# Patient Record
Sex: FEMALE | Race: WHITE | NOT HISPANIC OR LATINO | Employment: UNEMPLOYED | URBAN - NONMETROPOLITAN AREA
[De-identification: names, ages, dates, MRNs, and addresses within clinical notes are randomized per-mention and may not be internally consistent; named-entity substitution may affect disease eponyms.]

---

## 2017-01-17 RX ORDER — BUDESONIDE AND FORMOTEROL FUMARATE DIHYDRATE 160; 4.5 UG/1; UG/1
AEROSOL RESPIRATORY (INHALATION)
Qty: 1 INHALER | Refills: 5 | Status: SHIPPED | OUTPATIENT
Start: 2017-01-17 | End: 2018-02-02 | Stop reason: SDUPTHER

## 2017-01-17 RX ORDER — FEXOFENADINE HYDROCHLORIDE 60 MG/1
TABLET, FILM COATED ORAL
Qty: 60 TABLET | Refills: 5 | Status: SHIPPED | OUTPATIENT
Start: 2017-01-17 | End: 2017-07-27 | Stop reason: SDUPTHER

## 2017-01-17 RX ORDER — FLUTICASONE PROPIONATE 50 MCG
SPRAY, SUSPENSION (ML) NASAL
Qty: 16 G | Refills: 5 | Status: SHIPPED | OUTPATIENT
Start: 2017-01-17 | End: 2017-08-12 | Stop reason: SDUPTHER

## 2017-01-30 DIAGNOSIS — K86.1 CHRONIC PANCREATITIS, UNSPECIFIED PANCREATITIS TYPE (HCC): ICD-10-CM

## 2017-01-31 RX ORDER — PROMETHAZINE HYDROCHLORIDE 25 MG/1
TABLET ORAL
Qty: 90 TABLET | Refills: 0 | Status: SHIPPED | OUTPATIENT
Start: 2017-01-31 | End: 2017-03-30 | Stop reason: SDUPTHER

## 2017-02-07 ENCOUNTER — OFFICE VISIT (OUTPATIENT)
Dept: FAMILY MEDICINE CLINIC | Facility: CLINIC | Age: 57
End: 2017-02-07

## 2017-02-07 VITALS
SYSTOLIC BLOOD PRESSURE: 112 MMHG | BODY MASS INDEX: 27.31 KG/M2 | TEMPERATURE: 99.1 F | OXYGEN SATURATION: 98 % | DIASTOLIC BLOOD PRESSURE: 72 MMHG | HEART RATE: 80 BPM | WEIGHT: 160 LBS | HEIGHT: 64 IN

## 2017-02-07 DIAGNOSIS — R53.81 MALAISE AND FATIGUE: ICD-10-CM

## 2017-02-07 DIAGNOSIS — R53.83 MALAISE AND FATIGUE: ICD-10-CM

## 2017-02-07 DIAGNOSIS — R05.8 COUGH PRODUCTIVE OF PURULENT SPUTUM: Primary | ICD-10-CM

## 2017-02-07 DIAGNOSIS — R50.81 FEVER IN OTHER DISEASES: ICD-10-CM

## 2017-02-07 DIAGNOSIS — Z20.828 EXPOSURE TO THE FLU: ICD-10-CM

## 2017-02-07 DIAGNOSIS — G47.00 PERSISTENT INSOMNIA: ICD-10-CM

## 2017-02-07 DIAGNOSIS — J44.1 COPD EXACERBATION (HCC): ICD-10-CM

## 2017-02-07 LAB
EXPIRATION DATE: NORMAL
FLUAV AG NPH QL: NORMAL
FLUBV AG NPH QL: NORMAL
INTERNAL CONTROL: NORMAL
Lab: NORMAL

## 2017-02-07 PROCEDURE — 96372 THER/PROPH/DIAG INJ SC/IM: CPT | Performed by: FAMILY MEDICINE

## 2017-02-07 PROCEDURE — 87804 INFLUENZA ASSAY W/OPTIC: CPT | Performed by: FAMILY MEDICINE

## 2017-02-07 PROCEDURE — 99214 OFFICE O/P EST MOD 30 MIN: CPT | Performed by: FAMILY MEDICINE

## 2017-02-07 RX ORDER — AZITHROMYCIN 250 MG/1
TABLET, FILM COATED ORAL
Qty: 6 TABLET | Refills: 0 | Status: SHIPPED | OUTPATIENT
Start: 2017-02-07 | End: 2017-02-14

## 2017-02-07 RX ORDER — CEFTRIAXONE 1 G/1
1 INJECTION, POWDER, FOR SOLUTION INTRAMUSCULAR; INTRAVENOUS ONCE
Status: COMPLETED | OUTPATIENT
Start: 2017-02-07 | End: 2017-02-07

## 2017-02-07 RX ORDER — ERGOCALCIFEROL 1.25 MG/1
CAPSULE ORAL
Qty: 4 CAPSULE | Refills: 0 | Status: SHIPPED | OUTPATIENT
Start: 2017-02-07 | End: 2017-08-17 | Stop reason: SDUPTHER

## 2017-02-07 RX ORDER — METHYLPREDNISOLONE 4 MG/1
TABLET ORAL
Qty: 21 TABLET | Refills: 0 | Status: SHIPPED | OUTPATIENT
Start: 2017-02-07 | End: 2017-05-15

## 2017-02-07 RX ORDER — OSELTAMIVIR PHOSPHATE 75 MG/1
75 CAPSULE ORAL 2 TIMES DAILY
Qty: 10 CAPSULE | Refills: 0 | Status: SHIPPED | OUTPATIENT
Start: 2017-02-07 | End: 2017-02-14

## 2017-02-07 RX ORDER — ZOLPIDEM TARTRATE 10 MG/1
10 TABLET ORAL NIGHTLY PRN
Qty: 30 TABLET | Refills: 2 | Status: SHIPPED | OUTPATIENT
Start: 2017-02-07 | End: 2017-05-02 | Stop reason: SDUPTHER

## 2017-02-07 RX ADMIN — CEFTRIAXONE 1 G: 1 INJECTION, POWDER, FOR SOLUTION INTRAMUSCULAR; INTRAVENOUS at 12:28

## 2017-02-07 NOTE — PROGRESS NOTES
Subjective   Katelynn Sung is a 56 y.o. female.     Cough   This is a new problem. The current episode started in the past 7 days (approx 5 days). The problem has been rapidly worsening. The problem occurs every few minutes. The cough is productive of purulent sputum. Associated symptoms include chest pain (with cough), chills, ear congestion, a fever, headaches, myalgias, nasal congestion, postnasal drip, rhinorrhea, a sore throat, shortness of breath, sweats and wheezing. Pertinent negatives include no ear pain, eye redness, hemoptysis, rash or weight loss. The symptoms are aggravated by cold air, exercise and lying down. Risk factors for lung disease include smoking/tobacco exposure. She has tried rest and OTC cough suppressant for the symptoms. The treatment provided no relief. Her past medical history is significant for COPD and pneumonia.   Exposure to several family members with flu over past week.    During coughing spell, accidentally dropped ambien bottle into commode. Requesting refill. Occurred few days ago, barely any sleep since that time.    The following portions of the patient's history were reviewed and updated as appropriate: allergies, current medications, past family history, past medical history, past social history, past surgical history and problem list.    Review of Systems   Constitutional: Positive for appetite change, chills, fatigue and fever. Negative for weight loss.   HENT: Positive for congestion, postnasal drip, rhinorrhea and sore throat. Negative for ear pain, mouth sores, nosebleeds and trouble swallowing.    Eyes: Negative for pain, discharge and redness.   Respiratory: Positive for cough, shortness of breath and wheezing. Negative for hemoptysis.    Cardiovascular: Positive for chest pain (with cough). Negative for palpitations and leg swelling.   Gastrointestinal: Positive for diarrhea and nausea. Negative for abdominal distention and blood in stool.   Genitourinary:  Negative for dysuria and hematuria.   Musculoskeletal: Positive for arthralgias and myalgias.   Skin: Negative for rash.   Neurological: Positive for dizziness, weakness and headaches. Negative for syncope.   Hematological: Negative for adenopathy. Does not bruise/bleed easily.   Psychiatric/Behavioral: Positive for sleep disturbance. Negative for confusion. The patient is nervous/anxious.      Objective    Vitals:    02/07/17 1054   BP: 112/72   Pulse: 80   Temp: 99.1 °F (37.3 °C)   SpO2: 98%     Body mass index is 27.46 kg/(m^2).  Last 2 weights    02/07/17  1054   Weight: 160 lb (72.6 kg)     Physical Exam   Constitutional: She is oriented to person, place, and time. She appears well-developed and well-nourished. She is cooperative. She appears ill. No distress.   HENT:   Head: Normocephalic and atraumatic.   Right Ear: Tympanic membrane, external ear and ear canal normal.   Left Ear: Tympanic membrane, external ear and ear canal normal.   Nose: Mucosal edema present. No rhinorrhea.   Mouth/Throat: Uvula is midline and mucous membranes are normal. Mucous membranes are not dry. Posterior oropharyngeal erythema present. No oropharyngeal exudate.   Eyes: Conjunctivae, EOM and lids are normal.   Neck: Phonation normal. Neck supple. Normal carotid pulses present. No thyroid mass and no thyromegaly present.   Cardiovascular: Normal rate, regular rhythm, normal heart sounds and intact distal pulses.    Pulmonary/Chest: Effort normal. No respiratory distress (deep breathing triggers cough productive of purulent sputum). She has decreased breath sounds. She has wheezes (scattered). She has no rhonchi. She has no rales.   Lymphadenopathy:     She has no cervical adenopathy.   Neurological: She is alert and oriented to person, place, and time. She has normal strength. No cranial nerve deficit. Gait normal.   Skin: Skin is warm and dry. No rash noted.   Psychiatric: She has a normal mood and affect. Her behavior is normal.  Cognition and memory are normal.   Nursing note and vitals reviewed.    Recent Results (from the past 24 hour(s))   POC Influenza A / B    Collection Time: 02/07/17 12:29 PM   Result Value Ref Range    Rapid Influenza A Ag neg     Rapid Influenza B Ag neg     Internal Control Passed Passed    Lot Number [id number]     Expiration Date 6/18      Assessment/Plan   Katelynn was seen today for fever, generalized body aches, cough, diarrhea and chills.    Diagnoses and all orders for this visit:    Cough productive of purulent sputum  -     oseltamivir (TAMIFLU) 75 MG capsule; Take 1 capsule by mouth 2 (Two) Times a Day.  -     MethylPREDNISolone (MEDROL, STAN,) 4 MG tablet; Take as directed on package instructions.  -     azithromycin (ZITHROMAX Z-STAN) 250 MG tablet; Take 2 tablets the first day, then 1 tablet daily for 4 days.  -     cefTRIAXone (ROCEPHIN) injection 1 g; Inject 1 g into the shoulder, thigh, or buttocks 1 (One) Time.    Persistent insomnia  -     zolpidem (AMBIEN) 10 MG tablet; Take 1 tablet by mouth At Night As Needed for sleep.    Fever in other diseases  -     oseltamivir (TAMIFLU) 75 MG capsule; Take 1 capsule by mouth 2 (Two) Times a Day.  -     azithromycin (ZITHROMAX Z-STAN) 250 MG tablet; Take 2 tablets the first day, then 1 tablet daily for 4 days.  -     POC Influenza A / B    Malaise and fatigue  -     oseltamivir (TAMIFLU) 75 MG capsule; Take 1 capsule by mouth 2 (Two) Times a Day.  -     MethylPREDNISolone (MEDROL, STAN,) 4 MG tablet; Take as directed on package instructions.  -     azithromycin (ZITHROMAX Z-STAN) 250 MG tablet; Take 2 tablets the first day, then 1 tablet daily for 4 days.  -     POC Influenza A / B    Exposure to the flu  -     oseltamivir (TAMIFLU) 75 MG capsule; Take 1 capsule by mouth 2 (Two) Times a Day.  -     POC Influenza A / B    COPD exacerbation  -     MethylPREDNISolone (MEDROL, STAN,) 4 MG tablet; Take as directed on package instructions.  -     azithromycin (ZITHROMAX  Z-STAN) 250 MG tablet; Take 2 tablets the first day, then 1 tablet daily for 4 days.  -     cefTRIAXone (ROCEPHIN) injection 1 g; Inject 1 g into the shoulder, thigh, or buttocks 1 (One) Time.    Other orders  -     nystatin (MYCOSTATIN) 679311 UNIT/ML suspension; Take 5 mL by mouth 4 (Four) Times a Day. Switch and swallow    Rapid flu neg but high risk and + exposure, clinically c/w flu. Cover with Tamiflu.  Concern for developing bacterial PNE.  Abx as above. Comorbid COPD- medrol dosepak.  Continue albuterol MDI and ICS.  Symptomatic tx reviewed.  F/U for recheck in 5 days, sooner as needed.  Patient was encouraged to keep me informed of any acute changes, lack of improvement, or any new concerning symptoms.  Pt is aware of reasons to seek emergent care.  Patient voiced understanding of all instructions and denied further questions.

## 2017-02-09 DIAGNOSIS — K59.00 CONSTIPATION, UNSPECIFIED CONSTIPATION TYPE: ICD-10-CM

## 2017-02-09 RX ORDER — DOCUSATE SODIUM 100 MG/1
CAPSULE ORAL
Qty: 60 CAPSULE | Refills: 5 | Status: SHIPPED | OUTPATIENT
Start: 2017-02-09 | End: 2017-08-23 | Stop reason: SDUPTHER

## 2017-02-14 ENCOUNTER — OFFICE VISIT (OUTPATIENT)
Dept: FAMILY MEDICINE CLINIC | Facility: CLINIC | Age: 57
End: 2017-02-14

## 2017-02-14 VITALS
TEMPERATURE: 99.1 F | OXYGEN SATURATION: 97 % | SYSTOLIC BLOOD PRESSURE: 110 MMHG | HEIGHT: 64 IN | BODY MASS INDEX: 27.31 KG/M2 | HEART RATE: 62 BPM | WEIGHT: 160 LBS | DIASTOLIC BLOOD PRESSURE: 72 MMHG

## 2017-02-14 DIAGNOSIS — R05.9 COUGH: Primary | ICD-10-CM

## 2017-02-14 DIAGNOSIS — J98.01 BRONCHOSPASM: ICD-10-CM

## 2017-02-14 DIAGNOSIS — R07.89 ATYPICAL CHEST PAIN: ICD-10-CM

## 2017-02-14 DIAGNOSIS — B37.0 THRUSH: ICD-10-CM

## 2017-02-14 DIAGNOSIS — J44.9 CHRONIC OBSTRUCTIVE PULMONARY DISEASE, UNSPECIFIED COPD TYPE (HCC): ICD-10-CM

## 2017-02-14 PROCEDURE — 94640 AIRWAY INHALATION TREATMENT: CPT | Performed by: FAMILY MEDICINE

## 2017-02-14 PROCEDURE — 99214 OFFICE O/P EST MOD 30 MIN: CPT | Performed by: FAMILY MEDICINE

## 2017-02-14 RX ORDER — DEXTROMETHORPHAN HYDROBROMIDE AND PROMETHAZINE HYDROCHLORIDE 15; 6.25 MG/5ML; MG/5ML
5 SYRUP ORAL EVERY 6 HOURS PRN
Qty: 118 ML | Refills: 0 | Status: SHIPPED | OUTPATIENT
Start: 2017-02-14 | End: 2017-05-15

## 2017-02-14 RX ORDER — FLUCONAZOLE 150 MG/1
150 TABLET ORAL ONCE
Qty: 1 TABLET | Refills: 0 | Status: SHIPPED | OUTPATIENT
Start: 2017-02-14 | End: 2017-02-14

## 2017-02-14 RX ORDER — ALBUTEROL SULFATE 2.5 MG/3ML
2.5 SOLUTION RESPIRATORY (INHALATION) EVERY 4 HOURS PRN
Qty: 270 ML | Refills: 11 | Status: SHIPPED | OUTPATIENT
Start: 2017-02-14 | End: 2018-09-14

## 2017-02-14 RX ORDER — ALBUTEROL SULFATE 2.5 MG/3ML
2.5 SOLUTION RESPIRATORY (INHALATION) EVERY 6 HOURS PRN
Status: DISCONTINUED | OUTPATIENT
Start: 2017-02-14 | End: 2018-11-10

## 2017-02-14 RX ADMIN — ALBUTEROL SULFATE 2.5 MG: 2.5 SOLUTION RESPIRATORY (INHALATION) at 18:19

## 2017-02-14 NOTE — PROGRESS NOTES
"Subjective   Katelynn Sung is a 56 y.o. female.     History of Present Illness   Cough   She is here for f/u of cough. Seen initially one week ago for this. The current episode started 2 weeks ago. At last visit she c/o symptoms rapidly worsening. The problem occured every few minutes. The cough was productive of purulent sputum. Associated symptoms include chest pain (with cough), chills, ear congestion, a fever, headaches, myalgias, nasal congestion, postnasal drip, rhinorrhea, a sore throat, shortness of breath, sweats and wheezing. Pertinent negatives included no ear pain, eye redness, hemoptysis, rash or weight loss. The symptoms were aggravated by cold air, exercise and lying down. Risk factors for lung disease included smoking/tobacco exposure. She had tried rest and OTC cough suppressant for the symptoms. The treatment provided no relief. Her past medical history is significant for COPD and pneumonia. She had reported exposure to several family members with flu over past week. At last visit her flu screen was negative but due to clinical appearance and exposure she was tx'd with tamiflu as well as covered for bacterial infection. She returns today stating that she is much better but continues to have congested cough with chest tightness. Yesterday she began running \"low grade fever\" again.  She is a smoker and has underlying h/o COPD. She does not have nebulizer at home.    She was not able to tolerate the medrol dosepak due to increased anxiety.    The following portions of the patient's history were reviewed and updated as appropriate: allergies, current medications, past family history, past medical history, past social history, past surgical history and problem list.    Review of Systems   Constitutional: Positive for appetite change, chills, fatigue and fever.   HENT: Positive for congestion, postnasal drip, rhinorrhea and sore throat. Negative for ear pain, mouth sores, nosebleeds and trouble " swallowing.    Eyes: Negative for pain, discharge and redness.   Respiratory: Positive for cough, shortness of breath and wheezing.    Cardiovascular: Positive for chest pain (with cough). Negative for palpitations and leg swelling.   Gastrointestinal: Positive for nausea. Negative for abdominal distention, blood in stool, diarrhea and vomiting.   Genitourinary: Negative for dysuria and hematuria.   Musculoskeletal: Positive for arthralgias and myalgias.   Skin: Negative for rash.   Neurological: Positive for weakness. Negative for dizziness, syncope and headaches.   Hematological: Negative for adenopathy. Does not bruise/bleed easily.   Psychiatric/Behavioral: Positive for sleep disturbance. Negative for confusion. The patient is nervous/anxious.      Objective    Vitals:    02/14/17 1124   BP: 110/72   Pulse: 62   Temp: 99.1 °F (37.3 °C)   SpO2: 97%     Body mass index is 27.46 kg/(m^2).  Last 2 weights    02/14/17  1124   Weight: 160 lb (72.6 kg)     Physical Exam   Constitutional: She is oriented to person, place, and time. She appears well-developed and well-nourished. She is cooperative. She appears ill (improved from previous). No distress.   HENT:   Head: Normocephalic and atraumatic.   Right Ear: Tympanic membrane, external ear and ear canal normal.   Left Ear: Tympanic membrane, external ear and ear canal normal.   Nose: Mucosal edema present. No rhinorrhea.   Mouth/Throat: Uvula is midline and mucous membranes are normal. Mucous membranes are not dry. No oral lesions. Posterior oropharyngeal erythema (assoc'd with glossitis, thrush) present.   Eyes: Conjunctivae and lids are normal.   Neck: Phonation normal. Neck supple. Normal carotid pulses present. No thyroid mass and no thyromegaly present.   Cardiovascular: Normal rate, regular rhythm, normal heart sounds and intact distal pulses.    Pulmonary/Chest: Effort normal. No respiratory distress (deep breathing triggers cough). She has decreased breath  sounds. She has wheezes (scattered). She has no rhonchi. She has no rales.       Vascular Status -  Her exam exhibits no right foot edema. Her exam exhibits no left foot edema.  Lymphadenopathy:     She has no cervical adenopathy.   Neurological: She is alert and oriented to person, place, and time. She has normal strength. No cranial nerve deficit. Gait normal.   Skin: Skin is warm and dry. No rash noted.   Psychiatric: She has a normal mood and affect. Her behavior is normal. Cognition and memory are normal.   Nursing note and vitals reviewed.    Assessment/Plan   Katelynn was seen today for cough and fever.    Diagnoses and all orders for this visit:    Cough  -     XR Chest PA & Lateral; Future  -     Home Nebulizer  -     albuterol (PROVENTIL) (2.5 MG/3ML) 0.083% nebulizer solution; Take 2.5 mg by nebulization Every 4 (Four) Hours As Needed for wheezing.    Atypical chest pain  -     XR Chest PA & Lateral; Future    Thrush    Bronchospasm  -     XR Chest PA & Lateral; Future  -     Home Nebulizer  -     albuterol (PROVENTIL) (2.5 MG/3ML) 0.083% nebulizer solution; Take 2.5 mg by nebulization Every 4 (Four) Hours As Needed for wheezing.    Chronic obstructive pulmonary disease, unspecified COPD type  -     Home Nebulizer  -     albuterol (PROVENTIL) (2.5 MG/3ML) 0.083% nebulizer solution; Take 2.5 mg by nebulization Every 4 (Four) Hours As Needed for wheezing.    Other orders  -     fluconazole (DIFLUCAN) 150 MG tablet; Take 1 tablet by mouth 1 (One) Time for 1 dose.  -     promethazine-dextromethorphan (PROMETHAZINE-DM) 6.25-15 MG/5ML syrup; Take 5 mL by mouth Every 6 (Six) Hours As Needed for cough.    Recent acute respiratory illness, initially improving, now worsening again.  CXR to r/o secondary PNE.  Neb x 1 in office today improved SOA, cough.   I will contact patient regarding test results and provide instructions regarding any necessary changes in plan of care.  Patient was encouraged to keep me informed  of any acute changes, lack of improvement, or any new concerning symptoms.  Pt is aware of reasons to seek emergent care.  Patient voiced understanding of all instructions and denied further questions.

## 2017-02-17 DIAGNOSIS — G89.4 CHRONIC PAIN SYNDROME: ICD-10-CM

## 2017-02-20 RX ORDER — GABAPENTIN 800 MG/1
TABLET ORAL
Qty: 90 TABLET | Refills: 1 | Status: SHIPPED | OUTPATIENT
Start: 2017-02-20 | End: 2017-05-15

## 2017-02-28 DIAGNOSIS — M43.02 CERVICAL SPONDYLOLYSIS: ICD-10-CM

## 2017-02-28 DIAGNOSIS — G89.29 CHRONIC NECK PAIN: ICD-10-CM

## 2017-02-28 DIAGNOSIS — M54.2 CHRONIC NECK PAIN: ICD-10-CM

## 2017-02-28 DIAGNOSIS — M50.90 CERVICAL DISC DISEASE: ICD-10-CM

## 2017-03-01 NOTE — TELEPHONE ENCOUNTER
----- Message from Zahraa Phillips MA sent at 3/1/2017  2:20 PM EST -----  I wanted to let you know that I canceled out pt x ray order. She said that she is feeling better and is not going to have it done now

## 2017-03-30 DIAGNOSIS — M50.90 CERVICAL DISC DISEASE: ICD-10-CM

## 2017-03-30 DIAGNOSIS — G89.29 CHRONIC NECK PAIN: ICD-10-CM

## 2017-03-30 DIAGNOSIS — M43.02 CERVICAL SPONDYLOLYSIS: ICD-10-CM

## 2017-03-30 DIAGNOSIS — K86.1 CHRONIC PANCREATITIS, UNSPECIFIED PANCREATITIS TYPE (HCC): ICD-10-CM

## 2017-03-30 DIAGNOSIS — M54.2 CHRONIC NECK PAIN: ICD-10-CM

## 2017-03-31 RX ORDER — PROMETHAZINE HYDROCHLORIDE 25 MG/1
TABLET ORAL
Qty: 90 TABLET | Refills: 0 | Status: SHIPPED | OUTPATIENT
Start: 2017-03-31 | End: 2017-05-30 | Stop reason: SDUPTHER

## 2017-04-06 NOTE — TELEPHONE ENCOUNTER
Dose patient still need to be on the large dose of Vit D. Pharmacy sent a request for vit D 1 a week.

## 2017-04-07 RX ORDER — ERGOCALCIFEROL 1.25 MG/1
CAPSULE ORAL
Qty: 4 CAPSULE | OUTPATIENT
Start: 2017-04-07

## 2017-04-10 RX ORDER — ERGOCALCIFEROL 1.25 MG/1
CAPSULE ORAL
Qty: 4 CAPSULE | OUTPATIENT
Start: 2017-04-10

## 2017-04-10 RX ORDER — PANTOPRAZOLE SODIUM 40 MG/1
TABLET, DELAYED RELEASE ORAL
Qty: 60 TABLET | Refills: 5 | Status: SHIPPED | OUTPATIENT
Start: 2017-04-10 | End: 2017-10-11 | Stop reason: SDUPTHER

## 2017-05-02 DIAGNOSIS — G47.00 PERSISTENT INSOMNIA: ICD-10-CM

## 2017-05-03 RX ORDER — ZOLPIDEM TARTRATE 10 MG/1
TABLET ORAL
Qty: 30 TABLET | Refills: 5 | Status: SHIPPED | OUTPATIENT
Start: 2017-05-03 | End: 2017-08-22 | Stop reason: SDUPTHER

## 2017-05-05 ENCOUNTER — TELEPHONE (OUTPATIENT)
Dept: FAMILY MEDICINE CLINIC | Facility: CLINIC | Age: 57
End: 2017-05-05

## 2017-05-08 RX ORDER — LISINOPRIL AND HYDROCHLOROTHIAZIDE 12.5; 1 MG/1; MG/1
TABLET ORAL
Qty: 30 TABLET | Refills: 5 | Status: SHIPPED | OUTPATIENT
Start: 2017-05-08 | End: 2017-11-14 | Stop reason: SDUPTHER

## 2017-05-15 ENCOUNTER — OFFICE VISIT (OUTPATIENT)
Dept: FAMILY MEDICINE CLINIC | Facility: CLINIC | Age: 57
End: 2017-05-15

## 2017-05-15 VITALS
HEIGHT: 64 IN | HEART RATE: 76 BPM | WEIGHT: 159 LBS | BODY MASS INDEX: 27.14 KG/M2 | OXYGEN SATURATION: 98 % | DIASTOLIC BLOOD PRESSURE: 78 MMHG | SYSTOLIC BLOOD PRESSURE: 122 MMHG

## 2017-05-15 DIAGNOSIS — M50.90 CERVICAL DISC DISEASE: ICD-10-CM

## 2017-05-15 DIAGNOSIS — M54.2 CHRONIC NECK PAIN: ICD-10-CM

## 2017-05-15 DIAGNOSIS — Z78.0 POSTMENOPAUSAL: ICD-10-CM

## 2017-05-15 DIAGNOSIS — J44.9 CHRONIC OBSTRUCTIVE PULMONARY DISEASE, UNSPECIFIED COPD TYPE (HCC): ICD-10-CM

## 2017-05-15 DIAGNOSIS — I10 ESSENTIAL HYPERTENSION: Primary | ICD-10-CM

## 2017-05-15 DIAGNOSIS — E55.9 VITAMIN D DEFICIENCY: ICD-10-CM

## 2017-05-15 DIAGNOSIS — M43.02 CERVICAL SPONDYLOLYSIS: ICD-10-CM

## 2017-05-15 DIAGNOSIS — M62.81 MUSCLE WEAKNESS: ICD-10-CM

## 2017-05-15 DIAGNOSIS — Z12.39 BREAST CANCER SCREENING: ICD-10-CM

## 2017-05-15 DIAGNOSIS — R25.1 TREMOR OF BOTH HANDS: ICD-10-CM

## 2017-05-15 DIAGNOSIS — M40.294 OTHER KYPHOSIS OF THORACIC REGION: ICD-10-CM

## 2017-05-15 DIAGNOSIS — G89.29 CHRONIC NECK PAIN: ICD-10-CM

## 2017-05-15 DIAGNOSIS — M79.7 FIBROMYALGIA: ICD-10-CM

## 2017-05-15 LAB
ALBUMIN SERPL-MCNC: 4.9 G/DL (ref 3.5–5)
ALBUMIN/GLOB SERPL: 1.3 G/DL (ref 1–2)
ALP SERPL-CCNC: 70 U/L (ref 38–126)
ALT SERPL-CCNC: 23 U/L (ref 13–69)
AST SERPL-CCNC: 31 U/L (ref 15–46)
BILIRUB SERPL-MCNC: 0.5 MG/DL (ref 0.2–1.3)
BUN SERPL-MCNC: 11 MG/DL (ref 7–20)
BUN/CREAT SERPL: 13.8 (ref 7.1–23.5)
CALCIUM SERPL-MCNC: 10.1 MG/DL (ref 8.4–10.2)
CHLORIDE SERPL-SCNC: 102 MMOL/L (ref 98–107)
CO2 SERPL-SCNC: 24 MMOL/L (ref 26–30)
CREAT SERPL-MCNC: 0.8 MG/DL (ref 0.6–1.3)
ERYTHROCYTE [DISTWIDTH] IN BLOOD BY AUTOMATED COUNT: 13.6 % (ref 11.5–14.5)
ERYTHROCYTE [SEDIMENTATION RATE] IN BLOOD BY WESTERGREN METHOD: 6 MM/HR (ref 0–20)
GLOBULIN SER CALC-MCNC: 3.7 GM/DL
GLUCOSE SERPL-MCNC: 102 MG/DL (ref 74–98)
HCT VFR BLD AUTO: 39.3 % (ref 37–47)
HGB BLD-MCNC: 13.2 G/DL (ref 12–16)
MCH RBC QN AUTO: 29.1 PG (ref 27–31)
MCHC RBC AUTO-ENTMCNC: 33.6 G/DL (ref 30–37)
MCV RBC AUTO: 86.6 FL (ref 81–99)
PLATELET # BLD AUTO: 276 10*3/MM3 (ref 130–400)
POTASSIUM SERPL-SCNC: 4.2 MMOL/L (ref 3.5–5.1)
PROT SERPL-MCNC: 8.6 G/DL (ref 6.3–8.2)
RBC # BLD AUTO: 4.54 10*6/MM3 (ref 4.2–5.4)
SODIUM SERPL-SCNC: 141 MMOL/L (ref 137–145)
TSH SERPL DL<=0.005 MIU/L-ACNC: 1.89 MIU/ML (ref 0.47–4.68)
VIT B12 SERPL-MCNC: 503 PG/ML (ref 239–931)
WBC # BLD AUTO: 6.35 10*3/MM3 (ref 4.8–10.8)

## 2017-05-15 PROCEDURE — 99214 OFFICE O/P EST MOD 30 MIN: CPT | Performed by: FAMILY MEDICINE

## 2017-05-15 RX ORDER — BUPRENORPHINE HYDROCHLORIDE AND NALOXONE HYDROCHLORIDE DIHYDRATE 8; 2 MG/1; MG/1
TABLET SUBLINGUAL
COMMUNITY
Start: 2017-05-03 | End: 2018-09-05

## 2017-05-15 RX ORDER — PREGABALIN 200 MG/1
200 CAPSULE ORAL 2 TIMES DAILY
Qty: 60 CAPSULE | Refills: 2 | Status: SHIPPED | OUTPATIENT
Start: 2017-05-15 | End: 2017-08-15 | Stop reason: SDUPTHER

## 2017-05-16 ENCOUNTER — TELEPHONE (OUTPATIENT)
Dept: FAMILY MEDICINE CLINIC | Facility: CLINIC | Age: 57
End: 2017-05-16

## 2017-05-19 ENCOUNTER — TELEPHONE (OUTPATIENT)
Dept: FAMILY MEDICINE CLINIC | Facility: CLINIC | Age: 57
End: 2017-05-19

## 2017-05-19 DIAGNOSIS — G89.4 CHRONIC PAIN SYNDROME: ICD-10-CM

## 2017-05-19 RX ORDER — GABAPENTIN 800 MG/1
TABLET ORAL
Qty: 90 TABLET | Refills: 0 | Status: SHIPPED | OUTPATIENT
Start: 2017-05-19 | End: 2017-07-11 | Stop reason: SDUPTHER

## 2017-05-30 DIAGNOSIS — K86.1 CHRONIC PANCREATITIS, UNSPECIFIED PANCREATITIS TYPE (HCC): ICD-10-CM

## 2017-05-30 RX ORDER — PROMETHAZINE HYDROCHLORIDE 25 MG/1
TABLET ORAL
Qty: 90 TABLET | Refills: 0 | Status: SHIPPED | OUTPATIENT
Start: 2017-05-30 | End: 2017-07-25 | Stop reason: SDUPTHER

## 2017-06-16 NOTE — TELEPHONE ENCOUNTER
Patient called requesting a letter to take to her suboxone clinic apt. next Wednesday.  It needs to state why patient is taking or needs to be on gabapentin.  She states she takes it for the nerve pain in her neck and back.

## 2017-07-11 DIAGNOSIS — G89.4 CHRONIC PAIN SYNDROME: ICD-10-CM

## 2017-07-14 RX ORDER — GABAPENTIN 800 MG/1
TABLET ORAL
Qty: 90 TABLET | Refills: 0 | OUTPATIENT
Start: 2017-07-14 | End: 2017-08-15 | Stop reason: SDUPTHER

## 2017-07-25 DIAGNOSIS — K86.1 CHRONIC PANCREATITIS, UNSPECIFIED PANCREATITIS TYPE (HCC): ICD-10-CM

## 2017-07-25 RX ORDER — PROMETHAZINE HYDROCHLORIDE 25 MG/1
TABLET ORAL
Qty: 90 TABLET | Refills: 0 | Status: SHIPPED | OUTPATIENT
Start: 2017-07-25 | End: 2017-09-20 | Stop reason: SDUPTHER

## 2017-07-27 RX ORDER — FEXOFENADINE HYDROCHLORIDE 60 MG/1
TABLET, FILM COATED ORAL
Qty: 60 TABLET | Refills: 5 | Status: SHIPPED | OUTPATIENT
Start: 2017-07-27 | End: 2018-01-05 | Stop reason: SDUPTHER

## 2017-08-14 RX ORDER — FLUTICASONE PROPIONATE 50 MCG
SPRAY, SUSPENSION (ML) NASAL
Qty: 16 G | Refills: 5 | Status: SHIPPED | OUTPATIENT
Start: 2017-08-14 | End: 2018-02-02

## 2017-08-15 ENCOUNTER — OFFICE VISIT (OUTPATIENT)
Dept: FAMILY MEDICINE CLINIC | Facility: CLINIC | Age: 57
End: 2017-08-15

## 2017-08-15 VITALS
OXYGEN SATURATION: 98 % | SYSTOLIC BLOOD PRESSURE: 136 MMHG | BODY MASS INDEX: 26.98 KG/M2 | HEART RATE: 81 BPM | DIASTOLIC BLOOD PRESSURE: 80 MMHG | WEIGHT: 158 LBS | HEIGHT: 64 IN

## 2017-08-15 DIAGNOSIS — E55.9 VITAMIN D DEFICIENCY: ICD-10-CM

## 2017-08-15 DIAGNOSIS — M79.7 FIBROMYALGIA: ICD-10-CM

## 2017-08-15 DIAGNOSIS — M50.90 CERVICAL DISC DISEASE: ICD-10-CM

## 2017-08-15 DIAGNOSIS — G89.4 CHRONIC PAIN SYNDROME: ICD-10-CM

## 2017-08-15 DIAGNOSIS — I10 ESSENTIAL HYPERTENSION: ICD-10-CM

## 2017-08-15 DIAGNOSIS — Z11.59 NEED FOR HEPATITIS C SCREENING TEST: ICD-10-CM

## 2017-08-15 DIAGNOSIS — M25.50 POLYARTHRALGIA: ICD-10-CM

## 2017-08-15 DIAGNOSIS — G89.29 CHRONIC NECK PAIN: Primary | ICD-10-CM

## 2017-08-15 DIAGNOSIS — M54.2 CHRONIC NECK PAIN: Primary | ICD-10-CM

## 2017-08-15 DIAGNOSIS — M43.02 CERVICAL SPONDYLOLYSIS: ICD-10-CM

## 2017-08-15 DIAGNOSIS — L60.8 NAIL DEFORMITY: ICD-10-CM

## 2017-08-15 DIAGNOSIS — J44.9 CHRONIC OBSTRUCTIVE PULMONARY DISEASE, UNSPECIFIED COPD TYPE (HCC): ICD-10-CM

## 2017-08-15 PROCEDURE — 99214 OFFICE O/P EST MOD 30 MIN: CPT | Performed by: FAMILY MEDICINE

## 2017-08-15 PROCEDURE — 96372 THER/PROPH/DIAG INJ SC/IM: CPT | Performed by: FAMILY MEDICINE

## 2017-08-15 RX ORDER — METHYLPREDNISOLONE ACETATE 80 MG/ML
120 INJECTION, SUSPENSION INTRA-ARTICULAR; INTRALESIONAL; INTRAMUSCULAR; SOFT TISSUE ONCE
Status: COMPLETED | OUTPATIENT
Start: 2017-08-15 | End: 2017-08-15

## 2017-08-15 RX ORDER — PREGABALIN 200 MG/1
200 CAPSULE ORAL 2 TIMES DAILY
Qty: 60 CAPSULE | Refills: 2 | Status: SHIPPED | OUTPATIENT
Start: 2017-08-15 | End: 2017-08-15 | Stop reason: SDUPTHER

## 2017-08-15 RX ORDER — PREGABALIN 300 MG/1
CAPSULE ORAL
Qty: 60 CAPSULE | Refills: 2 | Status: SHIPPED | OUTPATIENT
Start: 2017-08-15 | End: 2017-10-28 | Stop reason: SDUPTHER

## 2017-08-15 RX ORDER — GABAPENTIN 800 MG/1
800 TABLET ORAL 3 TIMES DAILY
Qty: 90 TABLET | Refills: 0 | Status: SHIPPED | OUTPATIENT
Start: 2017-08-15 | End: 2017-08-15

## 2017-08-15 RX ADMIN — METHYLPREDNISOLONE ACETATE 120 MG: 80 INJECTION, SUSPENSION INTRA-ARTICULAR; INTRALESIONAL; INTRAMUSCULAR; SOFT TISSUE at 12:48

## 2017-08-15 NOTE — PROGRESS NOTES
"Subjective   Katelynn Sung is a 57 y.o. female.     History of Present Illness  Mrs. Sung presents today for f/u on several issues:  1) She has h/o chronic pancreatitis assoc'd with chronic nausea. Is followed by Dr. Ching. Denies fever, recent change in bowel habits. Weight has been stable. Uses anti-emetics frequently. Assoc'd dx of GERD and Humphreys's esophagus. Taking PPI as directed. Has chronic constipation. Uses MOM once per week at least which helps.  2) H/O HTN for several years. Taking meds as rx'd. Denies side effects. Fair compliance with low salt diet. Intermittently exercise (walking). Denies side effects from meds.  3) Having chronic neck pain due to assoc'd C-DDD/DJD. Has failed PT in the past. Aching/burning pain radiates into bilateral shoulder and upper back. Feels her upper back \"hunching over\" makes her head hurt worse. Reports mid/upper thoracic aching pain as well.  Continues to have diffuse muscle pain. Worst in right arm/hand. Previous dx of fibromyalgia as well as diffuse DJD/DDD. Currently on Lyrica which she feels works better than gabapentin but would like to increase dosage. She denies known side effects. It came to my attention that she never dc'd gabapentin and has been refilling and taking both. She is willing to d/c gabapentin but is worried about possible increase in pain.   4) COPD- stable, no recent increased cough, no sputum production, no fever or weight loss. Taking meds as rx'd. Not smoking.  5) She c/o increased allergy symptoms this late summer including scratchy throat, rhinorrhea, postnasal drip, etc. SHe has been taking claritin intermittently with some help. Has done well with short course of steroids in the past for allergic exacerbations.  6) SHe has h/o chronic insomnia. Doing well with ambien for sleep initiation. SHe denies side effects.    Has not yet had mammogram as previously ordered.    The following portions of the patient's history were reviewed and " updated as appropriate: allergies, current medications, past family history, past medical history, past social history, past surgical history and problem list.    Review of Systems   Constitutional: Positive for fatigue. Negative for activity change, appetite change, chills, fever and unexpected weight change.   HENT: Negative for congestion, nosebleeds, rhinorrhea, sore throat and trouble swallowing.    Eyes: Negative.    Respiratory: Negative.    Cardiovascular: Negative.    Gastrointestinal: Positive for constipation and nausea. Negative for abdominal pain, blood in stool, diarrhea and vomiting.   Endocrine: Negative.    Genitourinary: Negative.    Musculoskeletal: Positive for arthralgias, back pain, myalgias, neck pain and neck stiffness.   Skin: Negative.    Neurological: Positive for tremors, weakness and headaches. Negative for dizziness, syncope, facial asymmetry and speech difficulty.   Hematological: Negative.    Psychiatric/Behavioral: Positive for sleep disturbance. Negative for confusion and dysphoric mood. The patient is nervous/anxious.        Objective    Vitals:    08/15/17 1055   BP: 136/80   Pulse: 81   SpO2: 98%     Body mass index is 27.12 kg/(m^2).  Last 2 weights    08/15/17  1055   Weight: 158 lb (71.7 kg)       Physical Exam   Constitutional: She is oriented to person, place, and time. She appears well-developed and well-nourished. She is cooperative. She does not appear ill. No distress.   HENT:   Head: Normocephalic and atraumatic.   Right Ear: Tympanic membrane, external ear and ear canal normal.   Left Ear: Tympanic membrane, external ear and ear canal normal.   Nose: Mucosal edema and rhinorrhea (clear) present.   Mouth/Throat: Mucous membranes are normal. Mucous membranes are not dry. No oral lesions. No posterior oropharyngeal erythema.   Eyes: Conjunctivae, EOM and lids are normal.   Neck: Phonation normal. Neck supple. Normal carotid pulses present. No thyroid mass and no  thyromegaly present.   Cardiovascular: Normal rate, regular rhythm, normal heart sounds and intact distal pulses.    Pulmonary/Chest: Effort normal. She has decreased breath sounds. She has no wheezes. She has no rhonchi. She has no rales.   Musculoskeletal:        Cervical back: She exhibits decreased range of motion, tenderness (diffuse soft tissue of neck, upper back), bony tenderness (C3-C7) and deformity (kyphotic posture).   Diffuse soft tissue TTP       Vascular Status -  Her exam exhibits no right foot edema. Her exam exhibits no left foot edema.  Lymphadenopathy:     She has no cervical adenopathy.        Right: No supraclavicular adenopathy present.        Left: No supraclavicular adenopathy present.   Neurological: She is alert and oriented to person, place, and time. She has normal strength. She displays tremor (mild, bilateral hands). No cranial nerve deficit or sensory deficit. Gait normal.   Skin: Skin is warm and dry. No ecchymosis and no rash noted.   Nail thickening and linear white streaks   Psychiatric: She has a normal mood and affect. Her speech is normal and behavior is normal. Thought content normal. Cognition and memory are normal.   Nursing note and vitals reviewed.      Assessment/Plan   Katelynn was seen today for med refill.    Diagnoses and all orders for this visit:    Chronic neck pain  -     Discontinue: pregabalin (LYRICA) 200 MG capsule; Take 1 capsule by mouth 2 (Two) Times a Day.  -     pregabalin (LYRICA) 300 MG capsule; 1 po bid  -     methylPREDNISolone acetate (DEPO-medrol) injection 120 mg; Inject 1.5 mL into the shoulder, thigh, or buttocks 1 (One) Time.    Cervical disc disease  -     Discontinue: pregabalin (LYRICA) 200 MG capsule; Take 1 capsule by mouth 2 (Two) Times a Day.  -     pregabalin (LYRICA) 300 MG capsule; 1 po bid    Cervical spondylolysis  -     Discontinue: pregabalin (LYRICA) 200 MG capsule; Take 1 capsule by mouth 2 (Two) Times a Day.  -     pregabalin  (LYRICA) 300 MG capsule; 1 po bid    Chronic pain syndrome  -     Discontinue: gabapentin (NEURONTIN) 800 MG tablet; Take 1 tablet by mouth 3 (Three) Times a Day.    Essential hypertension    Chronic obstructive pulmonary disease, unspecified COPD type  -     methylPREDNISolone acetate (DEPO-medrol) injection 120 mg; Inject 1.5 mL into the shoulder, thigh, or buttocks 1 (One) Time.    Vitamin D deficiency    Fibromyalgia    Polyarthralgia  -     Sedimentation Rate  -     C-reactive Protein  -     Rheumatoid Factor, Quant  -     MERT With / DsDNA, RNP, Sjogrens A / B, Garcia    Nail deformity  -     Sedimentation Rate  -     C-reactive Protein  -     Rheumatoid Factor, Quant  -     MERT With / DsDNA, RNP, Sjogrens A / B, Garcia    Need for hepatitis C screening test  -     Hepatitis C Antibody    Other orders  -     Hepatitis C Antibody    Allergy treatment with claritin and flonase as well as IM DepoMedrol as above.  Lab eval for autoimmune/inflammatory causes of joint pain.  Chronic pain due to arthritis/FMSx/C-DDD. She will continue Lyrica at max dose and d/c gabapentin.  COPD stable. HTN controlled.  Continue vit D replacement.  I will contact patient regarding test results and provide instructions regarding any necessary changes in plan of care.  Routine f/u in 3 months, sooner as needed/instructed.  Patient was encouraged to keep me informed of any acute changes, lack of improvement, or any new concerning symptoms.  Pt is aware of reasons to seek emergent care.  Patient voiced understanding of all instructions and denied further questions.  As part of patient's treatment plan I am prescribing a controlled substance.  The patient has been made aware of the appropriate use of such medications, including potential risk of somnolence, limited ability to drive and/or work safely, and potential for dependence and/or overdose.  It has also been made clear that these medications are for use by this patient only, without  concomitant use of alcohol or other substances, unless prescribed.  DANTE report reviewed and scanned into chart.  Last DANTE date 8/15/17.

## 2017-08-16 LAB
ANA SER QL: NEGATIVE
CRP SERPL-MCNC: <0.5 MG/DL (ref 0–1)
ERYTHROCYTE [SEDIMENTATION RATE] IN BLOOD BY WESTERGREN METHOD: 19 MM/HR (ref 0–20)
HCV AB S/CO SERPL IA: <0.1 S/CO RATIO (ref 0–0.9)
RHEUMATOID FACT SERPL-ACNC: <10 IU/ML (ref 0–13.9)

## 2017-08-22 DIAGNOSIS — G47.00 PERSISTENT INSOMNIA: ICD-10-CM

## 2017-08-23 DIAGNOSIS — K59.00 CONSTIPATION, UNSPECIFIED CONSTIPATION TYPE: ICD-10-CM

## 2017-08-23 RX ORDER — DOCUSATE SODIUM 100 MG/1
CAPSULE ORAL
Qty: 60 CAPSULE | Refills: 5 | Status: SHIPPED | OUTPATIENT
Start: 2017-08-23 | End: 2017-11-15

## 2017-08-25 RX ORDER — ERGOCALCIFEROL 1.25 MG/1
CAPSULE ORAL
Qty: 4 CAPSULE | Refills: 0 | Status: SHIPPED | OUTPATIENT
Start: 2017-08-25 | End: 2017-10-10 | Stop reason: SDUPTHER

## 2017-08-25 RX ORDER — ZOLPIDEM TARTRATE 10 MG/1
TABLET ORAL
Qty: 30 TABLET | Refills: 5 | OUTPATIENT
Start: 2017-08-25 | End: 2018-02-05 | Stop reason: SDUPTHER

## 2017-09-20 DIAGNOSIS — K86.1 CHRONIC PANCREATITIS, UNSPECIFIED PANCREATITIS TYPE (HCC): ICD-10-CM

## 2017-09-22 RX ORDER — PROMETHAZINE HYDROCHLORIDE 25 MG/1
TABLET ORAL
Qty: 90 TABLET | Refills: 0 | Status: SHIPPED | OUTPATIENT
Start: 2017-09-22 | End: 2017-10-21 | Stop reason: SDUPTHER

## 2017-10-10 RX ORDER — ERGOCALCIFEROL 1.25 MG/1
CAPSULE ORAL
Qty: 4 CAPSULE | Refills: 0 | Status: SHIPPED | OUTPATIENT
Start: 2017-10-10 | End: 2017-11-08 | Stop reason: SDUPTHER

## 2017-10-11 RX ORDER — PANTOPRAZOLE SODIUM 40 MG/1
TABLET, DELAYED RELEASE ORAL
Qty: 60 TABLET | Refills: 5 | Status: SHIPPED | OUTPATIENT
Start: 2017-10-11 | End: 2019-01-22 | Stop reason: SDUPTHER

## 2017-10-21 DIAGNOSIS — K86.1 CHRONIC PANCREATITIS, UNSPECIFIED PANCREATITIS TYPE (HCC): ICD-10-CM

## 2017-10-23 RX ORDER — PROMETHAZINE HYDROCHLORIDE 25 MG/1
TABLET ORAL
Qty: 90 TABLET | Refills: 0 | Status: SHIPPED | OUTPATIENT
Start: 2017-10-23 | End: 2017-11-17 | Stop reason: SDUPTHER

## 2017-10-28 DIAGNOSIS — M50.90 CERVICAL DISC DISEASE: ICD-10-CM

## 2017-10-28 DIAGNOSIS — M54.2 CHRONIC NECK PAIN: ICD-10-CM

## 2017-10-28 DIAGNOSIS — G89.29 CHRONIC NECK PAIN: ICD-10-CM

## 2017-10-28 DIAGNOSIS — M43.02 CERVICAL SPONDYLOLYSIS: ICD-10-CM

## 2017-11-08 RX ORDER — ERGOCALCIFEROL 1.25 MG/1
CAPSULE ORAL
Qty: 4 CAPSULE | Refills: 0 | Status: SHIPPED | OUTPATIENT
Start: 2017-11-08 | End: 2017-12-11 | Stop reason: SDUPTHER

## 2017-11-14 RX ORDER — LISINOPRIL AND HYDROCHLOROTHIAZIDE 12.5; 1 MG/1; MG/1
TABLET ORAL
Qty: 30 TABLET | Refills: 0 | Status: SHIPPED | OUTPATIENT
Start: 2017-11-14 | End: 2017-12-11 | Stop reason: SDUPTHER

## 2017-11-15 ENCOUNTER — OFFICE VISIT (OUTPATIENT)
Dept: FAMILY MEDICINE CLINIC | Facility: CLINIC | Age: 57
End: 2017-11-15

## 2017-11-15 VITALS
WEIGHT: 159 LBS | DIASTOLIC BLOOD PRESSURE: 78 MMHG | HEART RATE: 90 BPM | TEMPERATURE: 98.6 F | SYSTOLIC BLOOD PRESSURE: 104 MMHG | HEIGHT: 64 IN | OXYGEN SATURATION: 97 % | BODY MASS INDEX: 27.14 KG/M2

## 2017-11-15 DIAGNOSIS — K59.00 CONSTIPATION, UNSPECIFIED CONSTIPATION TYPE: ICD-10-CM

## 2017-11-15 DIAGNOSIS — I10 ESSENTIAL HYPERTENSION: ICD-10-CM

## 2017-11-15 DIAGNOSIS — R73.01 IFG (IMPAIRED FASTING GLUCOSE): ICD-10-CM

## 2017-11-15 DIAGNOSIS — J44.9 CHRONIC OBSTRUCTIVE PULMONARY DISEASE, UNSPECIFIED COPD TYPE (HCC): Primary | ICD-10-CM

## 2017-11-15 DIAGNOSIS — Z23 NEED FOR INFLUENZA VACCINATION: ICD-10-CM

## 2017-11-15 DIAGNOSIS — M79.7 FIBROMYALGIA: ICD-10-CM

## 2017-11-15 DIAGNOSIS — B37.0 THRUSH: ICD-10-CM

## 2017-11-15 LAB
ALBUMIN SERPL-MCNC: 4.8 G/DL (ref 3.5–5)
ALBUMIN/GLOB SERPL: 1.5 G/DL (ref 1–2)
ALP SERPL-CCNC: 67 U/L (ref 38–126)
ALT SERPL-CCNC: 21 U/L (ref 13–69)
AST SERPL-CCNC: 27 U/L (ref 15–46)
BILIRUB SERPL-MCNC: 0.4 MG/DL (ref 0.2–1.3)
BUN SERPL-MCNC: 11 MG/DL (ref 7–20)
BUN/CREAT SERPL: 11 (ref 7.1–23.5)
CALCIUM SERPL-MCNC: 10 MG/DL (ref 8.4–10.2)
CHLORIDE SERPL-SCNC: 99 MMOL/L (ref 98–107)
CO2 SERPL-SCNC: 26 MMOL/L (ref 26–30)
CREAT SERPL-MCNC: 1 MG/DL (ref 0.6–1.3)
GFR SERPLBLD CREATININE-BSD FMLA CKD-EPI: 57 ML/MIN/1.73
GFR SERPLBLD CREATININE-BSD FMLA CKD-EPI: 70 ML/MIN/1.73
GLOBULIN SER CALC-MCNC: 3.2 GM/DL
GLUCOSE SERPL-MCNC: 93 MG/DL (ref 74–98)
HBA1C MFR BLD: 5.5 %
POTASSIUM SERPL-SCNC: 4.7 MMOL/L (ref 3.5–5.1)
PROT SERPL-MCNC: 8 G/DL (ref 6.3–8.2)
SODIUM SERPL-SCNC: 142 MMOL/L (ref 137–145)

## 2017-11-15 PROCEDURE — 90686 IIV4 VACC NO PRSV 0.5 ML IM: CPT | Performed by: FAMILY MEDICINE

## 2017-11-15 PROCEDURE — 99214 OFFICE O/P EST MOD 30 MIN: CPT | Performed by: FAMILY MEDICINE

## 2017-11-15 PROCEDURE — 90471 IMMUNIZATION ADMIN: CPT | Performed by: FAMILY MEDICINE

## 2017-11-15 NOTE — PROGRESS NOTES
Subjective   Katelynn Sung is a 57 y.o. female.     History of Present Illness  Ms. Sung presents today for routine f/u on several chronic issues.  1) SHe has chronic pain due to FMSx as well as DDD. She is currently on Lyrica and feels symptoms are stable. Denies side effect from current medication. No recent injury or fall.  2) She has chronic constipation assoc'd with IBSx. She has been using routine of fiber, miralax, ,stool softener without much improvement recently. Assoc'd with cramping abd pain and bloating. No blood in stool. No weight loss.  3) She has HTN. Currently on Zestoretic with good compliance. BP has been well controlled. No CP, SOA, palpitations, edema.  4) She has COPD. Is no longer smoking cigarettes but is using vapor. Using Symbicort and proventil as needed. No recent increase in cough, wheeze, CHU. No recent illness/fever.  5) Has h/o IFG; is due for recheck A1c. Has struggled with recurrent thrush as well.    The following portions of the patient's history were reviewed and updated as appropriate: allergies, current medications, past family history, past medical history, past social history, past surgical history and problem list.    Review of Systems   Constitutional: Positive for fatigue. Negative for activity change, appetite change, chills, fever and unexpected weight change.   HENT: Negative for congestion, nosebleeds, rhinorrhea, sore throat and trouble swallowing.    Eyes: Negative.    Respiratory: Negative.    Cardiovascular: Negative.    Gastrointestinal: Positive for constipation and nausea. Negative for abdominal pain, blood in stool, diarrhea and vomiting.   Endocrine: Negative.    Genitourinary: Negative.    Musculoskeletal: Positive for arthralgias, back pain, myalgias, neck pain and neck stiffness.   Skin: Negative.    Neurological: Positive for tremors, weakness and headaches. Negative for dizziness, syncope, facial asymmetry and speech difficulty.   Hematological:  Negative.    Psychiatric/Behavioral: Positive for sleep disturbance. Negative for confusion and dysphoric mood. The patient is nervous/anxious.        Objective    Vitals:    11/15/17 1116   BP: 104/78   Pulse: 90   Temp: 98.6 °F (37 °C)   SpO2: 97%     Body mass index is 27.29 kg/(m^2).  Last 2 weights    11/15/17  1116   Weight: 159 lb (72.1 kg)       Physical Exam   Constitutional: She is oriented to person, place, and time. She appears well-developed and well-nourished. She is cooperative. She does not appear ill. No distress.   HENT:   Head: Normocephalic and atraumatic.   Right Ear: Tympanic membrane, external ear and ear canal normal.   Left Ear: Tympanic membrane, external ear and ear canal normal.   Nose: Nose normal.   Mouth/Throat: Mucous membranes are dry (somewhat). No oral lesions. No posterior oropharyngeal erythema.   Mild glossitis noted   Eyes: Conjunctivae and lids are normal.   Neck: Phonation normal. Neck supple. Normal carotid pulses present. No thyroid mass and no thyromegaly present.   Cardiovascular: Normal rate, regular rhythm, normal heart sounds and intact distal pulses.    Pulmonary/Chest: Effort normal. She has decreased breath sounds. She has no wheezes. She has no rhonchi. She has no rales.   Musculoskeletal:        Cervical back: She exhibits decreased range of motion, tenderness (diffuse soft tissue of neck, upper back), bony tenderness (C3-C7) and deformity (kyphotic posture).        Thoracic back: She exhibits deformity (kyphotic posture). She exhibits no bony tenderness.   Diffuse soft tissue TTP       Vascular Status -  Her exam exhibits no right foot edema. Her exam exhibits no left foot edema.  Lymphadenopathy:     She has no cervical adenopathy.        Right: No supraclavicular adenopathy present.        Left: No supraclavicular adenopathy present.   Neurological: She is alert and oriented to person, place, and time. She has normal strength. She displays tremor (mild,  bilateral hands). Gait normal.   Skin: Skin is warm and dry. No ecchymosis and no rash noted.   Psychiatric: She has a normal mood and affect. Her speech is normal and behavior is normal. Thought content normal. Cognition and memory are normal.   Nursing note and vitals reviewed.      Assessment/Plan   Katelynn was seen today for follow-up and hypertension.    Diagnoses and all orders for this visit:    Chronic obstructive pulmonary disease, unspecified COPD type  -     Comprehensive Metabolic Panel    Need for influenza vaccination  -     Flu Vaccine Quad PF 3YR+    Essential hypertension  -     Comprehensive Metabolic Panel    Constipation, unspecified constipation type  -     Comprehensive Metabolic Panel    Fibromyalgia    Thrush    IFG (impaired fasting glucose)  -     Hemoglobin A1c    COPD stable. Continue current meds. She is encouraged to wean off nicotine replacement.  HTN controlled.  Chronic constipation with IBSx. I have reviewed risks/benefits and potential side effects of various treatment options. Pt voices understanding and wishes to proceed with trial of Linzess and probiotics along with her usual colace, miralax, fiber, etc.  Chronic pain is stable. Continue Lyrica.  Use nystatin as needed for thrush.  I will contact patient regarding test results and provide instructions regarding any necessary changes in plan of care.  Patient was encouraged to keep me informed of any acute changes, lack of improvement, or any new concerning symptoms.  Pt is aware of reasons to seek emergent care.  Patient voiced understanding of all instructions and denied further questions.

## 2017-11-16 ENCOUNTER — TELEPHONE (OUTPATIENT)
Dept: FAMILY MEDICINE CLINIC | Facility: CLINIC | Age: 57
End: 2017-11-16

## 2017-11-16 DIAGNOSIS — K59.09 CHRONIC CONSTIPATION: Primary | ICD-10-CM

## 2017-11-16 RX ORDER — LACTOBACILLUS RHAMNOSUS GG 10B CELL
1 CAPSULE ORAL 2 TIMES DAILY
Qty: 60 CAPSULE | Refills: 5 | Status: SHIPPED | OUTPATIENT
Start: 2017-11-16 | End: 2018-02-15

## 2017-11-17 ENCOUNTER — TELEPHONE (OUTPATIENT)
Dept: FAMILY MEDICINE CLINIC | Facility: CLINIC | Age: 57
End: 2017-11-17

## 2017-11-17 DIAGNOSIS — K86.1 CHRONIC PANCREATITIS, UNSPECIFIED PANCREATITIS TYPE (HCC): ICD-10-CM

## 2017-11-17 RX ORDER — PROMETHAZINE HYDROCHLORIDE 25 MG/1
25 TABLET ORAL EVERY 8 HOURS PRN
Qty: 90 TABLET | Refills: 0 | OUTPATIENT
Start: 2017-11-17 | End: 2017-12-14 | Stop reason: SDUPTHER

## 2017-11-20 ENCOUNTER — OFFICE VISIT (OUTPATIENT)
Dept: FAMILY MEDICINE CLINIC | Facility: CLINIC | Age: 57
End: 2017-11-20

## 2017-11-20 VITALS
DIASTOLIC BLOOD PRESSURE: 74 MMHG | SYSTOLIC BLOOD PRESSURE: 108 MMHG | HEIGHT: 64 IN | HEART RATE: 84 BPM | BODY MASS INDEX: 27.49 KG/M2 | WEIGHT: 161 LBS | OXYGEN SATURATION: 97 %

## 2017-11-20 DIAGNOSIS — B37.0 ORAL CANDIDIASIS: ICD-10-CM

## 2017-11-20 DIAGNOSIS — R09.82 PND (POST-NASAL DRIP): ICD-10-CM

## 2017-11-20 DIAGNOSIS — J01.10 ACUTE NON-RECURRENT FRONTAL SINUSITIS: ICD-10-CM

## 2017-11-20 DIAGNOSIS — J01.00 ACUTE NON-RECURRENT MAXILLARY SINUSITIS: ICD-10-CM

## 2017-11-20 DIAGNOSIS — J02.9 ACUTE PHARYNGITIS, UNSPECIFIED ETIOLOGY: ICD-10-CM

## 2017-11-20 LAB
EXPIRATION DATE: NORMAL
INTERNAL CONTROL: NORMAL
Lab: NORMAL
S PYO AG THROAT QL: NEGATIVE

## 2017-11-20 PROCEDURE — 96372 THER/PROPH/DIAG INJ SC/IM: CPT | Performed by: NURSE PRACTITIONER

## 2017-11-20 PROCEDURE — 99213 OFFICE O/P EST LOW 20 MIN: CPT | Performed by: NURSE PRACTITIONER

## 2017-11-20 PROCEDURE — 87880 STREP A ASSAY W/OPTIC: CPT | Performed by: NURSE PRACTITIONER

## 2017-11-20 RX ORDER — DOCUSATE SODIUM 100 MG/1
CAPSULE, LIQUID FILLED ORAL
COMMUNITY
Start: 2017-11-18 | End: 2019-09-23 | Stop reason: SDDI

## 2017-11-20 RX ORDER — FLUCONAZOLE 150 MG/1
150 TABLET ORAL ONCE
Qty: 1 TABLET | Refills: 1 | Status: SHIPPED | OUTPATIENT
Start: 2017-11-20 | End: 2017-11-20

## 2017-11-20 RX ORDER — METHYLPREDNISOLONE ACETATE 80 MG/ML
120 INJECTION, SUSPENSION INTRA-ARTICULAR; INTRALESIONAL; INTRAMUSCULAR; SOFT TISSUE ONCE
Status: COMPLETED | OUTPATIENT
Start: 2017-11-20 | End: 2017-11-20

## 2017-11-20 RX ORDER — LEVOFLOXACIN 500 MG/1
500 TABLET, FILM COATED ORAL DAILY
Qty: 7 TABLET | Refills: 0 | Status: SHIPPED | OUTPATIENT
Start: 2017-11-20 | End: 2017-11-27

## 2017-11-20 RX ADMIN — METHYLPREDNISOLONE ACETATE 120 MG: 80 INJECTION, SUSPENSION INTRA-ARTICULAR; INTRALESIONAL; INTRAMUSCULAR; SOFT TISSUE at 19:03

## 2017-11-22 ENCOUNTER — TELEPHONE (OUTPATIENT)
Dept: FAMILY MEDICINE CLINIC | Facility: CLINIC | Age: 57
End: 2017-11-22

## 2017-11-22 NOTE — TELEPHONE ENCOUNTER
----- Message from Zahraa Phillips MA sent at 11/20/2017  7:36 PM EST -----  Regarding: FW: PA STATUS  Contact: [phone]      ----- Message -----     From: Madeleine Mendoza     Sent: 11/17/2017   3:52 PM       To: [facility]  Subject: PA STATUS                                        Pt called requesting status of PA on her [employer] rx.

## 2017-11-29 ENCOUNTER — TELEPHONE (OUTPATIENT)
Dept: FAMILY MEDICINE CLINIC | Facility: CLINIC | Age: 57
End: 2017-11-29

## 2017-12-01 RX ORDER — POLYETHYLENE GLYCOL 3350 17 G/17G
17 POWDER, FOR SOLUTION ORAL 2 TIMES DAILY PRN
Qty: 14 EACH | Refills: 0 | Status: SHIPPED | OUTPATIENT
Start: 2017-12-01 | End: 2018-02-15

## 2017-12-01 NOTE — TELEPHONE ENCOUNTER
They want her to have failed other meds first. I know she has tried miralax previously and it did not work, but she will have to fill rx for miralax, try for 7 days, then fail. Then they may approve Linzess.

## 2017-12-11 RX ORDER — ERGOCALCIFEROL 1.25 MG/1
CAPSULE ORAL
Qty: 4 CAPSULE | Refills: 1 | Status: SHIPPED | OUTPATIENT
Start: 2017-12-11 | End: 2018-01-26 | Stop reason: SDUPTHER

## 2017-12-11 RX ORDER — LISINOPRIL AND HYDROCHLOROTHIAZIDE 12.5; 1 MG/1; MG/1
TABLET ORAL
Qty: 30 TABLET | Refills: 5 | Status: SHIPPED | OUTPATIENT
Start: 2017-12-11 | End: 2018-04-24 | Stop reason: SDUPTHER

## 2017-12-14 DIAGNOSIS — K86.1 CHRONIC PANCREATITIS, UNSPECIFIED PANCREATITIS TYPE (HCC): ICD-10-CM

## 2017-12-14 RX ORDER — PROMETHAZINE HYDROCHLORIDE 25 MG/1
TABLET ORAL
Qty: 90 TABLET | Refills: 0 | Status: SHIPPED | OUTPATIENT
Start: 2017-12-14 | End: 2018-01-17 | Stop reason: SDUPTHER

## 2018-01-02 DIAGNOSIS — G89.29 CHRONIC NECK PAIN: ICD-10-CM

## 2018-01-02 DIAGNOSIS — M43.02 CERVICAL SPONDYLOLYSIS: ICD-10-CM

## 2018-01-02 DIAGNOSIS — M54.2 CHRONIC NECK PAIN: ICD-10-CM

## 2018-01-02 DIAGNOSIS — M50.90 CERVICAL DISC DISEASE: ICD-10-CM

## 2018-01-05 RX ORDER — FEXOFENADINE HYDROCHLORIDE 60 MG/1
TABLET, FILM COATED ORAL
Qty: 60 TABLET | Refills: 5 | Status: SHIPPED | OUTPATIENT
Start: 2018-01-05 | End: 2018-06-15 | Stop reason: SDUPTHER

## 2018-01-17 DIAGNOSIS — K86.1 CHRONIC PANCREATITIS, UNSPECIFIED PANCREATITIS TYPE (HCC): ICD-10-CM

## 2018-01-17 RX ORDER — PROMETHAZINE HYDROCHLORIDE 25 MG/1
TABLET ORAL
Qty: 90 TABLET | Refills: 1 | Status: SHIPPED | OUTPATIENT
Start: 2018-01-17 | End: 2018-03-09 | Stop reason: SDUPTHER

## 2018-01-26 ENCOUNTER — OFFICE VISIT (OUTPATIENT)
Dept: FAMILY MEDICINE CLINIC | Facility: CLINIC | Age: 58
End: 2018-01-26

## 2018-01-26 VITALS
OXYGEN SATURATION: 97 % | HEIGHT: 64 IN | BODY MASS INDEX: 27.31 KG/M2 | HEART RATE: 72 BPM | TEMPERATURE: 98.4 F | DIASTOLIC BLOOD PRESSURE: 60 MMHG | WEIGHT: 160 LBS | SYSTOLIC BLOOD PRESSURE: 122 MMHG

## 2018-01-26 DIAGNOSIS — R05.9 COUGH: ICD-10-CM

## 2018-01-26 DIAGNOSIS — E55.9 VITAMIN D DEFICIENCY: ICD-10-CM

## 2018-01-26 DIAGNOSIS — N95.1 POSTMENOPAUSAL DISORDER: ICD-10-CM

## 2018-01-26 DIAGNOSIS — R52 BODY ACHES: ICD-10-CM

## 2018-01-26 DIAGNOSIS — J20.8 ACUTE BRONCHITIS DUE TO OTHER SPECIFIED ORGANISMS: Primary | ICD-10-CM

## 2018-01-26 DIAGNOSIS — R53.82 CHRONIC FATIGUE: ICD-10-CM

## 2018-01-26 PROCEDURE — 87804 INFLUENZA ASSAY W/OPTIC: CPT | Performed by: FAMILY MEDICINE

## 2018-01-26 PROCEDURE — 96372 THER/PROPH/DIAG INJ SC/IM: CPT | Performed by: FAMILY MEDICINE

## 2018-01-26 PROCEDURE — 99214 OFFICE O/P EST MOD 30 MIN: CPT | Performed by: FAMILY MEDICINE

## 2018-01-26 RX ORDER — PREDNISONE 10 MG/1
TABLET ORAL
Qty: 15 TABLET | Refills: 0 | Status: SHIPPED | OUTPATIENT
Start: 2018-01-26 | End: 2018-02-02

## 2018-01-26 RX ORDER — ALBUTEROL SULFATE 90 UG/1
2 AEROSOL, METERED RESPIRATORY (INHALATION) EVERY 4 HOURS PRN
Qty: 18 G | Refills: 1 | Status: SHIPPED | OUTPATIENT
Start: 2018-01-26 | End: 2018-02-16 | Stop reason: SDUPTHER

## 2018-01-26 RX ORDER — FLUCONAZOLE 150 MG/1
TABLET ORAL
COMMUNITY
Start: 2017-12-16 | End: 2018-02-02

## 2018-01-26 RX ORDER — DOXYCYCLINE 100 MG/1
100 CAPSULE ORAL EVERY 12 HOURS SCHEDULED
Qty: 20 CAPSULE | Refills: 0 | Status: SHIPPED | OUTPATIENT
Start: 2018-01-26 | End: 2018-02-02

## 2018-01-26 RX ORDER — METHYLPREDNISOLONE ACETATE 80 MG/ML
120 INJECTION, SUSPENSION INTRA-ARTICULAR; INTRALESIONAL; INTRAMUSCULAR; SOFT TISSUE ONCE
Status: COMPLETED | OUTPATIENT
Start: 2018-01-26 | End: 2018-01-26

## 2018-01-26 RX ORDER — ERGOCALCIFEROL 1.25 MG/1
CAPSULE ORAL
Qty: 4 CAPSULE | Refills: 0 | Status: SHIPPED | OUTPATIENT
Start: 2018-01-26 | End: 2018-03-22 | Stop reason: SDUPTHER

## 2018-01-26 RX ORDER — POLYETHYLENE GLYCOL 3350 17 G/17G
POWDER, FOR SOLUTION ORAL
COMMUNITY
Start: 2017-12-01 | End: 2018-02-15

## 2018-01-26 RX ORDER — GUAIFENESIN 600 MG/1
TABLET, EXTENDED RELEASE ORAL
Qty: 30 TABLET | Refills: 0 | Status: SHIPPED | OUTPATIENT
Start: 2018-01-26 | End: 2018-02-15

## 2018-01-26 RX ADMIN — METHYLPREDNISOLONE ACETATE 120 MG: 80 INJECTION, SUSPENSION INTRA-ARTICULAR; INTRALESIONAL; INTRAMUSCULAR; SOFT TISSUE at 17:54

## 2018-01-26 RX ADMIN — METHYLPREDNISOLONE ACETATE 120 MG: 80 INJECTION, SUSPENSION INTRA-ARTICULAR; INTRALESIONAL; INTRAMUSCULAR; SOFT TISSUE at 12:54

## 2018-01-26 NOTE — PROGRESS NOTES
Subjective   Katelynn Sung is a 57 y.o. female.     Cough   This is a new problem. The current episode started in the past 7 days. The problem has been rapidly worsening. The problem occurs every few minutes. The cough is productive of purulent sputum. Associated symptoms include chest pain (with cough), ear congestion, headaches, myalgias, nasal congestion, postnasal drip, rhinorrhea, shortness of breath and wheezing. Pertinent negatives include no chills, ear pain, eye redness, fever, hemoptysis, rash or sore throat. The symptoms are aggravated by lying down, exercise and cold air. She has tried OTC cough suppressant for the symptoms. The treatment provided no relief. Her past medical history is significant for bronchitis, COPD and pneumonia.     She also c/o severe fatigue, hot flashes. Feels it is due to postmenopausal status. Not currently on HRT. Is not a cigarette smoker; uses nicotine replacement. Sister with h/o breast cancer.    The following portions of the patient's history were reviewed and updated as appropriate: allergies, current medications, past family history, past medical history, past social history, past surgical history and problem list.    Review of Systems   Constitutional: Positive for fatigue. Negative for chills, fever and unexpected weight change.   HENT: Positive for congestion, postnasal drip and rhinorrhea. Negative for ear pain, mouth sores, nosebleeds, sore throat and trouble swallowing.    Eyes: Negative for pain, discharge and redness.   Respiratory: Positive for cough, chest tightness, shortness of breath and wheezing. Negative for hemoptysis.    Cardiovascular: Positive for chest pain (with cough). Negative for leg swelling.   Gastrointestinal: Negative for diarrhea, nausea and vomiting.   Genitourinary: Negative for dysuria and hematuria.   Musculoskeletal: Positive for arthralgias and myalgias.   Skin: Negative for rash.   Neurological: Positive for headaches.    Hematological: Negative for adenopathy. Does not bruise/bleed easily.   Psychiatric/Behavioral: Positive for sleep disturbance.       Objective    Vitals:    01/26/18 1145   BP: 122/60   Pulse: 72   Temp: 98.4 °F (36.9 °C)   SpO2: 97%     Body mass index is 27.46 kg/(m^2).  Last 2 weights    01/26/18  1145   Weight: 72.6 kg (160 lb)       Physical Exam   Constitutional: She is oriented to person, place, and time. She appears well-developed and well-nourished. She is cooperative. She appears ill. No distress.   HENT:   Head: Normocephalic and atraumatic.   Right Ear: External ear and ear canal normal. Tympanic membrane is erythematous and retracted.   Left Ear: External ear and ear canal normal. Tympanic membrane is erythematous and retracted.   Nose: Mucosal edema and rhinorrhea (mucoid) present. Right sinus exhibits maxillary sinus tenderness. Left sinus exhibits maxillary sinus tenderness.   Mouth/Throat: Oropharynx is clear and moist. Mucous membranes are not dry. No oral lesions.   Eyes: Conjunctivae and lids are normal.   Cardiovascular: Normal rate, regular rhythm, normal heart sounds and intact distal pulses.    Pulmonary/Chest: Effort normal. No respiratory distress (deep breathis trigger wet cough). She has decreased breath sounds. She has wheezes in the right upper field and the right middle field. She has no rhonchi. She has no rales.       Vascular Status -  Her exam exhibits no right foot edema. Her exam exhibits no left foot edema.  Lymphadenopathy:     She has no cervical adenopathy.   Neurological: She is alert and oriented to person, place, and time. Gait normal.   Skin: Skin is warm and dry. No rash noted.   Psychiatric: She has a normal mood and affect. Her behavior is normal. Cognition and memory are normal.   Nursing note and vitals reviewed.      Recent Results (from the past 24 hour(s))   POC Influenza A / B    Collection Time: 01/26/18  2:06 PM   Result Value Ref Range    Rapid Influenza A  Ag neg     Rapid Influenza B Ag neg     Internal Control Passed Passed    Lot Number [id number]     Expiration Date 9/19      Assessment/Plan   Katelynn was seen today for earache, sinus problem and cough.    Diagnoses and all orders for this visit:    Acute bronchitis due to other specified organisms  -     doxycycline (MONODOX) 100 MG capsule; Take 1 capsule by mouth Every 12 (Twelve) Hours.  -     methylPREDNISolone acetate (DEPO-medrol) injection 120 mg; Inject 1.5 mL into the shoulder, thigh, or buttocks 1 (One) Time.    Chronic fatigue  -     Estradiol  -     Progesterone  -     Testosterone, Free, Total  -     POC Influenza A / B    Postmenopausal disorder  -     Estradiol  -     Progesterone  -     Testosterone, Free, Total    Vitamin D deficiency  -     Vitamin D 25 Hydroxy    Body aches  -     POC Influenza A / B    Cough  -     POC Influenza A / B    Other orders  -     albuterol (PROVENTIL HFA;VENTOLIN HFA) 108 (90 Base) MCG/ACT inhaler; Inhale 2 puffs Every 4 (Four) Hours As Needed for Wheezing or Shortness of Air.  -     guaiFENesin (MUCINEX) 600 MG 12 hr tablet; 1-2 po bid as needed for chest congestion; take with plenty of water  -     predniSONE (DELTASONE) 10 MG tablet; 5 po day 1, then decrease by 1 tablet each day until gone (5,4,3,2,1)    Symptomatic tx reviewed.  I will contact patient regarding test results and provide instructions regarding any necessary changes in plan of care.  Routine f/u as schedule next month, f/u sooner as needed/instructed.  Patient was encouraged to keep me informed of any acute changes, lack of improvement, or any new concerning symptoms.  Pt advised to eat a heart healthy diet and get regular aerobic exercise once well.  Pt is aware of reasons to seek emergent care.  Patient voiced understanding of all instructions and denied further questions.

## 2018-01-29 LAB
25(OH)D3+25(OH)D2 SERPL-MCNC: 40.6 NG/ML
ESTRADIOL SERPL-MCNC: <5 PG/ML
PROGEST SERPL-MCNC: <0.1 NG/ML
TESTOST FREE SERPL-MCNC: 0.4 PG/ML (ref 0–4.2)
TESTOST SERPL-MCNC: <3 NG/DL (ref 3–41)

## 2018-01-31 ENCOUNTER — TELEPHONE (OUTPATIENT)
Dept: FAMILY MEDICINE CLINIC | Facility: CLINIC | Age: 58
End: 2018-01-31

## 2018-02-02 ENCOUNTER — OFFICE VISIT (OUTPATIENT)
Dept: FAMILY MEDICINE CLINIC | Facility: CLINIC | Age: 58
End: 2018-02-02

## 2018-02-02 VITALS
BODY MASS INDEX: 27.49 KG/M2 | DIASTOLIC BLOOD PRESSURE: 74 MMHG | OXYGEN SATURATION: 98 % | HEIGHT: 64 IN | WEIGHT: 161 LBS | SYSTOLIC BLOOD PRESSURE: 118 MMHG | TEMPERATURE: 98.9 F | HEART RATE: 77 BPM

## 2018-02-02 DIAGNOSIS — J31.0 OTHER CHRONIC RHINITIS: ICD-10-CM

## 2018-02-02 DIAGNOSIS — N95.1 POSTMENOPAUSAL DISORDER: ICD-10-CM

## 2018-02-02 DIAGNOSIS — J42 CHRONIC BRONCHITIS, UNSPECIFIED CHRONIC BRONCHITIS TYPE (HCC): Primary | ICD-10-CM

## 2018-02-02 DIAGNOSIS — Z79.890 POSTMENOPAUSAL HORMONE THERAPY: ICD-10-CM

## 2018-02-02 PROCEDURE — 99214 OFFICE O/P EST MOD 30 MIN: CPT | Performed by: FAMILY MEDICINE

## 2018-02-02 RX ORDER — FLUCONAZOLE 150 MG/1
TABLET ORAL
Qty: 2 TABLET | Refills: 0 | Status: SHIPPED | OUTPATIENT
Start: 2018-02-02 | End: 2018-02-15

## 2018-02-02 RX ORDER — ESTRADIOL VALERATE 10 MG/ML
10 INJECTION INTRAMUSCULAR
Qty: 1 ML | Refills: 2 | Status: SHIPPED | OUTPATIENT
Start: 2018-02-02 | End: 2018-04-24

## 2018-02-02 RX ORDER — BUDESONIDE AND FORMOTEROL FUMARATE DIHYDRATE 160; 4.5 UG/1; UG/1
2 AEROSOL RESPIRATORY (INHALATION)
Qty: 1 INHALER | Refills: 5 | Status: SHIPPED | OUTPATIENT
Start: 2018-02-02 | End: 2018-02-02

## 2018-02-02 RX ORDER — MOMETASONE FUROATE 50 UG/1
2 SPRAY, METERED NASAL DAILY
Qty: 17 G | Refills: 5 | Status: SHIPPED | OUTPATIENT
Start: 2018-02-02 | End: 2018-09-05 | Stop reason: SDUPTHER

## 2018-02-02 NOTE — PROGRESS NOTES
"Subjective   Katelynn Richy Sung is a 57 y.o. female.     History of Present Illness  Mrs. Sung presents today with c/o persistent cough. She has underlying COPD/chronic bronchitis. Has been tx'd for bronchitis exacerbation within past month. Does note feel she \"can get over it\". She is using albuterol MDI as needed. She apparently has not been using her Symbicort. She is not smoking but does use nicotine replacement vapor. She denies CP, hemoptysis or fever. Sputum is clear/white.    She would also like to review possible tx options for postmenopausal DO. She has severe hot flashes, depressed mood, irritability, vaginal dryness. She has been on Depo Estrogen previously which helped significantly. She is UTD on mammogram. Has + family h/o breast CA in sister.    He requests an alternative to her flonase, as it is not longer working and she is having increased rhinitis. No facial pain.    The following portions of the patient's history were reviewed and updated as appropriate: allergies, current medications, past family history, past medical history, past social history, past surgical history and problem list.    Review of Systems   Constitutional: Positive for fatigue. Negative for chills, fever and unexpected weight change.   HENT: Positive for congestion, postnasal drip, rhinorrhea and sinus pressure. Negative for ear pain, mouth sores, nosebleeds, sore throat and trouble swallowing.    Eyes: Negative for pain, discharge and redness.   Respiratory: Positive for cough, chest tightness, shortness of breath and wheezing.    Cardiovascular: Negative for chest pain and leg swelling.   Gastrointestinal: Negative for diarrhea, nausea and vomiting.   Endocrine: Positive for heat intolerance.   Genitourinary: Negative for dysuria and hematuria.   Musculoskeletal: Positive for arthralgias and myalgias.   Skin: Negative for rash.   Neurological: Positive for weakness and headaches.   Hematological: Negative for adenopathy. " Does not bruise/bleed easily.   Psychiatric/Behavioral: Positive for decreased concentration, dysphoric mood and sleep disturbance. Negative for confusion and suicidal ideas. The patient is nervous/anxious.        Objective    Vitals:    02/02/18 0919   BP: 118/74   Pulse: 77   Temp: 98.9 °F (37.2 °C)   SpO2: 98%     Body mass index is 27.64 kg/(m^2).  Last 2 weights    02/02/18 0919   Weight: 73 kg (161 lb)       Physical Exam   Constitutional: She is oriented to person, place, and time. She appears well-developed and well-nourished. She is cooperative. She does not appear ill. No distress.   HENT:   Head: Normocephalic and atraumatic.   Right Ear: Tympanic membrane, external ear and ear canal normal.   Left Ear: Tympanic membrane, external ear and ear canal normal.   Nose: Mucosal edema present. No rhinorrhea.   Mouth/Throat: Oropharynx is clear and moist. Mucous membranes are not dry. No oral lesions.   Eyes: Conjunctivae and lids are normal.   Neck: Phonation normal. Neck supple. No thyroid mass and no thyromegaly present.   Cardiovascular: Normal rate, regular rhythm, normal heart sounds and intact distal pulses.  Exam reveals no gallop.    No murmur heard.  Pulmonary/Chest: Effort normal. No respiratory distress (deep breaths trigger dry cough). She has decreased breath sounds. She has no wheezes. She has no rhonchi. She has no rales.       Vascular Status -  Her exam exhibits no right foot edema. Her exam exhibits no left foot edema.  Lymphadenopathy:     She has no cervical adenopathy.        Right: No supraclavicular adenopathy present.        Left: No supraclavicular adenopathy present.   Neurological: She is alert and oriented to person, place, and time. Gait normal.   Skin: Skin is warm and dry. No rash noted.   Psychiatric: Her behavior is normal. Her affect is blunt. Cognition and memory are normal.   Nursing note and vitals reviewed.      Assessment/Plan   Katelynn was seen today for cough.    Diagnoses  "and all orders for this visit:    Chronic bronchitis, unspecified chronic bronchitis type    Postmenopausal disorder    Postmenopausal hormone therapy    Other chronic rhinitis    Other orders  -     budesonide-formoterol (SYMBICORT) 160-4.5 MCG/ACT inhaler; Inhale 2 puffs 2 (Two) Times a Day.  -     fluconazole (DIFLUCAN) 150 MG tablet; 1 now and repeat in 1 week if needed  -     Estradiol Valerate 10 MG/ML oil; Inject 10 mg into the shoulder, thigh, or buttocks Every 30 (Thirty) Days.  -     mometasone (NASONEX) 50 MCG/ACT nasal spray; 2 sprays into each nostril Daily.    Uncontrolled COPD symptoms. I have reviewed risks/benefits and potential side effects of various treatment options. Pt voices understanding and wishes to proceed with symbicort daily with albuterol as needed. Refer to pulmonology if needed.    Severe postmenopausal DO symptoms with decreased quality of life according to pt. I have reviewed risks/benefits and potential side effects of various treatment options. Pt voices understanding and wishes to proceed with trial of Depo estrogen. She is aware that this increase her r/o breast CA in particular considering her family hx. She has intact uterus and therefore is also at risk for endometrial hyperplasia/neoplasm. Informed consent reviewed with her, signed and scanned to chart. She wishes to try for 2-3 months to see \"if she can get set right\". Is willing to consider alternative tx if this is minimally helpful.    F/U in 2 weeks, sooner as needed.  Patient was encouraged to keep me informed of any acute changes, lack of improvement, or any new concerning symptoms.  Pt is aware of reasons to seek emergent care.  Patient voiced understanding of all instructions and denied further questions.             "

## 2018-02-05 DIAGNOSIS — G47.00 PERSISTENT INSOMNIA: ICD-10-CM

## 2018-02-06 RX ORDER — ZOLPIDEM TARTRATE 10 MG/1
TABLET ORAL
Qty: 30 TABLET | Refills: 2 | OUTPATIENT
Start: 2018-02-06 | End: 2018-05-01 | Stop reason: SDUPTHER

## 2018-02-07 RX ORDER — METHYLPREDNISOLONE ACETATE 80 MG/ML
120 INJECTION, SUSPENSION INTRA-ARTICULAR; INTRALESIONAL; INTRAMUSCULAR; SOFT TISSUE ONCE
Status: COMPLETED | OUTPATIENT
Start: 2018-01-26 | End: 2018-01-26

## 2018-02-15 ENCOUNTER — OFFICE VISIT (OUTPATIENT)
Dept: FAMILY MEDICINE CLINIC | Facility: CLINIC | Age: 58
End: 2018-02-15

## 2018-02-15 VITALS
DIASTOLIC BLOOD PRESSURE: 80 MMHG | TEMPERATURE: 98.1 F | WEIGHT: 162 LBS | BODY MASS INDEX: 30.58 KG/M2 | HEIGHT: 61 IN | SYSTOLIC BLOOD PRESSURE: 120 MMHG

## 2018-02-15 DIAGNOSIS — E66.09 CLASS 1 OBESITY DUE TO EXCESS CALORIES WITH SERIOUS COMORBIDITY AND BODY MASS INDEX (BMI) OF 30.0 TO 30.9 IN ADULT: ICD-10-CM

## 2018-02-15 DIAGNOSIS — J42 CHRONIC BRONCHITIS, UNSPECIFIED CHRONIC BRONCHITIS TYPE (HCC): Primary | ICD-10-CM

## 2018-02-15 DIAGNOSIS — Z79.890 POSTMENOPAUSAL HORMONE THERAPY: ICD-10-CM

## 2018-02-15 DIAGNOSIS — E55.9 VITAMIN D DEFICIENCY: ICD-10-CM

## 2018-02-15 PROCEDURE — 99214 OFFICE O/P EST MOD 30 MIN: CPT | Performed by: FAMILY MEDICINE

## 2018-02-15 RX ORDER — FLUTICASONE PROPIONATE 50 MCG
SPRAY, SUSPENSION (ML) NASAL
COMMUNITY
Start: 2018-02-14 | End: 2018-09-05 | Stop reason: SDUPTHER

## 2018-02-16 ENCOUNTER — TELEPHONE (OUTPATIENT)
Dept: FAMILY MEDICINE CLINIC | Facility: CLINIC | Age: 58
End: 2018-02-16

## 2018-02-16 PROBLEM — E66.09 CLASS 1 OBESITY DUE TO EXCESS CALORIES WITH SERIOUS COMORBIDITY AND BODY MASS INDEX (BMI) OF 30.0 TO 30.9 IN ADULT: Status: ACTIVE | Noted: 2018-02-16

## 2018-02-16 PROBLEM — E66.811 CLASS 1 OBESITY DUE TO EXCESS CALORIES WITH SERIOUS COMORBIDITY AND BODY MASS INDEX (BMI) OF 30.0 TO 30.9 IN ADULT: Status: ACTIVE | Noted: 2018-02-16

## 2018-02-16 NOTE — PROGRESS NOTES
"Subjective   Katelynn Sung is a 57 y.o. female.     History of Present Illness  Mr. Sung presents today for f/u.  1) She has COPD/chronic bronchitis. She was advised at last visit to begin daily maintenance tx (Breo) due to persistent symptoms. She was to use ventolin as needed. She unfortunately has not followed these instructions. She has used ventolin rarely. Is still using Breo \"off and on\". Has cough and wheeze but these are overall improved. Some CHU. No CP, hemoptysis.   2) She wishes to start depo estradiol. Had lengthy review of risks/benefits of this medication for her and her specific risk factors. She wished to move forward with tx. This medication has not yet been approved by her insurance.  3) She has mild obesity. Struggling with energy, exercising, eating healthfully. Feels she would \"do better once on hormones.  4) is taking Vit D as instructed for def. Denies side effects; still having some joint pain    The following portions of the patient's history were reviewed and updated as appropriate: allergies, current medications, past family history, past medical history, past social history, past surgical history and problem list.    Review of Systems   Constitutional: Positive for fatigue. Negative for chills, fever and unexpected weight change.   HENT: Positive for congestion and postnasal drip. Negative for ear pain, mouth sores, nosebleeds, rhinorrhea, sore throat and trouble swallowing.    Eyes: Negative for pain, discharge and redness.   Respiratory: Positive for cough (improvd), shortness of breath (CHU, stable) and wheezing (improved). Negative for chest tightness.    Cardiovascular: Negative for chest pain, palpitations and leg swelling.   Gastrointestinal: Negative for blood in stool, diarrhea, nausea and vomiting.   Endocrine: Positive for heat intolerance.   Genitourinary: Negative for dysuria and hematuria.   Musculoskeletal: Positive for arthralgias and myalgias.   Skin: Negative " for rash.   Neurological: Positive for headaches. Negative for weakness.   Hematological: Negative for adenopathy. Does not bruise/bleed easily.   Psychiatric/Behavioral: Positive for decreased concentration, dysphoric mood and sleep disturbance. Negative for confusion and suicidal ideas. The patient is nervous/anxious.        Objective    Vitals:    02/15/18 1119   BP: 120/80   Temp: 98.1 °F (36.7 °C)     Body mass index is 30.61 kg/(m^2).  Last 2 weights    02/15/18  1119   Weight: 73.5 kg (162 lb)       Physical Exam   Constitutional: She is oriented to person, place, and time. She appears well-developed and well-nourished. She is cooperative. She does not appear ill. No distress.   obese   HENT:   Head: Normocephalic and atraumatic.   Right Ear: Tympanic membrane, external ear and ear canal normal.   Left Ear: Tympanic membrane, external ear and ear canal normal.   Nose: Mucosal edema present. No rhinorrhea.   Mouth/Throat: Oropharynx is clear and moist. Mucous membranes are not dry. No oral lesions.   Eyes: Conjunctivae and lids are normal.   Neck: Phonation normal. Neck supple. Normal carotid pulses present. Carotid bruit is not present. No thyroid mass and no thyromegaly present.   Cardiovascular: Normal rate, regular rhythm, normal heart sounds and intact distal pulses.  Exam reveals no gallop.    No murmur heard.  Pulmonary/Chest: Effort normal. No respiratory distress. She has decreased breath sounds. She has no wheezes. She has no rhonchi. She has no rales.   Musculoskeletal:        Thoracic back: She exhibits deformity (kyphosis). She exhibits no bony tenderness.       Vascular Status -  Her exam exhibits no right foot edema. Her exam exhibits no left foot edema.  Lymphadenopathy:     She has no cervical adenopathy.        Right: No supraclavicular adenopathy present.        Left: No supraclavicular adenopathy present.   Neurological: She is alert and oriented to person, place, and time. Gait normal.    Skin: Skin is warm and dry. No rash noted.   Psychiatric: Her behavior is normal. Thought content normal. Her affect is blunt. Cognition and memory are normal.   Nursing note and vitals reviewed.    Recent Results (from the past 1008 hour(s))   Estradiol    Collection Time: 01/26/18  1:01 PM   Result Value Ref Range    Estradiol <5.0 pg/mL   Progesterone    Collection Time: 01/26/18  1:01 PM   Result Value Ref Range    Progesterone <0.1 ng/mL   Testosterone, Free, Total    Collection Time: 01/26/18  1:01 PM   Result Value Ref Range    Testosterone, Total <3 (L) 3 - 41 ng/dL    Testosterone, Free 0.4 0.0 - 4.2 pg/mL   Vitamin D 25 Hydroxy    Collection Time: 01/26/18  1:01 PM   Result Value Ref Range    25 Hydroxy, Vitamin D 40.6 ng/mL   POC Influenza A / B    Collection Time: 01/26/18  2:06 PM   Result Value Ref Range    Rapid Influenza A Ag neg     Rapid Influenza B Ag neg     Internal Control Passed Passed    Lot Number [id number]     Expiration Date 9/19      Assessment/Plan   Katelynn was seen today for follow-up.    Diagnoses and all orders for this visit:    Chronic bronchitis, unspecified chronic bronchitis type    Postmenopausal hormone therapy    Class 1 obesity due to excess calories with serious comorbidity and body mass index (BMI) of 30.0 to 30.9 in adult    Vitamin D deficiency    COPD with persistent symptoms. She is reminded to take Breo and ventolin only as needed. She is working on reducing overall nicotine use. Not currently smoking cigs but using replacement vapor.    Will attempt to PA HRT for her.    Nutrition and activity goals reviewed including: mainly water to drink, limit white flour/processed sugar, high protein, high fiber carbs, good breakfast, working toward 150 mins cardio per week, weight training 2x/week    Continue vit D replacement.    F/U 4-6 weeks after starting HRT for recheck.  Patient was encouraged to keep me informed of any acute changes, lack of improvement, or any new  concerning symptoms.  She is encouraged to update mammogram, pap, etc.  Pt is aware of reasons to seek emergent care.  Patient voiced understanding of all instructions and denied further questions.

## 2018-02-19 RX ORDER — ALBUTEROL SULFATE 90 UG/1
AEROSOL, METERED RESPIRATORY (INHALATION)
Qty: 1 INHALER | Refills: 5 | Status: SHIPPED | OUTPATIENT
Start: 2018-02-19 | End: 2018-07-28 | Stop reason: SDUPTHER

## 2018-03-07 NOTE — TELEPHONE ENCOUNTER
I called pt and notified and she said that she does not want to schedule these right now that she would call back. She had a death in her family

## 2018-03-09 DIAGNOSIS — K86.1 CHRONIC PANCREATITIS, UNSPECIFIED PANCREATITIS TYPE (HCC): ICD-10-CM

## 2018-03-12 RX ORDER — PROMETHAZINE HYDROCHLORIDE 25 MG/1
TABLET ORAL
Qty: 90 TABLET | Refills: 0 | Status: SHIPPED | OUTPATIENT
Start: 2018-03-12 | End: 2018-04-04 | Stop reason: SDUPTHER

## 2018-03-23 RX ORDER — ERGOCALCIFEROL 1.25 MG/1
CAPSULE ORAL
Qty: 4 CAPSULE | Refills: 2 | Status: SHIPPED | OUTPATIENT
Start: 2018-03-23 | End: 2018-07-13 | Stop reason: SDUPTHER

## 2018-03-26 DIAGNOSIS — M54.2 CHRONIC NECK PAIN: ICD-10-CM

## 2018-03-26 DIAGNOSIS — M50.90 CERVICAL DISC DISEASE: ICD-10-CM

## 2018-03-26 DIAGNOSIS — G89.29 CHRONIC NECK PAIN: ICD-10-CM

## 2018-03-26 DIAGNOSIS — M43.02 CERVICAL SPONDYLOLYSIS: ICD-10-CM

## 2018-03-27 RX ORDER — PREGABALIN 300 MG/1
CAPSULE ORAL
Qty: 60 CAPSULE | Refills: 2 | OUTPATIENT
Start: 2018-03-27 | End: 2018-05-15 | Stop reason: SDUPTHER

## 2018-04-04 DIAGNOSIS — K86.1 CHRONIC PANCREATITIS, UNSPECIFIED PANCREATITIS TYPE (HCC): ICD-10-CM

## 2018-04-09 RX ORDER — PROMETHAZINE HYDROCHLORIDE 25 MG/1
TABLET ORAL
Qty: 90 TABLET | Refills: 0 | Status: SHIPPED | OUTPATIENT
Start: 2018-04-09 | End: 2018-05-15 | Stop reason: SDUPTHER

## 2018-04-20 RX ORDER — FLUCONAZOLE 150 MG/1
150 TABLET ORAL ONCE
Qty: 1 TABLET | Refills: 0 | Status: SHIPPED | OUTPATIENT
Start: 2018-04-20 | End: 2018-07-31 | Stop reason: SDUPTHER

## 2018-04-24 ENCOUNTER — OFFICE VISIT (OUTPATIENT)
Dept: FAMILY MEDICINE CLINIC | Facility: CLINIC | Age: 58
End: 2018-04-24

## 2018-04-24 VITALS
DIASTOLIC BLOOD PRESSURE: 72 MMHG | WEIGHT: 165 LBS | SYSTOLIC BLOOD PRESSURE: 110 MMHG | HEART RATE: 76 BPM | BODY MASS INDEX: 31.15 KG/M2 | HEIGHT: 61 IN | OXYGEN SATURATION: 98 %

## 2018-04-24 DIAGNOSIS — K22.719 BARRETT'S ESOPHAGUS WITH DYSPLASIA: ICD-10-CM

## 2018-04-24 DIAGNOSIS — R00.2 PALPITATIONS: Primary | ICD-10-CM

## 2018-04-24 DIAGNOSIS — Z12.31 ENCOUNTER FOR SCREENING MAMMOGRAM FOR BREAST CANCER: ICD-10-CM

## 2018-04-24 DIAGNOSIS — Z79.890 POSTMENOPAUSAL HORMONE THERAPY: ICD-10-CM

## 2018-04-24 DIAGNOSIS — I10 ESSENTIAL HYPERTENSION: ICD-10-CM

## 2018-04-24 DIAGNOSIS — R13.14 PHARYNGOESOPHAGEAL DYSPHAGIA: ICD-10-CM

## 2018-04-24 DIAGNOSIS — K59.09 CHRONIC CONSTIPATION: ICD-10-CM

## 2018-04-24 DIAGNOSIS — R68.81 EARLY SATIETY: ICD-10-CM

## 2018-04-24 DIAGNOSIS — N95.1 POSTMENOPAUSAL DISORDER: ICD-10-CM

## 2018-04-24 LAB
ALBUMIN SERPL-MCNC: 4.7 G/DL (ref 3.5–5)
ALBUMIN/GLOB SERPL: 1.3 G/DL (ref 1–2)
ALP SERPL-CCNC: 78 U/L (ref 38–126)
ALT SERPL-CCNC: 15 U/L (ref 13–69)
AST SERPL-CCNC: 28 U/L (ref 15–46)
BILIRUB SERPL-MCNC: 0.4 MG/DL (ref 0.2–1.3)
BUN SERPL-MCNC: 12 MG/DL (ref 7–20)
BUN/CREAT SERPL: 15 (ref 7.1–23.5)
CALCIUM SERPL-MCNC: 10 MG/DL (ref 8.4–10.2)
CHLORIDE SERPL-SCNC: 99 MMOL/L (ref 98–107)
CO2 SERPL-SCNC: 27 MMOL/L (ref 26–30)
CREAT SERPL-MCNC: 0.8 MG/DL (ref 0.6–1.3)
ERYTHROCYTE [DISTWIDTH] IN BLOOD BY AUTOMATED COUNT: 13 % (ref 11.5–14.5)
GFR SERPLBLD CREATININE-BSD FMLA CKD-EPI: 74 ML/MIN/1.73
GFR SERPLBLD CREATININE-BSD FMLA CKD-EPI: 90 ML/MIN/1.73
GLOBULIN SER CALC-MCNC: 3.6 GM/DL
GLUCOSE SERPL-MCNC: 88 MG/DL (ref 74–98)
HCT VFR BLD AUTO: 43.9 % (ref 37–47)
HGB BLD-MCNC: 14.5 G/DL (ref 12–16)
IRON SERPL-MCNC: 95 MCG/DL (ref 37–181)
MCH RBC QN AUTO: 29.6 PG (ref 27–31)
MCHC RBC AUTO-ENTMCNC: 33 G/DL (ref 30–37)
MCV RBC AUTO: 89.6 FL (ref 81–99)
PLATELET # BLD AUTO: 286 10*3/MM3 (ref 130–400)
POTASSIUM SERPL-SCNC: 4.6 MMOL/L (ref 3.5–5.1)
PROT SERPL-MCNC: 8.3 G/DL (ref 6.3–8.2)
RBC # BLD AUTO: 4.9 10*6/MM3 (ref 4.2–5.4)
SODIUM SERPL-SCNC: 141 MMOL/L (ref 137–145)
TSH SERPL DL<=0.005 MIU/L-ACNC: 2.69 MIU/ML (ref 0.47–4.68)
WBC # BLD AUTO: 7.13 10*3/MM3 (ref 4.8–10.8)

## 2018-04-24 PROCEDURE — 99214 OFFICE O/P EST MOD 30 MIN: CPT | Performed by: FAMILY MEDICINE

## 2018-04-24 PROCEDURE — 93000 ELECTROCARDIOGRAM COMPLETE: CPT | Performed by: FAMILY MEDICINE

## 2018-04-24 RX ORDER — ESTRADIOL 0.04 MG/D
1 FILM, EXTENDED RELEASE TRANSDERMAL 2 TIMES WEEKLY
Qty: 8 PATCH | Refills: 2 | Status: SHIPPED | OUTPATIENT
Start: 2018-04-26 | End: 2018-09-14

## 2018-04-24 NOTE — PROGRESS NOTES
Subjective   Katelynn Sung is a 58 y.o. female.     Mrs. Sung presents today with complaint of palpitations.      Palpitations    This is a new problem. The current episode started more than 1 month ago (approx 2 months). The problem occurs intermittently (3 distinct episodes). The problem has been gradually worsening. On average, each episode lasts 15 minutes. The symptoms are aggravated by caffeine. Associated symptoms include anxiety, coughing (chronic, improved), dizziness, an irregular heartbeat, malaise/fatigue and shortness of breath. Pertinent negatives include no chest pain, diaphoresis, fever, nausea, near-syncope, syncope, vomiting or weakness. She has tried breathing exercises for the symptoms. The treatment provided no relief. Risk factors include stress, sedentary lifestyle, post menopause and obesity. Her past medical history is significant for anxiety.   Of note, episodes have awoken her from sleep.    She also complains of persisting chronic constipation.  She has been evaluated for this in the past per Dr. Damon.  Last visit apparently 3 months ago.  She complains of early satiety, bloating.  Mild dysphagia.  No blood in stool or weight loss.    She is post menopausal disorder with symptoms including dysphoric mood, sleep impairment, vaginal dryness, hot flashes.  We attempted to place her back on estrogen but correlation was not covered.  She now requests estrogen patch.    The following portions of the patient's history were reviewed and updated as appropriate: allergies, current medications, past family history, past medical history, past social history, past surgical history and problem list.    Review of Systems   Constitutional: Positive for fatigue and malaise/fatigue. Negative for chills, diaphoresis, fever and unexpected weight change.   HENT: Positive for congestion, postnasal drip and trouble swallowing. Negative for ear pain, mouth sores, nosebleeds, rhinorrhea and sore throat.     Eyes: Negative for pain, discharge and redness.   Respiratory: Positive for cough (chronic, improved), shortness of breath and wheezing (improved). Negative for chest tightness.    Cardiovascular: Positive for palpitations. Negative for chest pain, leg swelling, syncope and near-syncope.   Gastrointestinal: Positive for constipation. Negative for blood in stool, diarrhea, nausea and vomiting.        See HPI   Endocrine: Positive for heat intolerance.   Genitourinary: Negative for dysuria and hematuria.   Musculoskeletal: Positive for arthralgias and myalgias.   Skin: Negative for rash.   Neurological: Positive for dizziness and headaches. Negative for weakness.   Hematological: Negative for adenopathy. Does not bruise/bleed easily.   Psychiatric/Behavioral: Positive for decreased concentration, dysphoric mood and sleep disturbance. Negative for confusion and suicidal ideas. The patient is nervous/anxious.        Objective    Vitals:    04/24/18 0917   BP: 110/72   Pulse: 76   SpO2: 98%     Body mass index is 31.18 kg/m².  1    04/24/18 0917   Weight: 74.8 kg (165 lb)         Physical Exam   Constitutional: She is oriented to person, place, and time. She appears well-developed and well-nourished. She is cooperative. She does not appear ill. No distress.   Obese   HENT:   Head: Normocephalic and atraumatic.   Right Ear: Tympanic membrane, external ear and ear canal normal.   Left Ear: Tympanic membrane, external ear and ear canal normal.   Nose: Mucosal edema present. No rhinorrhea.   Mouth/Throat: Oropharynx is clear and moist. Mucous membranes are not dry. No oral lesions.   Eyes: Conjunctivae and lids are normal.   Neck: Phonation normal. Neck supple. Normal carotid pulses present. Carotid bruit is not present. No thyroid mass and no thyromegaly present.   Cardiovascular: Normal rate, regular rhythm, normal heart sounds and intact distal pulses.  Exam reveals no gallop.    No murmur heard.  No peripheral edema.    Pulmonary/Chest: Effort normal. No respiratory distress. She has decreased breath sounds. She has no wheezes. She has no rhonchi. She has no rales.   Musculoskeletal:        Thoracic back: She exhibits deformity (kyphosis). She exhibits no bony tenderness.   Lymphadenopathy:     She has no cervical adenopathy.        Right: No supraclavicular adenopathy present.        Left: No supraclavicular adenopathy present.   Neurological: She is alert and oriented to person, place, and time. She displays no tremor. Gait normal.   Skin: Skin is warm and dry. No bruising and no rash noted.   Psychiatric: Her speech is normal and behavior is normal. Thought content normal. Her affect is blunt. Cognition and memory are normal.   Nursing note and vitals reviewed.      ECG 12 Lead  Date/Time: 4/24/2018 9:52 AM  Performed by: ERROL GUERRIER  Authorized by: ERROL GUERRIER   Comparison: not compared with previous ECG   Previous ECG: no previous ECG available  Rhythm: sinus rhythm  Ectopy comments: none  Rate: normal  BPM: 67  Conduction: non-specific intraventricular conduction delay  ST Segments: ST segments normal  T Waves: T waves normal  QRS axis: normal  Other findings: prolonged QTc interval  Other findings comments: low voltage in precordial leads  Clinical impression: non-specific ECG  Clinical impression comment: pt with significant kyphosis  Comments: RI int: 154 ms  QRS dur: 90 ms  QTc: 418 ms          Assessment/Plan   Katelynn was seen today for anxiety, depression, copd, hypertension, fibromyalgia, palpitations and constipation.    Diagnoses and all orders for this visit:    Palpitations  -     CBC (No Diff)  -     Comprehensive Metabolic Panel  -     Iron  -     TSH Rfx On Abnormal To Free T4  -     ECG 12 Lead    Early satiety  -     Ambulatory Referral to Gastroenterology    Pharyngoesophageal dysphagia  -     Ambulatory Referral to Gastroenterology    Essential hypertension  -     CBC (No Diff)  -     Comprehensive  Metabolic Panel  -     ECG 12 Lead    Humphreys's esophagus with dysplasia  -     Ambulatory Referral to Gastroenterology    Chronic constipation  -     Comprehensive Metabolic Panel  -     Ambulatory Referral to Gastroenterology    Postmenopausal disorder  -     estradiol (VIVELLE-DOT) 0.0375 MG/24HR; Place 1 patch on the skin 2 (Two) Times a Week.    Postmenopausal hormone therapy  -     estradiol (VIVELLE-DOT) 0.0375 MG/24HR; Place 1 patch on the skin 2 (Two) Times a Week.    Encounter for screening mammogram for breast cancer  -     Mammo Screening Digital Tomosynthesis Bilateral With CAD; Future    Palpitations with what sounds like paroxysmal tachycardia with distinct episodes which are symptomatic.  Lab eval as above to rule out secondary causes.  If normal refer to cardiology for further evaluation.    Constipation persistent dysphagia and history of Humphreys's esophagus.  Refer back to Dr. Strauss for further evaluation.    Post menopausal disorder with significant sediment hematology.  She wishes to begin estrogen transdermal.  Risk/benefits and potential side effects of HRT are once again reviewed with Mr. Sung.  She voices understanding of increased risk of breast cancer, heart attack, stroke, DVT and wishes to proceed. Pt advised to eat a heart healthy diet and get regular aerobic exercise. She is reminded to have mammogram. Order entered for her.    Keep routine follow-up as scheduled next month, polyps interesting/instructed.  I will contact patient regarding test results and provide instructions regarding any necessary changes in plan of care.  Patient was encouraged to keep me informed of any acute changes, lack of improvement, or any new concerning symptoms.  Pt is aware of reasons to seek emergent care.  Patient voiced understanding of all instructions and denied further questions.

## 2018-04-25 DIAGNOSIS — I47.9 PAROXYSMAL TACHYCARDIA (HCC): Primary | ICD-10-CM

## 2018-04-25 RX ORDER — LISINOPRIL AND HYDROCHLOROTHIAZIDE 12.5; 1 MG/1; MG/1
TABLET ORAL
Qty: 30 TABLET | Refills: 5 | Status: SHIPPED | OUTPATIENT
Start: 2018-04-25 | End: 2018-09-05 | Stop reason: SDUPTHER

## 2018-05-01 DIAGNOSIS — G47.00 PERSISTENT INSOMNIA: ICD-10-CM

## 2018-05-03 RX ORDER — ZOLPIDEM TARTRATE 10 MG/1
10 TABLET ORAL NIGHTLY PRN
Qty: 30 TABLET | Refills: 2 | Status: SHIPPED | OUTPATIENT
Start: 2018-05-03 | End: 2018-07-23 | Stop reason: SDUPTHER

## 2018-05-15 ENCOUNTER — OFFICE VISIT (OUTPATIENT)
Dept: FAMILY MEDICINE CLINIC | Facility: CLINIC | Age: 58
End: 2018-05-15

## 2018-05-15 VITALS
SYSTOLIC BLOOD PRESSURE: 112 MMHG | WEIGHT: 163 LBS | DIASTOLIC BLOOD PRESSURE: 76 MMHG | OXYGEN SATURATION: 98 % | HEART RATE: 76 BPM | HEIGHT: 61 IN | BODY MASS INDEX: 30.78 KG/M2

## 2018-05-15 DIAGNOSIS — E66.09 CLASS 1 OBESITY DUE TO EXCESS CALORIES WITHOUT SERIOUS COMORBIDITY WITH BODY MASS INDEX (BMI) OF 30.0 TO 30.9 IN ADULT: ICD-10-CM

## 2018-05-15 DIAGNOSIS — M43.02 CERVICAL SPONDYLOLYSIS: ICD-10-CM

## 2018-05-15 DIAGNOSIS — K86.1 CHRONIC PANCREATITIS, UNSPECIFIED PANCREATITIS TYPE (HCC): ICD-10-CM

## 2018-05-15 DIAGNOSIS — M50.90 CERVICAL DISC DISEASE: ICD-10-CM

## 2018-05-15 DIAGNOSIS — M79.7 FIBROMYALGIA: ICD-10-CM

## 2018-05-15 DIAGNOSIS — M54.2 CHRONIC NECK PAIN: ICD-10-CM

## 2018-05-15 DIAGNOSIS — G89.29 CHRONIC NECK PAIN: ICD-10-CM

## 2018-05-15 DIAGNOSIS — F17.200: Primary | ICD-10-CM

## 2018-05-15 PROCEDURE — 99214 OFFICE O/P EST MOD 30 MIN: CPT | Performed by: FAMILY MEDICINE

## 2018-05-15 RX ORDER — PROMETHAZINE HYDROCHLORIDE 25 MG/1
25 TABLET ORAL 3 TIMES DAILY
Qty: 90 TABLET | Refills: 0 | Status: SHIPPED | OUTPATIENT
Start: 2018-05-15 | End: 2018-06-12 | Stop reason: SDUPTHER

## 2018-05-15 RX ORDER — PREGABALIN 300 MG/1
CAPSULE ORAL
Qty: 60 CAPSULE | Refills: 2 | Status: SHIPPED | OUTPATIENT
Start: 2018-05-15 | End: 2018-09-05 | Stop reason: SDUPTHER

## 2018-05-15 NOTE — PROGRESS NOTES
"Subjective   Katelynn Sung is a 58 y.o. female.     History of Present Illness  Ms. Medrano presents today for f/u on several chronic problems.  1) She wishes to try to \"get off nicotine\". She quit smoking but has been using high nicotine content vapor cig. Would like to try nicotine gum. Stable cough, CHU.  2) She has chronic pain due to FMSx and C-DDD/DJD. Currently on LYrica and doing well. Denies side effects. No weight gain.  3) She has chronic pancreatitis but denies current abd pain. Does have intermittent nasuea for which she takes phenergan. Requests refill.  4) She has been trying to lose weight via improved diet. She has dc'd pop. Is down 7 lbs.    The following portions of the patient's history were reviewed and updated as appropriate: allergies, current medications, past family history, past medical history, past social history, past surgical history and problem list.    Review of Systems   Constitutional: Positive for fatigue. Negative for chills, diaphoresis, fever and unexpected weight change.   HENT: Positive for postnasal drip. Negative for congestion, ear pain, mouth sores, nosebleeds, rhinorrhea and sore throat.    Eyes: Negative for pain, discharge and redness.   Respiratory: Positive for cough (chronic, dry, intermittent) and shortness of breath (mild with exertion). Negative for chest tightness and wheezing.    Cardiovascular: Negative for chest pain, palpitations and leg swelling.   Gastrointestinal: Positive for constipation and nausea. Negative for blood in stool, diarrhea and vomiting.   Endocrine: Positive for heat intolerance.   Genitourinary: Negative for dysuria, hematuria and vaginal bleeding.   Musculoskeletal: Positive for arthralgias and myalgias.   Skin: Negative for rash.   Neurological: Positive for headaches. Negative for weakness.   Hematological: Negative for adenopathy. Does not bruise/bleed easily.   Psychiatric/Behavioral: Positive for sleep disturbance. Negative for " confusion, dysphoric mood and suicidal ideas. The patient is nervous/anxious.        Objective    Vitals:    05/15/18 1140   BP: 112/76   Pulse: 76   SpO2: 98%     Body mass index is 30.8 kg/m².  1    05/15/18  1140   Weight: 73.9 kg (163 lb)       Physical Exam   Constitutional: She is oriented to person, place, and time. She appears well-developed and well-nourished. She is cooperative. She does not appear ill. No distress.   HENT:   Head: Normocephalic and atraumatic.   Mouth/Throat: Mucous membranes are normal. Mucous membranes are not dry.   Eyes: Conjunctivae and lids are normal.   Cardiovascular: Normal rate, regular rhythm, normal heart sounds and intact distal pulses.  Exam reveals no gallop.    No murmur heard.  No peripheral edema.   Pulmonary/Chest: Effort normal and breath sounds normal. She has no wheezes. She has no rhonchi. She has no rales.   Musculoskeletal:        Thoracic back: She exhibits deformity (increased kyphosis). She exhibits no bony tenderness.   Neurological: She is alert and oriented to person, place, and time. She displays no tremor. Gait normal.   Skin: Skin is warm and dry. No bruising and no rash noted.   Psychiatric: She has a normal mood and affect. Her behavior is normal. Cognition and memory are normal.   Nursing note and vitals reviewed.    Lab Results   Component Value Date    WBC 7.13 04/24/2018    HGB 14.5 04/24/2018    HCT 43.9 04/24/2018    MCV 89.6 04/24/2018     04/24/2018     Lab Results   Component Value Date    GLUCOSE 99 08/04/2016    BUN 12 04/24/2018    CREATININE 0.80 04/24/2018    EGFRIFNONA 74 04/24/2018    EGFRIFAFRI 90 04/24/2018    BCR 15.0 04/24/2018    K 4.6 04/24/2018    CO2 27.0 04/24/2018    CALCIUM 10.0 04/24/2018    PROTENTOTREF 8.3 (H) 04/24/2018    ALBUMIN 4.70 04/24/2018    LABIL2 1.3 04/24/2018    AST 28 04/24/2018    ALT 15 04/24/2018     Lab Results   Component Value Date    TSH 2.69 04/24/2018     Lab Results   Component Value Date     HGBA1C 5.50 11/15/2017       Assessment/Plan   Katelynn was seen today for fibromyalgia, nicotine dependence and hypertension.    Diagnoses and all orders for this visit:    High degree of dependence on nicotine    Chronic pancreatitis, unspecified pancreatitis type  -     promethazine (PHENERGAN) 25 MG tablet; Take 1 tablet by mouth 3 (Three) Times a Day.    Cervical disc disease  -     pregabalin (LYRICA) 300 MG capsule; 1 po bid, only 1 rx/ 30 days    Cervical spondylolysis  -     pregabalin (LYRICA) 300 MG capsule; 1 po bid, only 1 rx/ 30 days    Chronic neck pain  -     pregabalin (LYRICA) 300 MG capsule; 1 po bid, only 1 rx/ 30 days    Fibromyalgia    Class 1 obesity due to excess calories without serious comorbidity with body mass index (BMI) of 30.0 to 30.9 in adult    Other orders  -     nicotine polacrilex ([employer] NICOTINE POLACRILEX) 4 MG gum; Chew 1 each As Needed for Smoking Cessation.    Chronic pain stable. Doing well on Lyrica. Refill provided.  Chronic pancreatitis with no recent flares of pain or change in bowel habits.  Nicotine dependence with desire for cessation. I have reviewed risks/benefits and potential side effects of various treatment options. Pt voices understanding and wishes to proceed with trial of nicotine gum.  Pt advised to eat a heart healthy diet and get regular aerobic exercise.  Routine f/u in 3 months, sooner as needed.  Patient was encouraged to keep me informed of any acute changes, lack of improvement, or any new concerning symptoms.  Pt is aware of reasons to seek emergent care.  Patient voiced understanding of all instructions and denied further questions.

## 2018-06-12 DIAGNOSIS — K86.1 CHRONIC PANCREATITIS, UNSPECIFIED PANCREATITIS TYPE (HCC): ICD-10-CM

## 2018-06-12 RX ORDER — PROMETHAZINE HYDROCHLORIDE 25 MG/1
TABLET ORAL
Qty: 90 TABLET | Refills: 1 | Status: SHIPPED | OUTPATIENT
Start: 2018-06-12 | End: 2018-08-07 | Stop reason: SDUPTHER

## 2018-06-15 RX ORDER — FEXOFENADINE HYDROCHLORIDE 60 MG/1
TABLET, FILM COATED ORAL
Qty: 60 TABLET | Refills: 5 | Status: SHIPPED | OUTPATIENT
Start: 2018-06-15 | End: 2019-04-03 | Stop reason: SDUPTHER

## 2018-07-16 RX ORDER — ERGOCALCIFEROL 1.25 MG/1
CAPSULE ORAL
Qty: 4 CAPSULE | Refills: 0 | Status: SHIPPED | OUTPATIENT
Start: 2018-07-16 | End: 2019-09-23

## 2018-07-23 ENCOUNTER — TELEPHONE (OUTPATIENT)
Dept: FAMILY MEDICINE CLINIC | Facility: CLINIC | Age: 58
End: 2018-07-23

## 2018-07-23 DIAGNOSIS — G47.00 PERSISTENT INSOMNIA: ICD-10-CM

## 2018-07-24 RX ORDER — ZOLPIDEM TARTRATE 10 MG/1
10 TABLET ORAL NIGHTLY PRN
Qty: 30 TABLET | Refills: 2 | Status: SHIPPED | OUTPATIENT
Start: 2018-07-24 | End: 2018-09-05 | Stop reason: SDUPTHER

## 2018-07-30 RX ORDER — ALBUTEROL SULFATE 90 UG/1
AEROSOL, METERED RESPIRATORY (INHALATION)
Qty: 18 G | Refills: 5 | Status: SHIPPED | OUTPATIENT
Start: 2018-07-30 | End: 2019-04-03 | Stop reason: SDUPTHER

## 2018-07-31 RX ORDER — FLUCONAZOLE 150 MG/1
150 TABLET ORAL ONCE
Qty: 1 TABLET | Refills: 1 | Status: SHIPPED | OUTPATIENT
Start: 2018-07-31 | End: 2019-01-26 | Stop reason: SDUPTHER

## 2018-08-04 DIAGNOSIS — K86.1 CHRONIC PANCREATITIS, UNSPECIFIED PANCREATITIS TYPE (HCC): ICD-10-CM

## 2018-08-07 DIAGNOSIS — K86.1 CHRONIC PANCREATITIS, UNSPECIFIED PANCREATITIS TYPE (HCC): ICD-10-CM

## 2018-08-07 RX ORDER — PROMETHAZINE HYDROCHLORIDE 25 MG/1
25 TABLET ORAL EVERY 8 HOURS PRN
Qty: 90 TABLET | Refills: 2 | Status: SHIPPED | OUTPATIENT
Start: 2018-08-07 | End: 2018-12-12

## 2018-08-10 RX ORDER — FLUTICASONE PROPIONATE 50 MCG
SPRAY, SUSPENSION (ML) NASAL
Qty: 16 G | Refills: 5 | Status: SHIPPED | OUTPATIENT
Start: 2018-08-10 | End: 2018-09-05 | Stop reason: SDUPTHER

## 2018-08-10 RX ORDER — PROMETHAZINE HYDROCHLORIDE 25 MG/1
TABLET ORAL
Qty: 90 TABLET | Refills: 0 | Status: SHIPPED | OUTPATIENT
Start: 2018-08-10 | End: 2018-09-05 | Stop reason: SDUPTHER

## 2018-09-05 ENCOUNTER — OFFICE VISIT (OUTPATIENT)
Dept: FAMILY MEDICINE CLINIC | Facility: CLINIC | Age: 58
End: 2018-09-05

## 2018-09-05 VITALS
HEART RATE: 76 BPM | DIASTOLIC BLOOD PRESSURE: 80 MMHG | WEIGHT: 170 LBS | BODY MASS INDEX: 32.1 KG/M2 | OXYGEN SATURATION: 98 % | HEIGHT: 61 IN | SYSTOLIC BLOOD PRESSURE: 122 MMHG

## 2018-09-05 DIAGNOSIS — M43.02 CERVICAL SPONDYLOLYSIS: ICD-10-CM

## 2018-09-05 DIAGNOSIS — F17.298 OTHER TOBACCO PRODUCT NICOTINE DEPENDENCE WITH OTHER NICOTINE-INDUCED DISORDER: ICD-10-CM

## 2018-09-05 DIAGNOSIS — G89.29 CHRONIC NECK PAIN: ICD-10-CM

## 2018-09-05 DIAGNOSIS — M54.2 CHRONIC NECK PAIN: ICD-10-CM

## 2018-09-05 DIAGNOSIS — M50.90 CERVICAL DISC DISEASE: ICD-10-CM

## 2018-09-05 DIAGNOSIS — G47.00 PERSISTENT INSOMNIA: ICD-10-CM

## 2018-09-05 DIAGNOSIS — J42 CHRONIC BRONCHITIS, UNSPECIFIED CHRONIC BRONCHITIS TYPE (HCC): ICD-10-CM

## 2018-09-05 DIAGNOSIS — M79.7 FIBROMYALGIA: Primary | ICD-10-CM

## 2018-09-05 DIAGNOSIS — I10 ESSENTIAL HYPERTENSION: ICD-10-CM

## 2018-09-05 PROCEDURE — 99214 OFFICE O/P EST MOD 30 MIN: CPT | Performed by: FAMILY MEDICINE

## 2018-09-05 RX ORDER — FLUTICASONE PROPIONATE 50 MCG
2 SPRAY, SUSPENSION (ML) NASAL DAILY
Qty: 16 G | Refills: 5 | Status: SHIPPED | OUTPATIENT
Start: 2018-09-05 | End: 2019-04-03 | Stop reason: SDUPTHER

## 2018-09-05 RX ORDER — ZOLPIDEM TARTRATE 10 MG/1
10 TABLET ORAL NIGHTLY PRN
Qty: 30 TABLET | Refills: 2 | Status: SHIPPED | OUTPATIENT
Start: 2018-09-05 | End: 2018-12-10 | Stop reason: SDUPTHER

## 2018-09-05 RX ORDER — LISINOPRIL AND HYDROCHLOROTHIAZIDE 12.5; 1 MG/1; MG/1
1 TABLET ORAL DAILY
Qty: 30 TABLET | Refills: 5 | Status: SHIPPED | OUTPATIENT
Start: 2018-09-05 | End: 2019-04-03 | Stop reason: SDUPTHER

## 2018-09-05 RX ORDER — NICOTINE 21 MG/24HR
1 PATCH, TRANSDERMAL 24 HOURS TRANSDERMAL EVERY 24 HOURS
Qty: 28 EACH | Refills: 2 | Status: SHIPPED | OUTPATIENT
Start: 2018-09-05 | End: 2018-11-10

## 2018-09-05 RX ORDER — PREGABALIN 300 MG/1
CAPSULE ORAL
Qty: 60 CAPSULE | Refills: 2 | Status: SHIPPED | OUTPATIENT
Start: 2018-09-05 | End: 2018-12-12 | Stop reason: SDUPTHER

## 2018-09-05 NOTE — PROGRESS NOTES
"Subjective   Katelynn Sung is a 58 y.o. female.     History of Present Illness  Ms. Medrano presents today for f/u on several chronic problems.  1) She wishes to try to \"get off nicotine\". She quit smoking but has been using high nicotine content vapor cig \"almost constantly\". She did not do well with nicotine gum but would like to try the patch. She has mild COPD. No CP or hemoptysis. Stable cough, CHU.  2) She has chronic pain due to FMSx and C-DDD/DJD. Currently on LYrica and doing well. Denies side effects. Some weight gain.  4) since her last visit she has had eval per Dr. Flores. Will be undergoing sleep study this month. Had ECHO- reviewed on chart.  Showed EF 60%, moderate hypokinesis of the lateral wall left ventricle, akinetic left ventricular apex, moderate dilatation of the right ventricle, small to moderate pericardial effusion. She is unsure about schedule stress testing or heart cath.   5) She is proud to report she has weaned off of Suboxone.  Has been without medication 9 days.  6) she has chronic insomnia which has responded well to Ambien for sleep initiation.  She is pleased with her sleep quality.  Denies side effects from medication.  7) she has essential hypertension for which he is on Zestoretic.  Taking as prescribed.  She denies side effects.  Blood pressure has been near goal.    The following portions of the patient's history were reviewed and updated as appropriate: allergies, current medications, past family history, past medical history, past social history, past surgical history and problem list.    Review of Systems   Constitutional: Positive for fatigue and unexpected weight change. Negative for chills, diaphoresis and fever.   HENT: Positive for postnasal drip. Negative for congestion, ear pain, mouth sores, nosebleeds, rhinorrhea and sore throat.    Eyes: Negative for pain, discharge and redness.   Respiratory: Positive for cough (chronic, dry, intermittent) and shortness of " breath (mild with exertion). Negative for chest tightness and wheezing.    Cardiovascular: Negative for chest pain, palpitations and leg swelling.   Gastrointestinal: Positive for constipation and nausea. Negative for blood in stool, diarrhea and vomiting.   Endocrine: Positive for heat intolerance.   Genitourinary: Negative for dysuria, hematuria and vaginal bleeding.   Musculoskeletal: Positive for arthralgias and myalgias.   Skin: Negative for rash.   Neurological: Positive for headaches. Negative for weakness.   Hematological: Negative for adenopathy. Does not bruise/bleed easily.   Psychiatric/Behavioral: Positive for sleep disturbance. Negative for confusion, dysphoric mood and suicidal ideas. The patient is nervous/anxious.        Objective    Vitals:    09/05/18 1533   BP: 122/80   Pulse: 76   SpO2: 98%     Body mass index is 32.12 kg/m².  1    09/05/18  1533   Weight: 77.1 kg (170 lb)       Physical Exam   Constitutional: She is oriented to person, place, and time. She appears well-developed and well-nourished. She is cooperative. She does not appear ill. No distress.   obese   HENT:   Head: Normocephalic and atraumatic.   Mouth/Throat: Oropharynx is clear and moist and mucous membranes are normal. Mucous membranes are not dry. No oral lesions.   Eyes: Conjunctivae and lids are normal.   Neck: Phonation normal. Neck supple. Normal carotid pulses present.   Cardiovascular: Normal rate, regular rhythm, normal heart sounds and intact distal pulses.  Exam reveals no gallop.    No murmur heard.  Pulmonary/Chest: Effort normal and breath sounds normal. She has no wheezes. She has no rhonchi. She has no rales.   Musculoskeletal:        Thoracic back: She exhibits deformity (increased kyphosis). She exhibits no bony tenderness.     Vascular Status -  Her right foot exhibits no edema. Her left foot exhibits no edema.  Lymphadenopathy:     She has no cervical adenopathy.   Neurological: She is alert and oriented to  person, place, and time. She displays no tremor. Gait normal.   Skin: Skin is warm and dry. No bruising and no rash noted.   Psychiatric: She has a normal mood and affect. Her behavior is normal. Cognition and memory are normal.   Good eye contact. Answers questions appropriately. Good personal hygiene and grooming.   Nursing note and vitals reviewed.    Lab Results   Component Value Date    WBC 7.13 04/24/2018    HGB 14.5 04/24/2018    HCT 43.9 04/24/2018    MCV 89.6 04/24/2018     04/24/2018     Lab Results   Component Value Date    GLUCOSE 99 08/04/2016    BUN 12 04/24/2018    CREATININE 0.80 04/24/2018    EGFRIFNONA 74 04/24/2018    EGFRIFAFRI 90 04/24/2018    BCR 15.0 04/24/2018    K 4.6 04/24/2018    CO2 27.0 04/24/2018    CALCIUM 10.0 04/24/2018    PROTENTOTREF 8.3 (H) 04/24/2018    ALBUMIN 4.70 04/24/2018    LABIL2 1.3 04/24/2018    AST 28 04/24/2018    ALT 15 04/24/2018     Lab Results   Component Value Date    LIPASE 46 08/04/2016     Lab Results   Component Value Date    TSH 2.69 04/24/2018     Lab Results   Component Value Date    HGBA1C 5.50 11/15/2017       Assessment/Plan   Katelynn was seen today for anxiety, depression, hypertension, fibromyalgia, copd and med refill.    Diagnoses and all orders for this visit:    Fibromyalgia    Other tobacco product nicotine dependence with other nicotine-induced disorder    Cervical disc disease  -     pregabalin (LYRICA) 300 MG capsule; 1 po bid, only 1 rx/ 30 days    Cervical spondylolysis  -     pregabalin (LYRICA) 300 MG capsule; 1 po bid, only 1 rx/ 30 days    Chronic neck pain  -     pregabalin (LYRICA) 300 MG capsule; 1 po bid, only 1 rx/ 30 days    Persistent insomnia  -     zolpidem (AMBIEN) 10 MG tablet; Take 1 tablet by mouth At Night As Needed for Sleep.    Chronic bronchitis, unspecified chronic bronchitis type (CMS/HCC)    Essential hypertension    Other orders  -     lisinopril-hydrochlorothiazide (PRINZIDE,ZESTORETIC) 10-12.5 MG per tablet;  Take 1 tablet by mouth Daily.  -     fluticasone (FLONASE) 50 MCG/ACT nasal spray; 2 sprays into the nostril(s) as directed by provider Daily.  -     nicotine (NICODERM CQ) 21 MG/24HR patch; Place 1 patch on the skin as directed by provider Daily.    Stable chronic pain syndrome.  Continue Lyrica.  No aberrant behavior regarding this medication.    Chronic insomnia doing well with Ambien.  No side effects reported.    COPD in association with high E cig/vapor use.  Trial of nicotine patch as above.  Behavioral modification methods reviewed as well.    Hypertension near goal.  She is encouraged to follow heart healthy diet, exercise daily.  In addition she has had a recent abnormal echo.  She is strongly encouraged to follow with Dr. Flores and recommended testing as scheduled.  Appears clinically stable at this time.    Routine follow-up in 3 months, sooner as needed.  Patient was encouraged to keep me informed of any acute changes, lack of improvement, or any new concerning symptoms.  Pt is aware of reasons to seek emergent care.  Patient voiced understanding of all instructions and denied further questions.  As part of patient's treatment plan I am prescribing a controlled substance.  The patient has been made aware of the appropriate use of such medications, including potential risk of somnolence, limited ability to drive and/or work safely, and potential for dependence and/or overdose.  It has also been made clear that these medications are for use by this patient only, without concomitant use of alcohol or other substances, unless prescribed.  DANTE report requested.        Please note that portions of this note may have been completed with a voice recognition program. Efforts were made to edit the dictations, but occasionally words are mistranscribed.

## 2018-09-10 ENCOUNTER — TELEPHONE (OUTPATIENT)
Dept: FAMILY MEDICINE CLINIC | Facility: CLINIC | Age: 58
End: 2018-09-10

## 2018-09-10 NOTE — TELEPHONE ENCOUNTER
Pt called requesting rx for a restless leg that Dr Servin wrote pt in past. She was unsure of the name of med.

## 2018-09-10 NOTE — TELEPHONE ENCOUNTER
I do not see anything in her medication hx specifically for restless leg syndrome.    Please review symptoms with her.

## 2018-09-11 RX ORDER — PRAMIPEXOLE DIHYDROCHLORIDE 0.12 MG/1
TABLET ORAL
Qty: 120 TABLET | Refills: 0 | Status: SHIPPED | OUTPATIENT
Start: 2018-09-11 | End: 2018-11-10

## 2018-09-11 NOTE — TELEPHONE ENCOUNTER
Spoke with patient states her legs are all over the place at night feels like she in constantly needing to move them. States this is keeping her up at night.

## 2018-09-11 NOTE — TELEPHONE ENCOUNTER
Please let her know I have sent in a prescription of Mirapex for her to try.  She should follow instructions carefully, read all information provided to her by the pharmacy, and let me know if she has any concerns.

## 2018-09-14 ENCOUNTER — OFFICE VISIT (OUTPATIENT)
Dept: RETAIL CLINIC | Facility: CLINIC | Age: 58
End: 2018-09-14

## 2018-09-14 VITALS
HEART RATE: 82 BPM | RESPIRATION RATE: 18 BRPM | SYSTOLIC BLOOD PRESSURE: 128 MMHG | OXYGEN SATURATION: 98 % | WEIGHT: 171 LBS | DIASTOLIC BLOOD PRESSURE: 72 MMHG | BODY MASS INDEX: 32.31 KG/M2 | TEMPERATURE: 98.5 F

## 2018-09-14 DIAGNOSIS — J30.2 ACUTE SEASONAL ALLERGIC RHINITIS, UNSPECIFIED TRIGGER: Primary | ICD-10-CM

## 2018-09-14 PROCEDURE — 96372 THER/PROPH/DIAG INJ SC/IM: CPT | Performed by: NURSE PRACTITIONER

## 2018-09-14 PROCEDURE — 99213 OFFICE O/P EST LOW 20 MIN: CPT | Performed by: NURSE PRACTITIONER

## 2018-09-14 RX ORDER — METHYLPREDNISOLONE ACETATE 80 MG/ML
80 INJECTION, SUSPENSION INTRA-ARTICULAR; INTRALESIONAL; INTRAMUSCULAR; SOFT TISSUE ONCE
Status: COMPLETED | OUTPATIENT
Start: 2018-09-14 | End: 2018-09-14

## 2018-09-14 RX ADMIN — METHYLPREDNISOLONE ACETATE 80 MG: 80 INJECTION, SUSPENSION INTRA-ARTICULAR; INTRALESIONAL; INTRAMUSCULAR; SOFT TISSUE at 12:45

## 2018-09-14 NOTE — PROGRESS NOTES
Cough      Subjective   Katelynn Sung is a 58 y.o. female.     URI    This is a new problem. The current episode started yesterday. The problem has been gradually worsening. There has been no fever. Associated symptoms include congestion, coughing, rhinorrhea and wheezing (intermittent ). Pertinent negatives include no abdominal pain, chest pain, diarrhea, ear pain (itchy ), headaches, nausea, rash, sinus pain, sore throat or vomiting. Treatments tried: Used Flonase x 2 doses.  Allegra x 2 doses, Mucinex x 1 dose. The treatment provided mild relief.    Vaccines UTD.  Requesting steroid shot today.  See ROS.      Patient Active Problem List   Diagnosis   • Abdominal pain   • Chronic pancreatitis (CMS/HCC)   • Persistent insomnia   • Chronic neck pain   • Dysphagia   • Heartburn   • Hypertension   • Nausea   • Polyarthropathy   • Vitamin D deficiency   • Cervical spondylolysis   • Cervical disc disease   • Fibromyalgia   • Humphreys's esophagus   • GERD (gastroesophageal reflux disease)   • COPD (chronic obstructive pulmonary disease) (CMS/HCC)   • Osteoporosis   • Chronic constipation   • Degenerative arthritis of lumbar spine   • Lumbar degenerative disc disease   • Anxiety   • Arthritis   • Depression   • Sleep apnea   • Restless leg syndrome   • Chronic rhinitis   • Postmenopausal disorder   • Postmenopausal hormone therapy   • Class 1 obesity due to excess calories with serious comorbidity and body mass index (BMI) of 30.0 to 30.9 in adult   • Nicotine dependence       Allergies   Allergen Reactions   • Nsaids GI Intolerance        Current Outpatient Prescriptions on File Prior to Visit   Medication Sig Dispense Refill   • BREO ELLIPTA 200-25 MCG/INH aerosol powder  INHALE 1 PUFF BY MOUTH DAILY. 1 each 5   • fexofenadine (ALLEGRA) 60 MG tablet TAKE 1 TO 2 TABLETS BY MOUTH ONCE DAILY 60 tablet 5   • fluticasone (FLONASE) 50 MCG/ACT nasal spray 2 sprays into the nostril(s) as directed by provider Daily. 16 g 5    • lisinopril-hydrochlorothiazide (PRINZIDE,ZESTORETIC) 10-12.5 MG per tablet Take 1 tablet by mouth Daily. 30 tablet 5   • pantoprazole (PROTONIX) 40 MG EC tablet TAKE ONE TABLET TWO TIMES A DAY 60 tablet 5   • pramipexole (MIRAPEX) 0.125 MG tablet Take 1 tablet 2-3 hours prior to bedtime.  May increase dose by 1 tablet each week if necessary.  Maximum dose 4 tablets. 120 tablet 0   • pregabalin (LYRICA) 300 MG capsule 1 po bid, only 1 rx/ 30 days 60 capsule 2   • promethazine (PHENERGAN) 25 MG tablet Take 1 tablet by mouth Every 8 (Eight) Hours As Needed for Nausea or Vomiting. 90 tablet 2   • VENTOLIN  (90 Base) MCG/ACT inhaler INHALE 2 PUFFS EVERY 4 HOURS AS NEEDED FOR WHEEZING OR SHORTNESS OF AIR. 18 g 5   • vitamin D (ERGOCALCIFEROL) 10702 units capsule capsule TAKE ONE CAPSULE BY MOUTH WEEKLY 4 capsule 0   • zolpidem (AMBIEN) 10 MG tablet Take 1 tablet by mouth At Night As Needed for Sleep. 30 tablet 2   • docusate sodium (COLACE) 100 MG capsule      • nicotine (NICODERM CQ) 21 MG/24HR patch Place 1 patch on the skin as directed by provider Daily. 28 each 2   • [DISCONTINUED] albuterol (PROVENTIL) (2.5 MG/3ML) 0.083% nebulizer solution Take 2.5 mg by nebulization Every 4 (Four) Hours As Needed for wheezing. 270 mL 11   • [DISCONTINUED] estradiol (VIVELLE-DOT) 0.0375 MG/24HR Place 1 patch on the skin 2 (Two) Times a Week. 8 patch 2     Current Facility-Administered Medications on File Prior to Visit   Medication Dose Route Frequency Provider Last Rate Last Dose   • albuterol (PROVENTIL) nebulizer solution 0.083% 2.5 mg/3mL  2.5 mg Nebulization Q6H PRN Lesvia Servin MD   2.5 mg at 02/14/17 [id number]       Past Medical History:   Diagnosis Date   • Adnexal mass     pelvic US 10/26/11 showed 2 cm left abdnexal cystic lesion, resolved on f/u 5/14/15   • Anxiety    • Class 1 obesity due to excess calories with serious comorbidity and body mass index (BMI) of 30.0 to 30.9 in adult 2/16/2018   • Colon polyps     • Cor pulmonale (CMS/HCC)    • Degenerative arthritis of lumbar spine    • Depression    • Fibromyalgia    • Herpes simplex    • Lumbar degenerative disc disease    • Mild sleep apnea 01/16/2013    Sleep study 1/16/13 showing mild degree   • Narcotic dependence (CMS/HCC)    • Osteoporosis    • Palpitations    • Respiratory failure requiring intubation (CMS/HCC)    • Restless leg syndrome    • Sinus problem        Past Surgical History:   Procedure Laterality Date   • COLONOSCOPY N/A 2008    Complete   • TUBAL ABDOMINAL LIGATION  1991   • UPPER GASTROINTESTINAL ENDOSCOPY N/A 2013       Family History   Problem Relation Age of Onset   • Stroke Mother    • Liver disease Mother         Chronic   • Cirrhosis Father    • Heart attack Father    • Cancer Sister         Breast cancer   • Stroke Brother    • Cancer Sister        Social History     Social History   • Marital status:      Spouse name: N/A   • Number of children: N/A   • Years of education: N/A     Occupational History   • Not on file.     Social History Main Topics   • Smoking status: Former Smoker     Packs/day: 0.75     Years: 3.00     Types: Cigarettes     Quit date: 2013   • Smokeless tobacco: Never Used      Comment: Use of nicotine containing substance in combustion-free vaporization device.   • Alcohol use No   • Drug use: No   • Sexual activity: No      Comment:      Other Topics Concern   • Not on file     Social History Narrative   • No narrative on file       Travel:  No recent travel within the last 21 days outside the [place] Denies recent travel to one of the following [place]  Countries:  [place], [place], [place], or [place].  Denies contact with anyone who has traveled to one of the following [place]  Countries: [place], [place], [place], or [place] within the last 21 days and is known or suspected to have Ebola.  Denies having had any contact with the human remains, blood or any bodily fluids of someone who is known or  suspected to have Ebola within the last 21 days.     OB History     No data available          Review of Systems   Constitutional: Negative for chills, diaphoresis and fever (maybe not sure ).   HENT: Positive for congestion, postnasal drip and rhinorrhea. Negative for ear discharge, ear pain (itchy ), mouth sores, nosebleeds, sinus pain, sinus pressure, sore throat and voice change.    Respiratory: Positive for cough and wheezing (intermittent ). Negative for chest tightness and shortness of breath.    Cardiovascular: Negative for chest pain.   Gastrointestinal: Negative for abdominal pain, diarrhea, nausea and vomiting.   Musculoskeletal: Positive for myalgias.   Skin: Negative for rash.   Neurological: Positive for light-headedness (last night; resolved today ). Negative for dizziness and headaches.       /72 (BP Location: Right arm, Patient Position: Sitting, Cuff Size: Adult)   Pulse 82   Temp 98.5 °F (36.9 °C) (Temporal Artery )   Resp 18   Wt 77.6 kg (171 lb)   SpO2 98%   BMI 32.31 kg/m²     Objective   Physical Exam   Constitutional: She appears well-developed. She is cooperative. She does not appear ill. No distress.   HENT:   Head: Normocephalic.   Right Ear: External ear and ear canal normal. Tympanic membrane is not scarred, not perforated, not erythematous, not retracted and not bulging. A middle ear effusion (mild serous effusion ) is present.   Left Ear: External ear and ear canal normal. Tympanic membrane is not scarred, not perforated, not erythematous, not retracted and not bulging. A middle ear effusion (mild serous effusion ) is present.   Nose: No rhinorrhea. Right sinus exhibits no maxillary sinus tenderness and no frontal sinus tenderness. Left sinus exhibits no maxillary sinus tenderness and no frontal sinus tenderness.   Mouth/Throat: Uvula is midline and oropharynx is clear and moist. Mucous membranes are not dry. No posterior oropharyngeal edema or posterior oropharyngeal  erythema. No tonsillar exudate.   Mild bilateral nasal congestion    Cardiovascular: Normal rate, regular rhythm and normal heart sounds.    Pulmonary/Chest: Effort normal and breath sounds normal. She has no decreased breath sounds. She has no wheezes. She has no rhonchi. She has no rales.   Neurological: She is alert.       Assessment/Plan   Katelynn was seen today for cough.    Diagnoses and all orders for this visit:    Acute seasonal allergic rhinitis, unspecified trigger  -     methylPREDNISolone acetate (DEPO-medrol) injection 80 mg; Inject 1 mL into the appropriate muscle as directed by prescriber 1 (One) Time.    Continue on Flonase and Allegra.  Stop Mucinex until you are increasing your water intake.  Increase water intake to at least 64 ounces per day.  Rest.  PE findings, VS reviewed.  Lungs are clear today.  Start using your Breo daily (has been using sometimes but states that she doesn't/isnt supposed to have to use it daily).  This is a maintenance inhaler and is typically used at least 1 time daily.  Use Albuterol inhaler as needed/prescribed by PCP.  If symptoms worsen or new symptoms present follow up with PCP.  Patient aware of when to seek emergent care.  Visit summary provided.  Questions/concerns addressed.           Return if symptoms worsen or fail to improve with PCP.

## 2018-11-10 ENCOUNTER — OFFICE VISIT (OUTPATIENT)
Dept: RETAIL CLINIC | Facility: CLINIC | Age: 58
End: 2018-11-10

## 2018-11-10 VITALS
TEMPERATURE: 97.6 F | OXYGEN SATURATION: 96 % | DIASTOLIC BLOOD PRESSURE: 84 MMHG | HEART RATE: 60 BPM | RESPIRATION RATE: 17 BRPM | SYSTOLIC BLOOD PRESSURE: 112 MMHG

## 2018-11-10 DIAGNOSIS — J01.40 ACUTE NON-RECURRENT PANSINUSITIS: Primary | ICD-10-CM

## 2018-11-10 DIAGNOSIS — J42 CHRONIC BRONCHITIS WITH ACUTE EXACERBATION (HCC): ICD-10-CM

## 2018-11-10 DIAGNOSIS — J20.9 CHRONIC BRONCHITIS WITH ACUTE EXACERBATION (HCC): ICD-10-CM

## 2018-11-10 PROCEDURE — 99213 OFFICE O/P EST LOW 20 MIN: CPT | Performed by: NURSE PRACTITIONER

## 2018-11-10 PROCEDURE — 96372 THER/PROPH/DIAG INJ SC/IM: CPT | Performed by: NURSE PRACTITIONER

## 2018-11-10 RX ORDER — DEXAMETHASONE SODIUM PHOSPHATE 10 MG/ML
10 INJECTION INTRAMUSCULAR; INTRAVENOUS ONCE
Status: COMPLETED | OUTPATIENT
Start: 2018-11-10 | End: 2018-11-10

## 2018-11-10 RX ORDER — PREDNISONE 10 MG/1
TABLET ORAL DAILY
Qty: 21 EACH | Refills: 0 | Status: SHIPPED | OUTPATIENT
Start: 2018-11-10 | End: 2018-11-16

## 2018-11-10 RX ORDER — CEFDINIR 300 MG/1
300 CAPSULE ORAL 2 TIMES DAILY
Qty: 20 CAPSULE | Refills: 0 | Status: SHIPPED | OUTPATIENT
Start: 2018-11-10 | End: 2018-11-20

## 2018-11-10 RX ORDER — FLUCONAZOLE 150 MG/1
150 TABLET ORAL DAILY
Qty: 2 TABLET | Refills: 0 | Status: SHIPPED | OUTPATIENT
Start: 2018-11-10 | End: 2018-11-12

## 2018-11-10 RX ADMIN — DEXAMETHASONE SODIUM PHOSPHATE 10 MG: 10 INJECTION INTRAMUSCULAR; INTRAVENOUS at 15:49

## 2018-11-10 NOTE — PATIENT INSTRUCTIONS
Chronic Obstructive Pulmonary Disease  Chronic obstructive pulmonary disease (COPD) is a long-term (chronic) lung problem. When you have COPD, it is hard for air to get in and out of your lungs. The way your lungs work will never return to normal. Usually the condition gets worse over time. There are things you can do to keep yourself as healthy as possible. Your doctor may treat your condition with:  · Medicines.  · Quitting smoking, if you smoke.  · Rehabilitation. This may involve a team of specialists.  · Oxygen.  · Exercise and changes to your diet.  · Lung surgery.  · Comfort measures (palliative care).    Follow these instructions at home:  Medicines  · Take over-the-counter and prescription medicines only as told by your doctor.  · Talk to your doctor before taking any cough or allergy medicines. You may need to avoid medicines that cause your lungs to be dry.  Lifestyle  · If you smoke, stop. Smoking makes the problem worse. If you need help quitting, ask your doctor.  · Avoid being around things that make your breathing worse. This may include smoke, chemicals, and fumes.  · Stay active, but remember to also rest.  · Learn and use tips on how to relax.  · Make sure you get enough sleep. Most adults need at least 7 hours a night.  · Eat healthy foods. Eat smaller meals more often. Rest before meals.  Controlled breathing  · Learn and use tips on how to control your breathing as told by your doctor. Try:  ? Breathing in (inhaling) through your nose for 1 second. Then, pucker your lips and breath out (exhale) through your lips for 2 seconds.  ? Putting one hand on your belly (abdomen). Breathe in slowly through your nose for 1 second. Your hand on your belly should move out. Pucker your lips and breathe out slowly through your lips. Your hand on your belly should move in as you breathe out.  Controlled coughing  · Learn and use controlled coughing to clear mucus from your lungs. The steps are:  1. Lean your  head a little forward.  2. Breathe in deeply.  3. Try to hold your breath for 3 seconds.  4. Keep your mouth slightly open while coughing 2 times.  5. Spit any mucus out into a tissue.  6. Rest and do the steps again 1 or 2 times as needed.  General instructions  · Make sure you get all the shots (vaccines) that your doctor recommends. Ask your doctor about a flu shot and a pneumonia shot.  · Use oxygen therapy and therapy to help improve your lungs (pulmonary rehabilitation) if told by your doctor. If you need home oxygen therapy, ask your doctor if you should buy a tool to measure your oxygen level (oximeter).  · Make a COPD action plan with your doctor. This helps you know what to do if you feel worse than usual.  · Manage any other conditions you have as told by your doctor.  · Avoid going outside when it is very hot, cold, or humid.  · Avoid people who have a sickness you can catch (contagious).  · Keep all follow-up visits as told by your doctor. This is important.  Contact a doctor if:  · You cough up more mucus than usual.  · There is a change in the color or thickness of the mucus.  · It is harder to breathe than usual.  · Your breathing is faster than usual.  · You have trouble sleeping.  · You need to use your medicines more often than usual.  · You have trouble doing your normal activities such as getting dressed or walking around the house.  Get help right away if:  · You have shortness of breath while resting.  · You have shortness of breath that stops you from:  ? Being able to talk.  ? Doing normal activities.  · Your chest hurts for longer than 5 minutes.  · Your skin color is more blue than usual.  · Your pulse oximeter shows that you have low oxygen for longer than 5 minutes.  · You have a fever.  · You feel too tired to breathe normally.  Summary  · Chronic obstructive pulmonary disease (COPD) is a long-term lung problem.  · The way your lungs work will never return to normal. Usually the  condition gets worse over time. There are things you can do to keep yourself as healthy as possible.  · Take over-the-counter and prescription medicines only as told by your doctor.  · If you smoke, stop. Smoking makes the problem worse.  This information is not intended to replace advice given to you by your health care provider. Make sure you discuss any questions you have with your health care provider.  Document Released: 06/05/2009 Document Revised: 05/25/2017 Document Reviewed: 08/14/2014  [employer] Interactive Patient Education © 2017 [employer]  Sinusitis, Adult  Sinusitis is soreness and inflammation of your sinuses. Sinuses are hollow spaces in the bones around your face. Your sinuses are located:  · Around your eyes.  · In the middle of your forehead.  · Behind your nose.  · In your cheekbones.    Your sinuses and nasal passages are lined with a stringy fluid (mucus). Mucus normally drains out of your sinuses. When your nasal tissues become inflamed or swollen, the mucus can become trapped or blocked so air cannot flow through your sinuses. This allows bacteria, viruses, and funguses to grow, which leads to infection.  Sinusitis can develop quickly and last for 7?10 days (acute) or for more than 12 weeks (chronic). Sinusitis often develops after a cold.  What are the causes?  This condition is caused by anything that creates swelling in the sinuses or stops mucus from draining, including:  · Allergies.  · Asthma.  · Bacterial or viral infection.  · Abnormally shaped bones between the nasal passages.  · Nasal growths that contain mucus (nasal polyps).  · Narrow sinus openings.  · Pollutants, such as chemicals or irritants in the air.  · A foreign object stuck in the nose.  · A fungal infection. This is rare.    What increases the risk?  The following factors may make you more likely to develop this condition:  · Having allergies or asthma.  · Having had a recent cold or respiratory tract  infection.  · Having structural deformities or blockages in your nose or sinuses.  · Having a weak immune system.  · Doing a lot of swimming or diving.  · Overusing nasal sprays.  · Smoking.    What are the signs or symptoms?  The main symptoms of this condition are pain and a feeling of pressure around the affected sinuses. Other symptoms include:  · Upper toothache.  · Earache.  · Headache.  · Bad breath.  · Decreased sense of smell and taste.  · A cough that may get worse at night.  · Fatigue.  · Fever.  · Thick drainage from your nose. The drainage is often green and it may contain pus (purulent).  · Stuffy nose or congestion.  · Postnasal drip. This is when extra mucus collects in the throat or back of the nose.  · Swelling and warmth over the affected sinuses.  · Sore throat.  · Sensitivity to light.    How is this diagnosed?  This condition is diagnosed based on symptoms, a medical history, and a physical exam. To find out if your condition is acute or chronic, your health care provider may:  · Look in your nose for signs of nasal polyps.  · Tap over the affected sinus to check for signs of infection.  · View the inside of your sinuses using an imaging device that has a light attached (endoscope).    If your health care provider suspects that you have chronic sinusitis, you may also:  · Be tested for allergies.  · Have a sample of mucus taken from your nose (nasal culture) and checked for bacteria.  · Have a mucus sample examined to see if your sinusitis is related to an allergy.    If your sinusitis does not respond to treatment and it lasts longer than 8 weeks, you may have an MRI or CT scan to check your sinuses. These scans also help to determine how severe your infection is.  In rare cases, a bone biopsy may be done to rule out more serious types of fungal sinus disease.  How is this treated?  Treatment for sinusitis depends on the cause and whether your condition is chronic or acute. If a virus is  causing your sinusitis, your symptoms will go away on their own within 10 days. You may be given medicines to relieve your symptoms, including:  · Topical nasal decongestants. They shrink swollen nasal passages and let mucus drain from your sinuses.  · Antihistamines. These drugs block inflammation that is triggered by allergies. This can help to ease swelling in your nose and sinuses.  · Topical nasal corticosteroids. These are nasal sprays that ease inflammation and swelling in your nose and sinuses.  · Nasal saline washes. These rinses can help to get rid of thick mucus in your nose.    If your condition is caused by bacteria, you will be given an antibiotic medicine. If your condition is caused by a fungus, you will be given an antifungal medicine.  Surgery may be needed to correct underlying conditions, such as narrow nasal passages. Surgery may also be needed to remove polyps.  Follow these instructions at home:  Medicines  · Take, use, or apply over-the-counter and prescription medicines only as told by your health care provider. These may include nasal sprays.  · If you were prescribed an antibiotic medicine, take it as told by your health care provider. Do not stop taking the antibiotic even if you start to feel better.  Hydrate and Humidify  · Drink enough water to keep your urine clear or pale yellow. Staying hydrated will help to thin your mucus.  · Use a cool mist humidifier to keep the humidity level in your home above 50%.  · Inhale steam for 10-15 minutes, 3-4 times a day or as told by your health care provider. You can do this in the bathroom while a hot shower is running.  · Limit your exposure to cool or dry air.  Rest  · Rest as much as possible.  · Sleep with your head raised (elevated).  · Make sure to get enough sleep each night.  General instructions  · Apply a warm, moist washcloth to your face 3-4 times a day or as told by your health care provider. This will help with discomfort.  · Wash  your hands often with soap and water to reduce your exposure to viruses and other germs. If soap and water are not available, use hand .  · Do not smoke. Avoid being around people who are smoking (secondhand smoke).  · Keep all follow-up visits as told by your health care provider. This is important.  Contact a health care provider if:  · You have a fever.  · Your symptoms get worse.  · Your symptoms do not improve within 10 days.  Get help right away if:  · You have a severe headache.  · You have persistent vomiting.  · You have pain or swelling around your face or eyes.  · You have vision problems.  · You develop confusion.  · Your neck is stiff.  · You have trouble breathing.  This information is not intended to replace advice given to you by your health care provider. Make sure you discuss any questions you have with your health care provider.  Document Released: 12/18/2006 Document Revised: 08/13/2017 Document Reviewed: 10/12/2016  [employer] Interactive Patient Education © 2018 [employer]

## 2018-11-10 NOTE — PROGRESS NOTES
URI      Subjective   Katelynn Sung is a 58 y.o. female.     URI    This is a new problem. Episode onset: 6 days  The problem has been gradually worsening. There has been no fever. Associated symptoms include congestion, coughing (dark, think ), nausea (resolved ), rhinorrhea, a sore throat (mild ) and wheezing (worse at night ). Pertinent negatives include no abdominal pain, diarrhea, ear pain (popping/cracking left ear ), headaches, rash, sneezing or vomiting. Treatments tried: Flonase; Albuterol BID. The treatment provided no relief.    Has not had influenza vaccine.  UTD on pneumonia vaccines.  See ROS.      Patient Active Problem List   Diagnosis   • Abdominal pain   • Chronic pancreatitis (CMS/HCC)   • Persistent insomnia   • Chronic neck pain   • Dysphagia   • Heartburn   • Hypertension   • Nausea   • Polyarthropathy   • Vitamin D deficiency   • Cervical spondylolysis   • Cervical disc disease   • Fibromyalgia   • Humphreys's esophagus   • GERD (gastroesophageal reflux disease)   • COPD (chronic obstructive pulmonary disease) (CMS/HCC)   • Osteoporosis   • Chronic constipation   • Degenerative arthritis of lumbar spine   • Lumbar degenerative disc disease   • Anxiety   • Arthritis   • Depression   • Sleep apnea   • Restless leg syndrome   • Chronic rhinitis   • Postmenopausal disorder   • Postmenopausal hormone therapy   • Class 1 obesity due to excess calories with serious comorbidity and body mass index (BMI) of 30.0 to 30.9 in adult   • Nicotine dependence       Allergies   Allergen Reactions   • Nsaids GI Intolerance        Current Outpatient Medications on File Prior to Visit   Medication Sig Dispense Refill   • BREO ELLIPTA 200-25 MCG/INH aerosol powder  INHALE 1 PUFF BY MOUTH DAILY. 1 each 5   • docusate sodium (COLACE) 100 MG capsule      • fexofenadine (ALLEGRA) 60 MG tablet TAKE 1 TO 2 TABLETS BY MOUTH ONCE DAILY 60 tablet 5   • fluticasone (FLONASE) 50 MCG/ACT nasal spray 2 sprays into the  nostril(s) as directed by provider Daily. 16 g 5   • lisinopril-hydrochlorothiazide (PRINZIDE,ZESTORETIC) 10-12.5 MG per tablet Take 1 tablet by mouth Daily. 30 tablet 5   • pantoprazole (PROTONIX) 40 MG EC tablet TAKE ONE TABLET TWO TIMES A DAY 60 tablet 5   • pregabalin (LYRICA) 300 MG capsule 1 po bid, only 1 rx/ 30 days 60 capsule 2   • promethazine (PHENERGAN) 25 MG tablet Take 1 tablet by mouth Every 8 (Eight) Hours As Needed for Nausea or Vomiting. 90 tablet 2   • VENTOLIN  (90 Base) MCG/ACT inhaler INHALE 2 PUFFS EVERY 4 HOURS AS NEEDED FOR WHEEZING OR SHORTNESS OF AIR. 18 g 5   • vitamin D (ERGOCALCIFEROL) 21427 units capsule capsule TAKE ONE CAPSULE BY MOUTH WEEKLY 4 capsule 0   • zolpidem (AMBIEN) 10 MG tablet Take 1 tablet by mouth At Night As Needed for Sleep. 30 tablet 2   • [DISCONTINUED] nicotine (NICODERM CQ) 21 MG/24HR patch Place 1 patch on the skin as directed by provider Daily. 28 each 2   • [DISCONTINUED] pramipexole (MIRAPEX) 0.125 MG tablet Take 1 tablet 2-3 hours prior to bedtime.  May increase dose by 1 tablet each week if necessary.  Maximum dose 4 tablets. 120 tablet 0     Current Facility-Administered Medications on File Prior to Visit   Medication Dose Route Frequency Provider Last Rate Last Dose   • [DISCONTINUED] albuterol (PROVENTIL) nebulizer solution 0.083% 2.5 mg/3mL  2.5 mg Nebulization Q6H PRN Lesvia Servin MD   2.5 mg at 02/14/17 1819       Past Medical History:   Diagnosis Date   • Adnexal mass     pelvic US 10/26/11 showed 2 cm left abdnexal cystic lesion, resolved on f/u 5/14/15   • Anxiety    • Class 1 obesity due to excess calories with serious comorbidity and body mass index (BMI) of 30.0 to 30.9 in adult 2/16/2018   • Colon polyps    • Cor pulmonale (CMS/HCC)    • Degenerative arthritis of lumbar spine    • Depression    • Fibromyalgia    • Herpes simplex    • Lumbar degenerative disc disease    • Mild sleep apnea 01/16/2013    Sleep study 1/16/13 showing mild  degree   • Narcotic dependence (CMS/HCC)    • Osteoporosis    • Palpitations    • Respiratory failure requiring intubation (CMS/HCC)    • Restless leg syndrome    • Sinus problem        Past Surgical History:   Procedure Laterality Date   • COLONOSCOPY N/A     Complete   • TUBAL ABDOMINAL LIGATION     • UPPER GASTROINTESTINAL ENDOSCOPY N/A        Family History   Problem Relation Age of Onset   • Stroke Mother    • Liver disease Mother         Chronic   • Cirrhosis Father    • Heart attack Father    • Cancer Sister         Breast cancer   • Stroke Brother    • Cancer Sister        Social History     Socioeconomic History   • Marital status:      Spouse name: Not on file   • Number of children: Not on file   • Years of education: Not on file   • Highest education level: Not on file   Social Needs   • Financial resource strain: Not on file   • Food insecurity - worry: Not on file   • Food insecurity - inability: Not on file   • Transportation needs - medical: Not on file   • Transportation needs - non-medical: Not on file   Occupational History   • Not on file   Tobacco Use   • Smoking status: Current Every Day Smoker     Packs/day: 0.75     Years: 3.00     Pack years: 2.25     Types: Cigarettes, Electronic Cigarette     Last attempt to quit:      Years since quittin.8   • Smokeless tobacco: Never Used   • Tobacco comment: Use of nicotine containing substance in combustion-free vaporization device.   Substance and Sexual Activity   • Alcohol use: No   • Drug use: No   • Sexual activity: No     Birth control/protection: Abstinence, Surgical     Comment:    Other Topics Concern   • Not on file   Social History Narrative   • Not on file       Travel:  No recent travel within the last 21 days outside the [place] Denies recent travel to one of the following [place]  Countries:  [place], [place], [place], or [place].  Denies contact with anyone who has traveled to one of the following  [place]  Countries: [place], [place], [place], or [place] within the last 21 days and is known or suspected to have Ebola.  Denies having had any contact with the human remains, blood or any bodily fluids of someone who is known or suspected to have Ebola within the last 21 days.     OB History     No data available          Review of Systems   Constitutional: Positive for chills, diaphoresis and fever (tactile; Thursday ).   HENT: Positive for congestion, postnasal drip, rhinorrhea, sinus pressure and sore throat (mild ). Negative for dental problem, ear pain (popping/cracking left ear ), mouth sores, nosebleeds, sneezing and voice change.    Respiratory: Positive for cough (dark, think ), shortness of breath and wheezing (worse at night ).    Gastrointestinal: Positive for nausea (resolved ). Negative for abdominal pain, diarrhea and vomiting.   Musculoskeletal: Positive for back pain. Negative for myalgias.   Skin: Negative for rash.   Neurological: Negative for dizziness and headaches.       /84 (BP Location: Right arm, Patient Position: Sitting, Cuff Size: Adult)   Pulse 60   Temp 97.6 °F (36.4 °C) (Temporal)   Resp 17   SpO2 96%     Objective   Physical Exam   Constitutional: She is oriented to person, place, and time. She appears well-developed and well-nourished. She is cooperative. She appears ill. No distress.   HENT:   Head: Normocephalic.   Right Ear: External ear and ear canal normal. Tympanic membrane is not injected, not scarred, not perforated, not erythematous, not retracted and not bulging. A middle ear effusion (moderate serous ) is present.   Left Ear: External ear and ear canal normal. Tympanic membrane is not injected, not scarred, not perforated, not erythematous, not retracted and not bulging. A middle ear effusion (moderate serous ) is present.   Nose: Mucosal edema and septal deviation present. No rhinorrhea. Right sinus exhibits maxillary sinus tenderness and frontal sinus  tenderness. Left sinus exhibits maxillary sinus tenderness and frontal sinus tenderness.   Mouth/Throat: Uvula is midline, oropharynx is clear and moist and mucous membranes are normal. No tonsillar exudate.   Bilateral turbinates boggy and swollen    Cardiovascular: Normal rate, regular rhythm and normal heart sounds.   Pulmonary/Chest: Effort normal. No respiratory distress. She has decreased breath sounds in the right lower field and the left lower field. She has wheezes in the right upper field and the left upper field. She has no rhonchi. She has no rales.   Lymphadenopathy:        Head (right side): Tonsillar adenopathy present. No preauricular and no posterior auricular adenopathy present.        Head (left side): Tonsillar adenopathy present. No preauricular and no posterior auricular adenopathy present.     She has cervical adenopathy.   Neurological: She is alert and oriented to person, place, and time.   Skin: Skin is warm, dry and intact. No rash noted.       Assessment/Plan   Katelynn was seen today for uri.    Diagnoses and all orders for this visit:    Acute non-recurrent pansinusitis  -     cefdinir (OMNICEF) 300 MG capsule; Take 1 capsule by mouth 2 (Two) Times a Day for 10 days.  -     dexamethasone (DECADRON) injection 10 mg; Inject 1 mL into the appropriate muscle as directed by prescriber 1 (One) Time.  -     fluconazole (DIFLUCAN) 150 MG tablet; Take 1 tablet by mouth Daily for 2 doses.  -     PredniSONE (DELTASONE) 10 MG (21) tablet pack; Take  by mouth Daily for 6 days.    Chronic bronchitis with acute exacerbation (CMS/HCC)  -     cefdinir (OMNICEF) 300 MG capsule; Take 1 capsule by mouth 2 (Two) Times a Day for 10 days.  -     dexamethasone (DECADRON) injection 10 mg; Inject 1 mL into the appropriate muscle as directed by prescriber 1 (One) Time.  -     fluconazole (DIFLUCAN) 150 MG tablet; Take 1 tablet by mouth Daily for 2 doses.  -     PredniSONE (DELTASONE) 10 MG (21) tablet pack; Take  by  mouth Daily for 6 days.    Increase water intake to at least 64 ounces per day.  Rest.  If symptoms worsen go to the ER this weekend.  Follow up with Dr. Servin if symptoms persist.  Questions/concerns addressed.  Visit summary provided today.      Return if symptoms worsen or fail to improve go the ER .

## 2018-11-19 DIAGNOSIS — K86.1 CHRONIC PANCREATITIS, UNSPECIFIED PANCREATITIS TYPE (HCC): ICD-10-CM

## 2018-11-19 RX ORDER — PROMETHAZINE HYDROCHLORIDE 25 MG/1
TABLET ORAL
Qty: 90 TABLET | Refills: 0 | Status: SHIPPED | OUTPATIENT
Start: 2018-11-19 | End: 2018-12-12 | Stop reason: SDUPTHER

## 2018-12-10 DIAGNOSIS — G47.00 PERSISTENT INSOMNIA: ICD-10-CM

## 2018-12-11 RX ORDER — ZOLPIDEM TARTRATE 10 MG/1
10 TABLET ORAL NIGHTLY PRN
Qty: 30 TABLET | Refills: 2 | Status: SHIPPED | OUTPATIENT
Start: 2018-12-11 | End: 2019-03-06 | Stop reason: SDUPTHER

## 2018-12-12 ENCOUNTER — OFFICE VISIT (OUTPATIENT)
Dept: FAMILY MEDICINE CLINIC | Facility: CLINIC | Age: 58
End: 2018-12-12

## 2018-12-12 VITALS
HEIGHT: 61 IN | SYSTOLIC BLOOD PRESSURE: 122 MMHG | DIASTOLIC BLOOD PRESSURE: 74 MMHG | HEART RATE: 72 BPM | BODY MASS INDEX: 33.61 KG/M2 | WEIGHT: 178 LBS | OXYGEN SATURATION: 98 %

## 2018-12-12 DIAGNOSIS — K86.1 CHRONIC PANCREATITIS, UNSPECIFIED PANCREATITIS TYPE (HCC): ICD-10-CM

## 2018-12-12 DIAGNOSIS — K22.719 BARRETT'S ESOPHAGUS WITH DYSPLASIA: ICD-10-CM

## 2018-12-12 DIAGNOSIS — I10 ESSENTIAL HYPERTENSION: Primary | ICD-10-CM

## 2018-12-12 DIAGNOSIS — M43.02 CERVICAL SPONDYLOLYSIS: ICD-10-CM

## 2018-12-12 DIAGNOSIS — J42 CHRONIC BRONCHITIS, UNSPECIFIED CHRONIC BRONCHITIS TYPE (HCC): ICD-10-CM

## 2018-12-12 DIAGNOSIS — M79.7 FIBROMYALGIA: ICD-10-CM

## 2018-12-12 DIAGNOSIS — M54.2 CHRONIC NECK PAIN: ICD-10-CM

## 2018-12-12 DIAGNOSIS — K86.1 OTHER CHRONIC PANCREATITIS (HCC): ICD-10-CM

## 2018-12-12 DIAGNOSIS — G25.81 RESTLESS LEG SYNDROME: ICD-10-CM

## 2018-12-12 DIAGNOSIS — G89.29 CHRONIC NECK PAIN: ICD-10-CM

## 2018-12-12 DIAGNOSIS — M50.90 CERVICAL DISC DISEASE: ICD-10-CM

## 2018-12-12 PROCEDURE — 99214 OFFICE O/P EST MOD 30 MIN: CPT | Performed by: FAMILY MEDICINE

## 2018-12-12 RX ORDER — NALOXONE HYDROCHLORIDE 4 MG/.1ML
SPRAY NASAL
Refills: 0 | COMMUNITY
Start: 2018-09-12 | End: 2020-02-11

## 2018-12-12 RX ORDER — PREGABALIN 300 MG/1
CAPSULE ORAL
Qty: 60 CAPSULE | Refills: 2 | Status: SHIPPED | OUTPATIENT
Start: 2018-12-12 | End: 2019-03-06 | Stop reason: SDUPTHER

## 2018-12-12 RX ORDER — BUPRENORPHINE HYDROCHLORIDE AND NALOXONE HYDROCHLORIDE DIHYDRATE 8; 2 MG/1; MG/1
1 TABLET SUBLINGUAL 2 TIMES DAILY
COMMUNITY
Start: 2018-12-05

## 2018-12-12 RX ORDER — PROMETHAZINE HYDROCHLORIDE 25 MG/1
TABLET ORAL
Qty: 90 TABLET | Refills: 0 | Status: SHIPPED | OUTPATIENT
Start: 2018-12-12 | End: 2019-01-08 | Stop reason: SDUPTHER

## 2018-12-12 NOTE — PROGRESS NOTES
"Subjective   Katelynn Sung is a 58 y.o. female.     History of Present Illness  Ms. Medrano presents today for f/u on several chronic problems.  1) She has mild COPD/chronic bronchitis. No CP or hemoptysis. Stable cough, CHU. Still working to quit smoking via vaping.   2) She has chronic pain due to FMSx and C-DDD/DJD. Currently on LYrica and doing well. Denies side effects. Some weight gain. No recent injury or change in functional status.   3) she continues to be unclear about rec'd f/u via Dr. Flores. Earlier this year had ECHO (showed EF 60%, moderate hypokinesis of the lateral wall left ventricle, akinetic left ventricular apex, moderate dilatation of the right ventricle, small to moderate pericardial effusion). Was schedule for sleep study. She is unsure about schedule stress testing or heart cath. She believes she was supposed to have \"artherial check on legs\". Has not had f/u since that time.  4) she has chronic insomnia which has responded well to Ambien for sleep initiation.  She is pleased with her sleep quality.  Denies side effects from medication. Good control of RLSx symptoms.  5) she has essential hypertension for which he is on Zestoretic.  Taking as prescribed.  She denies side effects.  Blood pressure has been near goal.  6) has chronic pancreatitis as well as lance's esophagus. No dysphagia, weight loss, blood in stool. Stable intermittent nausea. No abd pain.    The following portions of the patient's history were reviewed and updated as appropriate: allergies, current medications, past family history, past medical history, past social history, past surgical history and problem list.    Review of Systems   Constitutional: Positive for fatigue. Negative for chills, diaphoresis and fever.   HENT: Positive for postnasal drip. Negative for congestion, ear pain, mouth sores, nosebleeds, rhinorrhea and sore throat.    Eyes: Negative for pain, discharge and redness.   Respiratory: Positive for " cough (chronic, dry, intermittent) and shortness of breath (mild with exertion). Negative for chest tightness and wheezing.    Cardiovascular: Negative for chest pain, palpitations and leg swelling.   Gastrointestinal: Positive for constipation and nausea. Negative for blood in stool, diarrhea and vomiting.   Endocrine: Positive for heat intolerance.   Genitourinary: Negative for dysuria, hematuria and vaginal bleeding.   Musculoskeletal: Positive for arthralgias and myalgias.   Skin: Negative for rash.   Neurological: Positive for headaches. Negative for weakness.   Hematological: Negative for adenopathy. Does not bruise/bleed easily.   Psychiatric/Behavioral: Positive for sleep disturbance. Negative for confusion, dysphoric mood and suicidal ideas. The patient is nervous/anxious.        Objective    Vitals:    12/12/18 1347   BP: 122/74   Pulse: 72   SpO2: 98%     Body mass index is 33.63 kg/m².      12/12/18  1347   Weight: 80.7 kg (178 lb)       Physical Exam   Constitutional: She is oriented to person, place, and time. She appears well-developed and well-nourished. She is cooperative. She does not appear ill. No distress.   obese   HENT:   Head: Normocephalic and atraumatic.   Mouth/Throat: Oropharynx is clear and moist and mucous membranes are normal. Mucous membranes are not dry. No oral lesions.   Eyes: Conjunctivae and lids are normal.   Neck: Phonation normal. Neck supple. Normal carotid pulses present. Carotid bruit is not present.   Cardiovascular: Normal rate, regular rhythm, normal heart sounds and intact distal pulses. Exam reveals no gallop.   No murmur heard.  Pulmonary/Chest: Effort normal and breath sounds normal. She has no wheezes. She has no rhonchi. She has no rales.   Abdominal: Soft. Bowel sounds are normal. She exhibits no distension and no mass. There is no tenderness.   Musculoskeletal:        Thoracic back: She exhibits deformity (increased kyphosis). She exhibits no bony tenderness.      Vascular Status -  Her right foot exhibits no edema. Her left foot exhibits no edema.  Lymphadenopathy:     She has no cervical adenopathy.   Neurological: She is alert and oriented to person, place, and time. She displays no tremor. Gait normal.   Skin: Skin is warm and dry. No bruising and no rash noted.   Psychiatric: She has a normal mood and affect. Her behavior is normal. Cognition and memory are normal.   Good eye contact. Answers questions appropriately. Good personal hygiene and grooming.   Nursing note and vitals reviewed.    Lab Results   Component Value Date    WBC 7.13 04/24/2018    HGB 14.5 04/24/2018    HCT 43.9 04/24/2018    MCV 89.6 04/24/2018     04/24/2018     Lab Results   Component Value Date    GLUCOSE 99 08/04/2016    BUN 12 04/24/2018    CREATININE 0.80 04/24/2018    EGFRIFNONA 74 04/24/2018    EGFRIFAFRI 90 04/24/2018    BCR 15.0 04/24/2018    K 4.6 04/24/2018    CO2 27.0 04/24/2018    CALCIUM 10.0 04/24/2018    PROTENTOTREF 8.3 (H) 04/24/2018    ALBUMIN 4.70 04/24/2018    LABIL2 1.3 04/24/2018    AST 28 04/24/2018    ALT 15 04/24/2018     Lab Results   Component Value Date    HGBA1C 5.50 11/15/2017       Assessment/Plan   Katelynn was seen today for anxiety, depression, fibromyalgia, hypertension and copd.    Diagnoses and all orders for this visit:    Essential hypertension    Chronic bronchitis, unspecified chronic bronchitis type (CMS/HCC)    Restless leg syndrome    Fibromyalgia    Cervical disc disease  -     pregabalin (LYRICA) 300 MG capsule; 1 po bid, only 1 rx/ 30 days    Cervical spondylolysis  -     pregabalin (LYRICA) 300 MG capsule; 1 po bid, only 1 rx/ 30 days    Chronic neck pain  -     pregabalin (LYRICA) 300 MG capsule; 1 po bid, only 1 rx/ 30 days    Other chronic pancreatitis (CMS/HCC)    Humphreys's esophagus with dysplasia    Multiple chronic medical problems appearing stable.  Recommended continue current treatment plan.  She is encouraged to continue weaning  from nicotine replacement.    Records requested from Dr. Flores's office to ensure appropriate follow-up on recent abnormal studies.  She is clinically stable at this time.    Routine follow up in 3 months, sooner as needed.  Patient was encouraged to keep me informed of any acute changes, lack of improvement, or any new concerning symptoms.  Pt is aware of reasons to seek emergent care.  Patient voiced understanding of all instructions and denied further questions.  As part of patient's treatment plan I am prescribing a controlled substance.  The patient has been made aware of the appropriate use of such medications, including potential risk of somnolence, limited ability to drive and/or work safely, and potential for dependence and/or overdose.  It has also been made clear that these medications are for use by this patient only, without concomitant use of alcohol or other substances, unless prescribed.  DANTE report reviewed and scanned into chart.  Last DANTE date 5/1/18. Update requested.        Please note that portions of this note may have been completed with a voice recognition program. Efforts were made to edit the dictations, but occasionally words are mistranscribed.

## 2019-01-08 DIAGNOSIS — K86.1 CHRONIC PANCREATITIS, UNSPECIFIED PANCREATITIS TYPE (HCC): ICD-10-CM

## 2019-01-08 RX ORDER — PROMETHAZINE HYDROCHLORIDE 25 MG/1
TABLET ORAL
Qty: 90 TABLET | Refills: 0 | Status: SHIPPED | OUTPATIENT
Start: 2019-01-08 | End: 2019-02-04 | Stop reason: SDUPTHER

## 2019-01-22 RX ORDER — PANTOPRAZOLE SODIUM 40 MG/1
TABLET, DELAYED RELEASE ORAL
Qty: 60 TABLET | Refills: 5 | Status: SHIPPED | OUTPATIENT
Start: 2019-01-22 | End: 2019-08-15 | Stop reason: SDUPTHER

## 2019-01-28 RX ORDER — FLUCONAZOLE 150 MG/1
150 TABLET ORAL ONCE
Qty: 1 TABLET | Refills: 1 | Status: SHIPPED | OUTPATIENT
Start: 2019-01-28 | End: 2019-05-24 | Stop reason: SDUPTHER

## 2019-02-04 DIAGNOSIS — K86.1 CHRONIC PANCREATITIS, UNSPECIFIED PANCREATITIS TYPE (HCC): ICD-10-CM

## 2019-02-04 RX ORDER — PROMETHAZINE HYDROCHLORIDE 25 MG/1
TABLET ORAL
Qty: 90 TABLET | Refills: 0 | Status: SHIPPED | OUTPATIENT
Start: 2019-02-04 | End: 2019-03-06 | Stop reason: SDUPTHER

## 2019-03-06 ENCOUNTER — TELEPHONE (OUTPATIENT)
Dept: FAMILY MEDICINE CLINIC | Facility: CLINIC | Age: 59
End: 2019-03-06

## 2019-03-06 DIAGNOSIS — M43.02 CERVICAL SPONDYLOLYSIS: ICD-10-CM

## 2019-03-06 DIAGNOSIS — M54.2 CHRONIC NECK PAIN: ICD-10-CM

## 2019-03-06 DIAGNOSIS — K86.1 CHRONIC PANCREATITIS, UNSPECIFIED PANCREATITIS TYPE (HCC): ICD-10-CM

## 2019-03-06 DIAGNOSIS — G89.29 CHRONIC NECK PAIN: ICD-10-CM

## 2019-03-06 DIAGNOSIS — M50.90 CERVICAL DISC DISEASE: ICD-10-CM

## 2019-03-06 DIAGNOSIS — G47.00 PERSISTENT INSOMNIA: ICD-10-CM

## 2019-03-06 RX ORDER — PREGABALIN 300 MG/1
CAPSULE ORAL
Qty: 60 CAPSULE | Refills: 2 | Status: SHIPPED | OUTPATIENT
Start: 2019-03-06 | End: 2019-06-06 | Stop reason: SDUPTHER

## 2019-03-06 RX ORDER — PROMETHAZINE HYDROCHLORIDE 25 MG/1
25 TABLET ORAL 3 TIMES DAILY
Qty: 90 TABLET | Refills: 0 | Status: SHIPPED | OUTPATIENT
Start: 2019-03-06 | End: 2019-04-22 | Stop reason: SDUPTHER

## 2019-03-06 RX ORDER — ZOLPIDEM TARTRATE 10 MG/1
10 TABLET ORAL NIGHTLY PRN
Qty: 30 TABLET | Refills: 2 | Status: SHIPPED | OUTPATIENT
Start: 2019-03-06 | End: 2019-05-31 | Stop reason: SDUPTHER

## 2019-03-06 NOTE — TELEPHONE ENCOUNTER
----- Message from Yahaira Xiong sent at 3/5/2019  5:17 PM EST -----  Regarding: Refills   Patient called at 3:47pm to request refills be called to [facility] for   Phenergan, Lyrica 300mg and Ambien 10mg.

## 2019-03-06 NOTE — TELEPHONE ENCOUNTER
DANTE reviewed. Refill approved. Medication E rx'd to pharmacy as requested. Pt to keep f/u apt as scheduled.

## 2019-04-03 ENCOUNTER — OFFICE VISIT (OUTPATIENT)
Dept: FAMILY MEDICINE CLINIC | Facility: CLINIC | Age: 59
End: 2019-04-03

## 2019-04-03 VITALS
SYSTOLIC BLOOD PRESSURE: 126 MMHG | WEIGHT: 181 LBS | DIASTOLIC BLOOD PRESSURE: 84 MMHG | HEART RATE: 88 BPM | BODY MASS INDEX: 34.17 KG/M2 | HEIGHT: 61 IN

## 2019-04-03 DIAGNOSIS — K21.00 GASTROESOPHAGEAL REFLUX DISEASE WITH ESOPHAGITIS: ICD-10-CM

## 2019-04-03 DIAGNOSIS — I10 ESSENTIAL HYPERTENSION: Primary | ICD-10-CM

## 2019-04-03 DIAGNOSIS — E66.09 CLASS 1 OBESITY DUE TO EXCESS CALORIES WITH SERIOUS COMORBIDITY AND BODY MASS INDEX (BMI) OF 34.0 TO 34.9 IN ADULT: ICD-10-CM

## 2019-04-03 DIAGNOSIS — M79.7 FIBROMYALGIA: ICD-10-CM

## 2019-04-03 DIAGNOSIS — F51.04 CHRONIC INSOMNIA: ICD-10-CM

## 2019-04-03 DIAGNOSIS — J42 CHRONIC BRONCHITIS, UNSPECIFIED CHRONIC BRONCHITIS TYPE (HCC): ICD-10-CM

## 2019-04-03 PROCEDURE — 99214 OFFICE O/P EST MOD 30 MIN: CPT | Performed by: FAMILY MEDICINE

## 2019-04-03 RX ORDER — ALBUTEROL SULFATE 90 UG/1
2 AEROSOL, METERED RESPIRATORY (INHALATION) EVERY 4 HOURS PRN
Qty: 18 G | Refills: 5 | Status: SHIPPED | OUTPATIENT
Start: 2019-04-03 | End: 2019-11-08 | Stop reason: SDUPTHER

## 2019-04-03 RX ORDER — LISINOPRIL AND HYDROCHLOROTHIAZIDE 12.5; 1 MG/1; MG/1
1 TABLET ORAL DAILY
Qty: 30 TABLET | Refills: 5 | Status: SHIPPED | OUTPATIENT
Start: 2019-04-03 | End: 2019-10-24 | Stop reason: SDUPTHER

## 2019-04-03 RX ORDER — FEXOFENADINE HYDROCHLORIDE 60 MG/1
60 TABLET, FILM COATED ORAL DAILY
Qty: 60 TABLET | Refills: 5 | Status: SHIPPED | OUTPATIENT
Start: 2019-04-03 | End: 2019-09-27

## 2019-04-03 RX ORDER — FLUTICASONE PROPIONATE 50 MCG
2 SPRAY, SUSPENSION (ML) NASAL DAILY
Qty: 16 G | Refills: 5 | Status: SHIPPED | OUTPATIENT
Start: 2019-04-03 | End: 2019-10-29 | Stop reason: SDUPTHER

## 2019-04-03 NOTE — PROGRESS NOTES
"Subjective   Katelynn Richy Sung is a 59 y.o. female.     History of Present Illness  Pineda presents today for routine follow-up on several chronic medical problems.    She has chronic essential hypertension for which she is currently on Zestoretic.  Taking as prescribed.  Denies side effects.  Blood pressure has been well controlled.  Denies chest pain, palpitations or swelling in extremities.  Does have stable mild dyspnea on exertion.  Not following heart healthy diet.  Getting minimal exercise.    His chronic bronchitis/COPD.  Has quit smoking.  She is currently on albuterol as needed, Brio Ellipta.  Reports taking as prescribed.  No recent increase in cough, shortness of breath.  No chest pain, fever or hemoptysis.    She has chronic pain due to fibromyalgia as well as degenerative disc disease, arthritis.  Currently on Lyrica.  Tolerating well.  Having some weight gain she attributes to Lyrica use.  Good overall improvement in pain control, functioning, ADLs.    She has GERD with previous esophagitis.  Currently on Protonix.  Also with history of Humphreys's esophagus.  Denies dysphagia, abdominal pain, blood in stool or unintentional weight loss.    Has had a 25 pound weight gain over the last several months.  Admits she has not been exercising, taking in excess calories particularly sweets.  She requests recommendations regarding a \"diet\".    She has chronic insomnia for which she is currently on Ambien.  Tolerating well, denies side effects.  Getting at least 6 or more hours of sleep per night.  No parasomnia.    The following portions of the patient's history were reviewed and updated as appropriate: allergies, current medications, past family history, past medical history, past social history, past surgical history and problem list.    Review of Systems   Constitutional: Positive for fatigue and unexpected weight change. Negative for diaphoresis and fever.   HENT: Positive for congestion and postnasal drip. " Negative for ear pain, mouth sores, nosebleeds, rhinorrhea and sore throat.    Eyes: Negative for pain, discharge, redness and visual disturbance.   Respiratory: Positive for cough (chronic, dry, intermittent) and shortness of breath (mild with exertion). Negative for chest tightness and wheezing.    Cardiovascular: Negative for chest pain, palpitations and leg swelling.   Gastrointestinal: Positive for constipation and nausea. Negative for blood in stool, diarrhea and vomiting.   Endocrine: Positive for heat intolerance.   Genitourinary: Negative for dysuria, hematuria and vaginal bleeding.   Musculoskeletal: Positive for arthralgias and myalgias.   Skin: Negative for rash and wound.   Neurological: Positive for headaches. Negative for dizziness, tremors and weakness.   Hematological: Negative for adenopathy. Does not bruise/bleed easily.   Psychiatric/Behavioral: Positive for sleep disturbance. Negative for confusion, dysphoric mood and suicidal ideas. The patient is nervous/anxious.        Objective    Vitals:    04/03/19 1447   BP: 126/84   Pulse: 88     Body mass index is 34.2 kg/m².      04/03/19  1447   Weight: 82.1 kg (181 lb)       Physical Exam   Constitutional: She is oriented to person, place, and time. She appears well-developed and well-nourished. She is cooperative. She does not appear ill. No distress.   obese   HENT:   Head: Normocephalic and atraumatic.   Mouth/Throat: Oropharynx is clear and moist and mucous membranes are normal. Mucous membranes are not dry. No oral lesions.   Eyes: Conjunctivae and lids are normal. Right eye exhibits no nystagmus. Left eye exhibits no nystagmus.   Neck: Phonation normal. Neck supple. Normal carotid pulses present. Carotid bruit is not present. No thyroid mass present.   Cardiovascular: Normal rate, regular rhythm, normal heart sounds and intact distal pulses. Exam reveals no gallop.   No murmur heard.  Pulmonary/Chest: Effort normal and breath sounds normal. She  has no wheezes. She has no rhonchi. She has no rales.   Musculoskeletal:        Thoracic back: She exhibits deformity (increased kyphosis). She exhibits no bony tenderness.     Vascular Status -  Her right foot exhibits no edema. Her left foot exhibits no edema.  Lymphadenopathy:     She has no cervical adenopathy.   Neurological: She is alert and oriented to person, place, and time. She displays no tremor. Gait normal.   Skin: Skin is warm and dry. No bruising and no rash noted.   Psychiatric: She has a normal mood and affect. Her speech is normal and behavior is normal. Judgment and thought content normal. Cognition and memory are normal.   Good eye contact. Answers questions appropriately. Good personal hygiene and grooming.   Nursing note and vitals reviewed.    Lab Results   Component Value Date    WBC 7.13 04/24/2018    HGB 14.5 04/24/2018    HCT 43.9 04/24/2018    MCV 89.6 04/24/2018     04/24/2018       Lab Results   Component Value Date    GLUCOSE 99 08/04/2016    BUN 12 04/24/2018    CREATININE 0.80 04/24/2018    EGFRIFNONA 74 04/24/2018    EGFRIFAFRI 90 04/24/2018    BCR 15.0 04/24/2018    K 4.6 04/24/2018    CO2 27.0 04/24/2018    CALCIUM 10.0 04/24/2018    PROTENTOTREF 8.3 (H) 04/24/2018    ALBUMIN 4.70 04/24/2018    LABIL2 1.3 04/24/2018    AST 28 04/24/2018    ALT 15 04/24/2018     Labs from ER visit reviewed on chart (9/2018).    Lab Results   Component Value Date    HGBA1C 5.50 11/15/2017       Lab Results   Component Value Date    TSH 2.69 04/24/2018     Assessment/Plan   Katelynn was seen today for anxiety, depression, copd and hypertension.    Diagnoses and all orders for this visit:    Essential hypertension    Chronic bronchitis, unspecified chronic bronchitis type (CMS/HCC)    Fibromyalgia    Gastroesophageal reflux disease with esophagitis    Chronic insomnia    Class 1 obesity due to excess calories with serious comorbidity and body mass index (BMI) of 34.0 to 34.9 in adult    Other  orders  -     lisinopril-hydrochlorothiazide (PRINZIDE,ZESTORETIC) 10-12.5 MG per tablet; Take 1 tablet by mouth Daily.  -     fluticasone (FLONASE) 50 MCG/ACT nasal spray; 2 sprays into the nostril(s) as directed by provider Daily.  -     fexofenadine (ALLEGRA) 60 MG tablet; Take 1 tablet by mouth Daily.  -     albuterol sulfate HFA (VENTOLIN HFA) 108 (90 Base) MCG/ACT inhaler; Inhale 2 puffs Every 4 (Four) Hours As Needed for Wheezing.       Essential hypertension controlled.  Continue Zestoretic.  She is encouraged wholeheartedly diet and exercise daily.    COPD/emphysema stable.  Continue albuterol as needed, Brio Ellipta daily.    Fibromyalgia and multifactorial chronic pain currently stable.  Continue Lyrica.  She denies side effects.  No aberrant behavior regarding medication.    GERD with esophagitis currently well controlled.  Continue PPI.      Chronic insomnia with good improvement on Ambien.  No apparent side effects.  Continue current treatment plan.  Sleep hygiene methods reviewed.    Obesity with recent weight gain. Nutrition and activity goals reviewed including: mainly water to drink, limit white flour/processed sugar, high protein, high fiber carbs, good breakfast, working toward 150 mins cardio per week, resistance training 2x/week.    As part of patient's treatment plan I am prescribing a controlled substance.  The patient has been made aware of the appropriate use of such medications, including potential risk of somnolence, limited ability to drive and/or work safely, and potential for dependence and/or overdose.  It has also been made clear that these medications are for use by this patient only, without concomitant use of alcohol or other substances, unless prescribed.  DANTE report reviewed and scanned into chart.  Last DANTE date 4/2/19.    Routine f/u in 3 months, sooner as needed.  Patient was encouraged to keep me informed of any acute changes, lack of improvement, or any new concerning  symptoms.  Pt is aware of reasons to seek emergent care.  Patient voiced understanding of all instructions and denied further questions.        Please note that portions of this note may have been completed with a voice recognition program. Efforts were made to edit the dictations, but occasionally words are mistranscribed.

## 2019-04-04 PROBLEM — F51.04 CHRONIC INSOMNIA: Status: ACTIVE | Noted: 2019-04-04

## 2019-04-22 DIAGNOSIS — K86.1 CHRONIC PANCREATITIS, UNSPECIFIED PANCREATITIS TYPE (HCC): ICD-10-CM

## 2019-04-22 RX ORDER — PROMETHAZINE HYDROCHLORIDE 25 MG/1
25 TABLET ORAL 3 TIMES DAILY
Qty: 90 TABLET | Refills: 0 | Status: SHIPPED | OUTPATIENT
Start: 2019-04-22 | End: 2019-06-05 | Stop reason: SDUPTHER

## 2019-05-24 ENCOUNTER — TELEPHONE (OUTPATIENT)
Dept: FAMILY MEDICINE CLINIC | Facility: CLINIC | Age: 59
End: 2019-05-24

## 2019-05-24 RX ORDER — FLUCONAZOLE 150 MG/1
150 TABLET ORAL ONCE
Qty: 1 TABLET | Refills: 1 | Status: SHIPPED | OUTPATIENT
Start: 2019-05-24 | End: 2019-07-18 | Stop reason: SDUPTHER

## 2019-05-31 DIAGNOSIS — G47.00 PERSISTENT INSOMNIA: ICD-10-CM

## 2019-05-31 RX ORDER — ZOLPIDEM TARTRATE 10 MG/1
10 TABLET ORAL NIGHTLY PRN
Qty: 30 TABLET | Refills: 2 | Status: SHIPPED | OUTPATIENT
Start: 2019-05-31 | End: 2019-08-27 | Stop reason: SDUPTHER

## 2019-05-31 NOTE — TELEPHONE ENCOUNTER
Mal[facility] printed, no controlled substance agreement on file, UDS on file from 7/16/18, last visit 4/3/19, next appointment 7/3/19.

## 2019-06-05 DIAGNOSIS — K86.1 CHRONIC PANCREATITIS, UNSPECIFIED PANCREATITIS TYPE (HCC): ICD-10-CM

## 2019-06-06 DIAGNOSIS — M43.02 CERVICAL SPONDYLOLYSIS: ICD-10-CM

## 2019-06-06 DIAGNOSIS — M50.90 CERVICAL DISC DISEASE: ICD-10-CM

## 2019-06-06 DIAGNOSIS — G89.29 CHRONIC NECK PAIN: ICD-10-CM

## 2019-06-06 DIAGNOSIS — M54.2 CHRONIC NECK PAIN: ICD-10-CM

## 2019-06-06 RX ORDER — PREGABALIN 300 MG/1
CAPSULE ORAL
Qty: 60 CAPSULE | Refills: 0 | Status: SHIPPED | OUTPATIENT
Start: 2019-06-06 | End: 2019-07-08 | Stop reason: SDUPTHER

## 2019-06-06 RX ORDER — PROMETHAZINE HYDROCHLORIDE 25 MG/1
25 TABLET ORAL 3 TIMES DAILY
Qty: 90 TABLET | Refills: 0 | Status: SHIPPED | OUTPATIENT
Start: 2019-06-06 | End: 2019-07-08 | Stop reason: SDUPTHER

## 2019-06-20 ENCOUNTER — OFFICE VISIT (OUTPATIENT)
Dept: FAMILY MEDICINE CLINIC | Facility: CLINIC | Age: 59
End: 2019-06-20

## 2019-06-20 VITALS
HEART RATE: 77 BPM | OXYGEN SATURATION: 95 % | SYSTOLIC BLOOD PRESSURE: 122 MMHG | DIASTOLIC BLOOD PRESSURE: 78 MMHG | WEIGHT: 179 LBS | HEIGHT: 61 IN | BODY MASS INDEX: 33.79 KG/M2

## 2019-06-20 DIAGNOSIS — R10.13 EPIGASTRIC PAIN: Primary | ICD-10-CM

## 2019-06-20 DIAGNOSIS — R11.0 NAUSEA: ICD-10-CM

## 2019-06-20 DIAGNOSIS — K59.09 CHRONIC CONSTIPATION: ICD-10-CM

## 2019-06-20 DIAGNOSIS — R14.0 BLOATING: ICD-10-CM

## 2019-06-20 DIAGNOSIS — R10.11 RUQ PAIN: ICD-10-CM

## 2019-06-20 DIAGNOSIS — I10 ESSENTIAL HYPERTENSION: ICD-10-CM

## 2019-06-20 DIAGNOSIS — Z23 NEED FOR PNEUMOCOCCAL VACCINATION: ICD-10-CM

## 2019-06-20 DIAGNOSIS — K85.90 ACUTE PANCREATITIS, UNSPECIFIED COMPLICATION STATUS, UNSPECIFIED PANCREATITIS TYPE: ICD-10-CM

## 2019-06-20 DIAGNOSIS — R12 HEARTBURN: ICD-10-CM

## 2019-06-20 DIAGNOSIS — R10.32 LLQ PAIN: ICD-10-CM

## 2019-06-20 LAB
ALBUMIN SERPL-MCNC: 4.7 G/DL (ref 3.5–5)
ALBUMIN/GLOB SERPL: 1.6 G/DL (ref 1–2)
ALP SERPL-CCNC: 72 U/L (ref 38–126)
ALT SERPL-CCNC: 44 U/L (ref 13–69)
AMYLASE SERPL-CCNC: 73 U/L (ref 30–110)
AST SERPL-CCNC: 64 U/L (ref 15–46)
BASOPHILS # BLD AUTO: 0.08 10*3/MM3 (ref 0–0.2)
BASOPHILS NFR BLD AUTO: 1.5 % (ref 0–1.5)
BILIRUB SERPL-MCNC: 0.7 MG/DL (ref 0.2–1.3)
BUN SERPL-MCNC: 8 MG/DL (ref 7–20)
BUN/CREAT SERPL: 11.4 (ref 7.1–23.5)
CALCIUM SERPL-MCNC: 9.7 MG/DL (ref 8.4–10.2)
CHLORIDE SERPL-SCNC: 101 MMOL/L (ref 98–107)
CO2 SERPL-SCNC: 29 MMOL/L (ref 26–30)
CREAT SERPL-MCNC: 0.7 MG/DL (ref 0.6–1.3)
EOSINOPHIL # BLD AUTO: 0.54 10*3/MM3 (ref 0–0.4)
EOSINOPHIL NFR BLD AUTO: 10.1 % (ref 0.3–6.2)
ERYTHROCYTE [DISTWIDTH] IN BLOOD BY AUTOMATED COUNT: 13.9 % (ref 12.3–15.4)
GLOBULIN SER CALC-MCNC: 3 GM/DL
GLUCOSE SERPL-MCNC: 101 MG/DL (ref 74–98)
HCT VFR BLD AUTO: 44.9 % (ref 34–46.6)
HGB BLD-MCNC: 14.8 G/DL (ref 12–15.9)
IMM GRANULOCYTES # BLD AUTO: 0.02 10*3/MM3 (ref 0–0.05)
IMM GRANULOCYTES NFR BLD AUTO: 0.4 % (ref 0–0.5)
LIPASE SERPL-CCNC: 332 U/L (ref 23–300)
LYMPHOCYTES # BLD AUTO: 2.21 10*3/MM3 (ref 0.7–3.1)
LYMPHOCYTES NFR BLD AUTO: 41.2 % (ref 19.6–45.3)
MCH RBC QN AUTO: 28.7 PG (ref 26.6–33)
MCHC RBC AUTO-ENTMCNC: 33 G/DL (ref 31.5–35.7)
MCV RBC AUTO: 87.2 FL (ref 79–97)
MONOCYTES # BLD AUTO: 0.38 10*3/MM3 (ref 0.1–0.9)
MONOCYTES NFR BLD AUTO: 7.1 % (ref 5–12)
NEUTROPHILS # BLD AUTO: 2.14 10*3/MM3 (ref 1.7–7)
NEUTROPHILS NFR BLD AUTO: 39.7 % (ref 42.7–76)
NRBC BLD AUTO-RTO: 0 /100 WBC (ref 0–0.2)
PLATELET # BLD AUTO: 255 10*3/MM3 (ref 140–450)
POTASSIUM SERPL-SCNC: 4.7 MMOL/L (ref 3.5–5.1)
PROT SERPL-MCNC: 7.7 G/DL (ref 6.3–8.2)
RBC # BLD AUTO: 5.15 10*6/MM3 (ref 3.77–5.28)
SODIUM SERPL-SCNC: 140 MMOL/L (ref 137–145)
TSH SERPL DL<=0.005 MIU/L-ACNC: 1.69 MIU/ML (ref 0.47–4.68)
WBC # BLD AUTO: 5.37 10*3/MM3 (ref 3.4–10.8)

## 2019-06-20 PROCEDURE — 90471 IMMUNIZATION ADMIN: CPT | Performed by: FAMILY MEDICINE

## 2019-06-20 PROCEDURE — 99214 OFFICE O/P EST MOD 30 MIN: CPT | Performed by: FAMILY MEDICINE

## 2019-06-20 PROCEDURE — 90732 PPSV23 VACC 2 YRS+ SUBQ/IM: CPT | Performed by: FAMILY MEDICINE

## 2019-06-20 NOTE — PROGRESS NOTES
"Subjective   Katelynn Richy Sung is a 59 y.o. female.     History of Present Illness  Mrs. Sung is a 59-year-old  female with history of previous \"mild\" pancreatitis who presents today with complaining of 2 weeks of worsening epigastric pain radiating into her right upper quadrant.  Aggravated with eating.  Associated with severe nausea but no vomiting.  No change in bowel habits.  She was referred to gastroenterology in the past but has not kept regular follow-up.  She denies blood in stool, hematemesis.  Having associated bloating.    She also combines of chronic constipation associated with left lower quadrant discomfort.  Using Colace with mild relief of symptoms.    She has also been on Protonix for chronic heartburn.    She has chronic hypertension for which she is taking Prinzide.  Denies side effects.  Blood pressure has been well controlled.    The following portions of the patient's history were reviewed and updated as appropriate: allergies, current medications, past family history, past medical history, past social history, past surgical history and problem list.    Review of Systems   Constitutional: Positive for fatigue and unexpected weight change. Negative for diaphoresis and fever.   HENT: Positive for congestion and postnasal drip. Negative for ear pain, mouth sores, nosebleeds, rhinorrhea and sore throat.    Eyes: Negative for visual disturbance.   Respiratory: Positive for cough (chronic, dry, intermittent) and shortness of breath (mild with exertion). Negative for chest tightness and wheezing.    Cardiovascular: Negative for chest pain, palpitations and leg swelling.   Gastrointestinal: Positive for abdominal pain, constipation and nausea. Negative for blood in stool and vomiting.   Endocrine: Positive for heat intolerance. Negative for polydipsia and polyuria.   Genitourinary: Negative for decreased urine volume, dysuria and hematuria.   Musculoskeletal: Positive for arthralgias and " myalgias.   Skin: Negative for rash and wound.   Neurological: Positive for weakness and headaches. Negative for dizziness, tremors and syncope.   Hematological: Negative for adenopathy. Does not bruise/bleed easily.   Psychiatric/Behavioral: Positive for sleep disturbance. Negative for confusion, dysphoric mood and suicidal ideas. The patient is nervous/anxious.        Objective    Vitals:    06/20/19 0912   BP: 122/78   Pulse: 77   SpO2: 95%     Body mass index is 33.82 kg/m².      06/20/19 0912   Weight: 81.2 kg (179 lb)       Physical Exam   Constitutional: She is oriented to person, place, and time. She appears well-developed and well-nourished. She is cooperative. She appears ill (mildly). No distress.   obese   HENT:   Head: Normocephalic and atraumatic.   Mouth/Throat: Oropharynx is clear and moist and mucous membranes are normal. Mucous membranes are not dry. No oral lesions.   Eyes: Conjunctivae and lids are normal. No scleral icterus. Right eye exhibits no nystagmus. Left eye exhibits no nystagmus.   Neck: Phonation normal. Neck supple. Normal carotid pulses present. No thyroid mass present.   Cardiovascular: Normal rate, regular rhythm, normal heart sounds and intact distal pulses. Exam reveals no gallop.   No murmur heard.  Pulmonary/Chest: Effort normal and breath sounds normal. She has no wheezes. She has no rhonchi. She has no rales.   Abdominal: Soft. Bowel sounds are normal. She exhibits distension (mild). She exhibits no mass (exam limited by body habitus). There is no hepatosplenomegaly (exam lmited by body habitus). There is tenderness (moderate) in the epigastric area. There is no rigidity, no rebound, no guarding and no CVA tenderness.     Vascular Status -  Her right foot exhibits no edema. Her left foot exhibits no edema.  Lymphadenopathy:     She has no cervical adenopathy.   Neurological: She is alert and oriented to person, place, and time. She displays no tremor. Gait normal.   Skin:  Skin is warm and dry. No bruising and no rash noted.   No jaundice   Psychiatric: Her speech is normal and behavior is normal. Judgment and thought content normal. Her affect is blunt. Cognition and memory are normal.   Good eye contact. Answers questions appropriately. Good personal hygiene and grooming.   Nursing note and vitals reviewed.      Assessment/Plan   Katelynn was seen today for abdominal pain, nausea and falling asleep.    Diagnoses and all orders for this visit:    Epigastric pain  -     CBC & Differential  -     Comprehensive Metabolic Panel  -     Amylase  -     Lipase    RUQ pain  -     CBC & Differential  -     Comprehensive Metabolic Panel  -     Amylase  -     Lipase    Nausea  -     Comprehensive Metabolic Panel    Bloating  -     Comprehensive Metabolic Panel    Essential hypertension  -     CBC & Differential  -     TSH Rfx On Abnormal To Free T4  -     Comprehensive Metabolic Panel    Heartburn    LLQ pain  -     CBC & Differential  -     Comprehensive Metabolic Panel    Chronic constipation  -     TSH Rfx On Abnormal To Free T4    Need for pneumococcal vaccination  -     Pneumococcal Polysaccharide Vaccine 23-Valent Greater Than or Equal To 1yo Subcutaneous / IM       Hypertension controlled.  Continue Prinzide.  She is encouraged to follow heart healthy diet and exercise daily.    Heartburn/GERD well-controlled on Protonix at this time.    Concern for recurrent pancreatitis although she has a nonacute abdomen at this time, clinically stable.  Lab eval as above.  Pending review of lab results consider CT abdomen/pelvis versus referral for follow-up endoscopy.  She is encouraged to follow low fat diet in the interim.    Keep follow-up as scheduled in 2 weeks, follow-up sooner as needed/instructed.  I will contact patient regarding test results and provide instructions regarding any necessary changes in plan of care.  Patient was encouraged to keep me informed of any acute changes, lack of  improvement, or any new concerning symptoms.  Pt is aware of reasons to seek emergent care.  Patient voiced understanding of all instructions and denied further questions.              Please note that portions of this note may have been completed with a voice recognition program. Efforts were made to edit the dictations, but occasionally words are mistranscribed.

## 2019-06-24 DIAGNOSIS — R11.0 NAUSEA: ICD-10-CM

## 2019-06-24 DIAGNOSIS — K85.90 ACUTE PANCREATITIS, UNSPECIFIED COMPLICATION STATUS, UNSPECIFIED PANCREATITIS TYPE: Primary | ICD-10-CM

## 2019-06-24 NOTE — PROGRESS NOTES
Please inform pt her recent labs did show elevated lipase but were otherwise fine. She needs f/u ct scan. Order on chart. If pain acutely worsens she should go to ER.
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
## 2019-07-08 DIAGNOSIS — K86.1 CHRONIC PANCREATITIS, UNSPECIFIED PANCREATITIS TYPE (HCC): ICD-10-CM

## 2019-07-08 DIAGNOSIS — M43.02 CERVICAL SPONDYLOLYSIS: ICD-10-CM

## 2019-07-08 DIAGNOSIS — M50.90 CERVICAL DISC DISEASE: ICD-10-CM

## 2019-07-08 DIAGNOSIS — G89.29 CHRONIC NECK PAIN: ICD-10-CM

## 2019-07-08 DIAGNOSIS — M54.2 CHRONIC NECK PAIN: ICD-10-CM

## 2019-07-09 RX ORDER — PREGABALIN 300 MG/1
CAPSULE ORAL
Qty: 60 CAPSULE | Refills: 2 | Status: SHIPPED | OUTPATIENT
Start: 2019-07-09 | End: 2019-08-08 | Stop reason: SDUPTHER

## 2019-07-09 RX ORDER — PROMETHAZINE HYDROCHLORIDE 25 MG/1
25 TABLET ORAL 3 TIMES DAILY
Qty: 90 TABLET | Refills: 0 | Status: SHIPPED | OUTPATIENT
Start: 2019-07-09 | End: 2019-08-15 | Stop reason: SDUPTHER

## 2019-07-09 NOTE — TELEPHONE ENCOUNTER
DANTE reviewed. Refill approved and printed for . Pt to have UDS at time of . Pt to keep f/u apt as scheduled.

## 2019-07-11 ENCOUNTER — CLINICAL SUPPORT (OUTPATIENT)
Dept: FAMILY MEDICINE CLINIC | Facility: CLINIC | Age: 59
End: 2019-07-11

## 2019-07-11 DIAGNOSIS — Z51.81 THERAPEUTIC DRUG MONITORING: Primary | ICD-10-CM

## 2019-07-19 RX ORDER — FLUCONAZOLE 150 MG/1
TABLET ORAL
Qty: 1 TABLET | Refills: 1 | Status: SHIPPED | OUTPATIENT
Start: 2019-07-19 | End: 2019-09-23

## 2019-08-07 ENCOUNTER — TELEPHONE (OUTPATIENT)
Dept: FAMILY MEDICINE CLINIC | Facility: CLINIC | Age: 59
End: 2019-08-07

## 2019-08-07 DIAGNOSIS — M50.90 CERVICAL DISC DISEASE: ICD-10-CM

## 2019-08-07 DIAGNOSIS — M43.02 CERVICAL SPONDYLOLYSIS: ICD-10-CM

## 2019-08-07 DIAGNOSIS — G89.29 CHRONIC NECK PAIN: ICD-10-CM

## 2019-08-07 DIAGNOSIS — M54.2 CHRONIC NECK PAIN: ICD-10-CM

## 2019-08-07 RX ORDER — PREGABALIN 300 MG/1
CAPSULE ORAL
Qty: 60 CAPSULE | OUTPATIENT
Start: 2019-08-07

## 2019-08-08 DIAGNOSIS — G89.29 CHRONIC NECK PAIN: ICD-10-CM

## 2019-08-08 DIAGNOSIS — M50.90 CERVICAL DISC DISEASE: ICD-10-CM

## 2019-08-08 DIAGNOSIS — M43.02 CERVICAL SPONDYLOLYSIS: ICD-10-CM

## 2019-08-08 DIAGNOSIS — M54.2 CHRONIC NECK PAIN: ICD-10-CM

## 2019-08-08 RX ORDER — PREGABALIN 300 MG/1
CAPSULE ORAL
Qty: 60 CAPSULE | Refills: 2 | Status: SHIPPED | OUTPATIENT
Start: 2019-08-08 | End: 2019-09-23 | Stop reason: SDUPTHER

## 2019-08-08 NOTE — TELEPHONE ENCOUNTER
Patient left voicemail asking for a refill of Lyrica. She states that prescription was written with no refills. I called and spoke to [employer] and confirmed that last prescription was written on 07/09/2019, filled on 07/11/2019 and had 0 refills.

## 2019-08-08 NOTE — TELEPHONE ENCOUNTER
DANTE reviewed. Refill approved. Medication E rx'd to pharmacy as requested. Pt to keep f/u apt as scheduled.    Last UDS appropriate. Med agreement UTD.

## 2019-08-15 DIAGNOSIS — K86.1 CHRONIC PANCREATITIS, UNSPECIFIED PANCREATITIS TYPE (HCC): ICD-10-CM

## 2019-08-15 RX ORDER — PANTOPRAZOLE SODIUM 40 MG/1
TABLET, DELAYED RELEASE ORAL
Qty: 60 TABLET | Refills: 5 | Status: SHIPPED | OUTPATIENT
Start: 2019-08-15 | End: 2020-02-05

## 2019-08-15 RX ORDER — PROMETHAZINE HYDROCHLORIDE 25 MG/1
TABLET ORAL
Qty: 90 TABLET | Refills: 0 | Status: SHIPPED | OUTPATIENT
Start: 2019-08-15 | End: 2019-09-28 | Stop reason: SDUPTHER

## 2019-08-27 DIAGNOSIS — G47.00 PERSISTENT INSOMNIA: ICD-10-CM

## 2019-08-27 RX ORDER — ZOLPIDEM TARTRATE 10 MG/1
10 TABLET ORAL NIGHTLY PRN
Qty: 30 TABLET | Refills: 2 | Status: SHIPPED | OUTPATIENT
Start: 2019-08-27 | End: 2019-11-22 | Stop reason: SDUPTHER

## 2019-08-27 RX ORDER — ZOLPIDEM TARTRATE 10 MG/1
10 TABLET ORAL NIGHTLY PRN
Qty: 30 TABLET | OUTPATIENT
Start: 2019-08-27

## 2019-09-23 ENCOUNTER — OFFICE VISIT (OUTPATIENT)
Dept: FAMILY MEDICINE CLINIC | Facility: CLINIC | Age: 59
End: 2019-09-23

## 2019-09-23 VITALS
BODY MASS INDEX: 33.63 KG/M2 | SYSTOLIC BLOOD PRESSURE: 110 MMHG | HEART RATE: 70 BPM | OXYGEN SATURATION: 98 % | DIASTOLIC BLOOD PRESSURE: 68 MMHG | WEIGHT: 178 LBS

## 2019-09-23 DIAGNOSIS — G89.29 CHRONIC BILATERAL LOW BACK PAIN WITHOUT SCIATICA: ICD-10-CM

## 2019-09-23 DIAGNOSIS — M54.2 CHRONIC NECK PAIN: ICD-10-CM

## 2019-09-23 DIAGNOSIS — M43.02 CERVICAL SPONDYLOLYSIS: ICD-10-CM

## 2019-09-23 DIAGNOSIS — M54.50 CHRONIC BILATERAL LOW BACK PAIN WITHOUT SCIATICA: ICD-10-CM

## 2019-09-23 DIAGNOSIS — F17.298 OTHER TOBACCO PRODUCT NICOTINE DEPENDENCE WITH OTHER NICOTINE-INDUCED DISORDER: ICD-10-CM

## 2019-09-23 DIAGNOSIS — Z23 NEED FOR INFLUENZA VACCINATION: ICD-10-CM

## 2019-09-23 DIAGNOSIS — I10 ESSENTIAL HYPERTENSION: Primary | ICD-10-CM

## 2019-09-23 DIAGNOSIS — J42 CHRONIC BRONCHITIS, UNSPECIFIED CHRONIC BRONCHITIS TYPE (HCC): ICD-10-CM

## 2019-09-23 DIAGNOSIS — K86.1 OTHER CHRONIC PANCREATITIS (HCC): ICD-10-CM

## 2019-09-23 DIAGNOSIS — M79.7 FIBROMYALGIA: ICD-10-CM

## 2019-09-23 DIAGNOSIS — G89.29 CHRONIC NECK PAIN: ICD-10-CM

## 2019-09-23 DIAGNOSIS — M50.90 CERVICAL DISC DISEASE: ICD-10-CM

## 2019-09-23 PROCEDURE — 90471 IMMUNIZATION ADMIN: CPT | Performed by: FAMILY MEDICINE

## 2019-09-23 PROCEDURE — 90674 CCIIV4 VAC NO PRSV 0.5 ML IM: CPT | Performed by: FAMILY MEDICINE

## 2019-09-23 PROCEDURE — 99214 OFFICE O/P EST MOD 30 MIN: CPT | Performed by: FAMILY MEDICINE

## 2019-09-23 RX ORDER — PREGABALIN 200 MG/1
CAPSULE ORAL
Qty: 60 CAPSULE | Refills: 0 | Status: SHIPPED | OUTPATIENT
Start: 2019-09-23 | End: 2019-10-31

## 2019-09-23 NOTE — PROGRESS NOTES
"Subjective   Katelynn Sung is a 59 y.o. female.     History of Present Illness  Mrs. Sung presents today for routine follow-up on several chronic medical problems. She was scheduled for wellness check and pap smear but she wishes to reschedule that.     She has chronic essential hypertension for which she is currently on Zestoretic. Taking as prescribed.  Denies side effects.  Blood pressure has been well controlled.  Denies chest pain, palpitations or swelling in extremities.  Does have stable mild dyspnea on exertion.  Not following heart healthy diet.  Getting minimal exercise.     His chronic bronchitis/COPD.  Has quit smoking but is using E cig/Vape.  She is currently on albuterol as needed, Breo Ellipta.  Reports taking as prescribed.  No recent increase in cough, shortness of breath.  No chest pain, fever or hemoptysis.     She has chronic pain due to fibromyalgia as well as degenerative disc disease (C and L), as well as arthritis.  Currently on Lyrica.  Tolerating well. She is frustrated with weight gain (stable over past few months). Good overall improvement in pain control, functioning, ADLs. In regard to weight gain she admits she does not exercise and eats mostly sugary foods. She also wishes to know if a \"back brace\" would help her. She has trouble standing at sick to wash dishes, do housework, go shopping etc.     She has GERD with previous esophagitis.  Currently on Protonix.  Also with history of Humphreys's esophagus and pancreatitis.  Denies dysphagia, abdominal pain, blood in stool or unintentional weight loss. abd pain discussed at last visit resolved. CT in June showed gastric wall thickening but no pancreatitis. She was instructed to f/u with GI, declined assistance with scheduling f/u apt. Apparently did not make f/u with GI.     She has chronic insomnia for which she is currently on Ambien.  Tolerating well, denies side effects.  Getting at least 6 or more hours of sleep per night.  No " parasomnia.    Pt's previous HPI reviewed and updated as indicated.      The following portions of the patient's history were reviewed and updated as appropriate: allergies, current medications, past family history, past medical history, past social history, past surgical history and problem list.    Review of Systems   Constitutional: Positive for fatigue. Negative for diaphoresis, fever and unexpected weight change.   HENT: Positive for congestion and postnasal drip. Negative for ear pain, mouth sores, nosebleeds, rhinorrhea and sore throat.    Eyes: Negative for visual disturbance.   Respiratory: Positive for cough (chronic, dry, intermittent) and shortness of breath (mild with exertion). Negative for chest tightness and wheezing.    Cardiovascular: Negative for chest pain, palpitations and leg swelling.   Gastrointestinal: Positive for constipation and nausea. Negative for abdominal pain, blood in stool and vomiting.   Endocrine: Positive for heat intolerance. Negative for polydipsia and polyuria.   Genitourinary: Negative for dysuria and hematuria.   Musculoskeletal: Positive for arthralgias, back pain, myalgias and neck pain.   Skin: Negative for rash and wound.   Neurological: Positive for headaches. Negative for dizziness, tremors and syncope.   Hematological: Negative for adenopathy. Does not bruise/bleed easily.   Psychiatric/Behavioral: Positive for sleep disturbance. Negative for confusion and dysphoric mood. The patient is nervous/anxious.    Pt's previous ROS reviewed and updated as indicated.       Objective    Vitals:    09/23/19 0902   BP: 110/68   Pulse: 70   SpO2: 98%     Body mass index is 33.63 kg/m².      09/23/19 0902   Weight: 80.7 kg (178 lb)       Physical Exam   Constitutional: She is oriented to person, place, and time. She appears well-developed and well-nourished. She is cooperative. She does not appear ill. No distress.   obese   HENT:   Head: Normocephalic and atraumatic.    Mouth/Throat: Mucous membranes are normal. Mucous membranes are not dry.   Eyes: Conjunctivae and lids are normal. No scleral icterus. Right eye exhibits no nystagmus. Left eye exhibits no nystagmus.   Neck: Phonation normal. Neck supple. Normal carotid pulses present. Carotid bruit is not present. No thyroid mass present.   Cardiovascular: Normal rate, regular rhythm, normal heart sounds and intact distal pulses. Exam reveals no gallop.   No murmur heard.  Pulmonary/Chest: Effort normal and breath sounds normal. She has no wheezes. She has no rhonchi. She has no rales.   Abdominal: Soft. Bowel sounds are normal. She exhibits no distension and no mass. There is no hepatosplenomegaly. There is no tenderness. There is no rigidity, no rebound, no guarding and no CVA tenderness.   Musculoskeletal:        Thoracic back: She exhibits deformity (kyphotic posture). She exhibits no bony tenderness.        Lumbar back: She exhibits tenderness (diffuse soft tissue), deformity (loss of normal lordosis) and spasm. She exhibits no bony tenderness.     Vascular Status -  Her right foot exhibits no edema. Her left foot exhibits no edema.  Lymphadenopathy:     She has no cervical adenopathy.   Neurological: She is alert and oriented to person, place, and time. She has normal strength. She displays no tremor. Gait normal.   Neg SLR bilaterally   Skin: Skin is warm and dry. No bruising and no rash noted.   No jaundice   Psychiatric: Her speech is normal and behavior is normal. Judgment and thought content normal. Her affect is blunt. Cognition and memory are normal.   Good eye contact. Answers questions appropriately. Good personal hygiene and grooming.   Nursing note and vitals reviewed.  Pt's previous physical exam reviewed and updated as indicated.    Lab Results   Component Value Date    WBC 5.37 06/20/2019    HGB 14.8 06/20/2019    HCT 44.9 06/20/2019    MCV 87.2 06/20/2019     06/20/2019       Lab Results   Component  Value Date    GLUCOSE 99 08/04/2016    BUN 8 06/20/2019    CREATININE 0.70 06/20/2019    EGFRIFNONA 86 06/20/2019    EGFRIFAFRI 104 06/20/2019    BCR 11.4 06/20/2019    K 4.7 06/20/2019    CO2 29.0 06/20/2019    CALCIUM 9.7 06/20/2019    PROTENTOTREF 7.7 06/20/2019    ALBUMIN 4.70 06/20/2019    LABIL2 1.6 06/20/2019    AST 64 (H) 06/20/2019    ALT 44 06/20/2019       Lab Results   Component Value Date    HGBA1C 5.50 11/15/2017       Lab Results   Component Value Date    TSH 1.690 06/20/2019       Lab Results   Component Value Date    LIPASE 332 (H) 06/20/2019         Assessment/Plan   Katelynn was seen today for hypertension.    Diagnoses and all orders for this visit:    Essential hypertension    Cervical disc disease  -     pregabalin (LYRICA) 200 MG capsule; TAKE 1 TABLET BY MOUTH TWO TIMES A DAY    Cervical spondylolysis  -     pregabalin (LYRICA) 200 MG capsule; TAKE 1 TABLET BY MOUTH TWO TIMES A DAY    Chronic neck pain  -     pregabalin (LYRICA) 200 MG capsule; TAKE 1 TABLET BY MOUTH TWO TIMES A DAY    Need for influenza vaccination  -     Flucelvax Quad=>4Years ([id number])    Other tobacco product nicotine dependence with other nicotine-induced disorder    Fibromyalgia    Other chronic pancreatitis (CMS/HCC)    Chronic bronchitis, unspecified chronic bronchitis type (CMS/HCC)    Chronic bilateral low back pain without sciatica  -     Ambulatory Referral to Physical Therapy Evaluate and treat (needs eval for lumbar brace recommendations)    Other orders  -     nicotine (NICOTROL) 10 MG inhaler; Inhale 2 puffs As Needed for Smoking Cessation.       Hypertension controlled, continue Zestoretic.  She is encouraged to exercise daily and follow heart healthy diet.  Nicotine cessation also recommended.    Multifactorial chronic pain due to cervical and lumbar generative disc disease, fibromyalgia, arthritis.  Generally doing well on Lyrica.  She would like to try a lower dose to see if she can have benefit without  persistent weight gain.  Refer to physical therapy for evaluation of appropriateness of bracing.    COPD/chronic bronchitis stable.  Continue Breo Ellipta with albuterol as needed.    She is once again encouraged to follow with gastroenterology for management of her Humphreys's esophagus, chronic pancreatitis etc.  She declines assistance with scheduling appointment.    Routine follow-up in 4 to 6 weeks, sooner as needed.  She is also encouraged to reschedule breast exam/Pap smear.  She is aware she is overdue for breast cancer screening in the form of mammogram.  Does not wish to schedule at this time.  Patient was encouraged to keep me informed of any acute changes, lack of improvement, or any new concerning symptoms.  Pt is aware of reasons to seek emergent care.  Patient voiced understanding of all instructions and denied further questions.              Please note that portions of this note may have been completed with a voice recognition program. Efforts were made to edit the dictations, but occasionally words are mistranscribed.

## 2019-09-27 ENCOUNTER — OFFICE VISIT (OUTPATIENT)
Dept: RETAIL CLINIC | Facility: CLINIC | Age: 59
End: 2019-09-27

## 2019-09-27 VITALS
TEMPERATURE: 97.9 F | BODY MASS INDEX: 33.82 KG/M2 | DIASTOLIC BLOOD PRESSURE: 70 MMHG | HEART RATE: 67 BPM | SYSTOLIC BLOOD PRESSURE: 108 MMHG | OXYGEN SATURATION: 96 % | WEIGHT: 179 LBS | RESPIRATION RATE: 18 BRPM

## 2019-09-27 DIAGNOSIS — J04.0 LARYNGITIS: Primary | ICD-10-CM

## 2019-09-27 PROCEDURE — 99213 OFFICE O/P EST LOW 20 MIN: CPT | Performed by: NURSE PRACTITIONER

## 2019-09-27 RX ORDER — LEVOCETIRIZINE DIHYDROCHLORIDE 5 MG/1
5 TABLET, FILM COATED ORAL EVERY EVENING
Qty: 30 TABLET | Refills: 0 | Status: SHIPPED | OUTPATIENT
Start: 2019-09-27 | End: 2019-10-27

## 2019-09-27 NOTE — PROGRESS NOTES
"Hoarse      Subjective   Katelynn Sung is a 59 y.o. female.     Hoarse   This is a new problem. Episode onset: 2 days ago  The problem has been waxing and waning. Associated symptoms include coughing, myalgias and a sore throat (\"a little bit\" ). Pertinent negatives include no abdominal pain, chills, congestion, diaphoresis, fatigue, fever, headaches, nausea, rash or vomiting. Treatments tried: Ibuprofen (last dose:11 am)  The treatment provided mild relief.    No known ill contacts.  Has had influenza vaccine.  See ROS.     Patient Active Problem List   Diagnosis   • Chronic pancreatitis (CMS/HCC)   • Persistent insomnia   • Chronic neck pain   • Dysphagia   • Heartburn   • Hypertension   • Nausea   • Polyarthropathy   • Vitamin D deficiency   • Cervical spondylolysis   • Cervical disc disease   • Fibromyalgia   • Humphreys's esophagus   • GERD (gastroesophageal reflux disease)   • COPD (chronic obstructive pulmonary disease) (CMS/[facility])   • Osteoporosis   • Chronic constipation   • Degenerative arthritis of lumbar spine   • Lumbar degenerative disc disease   • Anxiety   • Arthritis   • Depression   • Sleep apnea   • Restless leg syndrome   • Chronic rhinitis   • Postmenopausal disorder   • Postmenopausal hormone therapy   • Class 1 obesity due to excess calories with serious comorbidity and body mass index (BMI) of 34.0 to 34.9 in adult   • Nicotine dependence   • Chronic insomnia       Allergies   Allergen Reactions   • Nsaids GI Intolerance        Current Outpatient Medications on File Prior to Visit   Medication Sig Dispense Refill   • albuterol sulfate HFA (VENTOLIN HFA) 108 (90 Base) MCG/ACT inhaler Inhale 2 puffs Every 4 (Four) Hours As Needed for Wheezing. 18 g 5   • BREO ELLIPTA 200-25 MCG/INH inhaler INHALE 1 PUFF BY MOUTH DAILY. 1 inhaler 5   • buprenorphine-naloxone (SUBOXONE) 8-2 MG per SL tablet      • fluticasone (FLONASE) 50 MCG/ACT nasal spray 2 sprays into the nostril(s) as directed by provider " Daily. 16 g 5   • lisinopril-hydrochlorothiazide (PRINZIDE,ZESTORETIC) 10-12.5 MG per tablet Take 1 tablet by mouth Daily. 30 tablet 5   • nystatin (MYCOSTATIN) 553208 UNIT/ML suspension SWISH AND SWALLOW 5 MLS BY MOUTH FOUR TIMES A  mL 0   • pantoprazole (PROTONIX) 40 MG EC tablet TAKE ONE TABLET BY MOUTH TWO TIMES A DAY 60 tablet 5   • pregabalin (LYRICA) 200 MG capsule TAKE 1 TABLET BY MOUTH TWO TIMES A DAY 60 capsule 0   • promethazine (PHENERGAN) 25 MG tablet TAKE 1 TABLET BY MOUTH 3 TIMES A DAY. AS NEEDED FOR NAUSEA. 90 tablet 0   • zolpidem (AMBIEN) 10 MG tablet Take 1 tablet by mouth At Night As Needed for Sleep. 30 tablet 2   • [DISCONTINUED] fexofenadine (ALLEGRA) 60 MG tablet Take 1 tablet by mouth Daily. 60 tablet 5   • NARCAN 4 MG/0.1ML nasal spray USE AS DIRECTED AS NEEDED  0   • [DISCONTINUED] nicotine (NICOTROL) 10 MG inhaler Inhale 2 puffs As Needed for Smoking Cessation. 168 each 2     No current facility-administered medications on file prior to visit.        Past Medical History:   Diagnosis Date   • Adnexal mass     pelvic US 10/26/11 showed 2 cm left abdnexal cystic lesion, resolved on f/u 5/14/15   • Anxiety    • Class 1 obesity due to excess calories with serious comorbidity and body mass index (BMI) of 30.0 to 30.9 in adult 2/16/2018   • Colon polyps    • Cor pulmonale (CMS/HCC)    • Degenerative arthritis of lumbar spine    • Depression    • Fibromyalgia    • Herpes simplex    • Lumbar degenerative disc disease    • Mild sleep apnea 01/16/2013    Sleep study 1/16/13 showing mild degree   • Narcotic dependence (CMS/HCC)    • Osteoporosis    • Palpitations    • Respiratory failure requiring intubation (CMS/HCC)    • Restless leg syndrome    • Sinus problem        Past Surgical History:   Procedure Laterality Date   • COLONOSCOPY N/A 2008    Complete   • TUBAL ABDOMINAL LIGATION  1991   • UPPER GASTROINTESTINAL ENDOSCOPY N/A 2013       Family History   Problem Relation Age of Onset   •  "Stroke Mother    • Liver disease Mother         Chronic   • Cirrhosis Father    • Heart attack Father    • Cancer Sister         Breast cancer   • Stroke Brother    • Cancer Sister        Social History     Socioeconomic History   • Marital status:      Spouse name: Not on file   • Number of children: Not on file   • Years of education: Not on file   • Highest education level: Not on file   Tobacco Use   • Smoking status: Current Some Day Smoker     Packs/day: 0.75     Years: 3.00     Pack years: 2.25     Types: Cigarettes, Electronic Cigarette     Last attempt to quit: 2013     Years since quittin.7   • Smokeless tobacco: Never Used   • Tobacco comment: 2013 started using nicotine containing substance in combustion-free vaporization device.   Substance and Sexual Activity   • Alcohol use: No   • Drug use: No   • Sexual activity: No     Birth control/protection: Abstinence, Surgical     Comment:        Travel:  No recent travel within the last 21 days outside the [place] Denies recent travel to one of the following [place]ries:  [place], [place], [place], or [place].  Denies contact with anyone who has traveled to one of the following [place]  Countries: [place], [place], [place], or [place] within the last 21 days and is known or suspected to have Ebola.  Denies having had any contact with the human remains, blood or any bodily fluids of someone who is known or suspected to have Ebola within the last 21 days.     OB History     No data available          Review of Systems   Constitutional: Negative for chills, diaphoresis, fatigue and fever.   HENT: Positive for hoarse voice, sore throat (\"a little bit\" ) and voice change. Negative for congestion, dental problem, ear discharge, ear pain, mouth sores, nosebleeds, postnasal drip, rhinorrhea, sinus pressure, sinus pain and sneezing.    Respiratory: Positive for cough and shortness of breath (with exertion ). Negative for chest " tightness and wheezing.    Gastrointestinal: Negative for abdominal pain, diarrhea, nausea and vomiting.   Musculoskeletal: Positive for myalgias.   Skin: Negative for rash.   Neurological: Negative for dizziness and headaches.       /70 (BP Location: Right arm, Patient Position: Sitting, Cuff Size: Adult)   Pulse 67   Temp 97.9 °F (36.6 °C) (Temporal)   Resp 18   Wt 81.2 kg (179 lb)   LMP  (LMP Unknown)   SpO2 96%   Breastfeeding? No   BMI 33.82 kg/m²     Objective   Physical Exam   Constitutional: She is oriented to person, place, and time. She appears well-developed and well-nourished. She is cooperative. She does not appear ill. No distress.   HENT:   Head: Normocephalic.   Right Ear: Tympanic membrane, external ear and ear canal normal.   Left Ear: Tympanic membrane, external ear and ear canal normal.   Nose: Mucosal edema (mild nasal congestion ) present. No rhinorrhea. Right sinus exhibits no maxillary sinus tenderness and no frontal sinus tenderness. Left sinus exhibits no maxillary sinus tenderness and no frontal sinus tenderness.   Mouth/Throat: Uvula is midline, oropharynx is clear and moist and mucous membranes are normal. No posterior oropharyngeal edema or posterior oropharyngeal erythema. Tonsils are 1+ on the right. Tonsils are 1+ on the left. No tonsillar exudate.   Cardiovascular: Normal rate, regular rhythm and normal heart sounds.   Pulmonary/Chest: Effort normal and breath sounds normal. No stridor. She has no decreased breath sounds. She has no wheezes. She has no rhonchi. She has no rales.   Lymphadenopathy:        Head (right side): No tonsillar, no preauricular, no posterior auricular and no occipital adenopathy present.        Head (left side): No tonsillar, no preauricular, no posterior auricular and no occipital adenopathy present.     She has no cervical adenopathy.   Neurological: She is alert and oriented to person, place, and time.   Skin: Skin is warm, dry and intact.  No rash noted.       Assessment/Plan   Katelynn was seen today for hoarse.    Diagnoses and all orders for this visit:    Laryngitis  -     levocetirizine (XYZAL) 5 MG tablet; Take 1 tablet by mouth Every Evening for 30 days.      Voice rest, warm fluids, cold fluids, increase fluids.  Warm salt water gargles.  Humidifier in room.  Local honey.  Stop Allegra and start Xyzal.  Follow up with PCP if symptoms do not improve and/or worsen.  Visit summary provided.  Questions/concerns addressed.      Return if symptoms worsen or fail to improve with PCP .

## 2019-09-27 NOTE — PATIENT INSTRUCTIONS
Laryngitis    Laryngitis is inflammation of the vocal cords that causes symptoms such as hoarseness or loss of voice. The vocal cords are two bands of muscles in your throat. When you speak, these cords come together and vibrate. The vibrations come out through your mouth as sound. When your vocal cords are inflamed, your voice sounds different.  Laryngitis can be temporary (acute) or long-term (chronic). Most cases of acute laryngitis improve with time. Chronic laryngitis is laryngitis that lasts for more than 3 weeks.  What are the causes?  Acute laryngitis may be caused by:  · A viral infection.  · Lots of talking, yelling, or singing. This is also called vocal strain.  · A bacterial infection.  Chronic laryngitis may be caused by:  · Vocal strain.  · Injury to your vocal cords.  · Acid reflux (gastroesophageal reflux disease, or GERD).  · Allergies.  · A sinus infection.  · Smoking.  · Alcohol abuse.  · Breathing in chemicals or dust.  · Growths on the vocal cords.  What increases the risk?  The following factors may make you more likely to develop this condition:  · Smoking.  · Alcohol abuse.  · Having allergies.  · Chronic irritants in the workplace, such as toxic fumes.  What are the signs or symptoms?  Symptoms of this condition may include:  · Low, hoarse voice.  · Loss of voice.  · Dry cough.  · Sore or dry throat.  · Stuffy nose.  How is this diagnosed?  This condition may be diagnosed based on:  · Your symptoms and a physical exam.  · Throat culture.  · Blood test.  · A procedure in which your health care provider looks at your vocal cords with a mirror or viewing tube (laryngoscopy).  How is this treated?  Treatment for laryngitis depends on what is causing it.  · Usually, treatment involves resting your voice and using medicines to soothe your throat.  · If your laryngitis is caused by a bacterial infection, you may need to take antibiotic medicine.  · If your laryngitis is caused by a growth, you may  need to have a procedure to remove it.  Follow these instructions at home:  Medicines  · Take over-the-counter and prescription medicines only as told by your health care provider.  · If you were prescribed an antibiotic medicine, take it as told by your health care provider. Do not stop taking the antibiotic even if you start to feel better.  General instructions  · Talk as little as possible. Also avoid whispering, which can cause vocal strain.  · Write instead of talking. Do this until your voice is back to normal.  · Drink enough fluid to keep your urine pale yellow.  · Breathe in moist air. Use a humidifier if you live in a dry climate.  · Do not use any products that contain nicotine or tobacco, such as cigarettes and e-cigarettes. If you need help quitting, ask your health care provider.  Contact a health care provider if:  · You have a fever.  · You have increasing pain.  · Your symptoms do not get better in 2 weeks.  Get help right away if:  · You cough up blood.  · You have difficulty swallowing.  · You have trouble breathing.  Summary  · Laryngitis is inflammation of the vocal cords that causes symptoms such as hoarseness or loss of voice.  · Laryngitis can be temporary (acute) or long-term (chronic).  · Treatment for laryngitis depends on the cause. It often involves resting your voice and using medicine to soothe your throat.  This information is not intended to replace advice given to you by your health care provider. Make sure you discuss any questions you have with your health care provider.  Document Released: 12/18/2006 Document Revised: 12/05/2018 Document Reviewed: 12/05/2018  [employer] Interactive Patient Education © 2019 [employer]

## 2019-09-28 DIAGNOSIS — K86.1 CHRONIC PANCREATITIS, UNSPECIFIED PANCREATITIS TYPE (HCC): ICD-10-CM

## 2019-09-30 RX ORDER — PROMETHAZINE HYDROCHLORIDE 25 MG/1
TABLET ORAL
Qty: 90 TABLET | Refills: 0 | Status: SHIPPED | OUTPATIENT
Start: 2019-09-30 | End: 2019-11-27 | Stop reason: SDUPTHER

## 2019-10-24 RX ORDER — LISINOPRIL AND HYDROCHLOROTHIAZIDE 12.5; 1 MG/1; MG/1
1 TABLET ORAL DAILY
Qty: 30 TABLET | Refills: 5 | Status: SHIPPED | OUTPATIENT
Start: 2019-10-24 | End: 2020-04-22 | Stop reason: SDUPTHER

## 2019-10-24 RX ORDER — PREGABALIN 300 MG/1
CAPSULE ORAL
Qty: 60 CAPSULE | Refills: 2 | Status: SHIPPED | OUTPATIENT
Start: 2019-10-24 | End: 2019-10-31

## 2019-10-29 RX ORDER — FLUTICASONE PROPIONATE 50 MCG
SPRAY, SUSPENSION (ML) NASAL
Qty: 14.812 G | Refills: 5 | Status: SHIPPED | OUTPATIENT
Start: 2019-10-29 | End: 2020-04-29

## 2019-10-29 RX ORDER — FLUCONAZOLE 150 MG/1
TABLET ORAL
Qty: 1 TABLET | Refills: 1 | Status: SHIPPED | OUTPATIENT
Start: 2019-10-29 | End: 2019-10-31

## 2019-10-29 RX ORDER — FEXOFENADINE HYDROCHLORIDE 60 MG/1
TABLET, FILM COATED ORAL
Qty: 60 TABLET | Refills: 5 | Status: SHIPPED | OUTPATIENT
Start: 2019-10-29 | End: 2020-04-22

## 2019-10-31 ENCOUNTER — OFFICE VISIT (OUTPATIENT)
Dept: FAMILY MEDICINE CLINIC | Facility: CLINIC | Age: 59
End: 2019-10-31

## 2019-10-31 VITALS
SYSTOLIC BLOOD PRESSURE: 124 MMHG | HEIGHT: 61 IN | TEMPERATURE: 98 F | OXYGEN SATURATION: 99 % | DIASTOLIC BLOOD PRESSURE: 78 MMHG | HEART RATE: 90 BPM | WEIGHT: 176.38 LBS | BODY MASS INDEX: 33.3 KG/M2

## 2019-10-31 DIAGNOSIS — M79.7 FIBROMYALGIA: Primary | ICD-10-CM

## 2019-10-31 DIAGNOSIS — K59.09 CHRONIC CONSTIPATION: ICD-10-CM

## 2019-10-31 DIAGNOSIS — R25.2 MUSCLE CRAMP: ICD-10-CM

## 2019-10-31 DIAGNOSIS — K86.1 OTHER CHRONIC PANCREATITIS (HCC): ICD-10-CM

## 2019-10-31 PROCEDURE — 99214 OFFICE O/P EST MOD 30 MIN: CPT | Performed by: FAMILY MEDICINE

## 2019-10-31 RX ORDER — PREGABALIN 225 MG/1
225 CAPSULE ORAL 2 TIMES DAILY
Qty: 60 CAPSULE | Refills: 0 | Status: SHIPPED | OUTPATIENT
Start: 2019-10-31 | End: 2019-12-04 | Stop reason: SDUPTHER

## 2019-10-31 NOTE — PROGRESS NOTES
"Subjective   Katelynn Sung is a 59 y.o. female.     History of Present Illness     Mrs. Sung presents today for follow-up after medication changes at her last visit.  She has fibromyalgia for which she was on Lyrica.  She is concerned about weight gain and wished to slowly wean off medication to see if this improved.  Therefore she was switched from 300 mg twice daily to 200 mg twice daily, alternating 200 with 300 if desired.  She reports she felt the wind was \"too fast\".  She had flare of pain.  She did notice improvement in appetite.  Has questions regarding a \"slower wean\".    She continues to have chronic constipation requiring use of milk of magnesia every 3 days.  She also has not kept follow-up with gastroenterology as previously advised for management of chronic pancreatitis.  She denies blood in bowel, no unexpected weight loss no increased abdominal pain, no fever.    She has increasing muscle cramps, concerned about her electrolyte levels.  Having \"charley horses\" increasingly at night.  No new focal neurological symptoms.     The following portions of the patient's history were reviewed and updated as appropriate: allergies, current medications, past family history, past medical history, past social history, past surgical history and problem list.    Review of Systems   Constitutional: Positive for fatigue. Negative for diaphoresis, fever and unexpected weight change.   HENT: Positive for congestion and postnasal drip. Negative for ear pain, mouth sores, nosebleeds, rhinorrhea and sore throat.    Eyes: Negative for visual disturbance.   Respiratory: Positive for cough (chronic, dry, intermittent) and shortness of breath (mild with exertion). Negative for wheezing.    Cardiovascular: Negative for chest pain, palpitations and leg swelling.   Gastrointestinal: Positive for constipation and nausea. Negative for abdominal pain, blood in stool and vomiting.   Endocrine: Positive for heat intolerance. " Negative for polydipsia and polyuria.   Genitourinary: Negative for dysuria and hematuria.   Musculoskeletal: Positive for arthralgias, back pain, myalgias and neck pain.   Skin: Negative for rash and wound.   Neurological: Positive for headaches. Negative for dizziness, tremors, syncope and weakness.   Hematological: Negative for adenopathy. Does not bruise/bleed easily.   Psychiatric/Behavioral: Positive for sleep disturbance. Negative for confusion and dysphoric mood. The patient is nervous/anxious.    Pt's previous ROS reviewed and updated as indicated.       Objective    Vitals:    10/31/19 0857   BP: 124/78   Pulse: 90   Temp: 98 °F (36.7 °C)   SpO2: 99%     Body mass index is 33.33 kg/m².      10/31/19  0857   Weight: 80 kg (176 lb 6 oz)     Physical Exam   Constitutional: She is oriented to person, place, and time. She appears well-developed and well-nourished. She is cooperative. She does not appear ill. No distress.   obese   HENT:   Head: Normocephalic and atraumatic.   Mouth/Throat: Mucous membranes are normal. Mucous membranes are not dry.   Eyes: Conjunctivae and lids are normal. No scleral icterus. Right eye exhibits no nystagmus. Left eye exhibits no nystagmus.   Cardiovascular: Normal rate, regular rhythm, normal heart sounds and intact distal pulses. Exam reveals no gallop.   No murmur heard.  Pulmonary/Chest: Effort normal and breath sounds normal. She has no wheezes. She has no rhonchi. She has no rales.   Musculoskeletal:        Thoracic back: She exhibits deformity (kyphotic posture). She exhibits no bony tenderness.        Lumbar back: She exhibits tenderness (diffuse soft tissue), deformity (loss of normal lordosis) and spasm. She exhibits no bony tenderness.     Vascular Status -  Her right foot exhibits no edema. Her left foot exhibits no edema.  Neurological: She is alert and oriented to person, place, and time. She has normal strength. She displays no tremor. Gait normal.   Neg SLR  bilaterally   Skin: Skin is warm and dry. No bruising and no rash noted.   No jaundice   Psychiatric: She has a normal mood and affect. Her speech is normal and behavior is normal. Judgment and thought content normal. Cognition and memory are normal.   Good eye contact. Answers questions appropriately. Good personal hygiene and grooming.   Nursing note and vitals reviewed.  Pt's previous physical exam reviewed and updated as indicated.      Assessment/Plan   Katelynn was seen today for hypertension and fibromyalgia.    Diagnoses and all orders for this visit:    Fibromyalgia    Muscle cramp  -     Basic Metabolic Panel  -     Magnesium    Other chronic pancreatitis (CMS/HCC)  -     Lipase  -     Ambulatory Referral to Gastroenterology    Chronic constipation  -     Ambulatory Referral to Gastroenterology    Other orders  -     pregabalin (LYRICA) 225 MG capsule; Take 1 capsule by mouth 2 (Two) Times a Day.      Fibromyalgia with recent flare with attempt for weaning down on Lyrica.  To that end change to 225 mg up to twice daily, with discontinuation of 203 100 mg dosing.    Refer to gastroneurology as above for further evaluation of chronic pancreatitis and chronic constipation.    She will keep her routine follow-up appointment as scheduled in December.  Follow-up sooner as needed/instructed.  I will contact patient regarding test results and provide instructions regarding any necessary changes in plan of care.  Patient was encouraged to keep me informed of any acute changes, lack of improvement, or any new concerning symptoms.  Pt is aware of reasons to seek emergent care.  Patient voiced understanding of all instructions and denied further questions.  As part of patient's treatment plan I am prescribing a controlled substance.  The patient has been made aware of the appropriate use of such medications, including potential risk of somnolence, limited ability to drive and/or work safely, and potential for dependence  and/or overdose.  It has also been made clear that these medications are for use by this patient only, without concomitant use of alcohol or other substances, unless prescribed.  DANTE report reviewed and scanned into chart.  Last DANTE date 8/25/19.  History and physical exam exhibit continued safe and appropriate use of controlled substance.  Patient has completed a prescribing agreement detailing terms of continued prescribing of controlled substances, including monitoring DANTE reports, urine drug screening, and pill counts if necessary.  Patient is aware that inappropriate use will result in cessation of prescribing such medications.            Please note that portions of this note may have been completed with a voice recognition program. Efforts were made to edit the dictations, but occasionally words are mistranscribed.

## 2019-11-01 LAB
BUN SERPL-MCNC: 13 MG/DL (ref 6–20)
BUN/CREAT SERPL: 10.9 (ref 7–25)
CALCIUM SERPL-MCNC: 9.9 MG/DL (ref 8.6–10.5)
CHLORIDE SERPL-SCNC: 97 MMOL/L (ref 98–107)
CO2 SERPL-SCNC: 25.2 MMOL/L (ref 22–29)
CREAT SERPL-MCNC: 1.19 MG/DL (ref 0.57–1)
GLUCOSE SERPL-MCNC: 97 MG/DL (ref 65–99)
LIPASE SERPL-CCNC: 81 U/L (ref 13–60)
MAGNESIUM SERPL-MCNC: 2.1 MG/DL (ref 1.6–2.6)
POTASSIUM SERPL-SCNC: 4.2 MMOL/L (ref 3.5–5.2)
SODIUM SERPL-SCNC: 138 MMOL/L (ref 136–145)

## 2019-11-08 RX ORDER — ALBUTEROL SULFATE 90 UG/1
2 AEROSOL, METERED RESPIRATORY (INHALATION) EVERY 4 HOURS PRN
Qty: 18 G | Refills: 5 | Status: SHIPPED | OUTPATIENT
Start: 2019-11-08 | End: 2020-04-22 | Stop reason: SDUPTHER

## 2019-11-22 DIAGNOSIS — G47.00 PERSISTENT INSOMNIA: ICD-10-CM

## 2019-11-22 NOTE — TELEPHONE ENCOUNTER
Pt is calling to check on the status of her zolpidem refill and is requesting a call back as to the status.

## 2019-11-24 RX ORDER — ZOLPIDEM TARTRATE 10 MG/1
10 TABLET ORAL NIGHTLY PRN
Qty: 30 TABLET | Refills: 2 | Status: SHIPPED | OUTPATIENT
Start: 2019-11-24 | End: 2020-02-19

## 2019-11-25 DIAGNOSIS — G47.00 PERSISTENT INSOMNIA: ICD-10-CM

## 2019-11-25 RX ORDER — ZOLPIDEM TARTRATE 10 MG/1
10 TABLET ORAL NIGHTLY PRN
Qty: 30 TABLET | Refills: 2 | OUTPATIENT
Start: 2019-11-25

## 2019-11-27 DIAGNOSIS — K86.1 CHRONIC PANCREATITIS, UNSPECIFIED PANCREATITIS TYPE (HCC): ICD-10-CM

## 2019-11-27 RX ORDER — PROMETHAZINE HYDROCHLORIDE 25 MG/1
TABLET ORAL
Qty: 90 TABLET | Refills: 1 | Status: SHIPPED | OUTPATIENT
Start: 2019-11-27 | End: 2020-02-05

## 2019-12-04 ENCOUNTER — OFFICE VISIT (OUTPATIENT)
Dept: FAMILY MEDICINE CLINIC | Facility: CLINIC | Age: 59
End: 2019-12-04

## 2019-12-04 VITALS
HEART RATE: 62 BPM | DIASTOLIC BLOOD PRESSURE: 78 MMHG | WEIGHT: 178 LBS | SYSTOLIC BLOOD PRESSURE: 104 MMHG | BODY MASS INDEX: 33.61 KG/M2 | HEIGHT: 61 IN | TEMPERATURE: 98.1 F | OXYGEN SATURATION: 100 %

## 2019-12-04 DIAGNOSIS — M79.7 FIBROMYALGIA: Primary | ICD-10-CM

## 2019-12-04 DIAGNOSIS — I10 ESSENTIAL HYPERTENSION: ICD-10-CM

## 2019-12-04 DIAGNOSIS — N28.9 RENAL INSUFFICIENCY: ICD-10-CM

## 2019-12-04 DIAGNOSIS — M81.0 AGE-RELATED OSTEOPOROSIS WITHOUT CURRENT PATHOLOGICAL FRACTURE: ICD-10-CM

## 2019-12-04 DIAGNOSIS — Z12.31 ENCOUNTER FOR SCREENING MAMMOGRAM FOR BREAST CANCER: ICD-10-CM

## 2019-12-04 DIAGNOSIS — K86.1 OTHER CHRONIC PANCREATITIS (HCC): ICD-10-CM

## 2019-12-04 PROCEDURE — 99214 OFFICE O/P EST MOD 30 MIN: CPT | Performed by: FAMILY MEDICINE

## 2019-12-04 RX ORDER — PREGABALIN 225 MG/1
225 CAPSULE ORAL 2 TIMES DAILY
Qty: 60 CAPSULE | Refills: 2 | Status: SHIPPED | OUTPATIENT
Start: 2019-12-04 | End: 2020-03-04 | Stop reason: SDUPTHER

## 2019-12-04 NOTE — PROGRESS NOTES
Subjective   Katelynn Sung is a 59 y.o. female.     History of Present Illness  Mrs. Sung presents today for f/u on FMSx. She has medicaiton adjustment at last visit. Lyrica changed to bid dosing at 225 mg. She is pleased to report she has better control of symptoms. Does not feel as bloated, tired, and weight is decreasing. She denies side effects from med.    She has apt with GI later this week for f/u on chronic abd pain, bloating, previous pancreatitis etc. Symptoms currently stable. No blood in stool.    She was noted to have mild renal insufficiency on previous labs. Had been using increasing amounts of NSAIDs but has dc'd as instructed. She has increased water intake as instructed. Due for recheck A1c. Has comorbid HTN which has been well controlled. Taking zestoretic as rx'd. Notably on chronic PPI for GERD and Barrets esophagus as well.    She is overdue for mammogram and DEXA.    No new complaints today.    The following portions of the patient's history were reviewed and updated as appropriate: allergies, current medications, past family history, past medical history, past social history, past surgical history and problem list.    Review of Systems   Constitutional: Positive for fatigue. Negative for diaphoresis, fever and unexpected weight change.   HENT: Positive for congestion and postnasal drip. Negative for ear pain, mouth sores, nosebleeds, rhinorrhea and sore throat.    Eyes: Negative for visual disturbance.   Respiratory: Positive for cough (chronic, dry, intermittent) and shortness of breath (mild with exertion). Negative for wheezing.    Cardiovascular: Negative for chest pain, palpitations and leg swelling.   Gastrointestinal: Positive for constipation and nausea. Negative for abdominal pain, blood in stool and vomiting.   Endocrine: Positive for heat intolerance. Negative for polydipsia and polyuria.   Genitourinary: Negative for dysuria and hematuria.   Musculoskeletal: Positive for  arthralgias, back pain, myalgias and neck pain.   Skin: Negative for rash and wound.   Neurological: Positive for headaches. Negative for dizziness, tremors, syncope and weakness.   Hematological: Negative for adenopathy. Does not bruise/bleed easily.   Psychiatric/Behavioral: Positive for sleep disturbance. Negative for confusion and dysphoric mood. The patient is nervous/anxious.    Pt's previous ROS reviewed and updated as indicated.     Objective    Vitals:    12/04/19 1116   BP: 104/78   Pulse: 62   Temp: 98.1 °F (36.7 °C)   SpO2: 100%     Body mass index is 33.63 kg/m².      12/04/19  1116   Weight: 80.7 kg (178 lb)       Physical Exam   Constitutional: She is oriented to person, place, and time. She appears well-developed and well-nourished. She is cooperative. She does not appear ill. No distress.   obese   HENT:   Head: Normocephalic and atraumatic.   Mouth/Throat: Mucous membranes are normal. Mucous membranes are not dry.   Eyes: No scleral icterus.   Cardiovascular: Normal rate, regular rhythm, normal heart sounds and intact distal pulses. Exam reveals no gallop.   No murmur heard.  Pulmonary/Chest: Effort normal and breath sounds normal. She has no wheezes. She has no rhonchi. She has no rales.   Musculoskeletal:        Thoracic back: She exhibits deformity (kyphotic posture).   Diffuse soft tissue TTP     Vascular Status -  Her right foot exhibits no edema. Her left foot exhibits no edema.  Neurological: She is alert and oriented to person, place, and time. She displays no tremor. Gait normal.   Skin: Skin is warm and dry. No bruising and no rash noted.   No jaundice   Psychiatric: She has a normal mood and affect. Her speech is normal and behavior is normal. Judgment and thought content normal. Cognition and memory are normal.   Good eye contact. Answers questions appropriately. Good personal hygiene and grooming.   Nursing note and vitals reviewed.  Pt's previous physical exam reviewed and updated as  indicated.    Lab Results   Component Value Date    WBC 5.37 06/20/2019    HGB 14.8 06/20/2019    HCT 44.9 06/20/2019    MCV 87.2 06/20/2019     06/20/2019       Lab Results   Component Value Date    GLUCOSE 99 08/04/2016    BUN 13 10/31/2019    CREATININE 1.19 (H) 10/31/2019    EGFRIFNONA 47 (L) 10/31/2019    EGFRIFAFRI 56 (L) 10/31/2019    BCR 10.9 10/31/2019    K 4.2 10/31/2019    CO2 25.2 10/31/2019    CALCIUM 9.9 10/31/2019    PROTENTOTREF 7.7 06/20/2019    ALBUMIN 4.70 06/20/2019    LABIL2 1.6 06/20/2019    AST 64 (H) 06/20/2019    ALT 44 06/20/2019     Lab Results   Component Value Date    HGBA1C 5.50 11/15/2017       Lab Results   Component Value Date    TSH 1.690 06/20/2019       Assessment/Plan   Katelynn was seen today for hypertension and fibromyalgia.    Diagnoses and all orders for this visit:    Fibromyalgia    Encounter for screening mammogram for breast cancer  -     Mammo Screening Digital Tomosynthesis Bilateral With CAD; Future    Age-related osteoporosis without current pathological fracture  -     DEXA Bone Density Axial; Future    Renal insufficiency  -     Basic Metabolic Panel; Future    Essential hypertension    Other chronic pancreatitis (CMS/HCC)    Other orders  -     pregabalin (LYRICA) 225 MG capsule; Take 1 capsule by mouth 2 (Two) Times a Day.    Doing well on high dose bid dosing of LYrica for mgnt of FMSx. No side effects reported. No aberrant behavior.    Routine screenings as above.    Mild renal insufficiency. F/u BMP as above. Avoid NSAIDs. Consider change in BP med in worsening.    HTN controlled. Continue zestoretic for now. Pt advised to eat a heart healthy diet and get regular aerobic exercise.    F/u with GI as scheduled for f/u on chronic pancreatitis, barrets, GERD, etc.    Routine f/u in 3 months, sooner as needed.  Patient was encouraged to keep me informed of any acute changes, lack of improvement, or any new concerning symptoms.  Pt is aware of reasons to seek  emergent care.  Patient voiced understanding of all instructions and denied further questions.  As part of patient's treatment plan I am prescribing a controlled substance.  The patient has been made aware of the appropriate use of such medications, including potential risk of somnolence, limited ability to drive and/or work safely, and potential for dependence and/or overdose.  It has also been made clear that these medications are for use by this patient only, without concomitant use of alcohol or other substances, unless prescribed.  DANTE report reviewed and scanned into chart.  Last DANTE date 8/25/19. Update requested.  History and physical exam exhibit continued safe and appropriate use of controlled substance.  Patient has completed a prescribing agreement detailing terms of continued prescribing of controlled substances, including monitoring DANTE reports, urine drug screening, and pill counts if necessary.  Patient is aware that inappropriate use will result in cessation of prescribing such medications.

## 2019-12-06 ENCOUNTER — TELEPHONE (OUTPATIENT)
Dept: FAMILY MEDICINE CLINIC | Facility: CLINIC | Age: 59
End: 2019-12-06

## 2019-12-06 NOTE — TELEPHONE ENCOUNTER
Pt needs to drop in for recheck BMP as we forgot to do at time of her visit earlier this week.    Also she needs updated Larissa if not already done.

## 2019-12-09 ENCOUNTER — TELEPHONE (OUTPATIENT)
Dept: FAMILY MEDICINE CLINIC | Facility: CLINIC | Age: 59
End: 2019-12-09

## 2019-12-09 NOTE — TELEPHONE ENCOUNTER
Clarified with Bran at [facility] per Dr. Servin patient should only be Taking Lyrica 225mg BID, not in addition to 300mg BID. Also informed patient of correct dose 225mg BID, voiced understanding.

## 2019-12-11 ENCOUNTER — RESULTS ENCOUNTER (OUTPATIENT)
Dept: FAMILY MEDICINE CLINIC | Facility: CLINIC | Age: 59
End: 2019-12-11

## 2019-12-11 DIAGNOSIS — N28.9 RENAL INSUFFICIENCY: ICD-10-CM

## 2019-12-23 RX ORDER — NICOTINE 10 MG
CARTRIDGE (EA) INHALATION
Qty: 168 EACH | Refills: 2 | Status: SHIPPED | OUTPATIENT
Start: 2019-12-23 | End: 2020-02-11

## 2020-02-05 DIAGNOSIS — K86.1 CHRONIC PANCREATITIS, UNSPECIFIED PANCREATITIS TYPE (HCC): ICD-10-CM

## 2020-02-05 RX ORDER — PANTOPRAZOLE SODIUM 40 MG/1
TABLET, DELAYED RELEASE ORAL
Qty: 60 TABLET | Refills: 4 | Status: SHIPPED | OUTPATIENT
Start: 2020-02-05 | End: 2020-04-22 | Stop reason: SDUPTHER

## 2020-02-05 RX ORDER — PROMETHAZINE HYDROCHLORIDE 25 MG/1
TABLET ORAL
Qty: 90 TABLET | Refills: 0 | Status: SHIPPED | OUTPATIENT
Start: 2020-02-05 | End: 2020-04-30

## 2020-02-11 ENCOUNTER — OFFICE VISIT (OUTPATIENT)
Dept: GASTROENTEROLOGY | Facility: CLINIC | Age: 60
End: 2020-02-11

## 2020-02-11 VITALS
TEMPERATURE: 98.5 F | SYSTOLIC BLOOD PRESSURE: 120 MMHG | WEIGHT: 180 LBS | BODY MASS INDEX: 30.73 KG/M2 | HEIGHT: 64 IN | DIASTOLIC BLOOD PRESSURE: 66 MMHG | HEART RATE: 76 BPM

## 2020-02-11 DIAGNOSIS — R13.10 DYSPHAGIA, UNSPECIFIED TYPE: ICD-10-CM

## 2020-02-11 DIAGNOSIS — R10.13 EPIGASTRIC PAIN: Primary | ICD-10-CM

## 2020-02-11 DIAGNOSIS — R12 HEARTBURN: ICD-10-CM

## 2020-02-11 DIAGNOSIS — R11.0 NAUSEA: ICD-10-CM

## 2020-02-11 DIAGNOSIS — K59.00 CONSTIPATION, UNSPECIFIED CONSTIPATION TYPE: ICD-10-CM

## 2020-02-11 PROCEDURE — 99204 OFFICE O/P NEW MOD 45 MIN: CPT | Performed by: INTERNAL MEDICINE

## 2020-02-11 PROCEDURE — 99406 BEHAV CHNG SMOKING 3-10 MIN: CPT | Performed by: INTERNAL MEDICINE

## 2020-02-11 NOTE — PROGRESS NOTES
"Chief Complaint   Patient presents with   • Abdominal Pain     History of Present Illness     There is history of epigastric abdominal pain about 1 month ago.  The pain is gradual in onset, severe in intensity and sharp in character.  Initially the pain was mild to moderate however later the patient claims that the pain became severe.  There was no radiation of the abdominal pain.  The patient went to [facility] emergency room.  The pain lasted for about 7 to 8 hours.  There were no precipitating or relieving factors of abdominal pain.  The patient believes that about 2 to 3 years ago she had similar kind of abdominal pain although less severe that lasted also for several hours and resolved on its own.  The patient also felt nauseated with abdominal pain.  There is no history of emesis.    The patient has a history of constipation off and on for the last for the last 2 years. Severity is moderate. This is described as hard stools perhaps one bowel movement every 3 to 4 days.  The patient takes milk of magnesia often to have a bowel movement.  The patient denies anorectal pain.    The patient has difficulty swallowing off and for the last 1 year. The symptom is moderate in severity, occurs occasionally perhaps once a month and is mostly associated with solid foods and at times with liquids as well.  The symptoms are not progressive.  The patient points towards the lower substernal area.  There is no associated weight loss.    Interestingly the patient was diagnosed to have \"mild type of chronic pancreatitis\" about 10 years ago.  There is no history of acute recurrent pancreatitis.  The patient denies history of chronic abdominal pain.  There is no history of recent weight loss in fact the patient has gained about 30 pounds over the last year or so.    The patient has a history of reflux off and on for the last several years.  The reflux is moderately severe.  Symptoms are described as retrosternal burning " "sensation, and indigestion.  There is history of occasional regurgitative symptoms.  Frequency being several times per week.  The symptoms are worse at night.  The patient takes acid suppressive therapy-pantoprazole 40 mg with reasonable control of his symptoms.  The patient claims that she had been told to have \"Humphreys's esophagus\".  The patient drinks on average a can of soda perhaps Mountain Dew on daily basis.    There is history of colon polyps.  The patient had undergone a colonoscopy in 2008.  There is no family history of colon cancer, inflammatory bowel disease, celiac disease or chronic liver disease.  The patient had been told by a physician about 10 years ago that she has \"chronic pancreatitis\".    About 6 years ago the patient claims that she was found to have bilateral pneumonia.  The patient did not have an abdominal surgery except tubal ligation years ago.  The patient denies history of alcohol use.  There is history of use of NSAIDs.     Review of Systems   Constitutional: Positive for fatigue and unexpected weight change. Negative for appetite change, chills and fever.   HENT: Positive for trouble swallowing. Negative for mouth sores and nosebleeds.    Eyes: Negative for discharge and redness.   Respiratory: Positive for apnea, cough and shortness of breath.    Cardiovascular: Negative for chest pain, palpitations and leg swelling.   Gastrointestinal: Positive for abdominal pain and nausea. Negative for abdominal distention, anal bleeding, blood in stool, constipation, diarrhea and vomiting.   Endocrine: Negative for cold intolerance, heat intolerance and polydipsia.   Genitourinary: Negative for dysuria, hematuria and urgency.   Musculoskeletal: Positive for arthralgias, back pain and neck pain. Negative for joint swelling and myalgias.   Skin: Negative for rash.   Allergic/Immunologic: Negative for food allergies and immunocompromised state.   Neurological: Negative for dizziness, seizures, " syncope and headaches.   Hematological: Negative for adenopathy. Does not bruise/bleed easily.   Psychiatric/Behavioral: Negative for dysphoric mood. The patient is nervous/anxious. The patient is not hyperactive.      Patient Active Problem List   Diagnosis   • Chronic pancreatitis (CMS/HCC)   • Persistent insomnia   • Chronic neck pain   • Dysphagia   • Heartburn   • Hypertension   • Nausea   • Polyarthropathy   • Vitamin D deficiency   • Cervical spondylolysis   • Cervical disc disease   • Fibromyalgia   • Humphreys's esophagus   • GERD (gastroesophageal reflux disease)   • COPD (chronic obstructive pulmonary disease) (CMS/[facility])   • Osteoporosis   • Chronic constipation   • Degenerative arthritis of lumbar spine   • Lumbar degenerative disc disease   • Anxiety   • Arthritis   • Depression   • Sleep apnea   • Restless leg syndrome   • Chronic rhinitis   • Postmenopausal disorder   • Postmenopausal hormone therapy   • Class 1 obesity due to excess calories with serious comorbidity and body mass index (BMI) of 34.0 to 34.9 in adult   • Nicotine dependence   • Chronic insomnia     Past Medical History:   Diagnosis Date   • Adnexal mass     pelvic US 10/26/11 showed 2 cm left abdnexal cystic lesion, resolved on f/u 5/14/15   • Anxiety    • Back ache    • Humphreys's esophagus    • Class 1 obesity due to excess calories with serious comorbidity and body mass index (BMI) of 30.0 to 30.9 in adult 2/16/2018   • Colon polyps    • COPD (chronic obstructive pulmonary disease) (CMS/[facility]) 2008   • Cor pulmonale (CMS/HCC)    • Degenerative arthritis of lumbar spine    • Depression    • Enlarged heart    • Fibromyalgia    • Herpes simplex    • Hypertension    • Insomnia    • Lumbar degenerative disc disease    • Mild sleep apnea 01/16/2013    Sleep study 1/16/13 showing mild degree   • Narcotic dependence (CMS/[facility])    • Osteoporosis    • Palpitations    • Palpitations    • Pancreatitis    • Respiratory failure requiring intubation  (CMS/HCC)    • Restless leg syndrome    • Sinus problem    • Sleep apnea    • Vision problems      Past Surgical History:   Procedure Laterality Date   • COLONOSCOPY N/A     Complete   • TUBAL ABDOMINAL LIGATION     • UPPER GASTROINTESTINAL ENDOSCOPY N/A      Family History   Problem Relation Age of Onset   • Stroke Mother    • Liver disease Mother         Chronic   • Cirrhosis Father    • Heart attack Father    • Cancer Sister         Breast cancer   • Stroke Brother    • Cancer Sister    • Colon cancer Neg Hx    • Liver cancer Neg Hx    • Crohn's disease Neg Hx    • Ulcerative colitis Neg Hx      Social History     Tobacco Use   • Smoking status: Current Some Day Smoker     Packs/day: 0.75     Years: 3.00     Pack years: 2.25     Types: Cigarettes, Electronic Cigarette     Last attempt to quit:      Years since quittin.1   • Smokeless tobacco: Never Used   • Tobacco comment:  started using nicotine containing substance in combustion-free vaporization device.   Substance Use Topics   • Alcohol use: No       Current Outpatient Medications:   •  ALBUTEROL SULFATE  (90 Base) MCG/ACT inhaler, INHALE 2 PUFFS EVERY 4 (FOUR) HOURS AS NEEDED FOR WHEEZING., Disp: 18 g, Rfl: 5  •  BREO ELLIPTA 200-25 MCG/INH inhaler, INHALE 1 PUFF BY MOUTH DAILY., Disp: 1 each, Rfl: 5  •  buprenorphine-naloxone (SUBOXONE) 8-2 MG per SL tablet, Place 1 tablet under the tongue 2 (Two) Times a Day., Disp: , Rfl:   •  fexofenadine (ALLEGRA) 60 MG tablet, TAKE 1 TABLET BY MOUTH DAILY., Disp: 60 tablet, Rfl: 5  •  fluticasone (FLONASE) 50 MCG/ACT nasal spray, USE 2 SPRAYS INTO THE NOSTRIL(S) AS DIRECTED BY PROVIDER DAILY., Disp: 14.812 g, Rfl: 5  •  lisinopril-hydrochlorothiazide (PRINZIDE,ZESTORETIC) 10-12.5 MG per tablet, TAKE 1 TABLET BY MOUTH DAILY., Disp: 30 tablet, Rfl: 5  •  nystatin (MYCOSTATIN) 780786 UNIT/ML suspension, SWISH AND SWALLOW 5 MLS BY MOUTH FOUR TIMES A DAY, Disp: 180 mL, Rfl: 0  •  pantoprazole  "(PROTONIX) 40 MG EC tablet, TAKE ONE TABLET BY MOUTH TWO TIMES A DAY, Disp: 60 tablet, Rfl: 4  •  pregabalin (LYRICA) 225 MG capsule, Take 1 capsule by mouth 2 (Two) Times a Day., Disp: 60 capsule, Rfl: 2  •  promethazine (PHENERGAN) 25 MG tablet, TAKE 1 TABLET BY MOUTH 3 TIMES A DAY. AS NEEDED FOR NAUSEA., Disp: 90 tablet, Rfl: 0  •  zolpidem (AMBIEN) 10 MG tablet, TAKE 1 TABLET BY MOUTH AT NIGHT AS NEEDED FOR SLEEP., Disp: 30 tablet, Rfl: 2    Allergies   Allergen Reactions   • Nsaids GI Intolerance       Blood pressure 120/66, pulse 76, temperature 98.5 °F (36.9 °C), height 162.6 cm (64\"), weight 81.6 kg (180 lb), not currently breastfeeding.    Physical Exam   Constitutional: She is oriented to person, place, and time. She appears well-developed and well-nourished. No distress.   HENT:   Head: Normocephalic and atraumatic.   Right Ear: Hearing and external ear normal.   Left Ear: Hearing and external ear normal.   Nose: Nose normal.   Mouth/Throat: Oropharynx is clear and moist and mucous membranes are normal. Mucous membranes are not pale, not dry and not cyanotic. No oral lesions. No oropharyngeal exudate.   Eyes: Conjunctivae and EOM are normal. Right eye exhibits no discharge. Left eye exhibits no discharge. No scleral icterus.   Neck: Trachea normal. Neck supple. No JVD present. No edema present. No thyroid mass and no thyromegaly present.   Cardiovascular: Normal rate, regular rhythm, S2 normal and normal heart sounds. Exam reveals no gallop, no S3 and no friction rub.   No murmur heard.  Pulmonary/Chest: Effort normal and breath sounds normal. No respiratory distress. She has no wheezes. She has no rales. She exhibits no tenderness.   Abdominal: Soft. Normal appearance and bowel sounds are normal. She exhibits no distension, no ascites and no mass. There is no splenomegaly or hepatomegaly. There is tenderness. There is no rigidity, no rebound and no guarding. No hernia.   Musculoskeletal: She exhibits no " tenderness or deformity.     Vascular Status -  Her right foot exhibits no edema. Her left foot exhibits no edema.  Lymphadenopathy:     She has no cervical adenopathy.        Left: No supraclavicular adenopathy present.   Neurological: She is alert and oriented to person, place, and time. She has normal strength. No cranial nerve deficit or sensory deficit. She exhibits normal muscle tone. Coordination normal.   Skin: No rash noted. She is not diaphoretic. No cyanosis. No pallor. Nails show no clubbing.   Psychiatric: She has a normal mood and affect. Her behavior is normal. Judgment and thought content normal.   Nursing note and vitals reviewed.  Stigmata of chronic liver disease:  None.  Asterixis:  None.    Laboratory Testing:  Upon review of medical records:    Dated February 8, 2016 sodium 138 potassium 3.5 chloride 102 CO2 21 BUN 9 serum creatinine 0.7 and glucose 130.  Calcium 9.1.  Total protein 7.3.  Albumin 4.5.  T bili 0.3 AST 19 ALT 5 alkaline phosphatase 48.  Lipase 51.  C-reactive protein 0.4.  Uric acid 4.1.  TSH 1.359.  WBC 7.25 hemoglobin 13.9 hematocrit 39.4 MCV 85.8 and platelet count 335.    Dated August 15, 2017 Hep C virus antibody negative at <0.1.  MERT negative.  Dated April 24, 2018 sodium 141 potassium 4.6 chloride 99 CO2 27 BUN 12 serum creatinine 0.80 glucose 88.  Calcium 10.0.  Total protein 8.3.  Albumin 4.70.  T bili 0.4 AST 28 ALT 15 alkaline phosphatase 78.  TSH 2.69.  Iron 95.  WBC 7.13 hemoglobin 14.5 hematocrit 43.9 MCV 89.6 and platelet count 286.    Dated June 20, 2019 sodium 140 potassium 4.7 chloride 101 CO2 29 BUN 8 serum creatinine 0.70 glucose 101.  Calcium 9.7.  Total protein 7.7.  Albumin 4.70.  T bili 0.7 AST elevated at 64 ALT 44 alkaline phosphatase 72.  Amylase 73.  Lipase elevated at 332.  TSH 1.690.  WBC 5.37 hemoglobin 14.8 hematocrit 44.9 MCV 87.2 and platelet count 255.    Dated January 19, 2020 sodium 141 potassium 4.2 chloride 103 CO2 29 BUN 11 serum  creatinine 0.9 glucose 110.  Calcium 9.1.  Total protein 8.3.  Albumin 4.1.  T bili 0.3 AST elevated at 48  ALT 31 alkaline phosphatase 84.  Lipase 216.    Abdominal Imaging:   Upon review of medical records:    Dated September 24, 2018 the patient underwent a CT of the abdomen pelvis without contrast which revealed: Scarring in the lingula.  Small hiatal hernia.  Gallbladder is unremarkable.  Liver is mildly fatty infiltrated.  No hydronephrosis or nephrolithiasis.  Solid organs are otherwise unremarkable.  No free fluid or adenopathy.  Appendix is normal.  Uterus is midline.  Urinary bladder is decompressed.  No free fluid or adenopathy in the pelvis.    Dated June 27, 2019 the patient underwent a CT of the abdomen and pelvis with oral and IV contrast which revealed: Fatty infiltration of the liver is again noted.  No liver masses.  Gastric wall thickening although the stomach is poorly distended.  Spleen size is normal.  Small hiatal hernia.  Pancreas is normal in size and enhances normally.  No peripancreatic inflammatory changes.  No adrenal masses.  Abdominal aorta is normal in size.  Bladder is poorly distended.  No pelvic mass or free fluid.  No large or small bowel dilatation or inflammatory changes.  Normal-sized appendix.    Dated January 19, 2020 the patient underwent a CT of the abdomen and pelvis without contrast which revealed: No renal or ureteral stones.  No hydronephrosis or perinephric stranding.  Gallbladder is present.  Fatty liver with areas of focal fatty sparing.  Unenhanced spleen, adrenal glands, and pancreas are without acute abnormality.  Mildly prominent mid small bowel loops with wall thickening.  Terminal ileum is decompressed.  This could represent enteritis or low-grade partial small bowel obstruction.  Appendix is normal.  Diverticulosis without diverticulitis.  Uterus and ovaries are normal for age.  No abdominal or pelvic lymphadenopathy.  There is a small amount of free fluid and  there is very mild mesenteric edema.  No acute osseous abnormality is identified.    Procedures:  Upon review of medical records:     Data May 22, 2015 patient underwent an upper endoscopy which revealed: Grade 1 esophagitis (erythema).  Small sliding hiatal hernia less than 3 cm.  Erythematous gastritis.  Second portion of duodenum, biopsy revealed no pathologic alterations.  Negative for celiac disease, microorganisms, metaplasia, or atypia.  Antrum and body, biopsy revealed chronic gastritis.  No H. pylori identified (negative CFV stain, adequate control).  Negative for atrophy, ulceration, metaplasia, dysplasia, or malignancy.    Assessment:      ICD-10-CM ICD-9-CM   1. Epigastric pain R10.13 789.06   2. Nausea R11.0 787.02   3. Constipation, unspecified constipation type K59.00 564.00   4. Heartburn R12 787.1   5. Dysphagia, unspecified type R13.10 787.20         Discussion:  1.     Plan/  Patient Instructions   1. Antireflux measures.  2. Avoid NSAIDs.  3. The patient was counseled for smoking cessation (Smoking cessation counseling/symptomatic/88188/3 minutes).  4. Protonix (Pantoprazole) tablet 40 mg tablet. Take 1 tablet orally in the morning half an hour before eating every day.  5. Low-fat diet.  6. Weight reduction.  The patient has been warned regarding potential weight gain upon cessation of smoking.  However this can be easily offset by changes in diet and increasing the activity.  Avoidance of soda beverages would help.  7. The patient should eat relatively soft diet.    8. The patient should eat in upright position and chew well.  The patient should drink water after to 3 bites and take medications with liberal amounts of water.    9. Generally,  medications that have a potential to cause pill esophagitis may be avoided or used in an alternative form. For example Motrin or Aleve can be taken in a gel cap form.  10. After eating and taking medications the patient should remain in upright position for  10-15 minutes.  11. Upper endoscopy (EGD) counseling:  Description of the procedure, risks, benefits, alternatives and options including nonoperative options were discussed with the patient in detail.  The patient understands and wishes to proceed.  12. Colonoscopy: Description of the procedure, risks benefits alternatives and options including non-operative options were discussed with the patient in detail.  The patient understands and wishes to proceed.  13. Discussed with the patient and her daughter.  Questions were answered.  Opportunity was given for additional questions.         Charbel Ching MD

## 2020-02-11 NOTE — PATIENT INSTRUCTIONS
1. Antireflux measures.  2. Avoid NSAIDs.  3. The patient was counseled for smoking cessation (Smoking cessation counseling/symptomatic/89751/3 minutes).  4. Protonix (Pantoprazole) tablet 40 mg tablet. Take 1 tablet orally in the morning half an hour before eating every day.  5. Low-fat diet.  6. Weight reduction.  The patient has been warned regarding potential weight gain upon cessation of smoking.  However this can be easily offset by changes in diet and increasing the activity.  Avoidance of soda beverages would help.  7. The patient should eat relatively soft diet.    8. The patient should eat in upright position and chew well.  The patient should drink water after to 3 bites and take medications with liberal amounts of water.    9. Generally,  medications that have a potential to cause pill esophagitis may be avoided or used in an alternative form. For example Motrin or Aleve can be taken in a gel cap form.  10. After eating and taking medications the patient should remain in upright position for 10-15 minutes.  11. Upper endoscopy (EGD) counseling:  Description of the procedure, risks, benefits, alternatives and options including nonoperative options were discussed with the patient in detail.  The patient understands and wishes to proceed.  12. Colonoscopy: Description of the procedure, risks benefits alternatives and options including non-operative options were discussed with the patient in detail.  The patient understands and wishes to proceed.  13. Discussed with the patient and her daughter.  Questions were answered.  Opportunity was given for additional questions.

## 2020-02-12 ENCOUNTER — PREP FOR SURGERY (OUTPATIENT)
Dept: OTHER | Facility: HOSPITAL | Age: 60
End: 2020-02-12

## 2020-02-12 DIAGNOSIS — R13.10 DYSPHAGIA, UNSPECIFIED TYPE: ICD-10-CM

## 2020-02-12 DIAGNOSIS — R10.13 EPIGASTRIC PAIN: Primary | ICD-10-CM

## 2020-02-12 DIAGNOSIS — R11.0 NAUSEA: ICD-10-CM

## 2020-02-12 DIAGNOSIS — R12 HEARTBURN: ICD-10-CM

## 2020-02-12 RX ORDER — SODIUM CHLORIDE 9 MG/ML
70 INJECTION, SOLUTION INTRAVENOUS CONTINUOUS PRN
Status: CANCELLED | OUTPATIENT
Start: 2020-02-12

## 2020-02-13 PROBLEM — R10.13 EPIGASTRIC PAIN: Status: ACTIVE | Noted: 2020-02-13

## 2020-02-18 DIAGNOSIS — G47.00 PERSISTENT INSOMNIA: ICD-10-CM

## 2020-02-19 RX ORDER — ZOLPIDEM TARTRATE 10 MG/1
10 TABLET ORAL NIGHTLY PRN
Qty: 30 TABLET | Refills: 1 | Status: SHIPPED | OUTPATIENT
Start: 2020-02-19 | End: 2020-05-14

## 2020-02-24 ENCOUNTER — OFFICE VISIT (OUTPATIENT)
Dept: FAMILY MEDICINE CLINIC | Facility: CLINIC | Age: 60
End: 2020-02-24

## 2020-02-24 VITALS
TEMPERATURE: 98.7 F | BODY MASS INDEX: 31.07 KG/M2 | HEART RATE: 74 BPM | HEIGHT: 64 IN | OXYGEN SATURATION: 99 % | SYSTOLIC BLOOD PRESSURE: 118 MMHG | DIASTOLIC BLOOD PRESSURE: 84 MMHG | WEIGHT: 182 LBS

## 2020-02-24 DIAGNOSIS — J02.9 SORE THROAT: ICD-10-CM

## 2020-02-24 DIAGNOSIS — J44.1 COPD EXACERBATION (HCC): Primary | ICD-10-CM

## 2020-02-24 DIAGNOSIS — R53.81 MALAISE: ICD-10-CM

## 2020-02-24 DIAGNOSIS — R05.9 COUGH: ICD-10-CM

## 2020-02-24 LAB
EXPIRATION DATE: NORMAL
EXPIRATION DATE: NORMAL
FLUAV AG NPH QL: NEGATIVE
FLUBV AG NPH QL: NEGATIVE
INTERNAL CONTROL: NORMAL
INTERNAL CONTROL: NORMAL
Lab: NORMAL
Lab: NORMAL
S PYO AG THROAT QL: NEGATIVE

## 2020-02-24 PROCEDURE — 87880 STREP A ASSAY W/OPTIC: CPT | Performed by: FAMILY MEDICINE

## 2020-02-24 PROCEDURE — 99214 OFFICE O/P EST MOD 30 MIN: CPT | Performed by: FAMILY MEDICINE

## 2020-02-24 PROCEDURE — 87804 INFLUENZA ASSAY W/OPTIC: CPT | Performed by: FAMILY MEDICINE

## 2020-02-24 RX ORDER — SULFAMETHOXAZOLE AND TRIMETHOPRIM 800; 160 MG/1; MG/1
1 TABLET ORAL 2 TIMES DAILY
Qty: 14 TABLET | Refills: 0 | Status: SHIPPED | OUTPATIENT
Start: 2020-02-24 | End: 2020-03-02

## 2020-02-24 RX ORDER — PREDNISONE 10 MG/1
TABLET ORAL
Qty: 15 TABLET | Refills: 0 | Status: SHIPPED | OUTPATIENT
Start: 2020-02-24 | End: 2020-03-04

## 2020-02-24 NOTE — PROGRESS NOTES
Subjective   Katelynn Sung is a 59 y.o. female.     She c/o cough    Cough   This is a new problem. The current episode started in the past 7 days. The problem has been gradually worsening. The problem occurs every few minutes. The cough is productive of sputum. Associated symptoms include chest pain (anterior with cough), a fever (subjective), nasal congestion, postnasal drip, rhinorrhea (mild), a sore throat, shortness of breath and wheezing. Pertinent negatives include no ear congestion, eye redness, headaches, hemoptysis or rash. The symptoms are aggravated by lying down. Risk factors for lung disease include smoking/tobacco exposure. She has tried a beta-agonist inhaler for the symptoms. The treatment provided mild relief. Her past medical history is significant for COPD.   She has not been using Breo as rx'd. Has been using albuterol as needed.    Unsure about sick exposures.    The following portions of the patient's history were reviewed and updated as appropriate: allergies, current medications, past family history, past medical history, past social history, past surgical history and problem list.    Review of Systems   Constitutional: Positive for fatigue and fever (subjective).   HENT: Positive for congestion, postnasal drip, rhinorrhea (mild) and sore throat. Negative for mouth sores.    Eyes: Negative for pain, discharge and redness.   Respiratory: Positive for cough, chest tightness, shortness of breath and wheezing. Negative for hemoptysis.    Cardiovascular: Positive for chest pain (anterior with cough).   Skin: Negative for rash.   Neurological: Positive for weakness (mild generalized weakness). Negative for headaches.   Hematological: Negative for adenopathy.   Psychiatric/Behavioral: Positive for sleep disturbance. Negative for confusion.       Objective    Vitals:    02/24/20 1447   BP: 118/84   Pulse: 74   Temp: 98.7 °F (37.1 °C)   SpO2: 99%     Body mass index is 31.24 kg/m².       02/24/20  1447   Weight: 82.6 kg (182 lb)       Physical Exam   Constitutional: She is oriented to person, place, and time. She appears well-developed and well-nourished. She is cooperative. She appears ill (mildly). No distress.   obese   HENT:   Head: Normocephalic and atraumatic.   Right Ear: Tympanic membrane, external ear and ear canal normal.   Left Ear: Tympanic membrane, external ear and ear canal normal.   Nose: Mucosal edema and rhinorrhea (mild) present.   Mouth/Throat: Mucous membranes are normal. No oral lesions. No uvula swelling. Posterior oropharyngeal erythema present. No oropharyngeal exudate.   Eyes: Conjunctivae and lids are normal. No scleral icterus.   Neck: Neck supple.   Cardiovascular: Normal rate, regular rhythm, normal heart sounds and intact distal pulses. Exam reveals no gallop.   No murmur heard.  Pulmonary/Chest: Effort normal. She has decreased breath sounds. She has no wheezes. She has no rhonchi. She has no rales.     Vascular Status -  Her right foot exhibits no edema. Her left foot exhibits no edema.  Lymphadenopathy:     She has no cervical adenopathy.   Neurological: She is alert and oriented to person, place, and time. She displays no tremor. Gait normal.   Skin: Skin is warm and dry. No bruising and no rash noted.   No jaundice   Psychiatric: She has a normal mood and affect. Her behavior is normal. Cognition and memory are normal.   Nursing note and vitals reviewed.  Pt's previous physical exam reviewed and updated as indicated.    Recent Results (from the past 24 hour(s))   POC Influenza A / B    Collection Time: 02/24/20  3:41 PM   Result Value Ref Range    Rapid Influenza A Ag Negative Negative    Rapid Influenza B Ag Negative Negative    Internal Control Passed Passed    Lot Number [id number]     Expiration Date 12/11/2021    POC Rapid Strep A    Collection Time: 02/24/20  3:41 PM   Result Value Ref Range    Rapid Strep A Screen Negative Negative, VALID, INVALID, Not  Performed    Internal Control Passed Passed    Lot Number [id number]     Expiration Date 10/01/2021        Assessment/Plan   Katelynn was seen today for cough and nasal congestion.    Diagnoses and all orders for this visit:    COPD exacerbation (CMS/[facility])    Cough    Malaise  -     POC Influenza A / B    Sore throat  -     POC Rapid Strep A    Other orders  -     predniSONE (DELTASONE) 10 MG tablet; 5 po day 1, then decrease by 1 tablet each day until gone (5,4,3,2,1)  -     sulfamethoxazole-trimethoprim (BACTRIM DS) 800-160 MG per tablet; Take 1 tablet by mouth 2 (Two) Times a Day for 7 days.     instructed to restart Breo, continue albuterol prn. abx and CS as above.  Tobacco cessation advised.  Pt voiced understanding of health risks of cont'd tobacco use.    Routine f/u as scheduled next month, f/u sooner as needed.  Patient was encouraged to keep me informed of any acute changes, lack of improvement, or any new concerning symptoms.  Pt is aware of reasons to seek emergent care.  Patient voiced understanding of all instructions and denied further questions.

## 2020-02-26 ENCOUNTER — PREP FOR SURGERY (OUTPATIENT)
Dept: OTHER | Facility: HOSPITAL | Age: 60
End: 2020-02-26

## 2020-02-26 DIAGNOSIS — K59.00 CONSTIPATION, UNSPECIFIED CONSTIPATION TYPE: ICD-10-CM

## 2020-02-26 DIAGNOSIS — Z12.11 COLON CANCER SCREENING: ICD-10-CM

## 2020-02-26 DIAGNOSIS — Z86.010 HISTORY OF COLON POLYPS: ICD-10-CM

## 2020-02-26 DIAGNOSIS — R10.13 EPIGASTRIC PAIN: Primary | ICD-10-CM

## 2020-02-26 RX ORDER — SODIUM CHLORIDE 9 MG/ML
70 INJECTION, SOLUTION INTRAVENOUS CONTINUOUS PRN
Status: CANCELLED | OUTPATIENT
Start: 2020-02-26

## 2020-02-28 ENCOUNTER — TELEPHONE (OUTPATIENT)
Dept: FAMILY MEDICINE CLINIC | Facility: CLINIC | Age: 60
End: 2020-02-28

## 2020-02-28 NOTE — TELEPHONE ENCOUNTER
PATIENT CALLED STATING SHE IS STILL WHEEZING AND FEELING BAD IN  MORNING AND EVENINGS.PATIENT STATES RIPS AND THROAT IS SORE.PATIENT USES A HUMIDIFIER PATIENT WAS SEEN THIS WEEK AND IS CURRENTLY ON A ANTIBIOTIC AND SMZ- MG AND PATIENT STATES SHE IS NOT FEELING ANY BETTER AND WOULD LIKE SOMETHING ELSE TO BETTER HER..      [facility] CONFIRMED  PLEASE ADVISE -[id number]       PATIENT WOULD LIKE A BREATHING TREATMENT CALLED INTO PHARMACY AND MACHINE

## 2020-03-02 NOTE — TELEPHONE ENCOUNTER
Spoke to patient, she states that she is feeling some better and will keep her appointment on Wednesday and call back if anything additional is needed.

## 2020-03-03 PROBLEM — Z12.11 COLON CANCER SCREENING: Status: ACTIVE | Noted: 2020-03-03

## 2020-03-03 PROBLEM — R10.11 RUQ PAIN: Status: ACTIVE | Noted: 2020-03-03

## 2020-03-03 PROBLEM — K59.00 CONSTIPATION: Status: ACTIVE | Noted: 2020-03-03

## 2020-03-04 ENCOUNTER — OFFICE VISIT (OUTPATIENT)
Dept: FAMILY MEDICINE CLINIC | Facility: CLINIC | Age: 60
End: 2020-03-04

## 2020-03-04 VITALS
HEIGHT: 64 IN | BODY MASS INDEX: 30.9 KG/M2 | SYSTOLIC BLOOD PRESSURE: 102 MMHG | TEMPERATURE: 98 F | DIASTOLIC BLOOD PRESSURE: 62 MMHG | HEART RATE: 72 BPM | OXYGEN SATURATION: 99 % | WEIGHT: 181 LBS

## 2020-03-04 DIAGNOSIS — Z12.4 SCREENING FOR CERVICAL CANCER: ICD-10-CM

## 2020-03-04 DIAGNOSIS — K21.00 GASTROESOPHAGEAL REFLUX DISEASE WITH ESOPHAGITIS: ICD-10-CM

## 2020-03-04 DIAGNOSIS — J42 CHRONIC BRONCHITIS, UNSPECIFIED CHRONIC BRONCHITIS TYPE (HCC): ICD-10-CM

## 2020-03-04 DIAGNOSIS — G89.29 CHRONIC FOOT PAIN, LEFT: Primary | ICD-10-CM

## 2020-03-04 DIAGNOSIS — M79.672 CHRONIC FOOT PAIN, LEFT: Primary | ICD-10-CM

## 2020-03-04 DIAGNOSIS — M50.90 CERVICAL DISC DISEASE: ICD-10-CM

## 2020-03-04 DIAGNOSIS — M43.02 CERVICAL SPONDYLOLYSIS: ICD-10-CM

## 2020-03-04 DIAGNOSIS — M79.7 FIBROMYALGIA: ICD-10-CM

## 2020-03-04 DIAGNOSIS — Z13.1 SCREENING FOR DIABETES MELLITUS: ICD-10-CM

## 2020-03-04 DIAGNOSIS — I10 ESSENTIAL HYPERTENSION: ICD-10-CM

## 2020-03-04 DIAGNOSIS — N28.9 MILD RENAL INSUFFICIENCY: ICD-10-CM

## 2020-03-04 DIAGNOSIS — M51.36 LUMBAR DEGENERATIVE DISC DISEASE: ICD-10-CM

## 2020-03-04 PROCEDURE — 99214 OFFICE O/P EST MOD 30 MIN: CPT | Performed by: FAMILY MEDICINE

## 2020-03-04 RX ORDER — PREGABALIN 225 MG/1
225 CAPSULE ORAL 2 TIMES DAILY
Qty: 60 CAPSULE | Refills: 2 | Status: SHIPPED | OUTPATIENT
Start: 2020-03-04 | End: 2020-05-29

## 2020-03-04 NOTE — PROGRESS NOTES
"Subjective   Katelynn Sung is a 59 y.o. female.     History of Present Illness   Mrs. Sung presents today for f/u on several medical problems.    She has chronic pain due to fibromyalgia syndrome, C-DDD, L-DDD.  Currently on Lyrica 225 twice daily.  Having improved function with decreased pain, muscle tension, improved sleep.  She denies side effects of medication and the possible weight gain.      She has had consultation with gastroenterology for chronic abdominal pain bloating, recurrent pancreatitis.  No recent change in status.  No blood in stool.  Has upcoming endoscopy.  Has previous diagnosis of GERD and Humphreys's esophagus.  Currently on PPI.    Had previously mild renal insufficiency on previous labs. Advised to avoid NSAIDs. Due for recheck. Is on chronic PPI.    Has comorbid HTN which has been well controlled. Taking zestoretic as rx'd.     Has COPD. Has quit smoking. No recent increase in cough, SOA, wheeze.      She is overdue for cervical cancer screening.  Has seen Dr. mejia in the past.    Her main concern today is that of persistent left foot pain times several weeks to months.  Associated with \"knot\" on the bottom of her left foot.  No particular treatments tried.     The following portions of the patient's history were reviewed and updated as appropriate: allergies, current medications, past family history, past medical history, past social history, past surgical history and problem list.    Review of Systems   Constitutional: Positive for fatigue. Negative for diaphoresis, fever and unexpected weight change.   HENT: Positive for congestion and postnasal drip. Negative for ear pain, mouth sores, nosebleeds, rhinorrhea and sore throat.    Eyes: Negative for visual disturbance.   Respiratory: Positive for cough (chronic, dry, intermittent) and shortness of breath (mild with exertion). Negative for wheezing.    Cardiovascular: Negative for chest pain, palpitations and leg swelling. "   Gastrointestinal: Positive for constipation and nausea. Negative for abdominal pain, blood in stool and vomiting.   Endocrine: Positive for heat intolerance. Negative for polydipsia and polyuria.   Genitourinary: Negative for dysuria and hematuria.   Musculoskeletal: Positive for arthralgias, back pain, myalgias and neck pain.        As per HPI   Skin: Negative for rash and wound.   Neurological: Positive for headaches. Negative for dizziness, tremors, syncope and weakness.   Hematological: Negative for adenopathy. Does not bruise/bleed easily.   Psychiatric/Behavioral: Positive for sleep disturbance. Negative for confusion and dysphoric mood. The patient is nervous/anxious.    Pt's previous ROS reviewed and updated as indicated.       Objective    Vitals:    03/04/20 1409   BP: 102/62   Pulse: 72   Temp: 98 °F (36.7 °C)   SpO2: 99%     Body mass index is 31.07 kg/m².      03/04/20  1409   Weight: 82.1 kg (181 lb)       Physical Exam   Constitutional: She is oriented to person, place, and time. She appears well-developed and well-nourished. She is cooperative. She does not appear ill. No distress.   obese   HENT:   Head: Normocephalic and atraumatic.   Mouth/Throat: Mucous membranes are normal. Mucous membranes are not dry.   Eyes: No scleral icterus.   Neck: Neck supple. Normal carotid pulses present. Carotid bruit is not present. No thyroid mass present.   Cardiovascular: Normal rate, regular rhythm, normal heart sounds and intact distal pulses. Exam reveals no gallop.   No murmur heard.  Pulmonary/Chest: Effort normal and breath sounds normal. She has no wheezes. She has no rhonchi. She has no rales.   Musculoskeletal:        Thoracic back: She exhibits deformity (kyphotic posture). She exhibits no bony tenderness.        Left foot: There is tenderness (plantar fascia with suspected fibroma mid foot) and deformity (see below). There is normal range of motion, no bony tenderness and normal capillary refill.    Diffuse soft tissue TTP     Vascular Status -  Her right foot exhibits no edema. Her left foot exhibits no edema.   Foot Structure and Biomechanics -  Her right foot exhibits hallux valgus.  Her left foot exhibits hallux valgus.  Lymphadenopathy:     She has no cervical adenopathy.   Neurological: She is alert and oriented to person, place, and time. She has normal strength. She displays no tremor. No sensory deficit. Gait normal.   Skin: Skin is warm and dry. No bruising and no rash noted.   No jaundice   Psychiatric: She has a normal mood and affect. Her speech is normal and behavior is normal. Judgment and thought content normal. Cognition and memory are normal.   Good eye contact. Answers questions appropriately. Good personal hygiene and grooming.   Nursing note and vitals reviewed.  Pt's previous physical exam reviewed and updated as indicated.    Lab Results   Component Value Date    GLUCOSE 99 08/04/2016    BUN 9 03/04/2020    CREATININE 0.88 03/04/2020    EGFRIFNONA 73 03/04/2020    EGFRIFAFRI 84 03/04/2020    BCR 10 03/04/2020    K 4.3 03/04/2020    CO2 22 03/04/2020    CALCIUM 9.3 03/04/2020    PROTENTOTREF 7.7 06/20/2019    ALBUMIN 4.70 06/20/2019    LABIL2 1.6 06/20/2019    AST 64 (H) 06/20/2019    ALT 44 06/20/2019     Lab Results   Component Value Date    TSH 1.690 06/20/2019         Assessment/Plan   Katelynn was seen today for hypertension, fibromyalgia and foot pain.    Diagnoses and all orders for this visit:    Chronic foot pain, left  -     Ambulatory Referral to Podiatry    Cervical spondylolysis  -     pregabalin (LYRICA) 225 MG capsule; Take 1 capsule by mouth 2 (Two) Times a Day.    Lumbar degenerative disc disease  -     pregabalin (LYRICA) 225 MG capsule; Take 1 capsule by mouth 2 (Two) Times a Day.    Fibromyalgia  -     pregabalin (LYRICA) 225 MG capsule; Take 1 capsule by mouth 2 (Two) Times a Day.    Mild renal insufficiency  -     Basic Metabolic Panel    Screening for diabetes  mellitus  -     Hemoglobin A1c    Screening for cervical cancer  -     Ambulatory Referral to Gynecology    Essential hypertension    Chronic bronchitis, unspecified chronic bronchitis type (CMS/HCC)    Gastroesophageal reflux disease with esophagitis    Cervical disc disease       Refer to podiatry for further evaluation of chronic left foot pain and suspected plantar fascia fibroma.    Multifactorial chronic pain due to lumbar degenerative disc disease, cervical generative disc disease, fibromyalgia.  She is doing well on Lyrica.  Continue current treatment plan.  No aberrant behavior.    Reassess status of mild renal insufficiency.  Advised to avoid NSAIDs.  She is aware chronic PPI use can worsen renal function and will discuss this further with her gastroenterologist.    Hypertension controlled, continue Zestoretic, heart healthy eating and daily exercise.    GERD/Humphreys's/recurrent pancreatitis being followed by gastroenterology.  She is encouraged to keep routine follow-up appointments as scheduled.    COPD/chronic bronchitis stable.  Continue albutero as needed, Breo daily.    She is once again reminded she is overdue for cervical cancer screening, mammography for breast cancer screening, DEXA scan.  She is amenable to referral to Dr. Palomino's office for pap/breast exam.  Orders for mammography and DEXA scan on chart.    Routine follow-up in 3 months, sooner as needed/instructed.  I will contact patient regarding test results and provide instructions regarding any necessary changes in plan of care.  Patient was encouraged to keep me informed of any acute changes, lack of improvement, or any new concerning symptoms.  Pt is aware of reasons to seek emergent care.  Patient voiced understanding of all instructions and denied further questions.  As part of patient's treatment plan I am prescribing a controlled substance.  The patient has been made aware of the appropriate use of such medications, including  potential risk of somnolence, limited ability to drive and/or work safely, and potential for dependence and/or overdose.  It has also been made clear that these medications are for use by this patient only, without concomitant use of alcohol or other substances, unless prescribed.  DANTE report reviewed and scanned into chart.  Last DANTE date 12/4/19. Update requested.  History and physical exam exhibit continued safe and appropriate use of controlled substance.  Patient has completed a prescribing agreement detailing terms of continued prescribing of controlled substances, including monitoring DANTE reports, urine drug screening, and pill counts if necessary.  Patient is aware that inappropriate use will result in cessation of prescribing such medications.                Please note that portions of this note may have been completed with a voice recognition program. Efforts were made to edit the dictations, but occasionally words are mistranscribed.

## 2020-03-05 LAB
BUN SERPL-MCNC: 9 MG/DL (ref 6–24)
BUN/CREAT SERPL: 10 (ref 9–23)
CALCIUM SERPL-MCNC: 9.3 MG/DL (ref 8.7–10.2)
CHLORIDE SERPL-SCNC: 99 MMOL/L (ref 96–106)
CO2 SERPL-SCNC: 22 MMOL/L (ref 20–29)
CREAT SERPL-MCNC: 0.88 MG/DL (ref 0.57–1)
GLUCOSE SERPL-MCNC: 95 MG/DL (ref 65–99)
HBA1C MFR BLD: 5.7 % (ref 4.8–5.6)
POTASSIUM SERPL-SCNC: 4.3 MMOL/L (ref 3.5–5.2)
SODIUM SERPL-SCNC: 139 MMOL/L (ref 134–144)

## 2020-03-06 ENCOUNTER — TELEPHONE (OUTPATIENT)
Dept: FAMILY MEDICINE CLINIC | Facility: CLINIC | Age: 60
End: 2020-03-06

## 2020-03-06 PROBLEM — F17.200 NICOTINE DEPENDENCE: Status: RESOLVED | Noted: 2018-05-15 | Resolved: 2020-03-06

## 2020-03-06 NOTE — TELEPHONE ENCOUNTER
PATIENT CALLING ASKING FOR A CALL BACK REGARDING LAB RESULTS IF IN.    PATIENT CAN BE REACHED [phone]

## 2020-03-09 RX ORDER — FLUCONAZOLE 150 MG/1
TABLET ORAL
Qty: 1 TABLET | Refills: 0 | Status: SHIPPED | OUTPATIENT
Start: 2020-03-09 | End: 2020-04-22

## 2020-03-11 ENCOUNTER — ANESTHESIA EVENT (OUTPATIENT)
Dept: GASTROENTEROLOGY | Facility: HOSPITAL | Age: 60
End: 2020-03-11

## 2020-03-11 ENCOUNTER — ANESTHESIA (OUTPATIENT)
Dept: GASTROENTEROLOGY | Facility: HOSPITAL | Age: 60
End: 2020-03-11

## 2020-03-11 ENCOUNTER — HOSPITAL ENCOUNTER (OUTPATIENT)
Facility: HOSPITAL | Age: 60
Setting detail: HOSPITAL OUTPATIENT SURGERY
Discharge: HOME | End: 2020-03-11
Attending: INTERNAL MEDICINE | Admitting: INTERNAL MEDICINE

## 2020-03-11 VITALS
RESPIRATION RATE: 16 BRPM | OXYGEN SATURATION: 98 % | TEMPERATURE: 97.5 F | WEIGHT: 180 LBS | HEART RATE: 60 BPM | DIASTOLIC BLOOD PRESSURE: 68 MMHG | SYSTOLIC BLOOD PRESSURE: 126 MMHG | HEIGHT: 64 IN | BODY MASS INDEX: 30.73 KG/M2

## 2020-03-11 DIAGNOSIS — R11.0 NAUSEA: ICD-10-CM

## 2020-03-11 DIAGNOSIS — R13.10 DYSPHAGIA, UNSPECIFIED TYPE: ICD-10-CM

## 2020-03-11 DIAGNOSIS — R10.13 EPIGASTRIC PAIN: ICD-10-CM

## 2020-03-11 DIAGNOSIS — R12 HEARTBURN: ICD-10-CM

## 2020-03-11 PROCEDURE — C1726 CATH, BAL DIL, NON-VASCULAR: HCPCS | Performed by: INTERNAL MEDICINE

## 2020-03-11 PROCEDURE — 25010000002 ONDANSETRON PER 1 MG: Performed by: NURSE ANESTHETIST, CERTIFIED REGISTERED

## 2020-03-11 PROCEDURE — 43239 EGD BIOPSY SINGLE/MULTIPLE: CPT | Performed by: INTERNAL MEDICINE

## 2020-03-11 PROCEDURE — 25010000002 PROPOFOL 1000 MG/100ML EMULSION: Performed by: NURSE ANESTHETIST, CERTIFIED REGISTERED

## 2020-03-11 PROCEDURE — 43249 ESOPH EGD DILATION <30 MM: CPT | Performed by: INTERNAL MEDICINE

## 2020-03-11 RX ORDER — LIDOCAINE HYDROCHLORIDE 20 MG/ML
INJECTION, SOLUTION INTRAVENOUS AS NEEDED
Status: DISCONTINUED | OUTPATIENT
Start: 2020-03-11 | End: 2020-03-11 | Stop reason: SURG

## 2020-03-11 RX ORDER — ONDANSETRON 2 MG/ML
INJECTION INTRAMUSCULAR; INTRAVENOUS AS NEEDED
Status: DISCONTINUED | OUTPATIENT
Start: 2020-03-11 | End: 2020-03-11 | Stop reason: SURG

## 2020-03-11 RX ORDER — PROPOFOL 10 MG/ML
INJECTION, EMULSION INTRAVENOUS AS NEEDED
Status: DISCONTINUED | OUTPATIENT
Start: 2020-03-11 | End: 2020-03-11 | Stop reason: SURG

## 2020-03-11 RX ORDER — SIMETHICONE 20 MG/.3ML
EMULSION ORAL AS NEEDED
Status: DISCONTINUED | OUTPATIENT
Start: 2020-03-11 | End: 2020-03-11 | Stop reason: HOSPADM

## 2020-03-11 RX ORDER — SODIUM CHLORIDE 9 MG/ML
70 INJECTION, SOLUTION INTRAVENOUS CONTINUOUS PRN
Status: DISCONTINUED | OUTPATIENT
Start: 2020-03-11 | End: 2020-03-11 | Stop reason: HOSPADM

## 2020-03-11 RX ORDER — METOCLOPRAMIDE 5 MG/1
2.5 TABLET ORAL
Qty: 30 TABLET | Refills: 1 | Status: SHIPPED | OUTPATIENT
Start: 2020-03-11 | End: 2020-09-09

## 2020-03-11 RX ADMIN — LIDOCAINE HYDROCHLORIDE 60 MG: 20 INJECTION, SOLUTION INTRAVENOUS at 09:27

## 2020-03-11 RX ADMIN — PROPOFOL 50 MG: 10 INJECTION, EMULSION INTRAVENOUS at 09:31

## 2020-03-11 RX ADMIN — PROPOFOL 50 MG: 10 INJECTION, EMULSION INTRAVENOUS at 09:47

## 2020-03-11 RX ADMIN — SODIUM CHLORIDE 70 ML/HR: 9 INJECTION, SOLUTION INTRAVENOUS at 07:33

## 2020-03-11 RX ADMIN — ONDANSETRON 4 MG: 2 INJECTION INTRAMUSCULAR; INTRAVENOUS at 09:51

## 2020-03-11 RX ADMIN — PROPOFOL 50 MG: 10 INJECTION, EMULSION INTRAVENOUS at 09:52

## 2020-03-11 RX ADMIN — PROPOFOL 50 MG: 10 INJECTION, EMULSION INTRAVENOUS at 09:38

## 2020-03-11 RX ADMIN — PROPOFOL 50 MG: 10 INJECTION, EMULSION INTRAVENOUS at 09:35

## 2020-03-11 RX ADMIN — PROPOFOL 50 MG: 10 INJECTION, EMULSION INTRAVENOUS at 09:43

## 2020-03-11 NOTE — ANESTHESIA PREPROCEDURE EVALUATION
Anesthesia Evaluation     Patient summary reviewed and Nursing notes reviewed   NPO Solid Status: > 8 hours  NPO Liquid Status: > 8 hours           Airway   Mallampati: II  TM distance: >3 FB  Neck ROM: full  No difficulty expected  Dental      Pulmonary    (+) COPD, sleep apnea,   Cardiovascular     (+) hypertension,       Neuro/Psych  (+) psychiatric history Anxiety and Depression,     GI/Hepatic/Renal/Endo    (+) obesity,  GERD,      Musculoskeletal     (+) neck pain,   Abdominal    Substance History      OB/GYN          Other                        Anesthesia Plan    ASA 3     MAC     intravenous induction     Anesthetic plan, all risks, benefits, and alternatives have been provided, discussed and informed consent has been obtained with: patient.    Plan discussed with CRNA.

## 2020-03-11 NOTE — ANESTHESIA POSTPROCEDURE EVALUATION
Patient: Katelynn Sung    Procedure Summary     Date:  03/11/20 Room / Location:  [facility] ENDOSCOPY 2 / [facility] ENDOSCOPY    Anesthesia Start:  0923 Anesthesia Stop:  0958    Procedure:  ESOPHAGOGASTRODUODENOSCOPY (N/A Esophagus) Diagnosis:       Esophageal ring      Esophagitis      Hiatal hernia      Gastritis      (Epigastric pain [R10.13])      (Nausea [R11.0])      (Heartburn [R12])      (Dysphagia, unspecified type [R13.10])    Surgeon:  Charbel Ching MD Provider:  Mitul Sierra CRNA    Anesthesia Type:  MAC ASA Status:  3          Anesthesia Type: MAC    Vitals  No vitals data found for the desired time range.          Post Anesthesia Care and Evaluation    Patient location during evaluation: bedside  Patient participation: complete - patient participated  Level of consciousness: awake and alert  Pain score: 0  Pain management: adequate  Airway patency: patent  Anesthetic complications: No anesthetic complications  PONV Status: none  Cardiovascular status: acceptable  Respiratory status: acceptable  Hydration status: acceptable

## 2020-03-11 NOTE — OP NOTE
PROCEDURE:  Upper Endoscopy with serial dilation of the esophagus to 18 mm using CRE balloon and biopsies.    DATE OF PROCEDURE: March 11, 2020.    REFERRING PROVIDER:  Lesvia Servin MD.     INSTRUMENT:  Olympus GIF H 190 video endoscope     INDICATIONS OF THE PROCEDURE: This is a 60-year-old white female with history of recurrent epigastric abdominal pain, nausea, reflux and intermittent dysphagia.     BIOPSIES: Second portion of duodenum.  Gastric antrum, body and fundus.  A cold biopsy polypectomy was performed at the fundus.  Biopsies were obtained from the distal esophagus-short segment Humphreys's appearing mucosa.  Biopsies were obtained from the mid and distal esophagus.     MEDICATIONS:  MAC.     PHOTOGRAPHS:  Photographs were included in the medical records.     CONSENT/PREPROCEDURE EVALUATION:  Risks, benefits, alternatives and options of the procedure including risks of anesthesia/sedation were discussed and informed consent was obtained prior to the procedure. History and physical examination were performed and nothing precluded the test.     REPORT:  The patient was placed in left lateral decubitus position. Once under the influence of IV sedation, the instrument was inserted into the mouth and esophagus was intubated under direct vision without difficulty.     Esophagus:  Z line was noted to be around 33 cm.  Subtle concentric rings were noted within the mid and distal esophagus.  Biopsies were obtained.   A small sliding hiatal hernia less than 3 cm was noted.  A small 1 cm tongue of gastric type mucosa was seen in the distal esophagus.  This raises the possibility of short segment Humphreys's.  This was biopsied.       Stomach:  Antrum:  Erythematous gastritis.  Angulus, lesser and greater curves: Normal.  Retroflex examination: Sliding hiatal hernia.  Cardia and fundus:  Normal.     Body of the stomach: Erythematous gastritis.  Good distensibility of the stomach was achieved no giant folds were  noted.   Biopsies were obtained from the gastric antrum,  body and fundus.  Somewhat yellowish tinged fluid was noted within the stomach.  This was suctioned.    Pylorus and pyloric channel:  normal.     Duodenum:  Bulb: Normal.  Second portion: normal.  No scalloping was seen in the second portion of duodenum.  Biopsies were obtained from the second portion of duodenum.    Intervention: Distal, mid and more proximal esophagus were dilated using 15-16.5-18 mm CRE balloon to 18 mm under vision without difficulty.  No active bleeding was noted.    The upper GI tract was decompressed and the scope was pulled out of the patient. The patient tolerated the procedure well.     DIAGNOSES:     1. Erythematous distal esophagitis.  2. Subtle concentric rings involving the mid and distal esophagus.  Biopsies were obtained.  These areas were dilated serially to 18 mm.  3. Erythematous gastritis.  4. Small sliding hiatal hernia less than 3 cm.    RECOMMENDATIONS:  1.  Dietary instructions.  2.  Pantoprazole 40 mg 1 p.o. q.a.m. 1/2 hour before breakfast.  3.  Follow biopsies.  4.  Follow up in office.  5.  Reglan (metoclopramide) 5 mg tablets.  Take one half tablet (2.5 mg) by mouth in the morning and in the evening (2 times daily preferably half an hour before food).     Thank you very much for letting me participate in the care of this patient. Please do not hesitate to call me if you have any questions.

## 2020-03-11 NOTE — DISCHARGE INSTRUCTIONS
To assist you in voiding:  Drink plenty of fluids  Listen to running water while attempting to void.    If you are unable to urinate and you have an uncomfortable urge to void or it has been   6 hours since you were discharged, return to the Emergency Room.    Rest today  No pushing,pulling,tugging,heavy lifting, or strenuous activity   No major decision making,driving,or drinking alcoholic beverages for 24 hours due to the sedation you received  Always use good hand hygiene/washing technique  No driving on pain medication.    NK F EGD DIL*********************************************************************    Postprocedure instructions:  1. Nothing by mouth until fully alert and as specified below .  2. Bedrest until fully alert.  3. Vital signs as routine.    Diet:   1. Nothing by mouth for 60 minutes, then if no chest pains the patient may have Clear liquids diet (No Sodas) for 4 hours.  2. May advance to soft diet in 4 hours if no chest pains, Fever or chills, nausea vomiting or bleeding.    Other instructions:  1. The patient may eat in upright position, chew well, take small bites and take medications in upright position.   2. The patient should drink water after 3-4 bites, and liberally with medications.    3. The patient should remain upright for about 10 minutes after eating and taking oral medications.    Blood Thinner and other medications Directions:  Avoid aspirin-other NSAIDs for 3 days.  If no bleeding may resume if desired.  Tylenol is okay.    Other instructions:  The patient should avoid medications that have a potential to cause pill esophagitis including potassium pills, tetracycline capsules, iron pills, NSAIDs and bisphosphonates.    Follow-up:    DR. ISABEL CHING in 4 weeks.Office phone # [phone].  ****************************************************************************************************    Notes to the patient and the family from Dr. Ching.     Dear patient/family  member,    Findings on today's procedure are as follows:    1. Esophagitis. Inflammation of the esophagus.  2. Esophageal rings.  These areas were stretched.   3. Inflammation of the stomach. Gastritis.  4. Small sliding hiatal hernia.  5. No cancer. No active ulcers.     Recommendations:    1. Protonix (Pantoprazole) tablet 40 mg tablet. Take 1 tablet orally in the morning half an hour before eating every day.  2. Reglan (metoclopramide) 5 mg tablets.  Take one half tablet (2.5 mg) by mouth in the morning and in the evening (2 times daily preferably half an hour before food).  3. Other instructions as above.    Should you have more questions please do not hesitate to talk to the nurse who can call me and let me talk to you.     I hope you feel better.    Charbel Ching M.D., FACP, FACG.

## 2020-03-11 NOTE — INTERVAL H&P NOTE
"  H&P reviewed. The patient was examined and there are no changes to the H&P.       No recent shortness of breath or chest pains.      Blood pressure 137/80, pulse 68, temperature 98.4 °F (36.9 °C), temperature source Temporal, resp. rate 18, height 162.6 cm (64\"), weight 81.6 kg (180 lb), SpO2 95 %, not currently breastfeeding.    Chest: clear to auscultation.  Cardiac exam: No S3 no murmurs.   Abdomen: soft bowel sounds present nondistended nontender.          "

## 2020-03-16 LAB
LAB AP CASE REPORT: NORMAL
PATH REPORT.FINAL DX SPEC: NORMAL

## 2020-03-18 ENCOUNTER — TELEPHONE (OUTPATIENT)
Dept: FAMILY MEDICINE CLINIC | Facility: CLINIC | Age: 60
End: 2020-03-18

## 2020-03-19 ENCOUNTER — OFFICE VISIT (OUTPATIENT)
Dept: FAMILY MEDICINE CLINIC | Facility: CLINIC | Age: 60
End: 2020-03-19

## 2020-03-19 VITALS
HEIGHT: 64 IN | SYSTOLIC BLOOD PRESSURE: 120 MMHG | HEART RATE: 79 BPM | BODY MASS INDEX: 31.31 KG/M2 | TEMPERATURE: 99.1 F | WEIGHT: 183.38 LBS | OXYGEN SATURATION: 95 % | DIASTOLIC BLOOD PRESSURE: 65 MMHG

## 2020-03-19 DIAGNOSIS — M79.602 LEFT ARM PAIN: ICD-10-CM

## 2020-03-19 DIAGNOSIS — M79.642 HAND PAIN, LEFT: ICD-10-CM
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
## 2020-03-19 DIAGNOSIS — M79.645 PAIN IN FINGER OF LEFT HAND: ICD-10-CM

## 2020-03-19 DIAGNOSIS — M25.522 PAIN IN JOINT OF LEFT ELBOW: ICD-10-CM

## 2020-03-19 PROCEDURE — 99214 OFFICE O/P EST MOD 30 MIN: CPT | Performed by: NURSE PRACTITIONER

## 2020-03-19 NOTE — PROGRESS NOTES
"Subjective   Katelynn Sung is a 60 y.o. female.     Patient is here today for complaints of left arm and hand pain, after a fall at  gas station yesterday. She went to ED, and had Xray, and they were negative. She would like to get a MRI. She has gotten a  to start working with her, because she would like  to have to pay for the ER expense, and any Xrays or MRIs. Her arm hurts from her elbow down to her fingers, and ring finger hurts the worse and is very swollen.       The following portions of the patient's history were reviewed and updated as appropriate: allergies, current medications, past family history, past medical history, past social history, past surgical history and problem list.    Review of Systems   Constitutional: Negative.    HENT: Negative.    Eyes: Negative.    Respiratory: Negative.    Cardiovascular: Negative.    Gastrointestinal: Negative.    Genitourinary: Negative.    Musculoskeletal: Positive for arthralgias.        Left elbow, arm, hand and finger pain and swelling   Skin: Negative.    Neurological: Negative for dizziness, syncope, weakness and numbness.   Hematological: Negative for adenopathy.   Psychiatric/Behavioral: Negative for confusion and suicidal ideas. The patient is not nervous/anxious.      Vitals:    03/19/20 1430   BP: 120/65   Pulse: 79   Temp: 99.1 °F (37.3 °C)   SpO2: 95%   Weight: 83.2 kg (183 lb 6 oz)   Height: 162.6 cm (64.02\")     Objective   Physical Exam   Constitutional: She is oriented to person, place, and time. She appears well-developed and well-nourished. No distress.   HENT:   Head: Normocephalic.   Right Ear: External ear normal.   Left Ear: External ear normal.   Nose: Nose normal.   Mouth/Throat: Oropharynx is clear and moist. No oropharyngeal exudate.   Eyes: Pupils are equal, round, and reactive to light. Conjunctivae are normal.   Neck: Normal range of motion. Neck supple. No tracheal deviation present. No thyromegaly present. "   Cardiovascular: Normal rate, regular rhythm, normal heart sounds and intact distal pulses.   No murmur heard.  Pulmonary/Chest: Effort normal and breath sounds normal. No respiratory distress. She has no wheezes. She has no rales. She exhibits no tenderness.   Abdominal: Soft. Bowel sounds are normal. She exhibits no distension and no mass. There is no hepatosplenomegaly or splenomegaly. There is no tenderness. There is no rebound and no guarding. No hernia.   Musculoskeletal: She exhibits no edema.        Left elbow: She exhibits decreased range of motion and swelling. Tenderness found. Olecranon process tenderness noted.        Left forearm: She exhibits tenderness.        Left hand: She exhibits decreased range of motion, bony tenderness (4th digit) and swelling.   Lymphadenopathy:     She has no cervical adenopathy.        Right cervical: No superficial cervical, no deep cervical and no posterior cervical adenopathy present.       Left cervical: No superficial cervical, no deep cervical and no posterior cervical adenopathy present.   Neurological: She is alert and oriented to person, place, and time. Coordination and gait normal.   Skin: Skin is warm and dry. No rash noted.   Psychiatric: She has a normal mood and affect. Her behavior is normal. Judgment and thought content normal.   Nursing note and vitals reviewed.      Assessment/Plan   Katelynn was seen today for arm pain.    Diagnoses and all orders for this visit:    Fall, initial encounter  -     MRI radius ulna left wo contrast; Future  -     MRI hand left wo contrast; Future    Left arm pain  -     MRI radius ulna left wo contrast; Future  -     MRI hand left wo contrast; Future    Pain in joint of left elbow  -     MRI radius ulna left wo contrast; Future  -     MRI hand left wo contrast; Future    Hand pain, left  -     MRI radius ulna left wo contrast; Future  -     MRI hand left wo contrast; Future    Pain in finger of left hand  -     MRI radius  ulna left wo contrast; Future  -     MRI hand left wo contrast; Future       Xray from [facility] reviewed, reports no fracture of arm or hand. MRI of ulna/radius and hand ordered. Did advise patient it will be several weeks before MRI will be performed, due to COVID-19. She voiced understanding. I will contact patient regarding test results and provide instructions regarding any necessary changes in plan of care. Patient is on Suboxone and Lyrica, so not other pain medications will be prescribed. She can take Tylenol for pain if needed.     Patient was encouraged to keep me informed of any acute changes, lack of improvement, or any new concerning symptoms. Patient voiced understanding of all instructions and denied further questions.    Patient to RTC pending results.

## 2020-03-24 ENCOUNTER — HOSPITAL ENCOUNTER (OUTPATIENT)
Dept: MRI IMAGING | Facility: HOSPITAL | Age: 60
Discharge: HOME | End: 2020-03-24

## 2020-03-24 ENCOUNTER — HOSPITAL ENCOUNTER (OUTPATIENT)
Dept: MRI IMAGING | Facility: HOSPITAL | Age: 60
Discharge: HOME | End: 2020-03-24
Admitting: NURSE PRACTITIONER
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
## 2020-03-24 DIAGNOSIS — M79.645 PAIN IN FINGER OF LEFT HAND: Primary | ICD-10-CM

## 2020-03-24 DIAGNOSIS — M79.642 HAND PAIN, LEFT: ICD-10-CM

## 2020-03-24 DIAGNOSIS — S53.20XA PARTIAL TEAR OF RADIAL COLLATERAL LIGAMENT OF ELBOW: ICD-10-CM
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
## 2020-03-24 DIAGNOSIS — S46.912A TEAR OF TENDON OF LEFT UPPER EXTREMITY, INITIAL ENCOUNTER: ICD-10-CM

## 2020-03-24 DIAGNOSIS — M79.645 PAIN IN FINGER OF LEFT HAND: ICD-10-CM

## 2020-03-24 DIAGNOSIS — M79.602 LEFT ARM PAIN: ICD-10-CM

## 2020-03-24 DIAGNOSIS — M25.522 PAIN IN JOINT OF LEFT ELBOW: ICD-10-CM

## 2020-03-24 DIAGNOSIS — R93.89 ABNORMAL MRI: ICD-10-CM

## 2020-03-24 DIAGNOSIS — S52.592A OTHER CLOSED FRACTURE OF DISTAL END OF LEFT RADIUS, INITIAL ENCOUNTER: Primary | ICD-10-CM

## 2020-03-24 PROCEDURE — 73218 MRI UPPER EXTREMITY W/O DYE: CPT

## 2020-03-25 ENCOUNTER — TELEPHONE (OUTPATIENT)
Dept: FAMILY MEDICINE CLINIC | Facility: CLINIC | Age: 60
End: 2020-03-25

## 2020-03-25 NOTE — TELEPHONE ENCOUNTER
Please make sure pt is aware her MRI showed:  1) suspected fracture of 4th finger with some injury to tendon  2) fracture of radius bone in elbow    I believe she has been referred to ortho. Please confirm if she has received information about apt? Needs to be seen this week.    Please confirm if knee if swollen, red, warm. Is she able to bear weight. Does bearing weight worsen pain? Any numbness/weakness in left leg.

## 2020-03-25 NOTE — TELEPHONE ENCOUNTER
Sharon - Patient informed of elbow MRI, requested result of MRI of hand as well, I don't see that it has been reviewed, please advise.     Patient also mentioned pain in her left knee and wanted advice on what to do. Dr. Servin - please advise.

## 2020-03-25 NOTE — TELEPHONE ENCOUNTER
Patient informed, she is scheduled with them this week. Patient states that knee is not swollen, red, or warm. She states that it mostly hurts when she wakes up, and occasionally when walking, states that her knee feels like pins and needles but leg is not numb. Patient also stated to clarify, she got mixed up on sides, it is her RIGHT knee.

## 2020-03-26 ENCOUNTER — TELEPHONE (OUTPATIENT)
Dept: FAMILY MEDICINE CLINIC | Facility: CLINIC | Age: 60
End: 2020-03-26

## 2020-03-26 NOTE — TELEPHONE ENCOUNTER
Pt called and stated she had an MRI of left arm and hand and needs to come by and get a copy of both these before her appointment with the  at 1:00. Please advise [phone]

## 2020-03-27 ENCOUNTER — OFFICE VISIT (OUTPATIENT)
Dept: ORTHOPEDIC SURGERY | Facility: CLINIC | Age: 60
End: 2020-03-27

## 2020-03-27 VITALS — BODY MASS INDEX: 30.73 KG/M2 | WEIGHT: 180 LBS | HEIGHT: 64 IN

## 2020-03-27 DIAGNOSIS — S63.639A VOLAR PLATE INJURY OF FINGER, INITIAL ENCOUNTER: Primary | ICD-10-CM

## 2020-03-27 DIAGNOSIS — S52.135A CLOSED NONDISPLACED FRACTURE OF NECK OF LEFT RADIUS, INITIAL ENCOUNTER: ICD-10-CM

## 2020-03-27 DIAGNOSIS — M25.522 ARTHRALGIA OF ELBOW, LEFT: Primary | ICD-10-CM

## 2020-03-27 DIAGNOSIS — S80.01XA CONTUSION OF RIGHT KNEE, INITIAL ENCOUNTER: ICD-10-CM

## 2020-03-27 PROCEDURE — 99203 OFFICE O/P NEW LOW 30 MIN: CPT | Performed by: ORTHOPAEDIC SURGERY

## 2020-03-27 PROCEDURE — 73070 X-RAY EXAM OF ELBOW: CPT | Performed by: ORTHOPAEDIC SURGERY

## 2020-04-02 NOTE — PROGRESS NOTES
Subjective   Patient ID: Katelynn Sung is a 60 y.o. right hand dominant female referred by [facility] ER and is being seen for orthopaedic evaluation today for multiple injuries sustained in a fall. Initial Evaluation and Fracture of the Left Forearm (Patient here today for left elbow injury on the morning of 3/18/20. Patient states she fell in pothole in parking lot of  gas station in [place] on [address]. She had xrays at [facility], MRI at [facility]. ); Initial Evaluation and Fracture of the Left Hand; and Pain of the Right Knee (Patient has some anterior right knee pain and bruising from fall. )           CHIEF COMPLAINT:    Left elbow pain.  Left hand ring finger pain and swelling.  Right anterior knee contusion.    History of Present Illness    Acute Condition or Injury:    Date of Injury: 3-  Exact Location of injury: [employer] station on [address] in [place], [place].  Mechanism of injury: Fall to the ground and Fall on the outstretched hand  Work related: []   Yes    [x]   No  Motor vehicle accident: []  Yes     [x]   No     Patient is a 60 year old right hand dominant woman that reports that on 3- she fell when stepping into a pothole of a parking lot and landed on her outstretched left hand and arm.  She noted left elbow and proximal forearm pain, and left hand ring finger pain and swelling.  She also hit her right knee with and anterior bruise and contusion though with stable ambulation.  No open wounds.  She has had radiographs at the ER and also had later MRI evaluations of her left elbow and left hand showing a minimal impacted left radial neck fracture, and a partial tear of the volar plate of the left ring finger PIP joint.  She presents today for orthopaedic evaluation and management of her acute injuries.    Past Medical History:   Diagnosis Date   • Adnexal mass     pelvic US 10/26/11 showed 2 cm left abdnexal cystic lesion, resolved on f/u 5/14/15   • Anxiety    • Back  ache    • Humphreys's esophagus    • Body piercing     Both ears   • Class 1 obesity due to excess calories with serious comorbidity and body mass index (BMI) of 30.0 to 30.9 in adult 2/16/2018   • Colon polyps    • COPD (chronic obstructive pulmonary disease) (CMS/[facility]) 2008   • Cor pulmonale (CMS/[facility])    • Degenerative arthritis of lumbar spine    • Depression    • Enlarged heart    • Fibromyalgia    • Herpes simplex    • Hypertension    • Insomnia    • Lumbar degenerative disc disease    • Mild sleep apnea 01/16/2013    Sleep study 1/16/13 showing mild degree   • Narcotic dependence (CMS/[facility])    • Osteoporosis    • Palpitations    • Palpitations    • Pancreatitis    • Respiratory failure requiring intubation (CMS/[facility])    • Restless leg syndrome    • Sinus problem    • Sleep apnea    • Vision problems         Past Surgical History:   Procedure Laterality Date   • COLONOSCOPY N/A 2008    Complete   • ENDOSCOPY N/A 3/11/2020    Procedure: ESOPHAGOGASTRODUODENOSCOPY;  Surgeon: Charbel Ching MD;  Location: [facility] ENDOSCOPY;  Service: Gastroenterology;  Laterality: N/A;   • TUBAL ABDOMINAL LIGATION  1991   • UPPER GASTROINTESTINAL ENDOSCOPY N/A 2013       Family History   Problem Relation Age of Onset   • Stroke Mother    • Liver disease Mother         Chronic   • Cirrhosis Father    • Heart attack Father    • Cancer Sister         Breast cancer   • Stroke Brother    • Cancer Sister    • Colon cancer Neg Hx    • Liver cancer Neg Hx    • Crohn's disease Neg Hx    • Ulcerative colitis Neg Hx        Social History     Socioeconomic History   • Marital status:      Spouse name: Not on file   • Number of children: Not on file   • Years of education: Not on file   • Highest education level: Not on file   Occupational History   • Occupation: former caregiver   Tobacco Use   • Smoking status: Former Smoker     Packs/day: 0.75     Years: 3.00     Pack years: 2.25     Types: Cigarettes, Electronic Cigarette     Last  "attempt to quit: 2013     Years since quittin.2   • Smokeless tobacco: Never Used   • Tobacco comment: 2013 started using nicotine containing substance in combustion-free vaporization device.   Substance and Sexual Activity   • Alcohol use: No   • Drug use: No   • Sexual activity: Defer     Comment:    Social History Narrative    Right hand dominant       Allergies   Allergen Reactions   • Nsaids GI Intolerance       Review of Systems   Constitutional: Negative for fever.   HENT: Negative for dental problem and voice change.    Eyes: Negative for visual disturbance.   Respiratory: Negative for shortness of breath.    Cardiovascular: Negative for chest pain.   Gastrointestinal: Negative for abdominal pain.   Genitourinary: Negative for dysuria.   Musculoskeletal: Positive for arthralgias and joint swelling (left ring finger PIP joint). Negative for gait problem.   Skin: Positive for color change (soft ecchymosis right anterior knee). Negative for rash.   Neurological: Negative for speech difficulty and numbness.   Hematological: Does not bruise/bleed easily.   Psychiatric/Behavioral: Negative for confusion.     I have reviewed the medical and surgical history, family history, social history, medications, and/or allergies, and the review of systems of this report.    Objective   Ht 162.6 cm (64\")   Wt 81.6 kg (180 lb)   LMP  (LMP Unknown)   BMI 30.90 kg/m²    Physical Exam   Constitutional: She appears well-developed. No distress.   Musculoskeletal: She exhibits no deformity.   Neurological: She is alert. Gait normal.   Skin: Skin is warm and dry. No rash noted. No erythema.   Psychiatric: She has a normal mood and affect. Her speech is normal.   Vitals reviewed.    Left Hand Exam     Tenderness   Left hand tenderness location: palmar and collateral aspects of left ring PIP joint.     Range of Motion   The patient has normal left wrist ROM.  Hand   PIP Rin     Muscle Strength   Wrist extension: 5/5 "   Wrist flexion: 5/5   :  4/5     Other   Erythema: absent  Sensation: normal  Pulse: present      Left Elbow Exam     Tenderness   The patient is experiencing tenderness in the radial head.     Range of Motion   Extension: -10   Flexion: 110   Left elbow pronation: 80 degrees.   Supination: 80     Muscle Strength   Pronation:  4/5   Supination:  4/5     Tests   Varus: negative  Valgus: negative  Tinel's sign (cubital tunnel): negative    Other   Erythema: absent  Pulse: present            Neurologic Exam     Mental Status   Attention: normal.   Speech: speech is normal   Level of consciousness: alert  Knowledge: good.     Motor Exam   Overall muscle tone: normal    Gait, Coordination, and Reflexes     Gait  Gait: normal      Assessment/Plan     Independent Review of Radiographic Studies:    Reviewed relevant prior imaging with patient again today.  AP and lateral of the left elbow taken in clinic today, indication to evaluate pain and with prior comparison views, shows an acute appearing minimal impacted left radial neck fracture.  Elbow joint space congruent and radiocapitaller line intact.     Laboratory and Other Studies:  No new results reviewed today.     Medical Decision Making:    Stable initial neurovascular and fracture exam.  Closed treatment of fracture and or dislocation.  Medications as prescribed and only as tolerated.  Physical and occupational therapy planned.  Sports and activity restrictions as appropriate.  Independent review of prior imaging, labs or tests.    Procedures     Katelynn was seen today for initial evaluation, fracture, initial evaluation, fracture and pain.    Diagnoses and all orders for this visit:    Volar plate injury of finger, initial encounter  Comments:  left ring finger  Orders:  -     Ambulatory Referral to Physical Therapy Evaluate and treat, Ortho  -     Ambulatory Referral to Physical Therapy Evaluate and treat, Ortho; Stretching, ROM, Strengthening    Closed  nondisplaced fracture of neck of left radius, initial encounter  -     Ambulatory Referral to Physical Therapy Evaluate and treat, Ortho; Stretching, ROM, Strengthening    Contusion of right knee, initial encounter       Discussion of orthopaedic goals and activities and patient and/or guardian expressed appreciation.  Risk, benefits, and merits of treatment alternatives reviewed with the patient and/or guardian and questions answered  Regular exercise as tolerated  Guided on proper techniques for mobility, strength, agility and/or conditioning exercises  Ice, heat, and/or modalities as beneficial  Reduced physical activity as appropriate and avoid offending activity  Weight bearing parameters reviewed  Physical therapy referral given  Take prescribed medications as instructed only as tolerated    Recommendations/Plan:  Exercise, medications, injections, other patient advice, and return appointment as noted.  Brace: No brace was given at today's visit. Orthotic - Patient referred to Hand PT/OT and for a custom finger splint to prevent swan neck deformity of ring finger in setting of volar plate injury.  Continue sling for elbow and arm comfort and protection.  May remove routinely for gentle range of motion exercises.  Referral: Physical and Occupational Therapy referral. Referral for orthotic device.  Test/Studies: No additional studies ordered at this time.  Work/Activity Status: Usual activities, routine exercise as tolerated, no strenuous activity.    Return in about 6 weeks (around 5/8/2020) for XOA left elbow repeat exam, update plan.  Patient is encouraged and agreeable to call or return sooner for any issues or concerns.

## 2020-04-10 ENCOUNTER — TELEPHONE (OUTPATIENT)
Dept: FAMILY MEDICINE CLINIC | Facility: CLINIC | Age: 60
End: 2020-04-10

## 2020-04-10 NOTE — TELEPHONE ENCOUNTER
PT CALLED BACK AND STATED THAT SHE JUST HAD A SCOPE DONE ON HER STOMACH,  AND SHE WOULD SOMETHING  TO  COAT HER STOMACH - SHE CANNOT GET A HOLD OF HER STOMACH DOCTOR - SHE STATES SHE IS HAVING ON AND OFF PAIN - SHE STATES SHE IS EATING A LOT TUMS AN HAS ON AND  OF PAIN. SHE STATES SHE  HIATAL  HERNIA  AND GASTRITIS     [facility]

## 2020-04-10 NOTE — TELEPHONE ENCOUNTER
PT CALLED AND WANTED TO KNOW IF DR GUERRIER WAS CALLING IN HER SOMETHING FOR HER STOMACH PAIN TODAY? PLEASE ADVISE    HELEN: [phone]    [facility]

## 2020-04-10 NOTE — TELEPHONE ENCOUNTER
PT CALLED AND STATED THAT SHE WOULD LIKE AN RX FOR NICOTINE PATCHES    PT CALL BACK [phone]    [facility] - CONFIRMED

## 2020-04-13 ENCOUNTER — TELEPHONE (OUTPATIENT)
Dept: GASTROENTEROLOGY | Facility: CLINIC | Age: 60
End: 2020-04-13

## 2020-04-13 RX ORDER — NICOTINE 21 MG/24HR
1 PATCH, TRANSDERMAL 24 HOURS TRANSDERMAL EVERY 24 HOURS
Qty: 28 EACH | Refills: 2 | Status: SHIPPED | OUTPATIENT
Start: 2020-04-13 | End: 2020-07-13

## 2020-04-13 NOTE — TELEPHONE ENCOUNTER
Patient wanted something to coat the stomach, however, Patient states stomach pain is better, and does not wish to start any new medication right now.  If there is a recurrence, she will call back.

## 2020-04-13 NOTE — TELEPHONE ENCOUNTER
----- Message from Yesenia Trotter MA sent at 4/10/2020  2:12 PM EDT -----  Contact: [phone]      ----- Message -----  From: Wilmar Gordon RegSched Rep  Sent: 4/10/2020  12:58 PM EDT  To: [facility]    The patient left a voicemail stating she was having pain in her stomach and would like a prescription called to the pharmacy.

## 2020-04-13 NOTE — TELEPHONE ENCOUNTER
Patient has tried multiple times to reach out to GI office.  No one answered.  She said that she has stopped her electric cigarette x 3 days and this has helped her stomach, but she would like a prescription for nicotine patches if possible.  She said you had tried this in the past, but she wasn't quite ready to quit at that time.

## 2020-04-13 NOTE — TELEPHONE ENCOUNTER
Can you possibly schedule a video visit with Dr. Ching? She was supposed to have an office visit today with him.

## 2020-04-21 ENCOUNTER — TELEPHONE (OUTPATIENT)
Dept: FAMILY MEDICINE CLINIC | Facility: CLINIC | Age: 60
End: 2020-04-21

## 2020-04-21 NOTE — TELEPHONE ENCOUNTER
Pt called because she is having an allergy flare up and she is requesting a allergy shot.     Please call and advise   [phone]

## 2020-04-22 ENCOUNTER — OFFICE VISIT (OUTPATIENT)
Dept: FAMILY MEDICINE CLINIC | Facility: CLINIC | Age: 60
End: 2020-04-22

## 2020-04-22 ENCOUNTER — TELEPHONE (OUTPATIENT)
Dept: FAMILY MEDICINE CLINIC | Facility: CLINIC | Age: 60
End: 2020-04-22

## 2020-04-22 DIAGNOSIS — G47.00 PERSISTENT INSOMNIA: ICD-10-CM

## 2020-04-22 DIAGNOSIS — M43.02 CERVICAL SPONDYLOLYSIS: ICD-10-CM

## 2020-04-22 DIAGNOSIS — M51.36 LUMBAR DEGENERATIVE DISC DISEASE: ICD-10-CM

## 2020-04-22 DIAGNOSIS — M79.7 FIBROMYALGIA: ICD-10-CM

## 2020-04-22 DIAGNOSIS — J42 CHRONIC BRONCHITIS, UNSPECIFIED CHRONIC BRONCHITIS TYPE (HCC): ICD-10-CM

## 2020-04-22 DIAGNOSIS — J30.1 SEASONAL ALLERGIC RHINITIS DUE TO POLLEN: Primary | ICD-10-CM

## 2020-04-22 PROCEDURE — 99441 PR PHYS/QHP TELEPHONE EVALUATION 5-10 MIN: CPT | Performed by: FAMILY MEDICINE

## 2020-04-22 RX ORDER — PREGABALIN 225 MG/1
225 CAPSULE ORAL 2 TIMES DAILY
Qty: 60 CAPSULE | Refills: 2 | Status: CANCELLED | OUTPATIENT
Start: 2020-04-22

## 2020-04-22 RX ORDER — LISINOPRIL AND HYDROCHLOROTHIAZIDE 12.5; 1 MG/1; MG/1
1 TABLET ORAL DAILY
Qty: 30 TABLET | Refills: 5 | Status: SHIPPED | OUTPATIENT
Start: 2020-04-22 | End: 2020-10-23

## 2020-04-22 RX ORDER — CETIRIZINE HYDROCHLORIDE 10 MG/1
10 TABLET ORAL DAILY
Qty: 30 TABLET | Refills: 2 | Status: SHIPPED | OUTPATIENT
Start: 2020-04-22 | End: 2020-07-28

## 2020-04-22 RX ORDER — PANTOPRAZOLE SODIUM 40 MG/1
40 TABLET, DELAYED RELEASE ORAL 2 TIMES DAILY
Qty: 60 TABLET | Refills: 4 | Status: SHIPPED | OUTPATIENT
Start: 2020-04-22 | End: 2020-09-04

## 2020-04-22 RX ORDER — DEXAMETHASONE 4 MG/1
4 TABLET ORAL 2 TIMES DAILY WITH MEALS
Qty: 10 TABLET | Refills: 0 | Status: SHIPPED | OUTPATIENT
Start: 2020-04-22 | End: 2020-04-27

## 2020-04-22 RX ORDER — AZELASTINE 1 MG/ML
2 SPRAY, METERED NASAL 2 TIMES DAILY
Qty: 30 ML | Refills: 2 | Status: SHIPPED | OUTPATIENT
Start: 2020-04-22 | End: 2020-07-28

## 2020-04-22 RX ORDER — ALBUTEROL SULFATE 90 UG/1
2 AEROSOL, METERED RESPIRATORY (INHALATION) EVERY 4 HOURS PRN
Qty: 18 G | Refills: 5 | Status: SHIPPED | OUTPATIENT
Start: 2020-04-22 | End: 2020-10-06

## 2020-04-22 RX ORDER — ZOLPIDEM TARTRATE 10 MG/1
10 TABLET ORAL NIGHTLY PRN
Qty: 30 TABLET | Refills: 1 | Status: CANCELLED | OUTPATIENT
Start: 2020-04-22

## 2020-04-22 NOTE — PROGRESS NOTES
"Subjective   Katelynn Sung is a 60 y.o. female.     In order to limit face-to-face contact and enact \"social distancing\" in light of the COVID-19 outbreaks and in accordance to the recommendations per the CDC, WHO, and our individual department, she was contacted by telephone.  Telephone visit was used to screen the patient for needs and conduct her visit.      COVID-19 screening: specifically denies fever, body aches, difficulty breathing, sore throat, recent travel, or known sick exposures. ( Cough, runny nose as discussed below.)      She c/o allergy trouble    Allergies   This is a recurrent problem. The current episode started in the past 7 days. The problem occurs constantly. The problem has been gradually worsening. Associated symptoms include congestion, coughing (dry, triggered by postnasal drip), fatigue, headaches, nausea and a sore throat (mild). Pertinent negatives include no change in bowel habit, chest pain, fever, rash, swollen glands, urinary symptoms, vertigo or vomiting. Associated symptoms comments: Clear rhinorrhea, postnasal drip. Nothing aggravates the symptoms. Treatments tried: flonase, allegra. The treatment provided no relief.   She has comorbid chronic bronchitis. Some increase wheeze. Using Breo as directed.    The following portions of the patient's history were reviewed and updated as appropriate: allergies, current medications, past family history, past medical history, past social history, past surgical history and problem list.    Review of Systems   Constitutional: Positive for fatigue. Negative for fever.   HENT: Positive for congestion, postnasal drip, rhinorrhea, sneezing and sore throat (mild). Negative for ear pain, mouth sores, nosebleeds and sinus pain.    Eyes: Positive for itching. Negative for pain, discharge and redness.   Respiratory: Positive for cough (dry, triggered by postnasal drip) and wheezing. Negative for shortness of breath.    Cardiovascular: Negative for " chest pain.   Gastrointestinal: Positive for nausea. Negative for change in bowel habit and vomiting.   Skin: Negative for rash.   Neurological: Positive for headaches. Negative for vertigo.   Hematological: Negative for adenopathy.       Objective   Physical Exam   Constitutional: She is oriented to person, place, and time. She is cooperative. No distress.   Neurological: She is alert and oriented to person, place, and time.   Psychiatric: Her speech is normal. Thought content normal. Her mood appears not anxious. Cognition and memory are normal. She does not exhibit a depressed mood.       Assessment/Plan   Katelynn was seen today for allergies.    Diagnoses and all orders for this visit:    Seasonal allergic rhinitis due to pollen    Chronic bronchitis, unspecified chronic bronchitis type (CMS/HCC)    Other orders  -     azelastine (ASTELIN) 0.1 % nasal spray; 2 sprays into the nostril(s) as directed by provider 2 (Two) Times a Day. Use in each nostril as directed  -     cetirizine (zyrTEC) 10 MG tablet; Take 1 tablet by mouth Daily.  -     dexamethasone (DECADRON) 4 MG tablet; Take 1 tablet by mouth 2 (Two) Times a Day With Meals for 5 days.    continue Breo with albuterol as needed.  Routine f/u as scheduled.  Patient was encouraged to keep me informed of any acute changes, lack of improvement, or any new concerning symptoms.  Pt is aware of reasons to seek emergent care.  Patient voiced understanding of all instructions and denied further questions.      This visit has been rescheduled as a phone visit to comply with patient safety concerns in accordance with CDC recommendations. Total time of discussion was 6 minutes.

## 2020-04-22 NOTE — TELEPHONE ENCOUNTER
Medications have been sent.    Patient does not need refills on ambien or lyrica.    Both medications were filled recently.    lyrica was on 04/02/2020 and the ambien was 4/18/2020

## 2020-04-22 NOTE — TELEPHONE ENCOUNTER
PT CALLED TO REQUEST A 90 DAY DISPENSE FOR THE FOLLOWING RX'S     pantoprazole (PROTONIX) 40 MG EC tablet  zolpidem (AMBIEN) 10 MG tablet  pregabalin (LYRICA) 225 MG capsule  lisinopril-hydrochlorothiazide (PRINZIDE,ZESTORETIC) 10-12.5 MG per tablet  And  ALBUTEROL SULFATE  (90 Base) MCG/ACT inhaler.    PLEASE ADVISE.  CALL BACK:[phone]       [facility] ([place]) - [place], [place] - [address], [address]

## 2020-04-29 RX ORDER — FLUTICASONE PROPIONATE 50 MCG
SPRAY, SUSPENSION (ML) NASAL
Qty: 15.8 G | Refills: 4 | Status: SHIPPED | OUTPATIENT
Start: 2020-04-29 | End: 2022-03-09 | Stop reason: SDUPTHER

## 2020-04-29 RX ORDER — FEXOFENADINE HYDROCHLORIDE 60 MG/1
TABLET, FILM COATED ORAL
Qty: 30 TABLET | Refills: 4 | Status: SHIPPED | OUTPATIENT
Start: 2020-04-29 | End: 2020-08-13

## 2020-04-30 ENCOUNTER — TELEPHONE (OUTPATIENT)
Dept: FAMILY MEDICINE CLINIC | Facility: CLINIC | Age: 60
End: 2020-04-30

## 2020-04-30 DIAGNOSIS — K86.1 CHRONIC PANCREATITIS, UNSPECIFIED PANCREATITIS TYPE (HCC): ICD-10-CM

## 2020-04-30 RX ORDER — FLUCONAZOLE 150 MG/1
TABLET ORAL
Qty: 1 TABLET | Refills: 0 | Status: SHIPPED | OUTPATIENT
Start: 2020-04-30 | End: 2020-08-13 | Stop reason: SDUPTHER

## 2020-04-30 RX ORDER — PROMETHAZINE HYDROCHLORIDE 25 MG/1
TABLET ORAL
Qty: 90 TABLET | Refills: 0 | Status: SHIPPED | OUTPATIENT
Start: 2020-04-30 | End: 2020-06-19

## 2020-05-01 DIAGNOSIS — S52.135A CLOSED NONDISPLACED FRACTURE OF NECK OF LEFT RADIUS, INITIAL ENCOUNTER: Primary | ICD-10-CM

## 2020-05-04 ENCOUNTER — OFFICE VISIT (OUTPATIENT)
Dept: ORTHOPEDIC SURGERY | Facility: CLINIC | Age: 60
End: 2020-05-04

## 2020-05-04 VITALS — WEIGHT: 180 LBS | HEIGHT: 64 IN | BODY MASS INDEX: 30.73 KG/M2 | RESPIRATION RATE: 16 BRPM

## 2020-05-04 DIAGNOSIS — S52.135A CLOSED NONDISPLACED FRACTURE OF NECK OF LEFT RADIUS, INITIAL ENCOUNTER: Primary | ICD-10-CM

## 2020-05-04 PROCEDURE — 99213 OFFICE O/P EST LOW 20 MIN: CPT | Performed by: ORTHOPAEDIC SURGERY

## 2020-05-04 PROCEDURE — 73070 X-RAY EXAM OF ELBOW: CPT | Performed by: ORTHOPAEDIC SURGERY

## 2020-05-04 NOTE — PROGRESS NOTES
Subjective   Patient ID: Katelynn Sung is a 60 y.o. right hand dominant female is here today for follow-up for fracture recovery.  Follow-up of the Left Elbow (Here today for F/U of closed nondisplaced fracture of neck of left radius. Fell 3/18/20. States PT is really helping/) and Follow-up of the Left Hand       CHIEF COMPLAINT:    Fracture follow up evaluation    History of Present Illness    Pain controlled: [] no   [x] yes   Medication refill requested: [x] no   [] yes    Patient compliant with instructions: [] no   [x] yes   Other: She reports good progress with therapy for her left elbow and left ring finger traumatic injuries. She states the finger splint was helpful to prevent deformity and she commends the physical therapist Srinath at [facility] PT/OT.   She also reports stable recovery from right anterior knee contusion with milder residual anterior knee pain to palpation though no pain with ambulation.     Past Medical History:   Diagnosis Date   • Adnexal mass     pelvic US 10/26/11 showed 2 cm left abdnexal cystic lesion, resolved on f/u 5/14/15   • Anxiety    • Back ache    • Humphreys's esophagus    • Body piercing     Both ears   • Class 1 obesity due to excess calories with serious comorbidity and body mass index (BMI) of 30.0 to 30.9 in adult 2/16/2018   • Colon polyps    • COPD (chronic obstructive pulmonary disease) (CMS/HCC) 2008   • Cor pulmonale (CMS/HCC)    • Degenerative arthritis of lumbar spine    • Depression    • Enlarged heart    • Fibromyalgia    • Herpes simplex    • Hypertension    • Insomnia    • Lumbar degenerative disc disease    • Mild sleep apnea 01/16/2013    Sleep study 1/16/13 showing mild degree   • Narcotic dependence (CMS/HCC)    • Osteoporosis    • Palpitations    • Palpitations    • Pancreatitis    • Respiratory failure requiring intubation (CMS/HCC)    • Restless leg syndrome    • Sinus problem    • Sleep apnea    • Vision problems         Past Surgical History:    Procedure Laterality Date   • COLONOSCOPY N/A     Complete   • ENDOSCOPY N/A 3/11/2020    Procedure: ESOPHAGOGASTRODUODENOSCOPY;  Surgeon: Charbel Ching MD;  Location: [facility] ENDOSCOPY;  Service: Gastroenterology;  Laterality: N/A;   • TUBAL ABDOMINAL LIGATION     • UPPER GASTROINTESTINAL ENDOSCOPY N/A         Family History   Problem Relation Age of Onset   • Stroke Mother    • Liver disease Mother         Chronic   • Cirrhosis Father    • Heart attack Father    • Cancer Sister         Breast cancer   • Stroke Brother    • Cancer Sister    • Colon cancer Neg Hx    • Liver cancer Neg Hx    • Crohn's disease Neg Hx    • Ulcerative colitis Neg Hx        Social History     Socioeconomic History   • Marital status:      Spouse name: Not on file   • Number of children: Not on file   • Years of education: Not on file   • Highest education level: Not on file   Occupational History   • Occupation: former caregiver   Tobacco Use   • Smoking status: Former Smoker     Packs/day: 0.75     Years: 3.00     Pack years: 2.25     Types: Cigarettes, Electronic Cigarette     Last attempt to quit:      Years since quittin.3   • Smokeless tobacco: Never Used   • Tobacco comment: 2013 started using nicotine containing substance in combustion-free vaporization device. PT not longer uses vapor stick.   Substance and Sexual Activity   • Alcohol use: No   • Drug use: No   • Sexual activity: Defer     Comment:    Social History Narrative    Right hand dominant       Allergies   Allergen Reactions   • Nsaids GI Intolerance       Review of Systems   Constitutional: Negative for fever.   Musculoskeletal: Positive for arthralgias. Negative for gait problem and joint swelling.   Neurological: Negative for weakness.     I have reviewed the medical and surgical history, family history, social history, medications, and/or allergies, and the review of systems of this report.    Objective   Resp 16   Ht 162.6 cm  "(64\")   Wt 81.6 kg (180 lb)   LMP  (LMP Unknown)   BMI 30.90 kg/m²      Signs of infection: [x] no                  [] yes   Swelling: [x] no                  [] yes   Skin wound: [] healing well   [] healed well   [x] skin intact   Motor exam intact: [] no                  [x] yes   Neurovascular exam intact: [] no                  [x] yes   Signs of compartment syndrome: [x] no                  [] yes   Signs of DVT: [x] no                  [] yes   Other:      Physical Exam   Constitutional: She appears well-developed. No distress.   Musculoskeletal: She exhibits no deformity.   Neurological: She is alert.   Skin: Skin is warm and dry. No rash noted. No erythema.   Psychiatric: She has a normal mood and affect.   Vitals reviewed.    Left Hand Exam     Tenderness   The patient is experiencing no tenderness (ring finger with no swan neck, no mallet nor other deformity.  Good mobility and flexor extensor strength of digits.).     Range of Motion   The patient has normal left wrist ROM.    Muscle Strength   The patient has normal left wrist strength.    Other   Erythema: absent  Sensation: normal  Pulse: present      Left Elbow Exam     Tenderness   The patient is experiencing no tenderness.     Range of Motion   Extension: -10   Flexion: 140   Left elbow pronation: 80 degrees.   Supination: 90     Muscle Strength   The patient has normal left elbow strength.    Tests   Varus: negative  Valgus: negative  Tinel's sign (cubital tunnel): negative    Other   Erythema: absent  Sensation: normal  Pulse: present          Assessment/Plan     Independent Review of Radiographic Studies:    AP and lateral of the left elbow, indication to evaluate fracture healing, and compared with prior imaging, shows interm radial head and neck fracture healing in continued good position and alignment.    Laboratory and Other Studies:  No new results reviewed today.     Medical Decision Making:    Good progress, significantly improved.  " Continue current treatment plan.  Closed treatment of fracture and or dislocation.  Physical and occupational therapy ongoing.  She will continue formal visits for another week then home program.  She has good function return other than slight elbow extension limitation at this stage of recovery.  Sports and activity restrictions as appropriate.     Procedures    Katelynn was seen today for follow-up and follow-up.    Diagnoses and all orders for this visit:    Closed nondisplaced fracture of neck of left radius, initial encounter         Physical therapy: [] rehab facility    [] home-based    [x] outpatient referral   Ultrasound: [x]not ordered         []order given to patient   Labs: [x]not ordered         []order given to patient   Weight Bearing status: []Full [x]WBAT []PWB []NWB []Other     Discussion of orthopaedic goals and activities and patient and/or guardian expressed appreciation.  Regular exercise as tolerated  Guided on proper techniques for mobility, strength, agility and/or conditioning exercises  Cast, splint or brace care and assistive device usage instructions given  Weight bearing parameters reviewed  Physical therapy program ongoing  Take prescribed medications as instructed only as tolerated    Recommendations/Plan:  Exercise, medications, injections, other patient advice, and return appointment as noted.  Brace: No brace was given at today's visit.  Referral: No referrals made at today's visit.  Test/Studies: No additional studies ordered at this time.  Work/Activity Status: Usual activities, routine exercise as tolerated, light physical work as tolerated, no strenuous activity.     Return if symptoms worsen or fail to improve.  Patient is encouraged and agreeable to call or return sooner for any issues or concerns.

## 2020-05-13 DIAGNOSIS — G47.00 PERSISTENT INSOMNIA: ICD-10-CM

## 2020-05-14 RX ORDER — ZOLPIDEM TARTRATE 10 MG/1
10 TABLET ORAL NIGHTLY PRN
Qty: 30 TABLET | Refills: 1 | Status: SHIPPED | OUTPATIENT
Start: 2020-05-14 | End: 2020-07-09

## 2020-05-29 ENCOUNTER — TELEPHONE (OUTPATIENT)
Dept: FAMILY MEDICINE CLINIC | Facility: CLINIC | Age: 60
End: 2020-05-29

## 2020-05-29 DIAGNOSIS — M79.7 FIBROMYALGIA: ICD-10-CM

## 2020-05-29 DIAGNOSIS — M51.36 LUMBAR DEGENERATIVE DISC DISEASE: ICD-10-CM

## 2020-05-29 DIAGNOSIS — M43.02 CERVICAL SPONDYLOLYSIS: ICD-10-CM

## 2020-05-29 RX ORDER — PREGABALIN 225 MG/1
225 CAPSULE ORAL 2 TIMES DAILY
Qty: 60 CAPSULE | Refills: 0 | Status: SHIPPED | OUTPATIENT
Start: 2020-05-29 | End: 2020-06-29

## 2020-05-29 NOTE — TELEPHONE ENCOUNTER
Patient states that [facility] did not get the refill of her Lyrica.  She can be reached at [phone]

## 2020-06-03 ENCOUNTER — OFFICE VISIT (OUTPATIENT)
Dept: FAMILY MEDICINE CLINIC | Facility: CLINIC | Age: 60
End: 2020-06-03

## 2020-06-03 DIAGNOSIS — M79.7 FIBROMYALGIA: ICD-10-CM

## 2020-06-03 DIAGNOSIS — K59.09 CHRONIC CONSTIPATION: ICD-10-CM

## 2020-06-03 DIAGNOSIS — I10 ESSENTIAL HYPERTENSION: ICD-10-CM

## 2020-06-03 DIAGNOSIS — J42 CHRONIC BRONCHITIS, UNSPECIFIED CHRONIC BRONCHITIS TYPE (HCC): Primary | ICD-10-CM

## 2020-06-03 DIAGNOSIS — R60.9 PERIPHERAL EDEMA: ICD-10-CM

## 2020-06-03 PROCEDURE — 99442 PR PHYS/QHP TELEPHONE EVALUATION 11-20 MIN: CPT | Performed by: FAMILY MEDICINE

## 2020-06-03 RX ORDER — FUROSEMIDE 20 MG/1
TABLET ORAL
Qty: 30 TABLET | Refills: 0 | Status: SHIPPED | OUTPATIENT
Start: 2020-06-03 | End: 2020-07-01

## 2020-06-03 NOTE — PROGRESS NOTES
"Subjective   Katelynn Rcihy Sung is a 60 y.o. female.     In order to limit face-to-face contact and enact \"social distancing\" in light of the COVID-19 outbreaks and in accordance to the recommendations per the CDC, WHO, and our individual department, she was contacted by telephone.  Telephone visit was used to screen the patient for needs and conduct her regularly scheduled follow-up.     COVID-19 screening: specifically denies fever, body aches, cough, difficulty breathing, sore throat, runny nose, recent travel, or known sick exposures.      History of Present Illness  Mrs. Sung presents today for routine follow-up on several chronic medical problems.    She has COPD/chronic bronchitis recently stable.  No increased cough, chest congestion or wheeze.  Currently on Brio Ellipta, albuterol as needed.  No recent increase she is now better all.    She has essential hypertension for which she is currently on Zestoretic.  Taking as prescribed.  Blood pressure has been well controlled.  No unusual headaches or focal neurological symptoms.  No exertional chest pain.    She has chronic constipation, currently on Linzess.  Having regular BMs.  No rectal pain.  She does complain of some increased bloating, weight gain.    She is on high-dose Lyrica for management of chronic pain due to fibromyalgia, lumbar spine arthritis, cervical degenerative disc disease.  Pain well controlled.  Feels she may be having some swelling and weight gain associated with Lyrica.  Would like to have diuretic to be used as needed for increased welling in extremities.    She is proud to report she has quit vaping.  This has significantly improved her abdominal symptoms of cramping, nausea.    She has upcoming annual exam with Dr. mejia's office.    The following portions of the patient's history were reviewed and updated as appropriate: allergies, current medications, past family history, past medical history, past social history, past surgical " history and problem list.    Review of Systems   Constitutional: Positive for fatigue and unexpected weight change. Negative for diaphoresis and fever.   HENT: Positive for congestion and postnasal drip. Negative for ear pain, mouth sores, nosebleeds, rhinorrhea and sore throat.    Eyes: Negative for visual disturbance.   Respiratory: Positive for cough (chronic, dry, intermittent) and shortness of breath (mild with exertion). Negative for wheezing.    Cardiovascular: Negative for chest pain, palpitations and leg swelling.   Gastrointestinal: Positive for constipation and nausea. Negative for abdominal pain, blood in stool and vomiting.   Endocrine: Positive for heat intolerance. Negative for polydipsia and polyuria.   Genitourinary: Negative for dysuria and hematuria.   Musculoskeletal: Positive for arthralgias, back pain, myalgias and neck pain.   Skin: Negative for rash and wound.   Neurological: Positive for headaches. Negative for dizziness, tremors, syncope and weakness.   Hematological: Negative for adenopathy. Does not bruise/bleed easily.   Psychiatric/Behavioral: Positive for sleep disturbance. Negative for confusion and dysphoric mood. The patient is nervous/anxious.    Pt's previous ROS reviewed and updated as indicated.       Objective      Physical Exam   Constitutional: She is oriented to person, place, and time. She is cooperative. No distress.   Neurological: She is alert and oriented to person, place, and time.   Psychiatric: Her speech is normal and behavior is normal. Thought content normal. Her mood appears not anxious. Cognition and memory are normal. She does not exhibit a depressed mood.     Lab Results   Component Value Date    WBC 5.37 06/20/2019    HGB 14.8 06/20/2019    HCT 44.9 06/20/2019    MCV 87.2 06/20/2019     06/20/2019       Lab Results   Component Value Date    GLUCOSE 99 08/04/2016    BUN 9 03/04/2020    CREATININE 0.88 03/04/2020    EGFRIFNONA 73 03/04/2020    EGFRIFAFRI  84 03/04/2020    BCR 10 03/04/2020    K 4.3 03/04/2020    CO2 22 03/04/2020    CALCIUM 9.3 03/04/2020    PROTENTOTREF 7.7 06/20/2019    ALBUMIN 4.70 06/20/2019    LABIL2 1.6 06/20/2019    AST 64 (H) 06/20/2019    ALT 44 06/20/2019       Lab Results   Component Value Date    TSH 1.690 06/20/2019           Assessment/Plan   Katelynn was seen today for follow-up.    Diagnoses and all orders for this visit:    Chronic bronchitis, unspecified chronic bronchitis type (CMS/HCC)    Essential hypertension    Peripheral edema    Chronic constipation    Other orders  -     linaclotide (Linzess) 72 MCG capsule capsule; Take 1 capsule by mouth Every Morning Before Breakfast.  -     furosemide (Lasix) 20 MG tablet; 1 po daily as needed for swelling       COPD/chronic bronchitis stable.  She is commended for discontinuation of vaping.  Continue Breo, albuterol as needed.    Hypertension historically well controlled.  Continue Zestoretic.  She is encouraged to follow heart healthy diet and exercise daily.    Lasix as needed for peripheral edema.  She will keep me informed of any lack of improvement.    Continue Linzess for management of chronic constipation.  Have reviewed lifestyle modification efforts which support healthy bowel function.    Chronic pain due to fibromyalgia, arthritis, cervical generative disc disease currently stable.  Continue Lyrica.  Re-/benefits and potential side effects of this medication have again been reviewed.    Routine follow-up in 3 months, sooner as needed.  Patient was encouraged to keep me informed of any acute changes, lack of improvement, or any new concerning symptoms.  Pt is aware of reasons to seek emergent care.  Patient voiced understanding of all instructions and denied further questions.  As part of patient's treatment plan I am prescribing a controlled substance.  The patient has been made aware of the appropriate use of such medications, including potential risk of somnolence, limited  ability to drive and/or work safely, and potential for dependence and/or overdose.  It has also been made clear that these medications are for use by this patient only, without concomitant use of alcohol or other substances, unless prescribed.  DANTE report reviewed and scanned into chart.  Last DANTE date 4/20/2020.  History and physical exam exhibit continued safe and appropriate use of controlled substance.        Please note that portions of this note may have been completed with a voice recognition program. Efforts were made to edit the dictations, but occasionally words are mistranscribed.          This visit has been rescheduled as a phone visit to comply with patient safety concerns in accordance with CDC recommendations. Total time of discussion was 11 minutes.

## 2020-06-09 ENCOUNTER — PREP FOR SURGERY (OUTPATIENT)
Dept: OTHER | Facility: HOSPITAL | Age: 60
End: 2020-06-09

## 2020-06-09 DIAGNOSIS — R10.13 EPIGASTRIC PAIN: ICD-10-CM

## 2020-06-09 DIAGNOSIS — Z12.11 COLON CANCER SCREENING: ICD-10-CM

## 2020-06-09 DIAGNOSIS — K59.00 CONSTIPATION: Primary | ICD-10-CM

## 2020-06-09 DIAGNOSIS — Z01.812 PRE-PROCEDURE LAB EXAM: Primary | ICD-10-CM

## 2020-06-09 DIAGNOSIS — R10.11 RUQ PAIN: ICD-10-CM

## 2020-06-09 RX ORDER — SODIUM CHLORIDE 9 MG/ML
70 INJECTION, SOLUTION INTRAVENOUS CONTINUOUS PRN
Status: CANCELLED | OUTPATIENT
Start: 2020-06-09

## 2020-06-16 ENCOUNTER — RESULTS ENCOUNTER (OUTPATIENT)
Dept: GASTROENTEROLOGY | Facility: CLINIC | Age: 60
End: 2020-06-16

## 2020-06-16 DIAGNOSIS — Z01.812 PRE-PROCEDURE LAB EXAM: ICD-10-CM

## 2020-06-19 DIAGNOSIS — K86.1 CHRONIC PANCREATITIS, UNSPECIFIED PANCREATITIS TYPE (HCC): ICD-10-CM

## 2020-06-19 RX ORDER — PROMETHAZINE HYDROCHLORIDE 25 MG/1
TABLET ORAL
Qty: 90 TABLET | Refills: 0 | Status: CANCELLED | OUTPATIENT
Start: 2020-06-19

## 2020-06-19 RX ORDER — PROMETHAZINE HYDROCHLORIDE 25 MG/1
TABLET ORAL
Qty: 90 TABLET | Refills: 0 | Status: SHIPPED | OUTPATIENT
Start: 2020-06-19 | End: 2020-07-20

## 2020-06-19 NOTE — TELEPHONE ENCOUNTER
PT CALLED TO REQUEST A REFILL FOR promethazine (PHENERGAN) 25 MG tablet.    PT CONTACT [phone]     PHARMACY VERIFIED [facility] ([place] - [place] - [address] [place] [address], [address] - [phone]  - [phone] FX

## 2020-06-26 DIAGNOSIS — M79.7 FIBROMYALGIA: ICD-10-CM

## 2020-06-26 DIAGNOSIS — M51.36 LUMBAR DEGENERATIVE DISC DISEASE: ICD-10-CM

## 2020-06-26 DIAGNOSIS — M43.02 CERVICAL SPONDYLOLYSIS: ICD-10-CM

## 2020-06-29 RX ORDER — PREGABALIN 225 MG/1
225 CAPSULE ORAL 2 TIMES DAILY
Qty: 60 CAPSULE | Refills: 0 | Status: SHIPPED | OUTPATIENT
Start: 2020-06-29 | End: 2020-07-27

## 2020-07-01 RX ORDER — FUROSEMIDE 20 MG/1
TABLET ORAL
Qty: 30 TABLET | Refills: 3 | Status: SHIPPED | OUTPATIENT
Start: 2020-07-01 | End: 2020-11-05

## 2020-07-02 ENCOUNTER — TELEPHONE (OUTPATIENT)
Dept: FAMILY MEDICINE CLINIC | Facility: CLINIC | Age: 60
End: 2020-07-02

## 2020-07-02 ENCOUNTER — PRIOR AUTHORIZATION (OUTPATIENT)
Dept: FAMILY MEDICINE CLINIC | Facility: CLINIC | Age: 60
End: 2020-07-02

## 2020-07-02 DIAGNOSIS — K59.09 CHRONIC CONSTIPATION: Primary | ICD-10-CM

## 2020-07-02 NOTE — TELEPHONE ENCOUNTER
Spoke with patient, states she is seeing a new doctor @ her [facility] clinic and he advised her she would need to see a specialist to get Lyrica, patient states Dr. Servin has always written this for her and she was not understanding why she need to start seeing specialist, advised patient Dr. Servin will continue to write this for her until otherwise discussed between her and the patient. Patient verbalized understanding.

## 2020-07-09 DIAGNOSIS — G47.00 PERSISTENT INSOMNIA: ICD-10-CM

## 2020-07-09 RX ORDER — ZOLPIDEM TARTRATE 10 MG/1
TABLET ORAL
Qty: 30 TABLET | Refills: 0 | Status: SHIPPED | OUTPATIENT
Start: 2020-07-09 | End: 2020-08-11

## 2020-07-13 RX ORDER — NICOTINE 21 MG/24HR
1 PATCH, TRANSDERMAL 24 HOURS TRANSDERMAL EVERY 24 HOURS
Qty: 28 EACH | Refills: 1 | Status: SHIPPED | OUTPATIENT
Start: 2020-07-13 | End: 2020-08-13

## 2020-07-14 ENCOUNTER — PRIOR AUTHORIZATION (OUTPATIENT)
Dept: FAMILY MEDICINE CLINIC | Facility: CLINIC | Age: 60
End: 2020-07-14

## 2020-07-18 DIAGNOSIS — K86.1 CHRONIC PANCREATITIS, UNSPECIFIED PANCREATITIS TYPE (HCC): ICD-10-CM

## 2020-07-20 RX ORDER — PROMETHAZINE HYDROCHLORIDE 25 MG/1
TABLET ORAL
Qty: 90 TABLET | Refills: 0 | Status: SHIPPED | OUTPATIENT
Start: 2020-07-20 | End: 2020-09-04

## 2020-07-27 DIAGNOSIS — M79.7 FIBROMYALGIA: ICD-10-CM

## 2020-07-27 DIAGNOSIS — M51.36 LUMBAR DEGENERATIVE DISC DISEASE: ICD-10-CM

## 2020-07-27 DIAGNOSIS — M43.02 CERVICAL SPONDYLOLYSIS: ICD-10-CM

## 2020-07-27 RX ORDER — PREGABALIN 225 MG/1
225 CAPSULE ORAL 2 TIMES DAILY
Qty: 60 CAPSULE | Refills: 0 | Status: SHIPPED | OUTPATIENT
Start: 2020-07-27 | End: 2020-08-13 | Stop reason: SDUPTHER

## 2020-07-27 NOTE — TELEPHONE ENCOUNTER
DANTE reviewed. Refill approved and printed for . Needs updated CSA. Pt to keep f/u apt as scheduled.

## 2020-07-28 RX ORDER — AZELASTINE 1 MG/ML
SPRAY, METERED NASAL
Qty: 30 ML | Refills: 1 | Status: SHIPPED | OUTPATIENT
Start: 2020-07-28 | End: 2021-12-20

## 2020-07-28 RX ORDER — CETIRIZINE HYDROCHLORIDE 10 MG/1
TABLET ORAL
Qty: 30 TABLET | Refills: 1 | Status: SHIPPED | OUTPATIENT
Start: 2020-07-28 | End: 2022-03-09 | Stop reason: SDUPTHER

## 2020-07-29 DIAGNOSIS — M51.36 LUMBAR DEGENERATIVE DISC DISEASE: ICD-10-CM

## 2020-07-29 DIAGNOSIS — M43.02 CERVICAL SPONDYLOLYSIS: ICD-10-CM

## 2020-07-29 DIAGNOSIS — M79.7 FIBROMYALGIA: ICD-10-CM

## 2020-07-29 RX ORDER — PREGABALIN 225 MG/1
225 CAPSULE ORAL 2 TIMES DAILY
Qty: 60 CAPSULE | Refills: 0 | Status: CANCELLED | OUTPATIENT
Start: 2020-07-29

## 2020-07-29 NOTE — TELEPHONE ENCOUNTER
Caller: Katelynn Sung    Relationship: Self    Best call back number:[phone]    Medication needed:   Requested Prescriptions     Pending Prescriptions Disp Refills   • pregabalin (LYRICA) 225 MG capsule 60 capsule 0     Sig: Take 1 capsule by mouth 2 (Two) Times a Day.       When do you need the refill by: ASAP    What details did the patient provide when requesting the medication:     Does the patient have less than a 3 day supply:  [x] Yes  [] No    What is the patient's preferred pharmacy: [email or web address] ([place]) - [place], [address] - [phone] [facility] [phone]

## 2020-08-05 ENCOUNTER — OFFICE VISIT (OUTPATIENT)
Dept: FAMILY MEDICINE CLINIC | Facility: CLINIC | Age: 60
End: 2020-08-05

## 2020-08-05 ENCOUNTER — TELEPHONE (OUTPATIENT)
Dept: FAMILY MEDICINE CLINIC | Facility: CLINIC | Age: 60
End: 2020-08-05

## 2020-08-05 DIAGNOSIS — R50.9 FEVER, UNSPECIFIED FEVER CAUSE: ICD-10-CM

## 2020-08-05 DIAGNOSIS — J44.1 COPD EXACERBATION (HCC): Primary | ICD-10-CM

## 2020-08-05 DIAGNOSIS — R05.9 COUGH: ICD-10-CM

## 2020-08-05 PROCEDURE — 99442 PR PHYS/QHP TELEPHONE EVALUATION 11-20 MIN: CPT | Performed by: FAMILY MEDICINE

## 2020-08-05 RX ORDER — DEXAMETHASONE 4 MG/1
4 TABLET ORAL 2 TIMES DAILY WITH MEALS
Qty: 6 TABLET | Refills: 0 | Status: SHIPPED | OUTPATIENT
Start: 2020-08-05 | End: 2020-08-08

## 2020-08-05 RX ORDER — LEVOFLOXACIN 500 MG/1
500 TABLET, FILM COATED ORAL DAILY
Qty: 7 TABLET | Refills: 0 | Status: SHIPPED | OUTPATIENT
Start: 2020-08-05 | End: 2020-08-12

## 2020-08-05 NOTE — TELEPHONE ENCOUNTER
Pt called stating she has a fever and cough    Pt stated she has a history of bronchitis and is requesting something be called in for her    Please advise at: [phone]     [facility] ([place]) - [place] - [address] [place] [address], [address] - [phone]  - [phone] FX

## 2020-08-05 NOTE — PROGRESS NOTES
"TELEPHONE NOTE  08/05/2020        HELEN WOLF  1960    In order to limit face-to-face contact and enact \"social distancing\" in light of the COVID-19 outbreaks and in accordance to the recommendations per the CDC, WHO, and our individual department, she was contacted by telephone.  Telephone visit was used to screen the patient for needs and conduct her apt.          She c/o cough, fever    Cough   This is a new problem. The current episode started in the past 7 days. The problem has been rapidly worsening. The problem occurs every few minutes. The cough is non-productive. Associated symptoms include chest pain (with cough), chills, a fever (subjective), headaches, myalgias, nasal congestion (mild), shortness of breath and wheezing. Pertinent negatives include no eye redness, hemoptysis, rash, rhinorrhea or sore throat. Associated symptoms comments: Malaise, severe fatigue/sleepiness. The symptoms are aggravated by lying down and exercise. She has tried a beta-agonist inhaler (NSAID) for the symptoms. The treatment provided mild relief. Her past medical history is significant for COPD.     Had COVID test yesterday at [facility], Still pending.      Patient's past medical hx, surgical hx, family hx, social hx reviewed on chart. Medication and allergy lists reviewed on chart. Information updated as indicated.      Review of Systems   Constitutional: Positive for appetite change, chills, fatigue and fever (subjective).   HENT: Positive for congestion. Negative for mouth sores, rhinorrhea and sore throat.    Eyes: Negative for pain, discharge and redness.   Respiratory: Positive for cough, chest tightness, shortness of breath and wheezing. Negative for hemoptysis.    Cardiovascular: Positive for chest pain (with cough).   Gastrointestinal: Positive for nausea. Negative for diarrhea and vomiting.   Genitourinary: Negative for dysuria and hematuria.   Musculoskeletal: Positive for arthralgias " and myalgias.   Skin: Negative for rash.   Neurological: Positive for weakness and headaches.   Hematological: Negative for adenopathy.   Psychiatric/Behavioral: Negative for confusion.         Diagnoses and all orders for this visit:    COPD exacerbation (CMS/HCC)    Cough    Fever, unspecified fever cause    Other orders  -     dexamethasone (DECADRON) 4 MG tablet; Take 1 tablet by mouth 2 (Two) Times a Day With Meals for 3 days.  -     levoFLOXacin (Levaquin) 500 MG tablet; Take 1 tablet by mouth Daily for 7 days.      Continue Breo daily, albuterol MDI as needed every 4 hours, abx and short course of corticosteroids as above. She will let me know results of COVID testing as soon as they are available.    Patient will f/u as scheduled next month, f/u sooner as needed.  Patient was encouraged to keep me informed of any acute changes, lack of improvement, or any new concerning symptoms.  Pt is aware of reasons to seek emergent care.  Patient voiced understanding of all instructions and denied further questions.      This visit has been rescheduled as a phone visit to comply with patient safety concerns in accordance with CDC recommendations. Total time of discussion was 11 minutes.

## 2020-08-07 ENCOUNTER — TELEPHONE (OUTPATIENT)
Dept: FAMILY MEDICINE CLINIC | Facility: CLINIC | Age: 60
End: 2020-08-07

## 2020-08-07 NOTE — TELEPHONE ENCOUNTER
Patient received her covid results, and it came back positive. She had it done at [facility] on 8/4/20.    right

## 2020-08-10 DIAGNOSIS — G47.00 PERSISTENT INSOMNIA: ICD-10-CM

## 2020-08-10 NOTE — TELEPHONE ENCOUNTER
Pt needs to schedule telehealth f/u visit ASAP with available provider.     Please contact pt and check on current symptom status.

## 2020-08-11 RX ORDER — ZOLPIDEM TARTRATE 10 MG/1
TABLET ORAL
Qty: 30 TABLET | Refills: 5 | Status: SHIPPED | OUTPATIENT
Start: 2020-08-11 | End: 2021-01-05 | Stop reason: SDUPTHER

## 2020-08-11 NOTE — TELEPHONE ENCOUNTER
Patient has been scheduled for 8:300 Thursday morning for a telehealth visit with edgar.    Patient states that she is still having issues with a fever off and on, fatigue, and coughing.    Patient states that she just feels very tired.

## 2020-08-13 ENCOUNTER — OFFICE VISIT (OUTPATIENT)
Dept: FAMILY MEDICINE CLINIC | Facility: CLINIC | Age: 60
End: 2020-08-13

## 2020-08-13 DIAGNOSIS — B37.0 ORAL THRUSH: ICD-10-CM

## 2020-08-13 DIAGNOSIS — M43.02 CERVICAL SPONDYLOLYSIS: ICD-10-CM

## 2020-08-13 DIAGNOSIS — M79.7 FIBROMYALGIA: ICD-10-CM

## 2020-08-13 DIAGNOSIS — M51.36 LUMBAR DEGENERATIVE DISC DISEASE: ICD-10-CM

## 2020-08-13 DIAGNOSIS — U07.1 COVID-19: Primary | ICD-10-CM

## 2020-08-13 PROCEDURE — 99213 OFFICE O/P EST LOW 20 MIN: CPT | Performed by: NURSE PRACTITIONER

## 2020-08-13 RX ORDER — FLUCONAZOLE 150 MG/1
150 TABLET ORAL ONCE
Qty: 2 TABLET | Refills: 0 | Status: SHIPPED | OUTPATIENT
Start: 2020-08-13 | End: 2020-08-13

## 2020-08-13 RX ORDER — PREGABALIN 225 MG/1
225 CAPSULE ORAL 2 TIMES DAILY
Qty: 60 CAPSULE | Refills: 2 | Status: SHIPPED | OUTPATIENT
Start: 2020-08-13 | End: 2020-09-09 | Stop reason: SDUPTHER

## 2020-08-13 NOTE — PROGRESS NOTES
Subjective     Chief Complaint:    Chief Complaint   Patient presents with   • Follow-up     med refills; h/o COVID (tested positive 08/03/2020)       History of Present Illness:   She notes overt fatigue, cough, temp, and her sister and brother had covid, she went to [facility] and swabbed positive on 8/3/20.   Notes very bad cough, does not have any appetite, no taste or smell, shortness of breath seems to be better but was bad last week. She uses breo and albuterol prn for underlying COPD. Denies active tobacco use. Still having temp in the evening (100-101).   She went to the ED on Sunday at [facility] and she was given fluids.   No wheezing.   No leg swelling, pain, redness.     She completed dex and levaquin early in her course for presumed bronchitis    Needs refill on lyrica, takes for fibromylagia pain, works well, tolerating  Request diflucan tablet for oral thrush, has taken this before in the past and it worked well.     Review of Systems  CV- No chest pain, palpitations  GI- No N/V/D, abd pain  Neuro-No dizziness, headaches    I have reviewed and/or updated the patient's past medical, surgical, family, social history and problem list as appropriate.     Medications:    Current Outpatient Medications:   •  albuterol sulfate  (90 Base) MCG/ACT inhaler, Inhale 2 puffs Every 4 (Four) Hours As Needed for Wheezing., Disp: 18 g, Rfl: 5  •  azelastine (ASTELIN) 0.1 % nasal spray, USE 2 SPRAYS IN EACH NOSTRIL(S) AS DIRECTED BY PROVIDER 2 (TWO) TIMES A DAY., Disp: 30 mL, Rfl: 1  •  BREO ELLIPTA 200-25 MCG/INH inhaler, INHALE 1 PUFF BY MOUTH DAILY. (Patient taking differently: Inhale 1 puff Daily.), Disp: 1 each, Rfl: 5  •  buprenorphine-naloxone (SUBOXONE) 8-2 MG per SL tablet, Place 1 tablet under the tongue 2 (Two) Times a Day., Disp: , Rfl:   •  cetirizine (zyrTEC) 10 MG tablet, TAKE 1 TABLET BY MOUTH ONCE DAILY, Disp: 30 tablet, Rfl: 1  •  fluticasone (FLONASE) 50 MCG/ACT nasal spray, USE 2  SPRAYS INTO THE NOSTRIL(S) AS DIRECTED BY PROVIDER DAILY., Disp: 15.8 g, Rfl: 4  •  furosemide (LASIX) 20 MG tablet, TAKE 1 TABLET BY MOUTH DAILY AS NEEDED FOR SWELLING, Disp: 30 tablet, Rfl: 3  •  lisinopril-hydrochlorothiazide (PRINZIDE,ZESTORETIC) 10-12.5 MG per tablet, Take 1 tablet by mouth Daily., Disp: 30 tablet, Rfl: 5  •  metoclopramide (REGLAN) 5 MG tablet, Take 1/2 tablet by mouth 2 (Two) Times a Day Before Meals., Disp: 30 tablet, Rfl: 1  •  nystatin (MYCOSTATIN) 384207 UNIT/ML suspension, SWISH AND SWALLOW 5 MLS BY MOUTH FOUR TIMES A DAY, Disp: 180 mL, Rfl: 0  •  pantoprazole (PROTONIX) 40 MG EC tablet, Take 1 tablet by mouth 2 (Two) Times a Day., Disp: 60 tablet, Rfl: 4  •  Plecanatide (Trulance) 3 MG tablet, Take 1 tablet by mouth Daily., Disp: 30 tablet, Rfl: 2  •  pregabalin (LYRICA) 225 MG capsule, Take 1 capsule by mouth 2 (Two) Times a Day., Disp: 60 capsule, Rfl: 2  •  promethazine (PHENERGAN) 25 MG tablet, TAKE 1 TABLET BY MOUTH 3 TIMES A DAY. AS NEEDED FOR NAUSEA., Disp: 90 tablet, Rfl: 0  •  zolpidem (AMBIEN) 10 MG tablet, TAKE 1 TABLET BY MOUTH ONCE DAILY AT BEDTIME AS NEEDED FOR SLEEP., Disp: 30 tablet, Rfl: 5  •  fluconazole (DIFLUCAN) 150 MG tablet, Take 1 tablet by mouth 1 (One) Time for 1 dose. May repeat in 3 days if needed, Disp: 2 tablet, Rfl: 0    Allergies:  Allergies   Allergen Reactions   • Nsaids GI Intolerance       Objective     Vital Signs: There were no vitals filed for this visit.    Physical Exam:  Gen-no acute distress, telephone exam  Resp- normal WOB, able to speak in full sentences without distress  Neuro-A&Ox3, speech clear  Psych-appropriate mood, cooperative       Assessment / Plan     Assessment/Plan:   Problem List Items Addressed This Visit        Nervous and Auditory    Fibromyalgia    Relevant Medications    pregabalin (LYRICA) 225 MG capsule       Musculoskeletal and Integument    Cervical spondylolysis    Relevant Medications    pregabalin (LYRICA) 225 MG  capsule    Lumbar degenerative disc disease    Relevant Medications    pregabalin (LYRICA) 225 MG capsule      Other Visit Diagnoses     COVID-19    -  Primary    Oral thrush        Relevant Medications    fluconazole (DIFLUCAN) 150 MG tablet        -- appears to be convalescing well from COVID, discussed adding zinc and vitamin C. Discussed s/s that warrant emergent eval. Encouraged to use albuterol prn. Discussed breo is not a PRN med and encouraged to use daily  -- ED records reviewed, mild thrombocytopenia likely due to covid, ASHLEY noted but she received fluids  -- refilled lyrica, needs updated UDS and nacr contract next time she is in office.   -- diflucan for presumed oral thrush per her request  Controlled Substance  Prescription  As part of this patient's treatment plan I am prescribing controlled substances. The patient has been made aware of appropriate use of such medications, including potential risk of somnolence, limited ability to drive and /or work safely, and potential for dependence or overdose. It has also been made clear that these medications are for use by this patient only, without concomitant use of alcohol or other substances unless prescribed. Malcom reviewed and appropriate     Follow up:  prn    Total Time of Encounter 20 minutes    Electronically signed by JESSICA López   08/13/2020 8:32 AM      Please note that portions of this note may have been completed with a voice recognition program. Efforts were made to edit the dictations, but occasionally words are mistranscribed.

## 2020-09-04 DIAGNOSIS — K86.1 CHRONIC PANCREATITIS, UNSPECIFIED PANCREATITIS TYPE (HCC): ICD-10-CM

## 2020-09-04 RX ORDER — PROMETHAZINE HYDROCHLORIDE 25 MG/1
TABLET ORAL
Qty: 90 TABLET | Refills: 0 | Status: SHIPPED | OUTPATIENT
Start: 2020-09-04 | End: 2020-10-06

## 2020-09-04 RX ORDER — PANTOPRAZOLE SODIUM 40 MG/1
40 TABLET, DELAYED RELEASE ORAL 2 TIMES DAILY
Qty: 60 TABLET | Refills: 3 | Status: SHIPPED | OUTPATIENT
Start: 2020-09-04 | End: 2021-01-05 | Stop reason: SDUPTHER

## 2020-09-09 ENCOUNTER — OFFICE VISIT (OUTPATIENT)
Dept: FAMILY MEDICINE CLINIC | Facility: CLINIC | Age: 60
End: 2020-09-09

## 2020-09-09 VITALS
SYSTOLIC BLOOD PRESSURE: 122 MMHG | TEMPERATURE: 98.2 F | HEIGHT: 64 IN | WEIGHT: 175 LBS | OXYGEN SATURATION: 96 % | RESPIRATION RATE: 14 BRPM | BODY MASS INDEX: 29.88 KG/M2 | HEART RATE: 88 BPM | DIASTOLIC BLOOD PRESSURE: 70 MMHG

## 2020-09-09 DIAGNOSIS — M51.36 LUMBAR DEGENERATIVE DISC DISEASE: ICD-10-CM

## 2020-09-09 DIAGNOSIS — M43.02 CERVICAL SPONDYLOLYSIS: ICD-10-CM

## 2020-09-09 DIAGNOSIS — M79.7 FIBROMYALGIA: Primary | ICD-10-CM

## 2020-09-09 PROCEDURE — 96372 THER/PROPH/DIAG INJ SC/IM: CPT | Performed by: FAMILY MEDICINE

## 2020-09-09 PROCEDURE — 99213 OFFICE O/P EST LOW 20 MIN: CPT | Performed by: FAMILY MEDICINE

## 2020-09-09 RX ORDER — PREGABALIN 300 MG/1
CAPSULE ORAL
Qty: 60 CAPSULE | Refills: 1 | Status: SHIPPED | OUTPATIENT
Start: 2020-09-09 | End: 2020-11-09 | Stop reason: SDUPTHER

## 2020-09-09 RX ORDER — NALOXONE HYDROCHLORIDE 4 MG/.1ML
SPRAY NASAL
COMMUNITY
Start: 2020-07-07 | End: 2022-06-13

## 2020-09-09 RX ORDER — METHYLPREDNISOLONE ACETATE 80 MG/ML
80 INJECTION, SUSPENSION INTRA-ARTICULAR; INTRALESIONAL; INTRAMUSCULAR; SOFT TISSUE ONCE
Status: COMPLETED | OUTPATIENT
Start: 2020-09-09 | End: 2020-09-09

## 2020-09-09 RX ORDER — LINACLOTIDE 72 UG/1
CAPSULE, GELATIN COATED ORAL
COMMUNITY
Start: 2020-08-21 | End: 2021-03-31

## 2020-09-09 RX ORDER — KETOROLAC TROMETHAMINE 30 MG/ML
60 INJECTION, SOLUTION INTRAMUSCULAR; INTRAVENOUS ONCE
Status: COMPLETED | OUTPATIENT
Start: 2020-09-09 | End: 2020-09-09

## 2020-09-09 RX ADMIN — METHYLPREDNISOLONE ACETATE 80 MG: 80 INJECTION, SUSPENSION INTRA-ARTICULAR; INTRALESIONAL; INTRAMUSCULAR; SOFT TISSUE at 14:34

## 2020-09-09 RX ADMIN — KETOROLAC TROMETHAMINE 60 MG: 30 INJECTION, SOLUTION INTRAMUSCULAR; INTRAVENOUS at 14:34

## 2020-09-09 NOTE — PROGRESS NOTES
Subjective   Katelynn Sung is a 60 y.o. female.     History of Present Illness   Mrs. Sung presents today for f/u on FMSx. She has had a flare of her diffuse muscle pain in stiffness since her fall in March of this year during which time she had fx of Lt arm, knee contusions, etc. Wishes to go back to her previous higher dose lyrica. No weight gain since last visit. No swelling. She continues ot have pain from C-DDD/DJD, L-DDD/DJD as well. No new focal neuro symptoms.    Still having some fatigue from COVID infection, now few weeks out. No increased cough, no fever.    The following portions of the patient's history were reviewed and updated as appropriate: allergies, current medications, past family history, past medical history, past social history, past surgical history and problem list.    Review of Systems   Constitutional: Positive for fatigue. Negative for diaphoresis, fever and unexpected weight change.   HENT: Positive for congestion and postnasal drip. Negative for ear pain, mouth sores, nosebleeds, rhinorrhea and sore throat.    Eyes: Negative for visual disturbance.   Respiratory: Positive for cough (chronic, dry, intermittent) and shortness of breath (mild with exertion). Negative for wheezing.    Cardiovascular: Negative for chest pain, palpitations and leg swelling.   Gastrointestinal: Positive for constipation and nausea. Negative for abdominal pain, blood in stool and vomiting.   Endocrine: Positive for heat intolerance. Negative for polydipsia and polyuria.   Genitourinary: Negative for dysuria and hematuria.   Musculoskeletal: Positive for arthralgias, back pain, myalgias and neck pain.   Skin: Negative for rash and wound.   Neurological: Positive for headaches. Negative for dizziness, tremors, syncope and weakness.   Hematological: Negative for adenopathy. Does not bruise/bleed easily.   Psychiatric/Behavioral: Positive for sleep disturbance. Negative for confusion and dysphoric mood. The  patient is nervous/anxious.    Pt's previous ROS reviewed and updated as indicated.       Objective    Vitals:    09/09/20 1325   BP: 122/70   Pulse: 88   Resp: 14   Temp: 98.2 °F (36.8 °C)   SpO2: 96%     Body mass index is 30.04 kg/m².      09/09/20  1325   Weight: 79.4 kg (175 lb)       Physical Exam   Constitutional: She is oriented to person, place, and time. She appears well-developed and well-nourished. She is cooperative. She does not appear ill. No distress.   obese   HENT:   Head: Normocephalic and atraumatic.   Eyes: Conjunctivae and EOM are normal. No scleral icterus.   Neck: Neck supple. Normal carotid pulses present. No thyroid mass present.   Cardiovascular: Normal rate, regular rhythm, normal heart sounds and intact distal pulses. Exam reveals no gallop.   No murmur heard.  Pulmonary/Chest: Effort normal and breath sounds normal. She has no wheezes. She has no rhonchi. She has no rales.   Musculoskeletal:        Thoracic back: She exhibits deformity (kyphotic posture). She exhibits no bony tenderness.   Diffuse soft tissue TTP     Vascular Status -  Her right foot exhibits no edema. Her left foot exhibits no edema.  Lymphadenopathy:     She has no cervical adenopathy.   Neurological: She is alert and oriented to person, place, and time. She has normal strength. She displays no tremor. No sensory deficit. Gait normal.   Skin: Skin is warm and dry. No bruising and no rash noted.   No jaundice   Psychiatric: Her speech is normal and behavior is normal. Judgment and thought content normal. Her affect is blunt. Cognition and memory are normal.   Good eye contact. Answers questions appropriately. Good personal hygiene and grooming.   Nursing note and vitals reviewed.  Pt's previous physical exam reviewed and updated as indicated.    Lab Results   Component Value Date    WBC 5.37 06/20/2019    HGB 14.8 06/20/2019    HCT 44.9 06/20/2019    MCV 87.2 06/20/2019     06/20/2019       Lab Results    Component Value Date    GLUCOSE 99 08/04/2016    BUN 9 03/04/2020    CREATININE 0.88 03/04/2020    EGFRIFNONA 73 03/04/2020    EGFRIFAFRI 84 03/04/2020    BCR 10 03/04/2020    K 4.3 03/04/2020    CO2 22 03/04/2020    CALCIUM 9.3 03/04/2020    PROTENTOTREF 7.7 06/20/2019    ALBUMIN 4.70 06/20/2019    LABIL2 1.6 06/20/2019    AST 64 (H) 06/20/2019    ALT 44 06/20/2019       Lab Results   Component Value Date    HGBA1C 5.7 (H) 03/04/2020       Lab Results   Component Value Date    TSH 1.690 06/20/2019         Assessment/Plan   Katelynn was seen today for copd, fibromyalgia and hypertension.    Diagnoses and all orders for this visit:    Fibromyalgia  -     methylPREDNISolone acetate (DEPO-medrol) injection 80 mg  -     ketorolac (TORADOL) injection 60 mg  -     pregabalin (LYRICA) 300 MG capsule; 1 po bid    Cervical spondylolysis  -     pregabalin (LYRICA) 300 MG capsule; 1 po bid    Lumbar degenerative disc disease  -     pregabalin (LYRICA) 300 MG capsule; 1 po bid       I have once again reviewed risks/benefits and potential side effects of various treatment options. Pt voices understanding and wishes to proceed with full dose Lyrica which she has previously tolerated. Point of care meds as above to assist with pain, stiffness.    Routine f/u as scheduled in 2 months, sooner as needed.  Patient was encouraged to keep me informed of any acute changes, lack of improvement, or any new concerning symptoms.  Pt is aware of reasons to seek emergent care.  Patient voiced understanding of all instructions and denied further questions.  As part of patient's treatment plan I am prescribing a controlled substance.  The patient has been made aware of the appropriate use of such medications, including potential risk of somnolence, limited ability to drive and/or work safely, and potential for dependence and/or overdose.  It has also been made clear that these medications are for use by this patient only, without concomitant use  of alcohol or other substances, unless prescribed.  DANTE report reviewed and scanned into chart.  Last DANTE date 6/24/2020.  History and physical exam exhibit continued safe and appropriate use of controlled substance.  Patient has completed a prescribing agreement detailing terms of continued prescribing of controlled substances, including monitoring DANTE reports, urine drug screening, and pill counts if necessary.  Patient is aware that inappropriate use will result in cessation of prescribing such medications.

## 2020-10-05 DIAGNOSIS — K86.1 CHRONIC PANCREATITIS, UNSPECIFIED PANCREATITIS TYPE (HCC): ICD-10-CM

## 2020-10-06 RX ORDER — PROMETHAZINE HYDROCHLORIDE 25 MG/1
TABLET ORAL
Qty: 90 TABLET | Refills: 0 | Status: SHIPPED | OUTPATIENT
Start: 2020-10-06 | End: 2020-11-09 | Stop reason: SDUPTHER

## 2020-10-06 RX ORDER — ALBUTEROL SULFATE 90 UG/1
AEROSOL, METERED RESPIRATORY (INHALATION)
Qty: 8.5 G | Refills: 4 | Status: SHIPPED | OUTPATIENT
Start: 2020-10-06 | End: 2021-12-20

## 2020-10-17 DIAGNOSIS — K86.1 CHRONIC PANCREATITIS, UNSPECIFIED PANCREATITIS TYPE (HCC): ICD-10-CM

## 2020-10-19 RX ORDER — PROMETHAZINE HYDROCHLORIDE 25 MG/1
TABLET ORAL
Qty: 90 TABLET | Refills: 0 | OUTPATIENT
Start: 2020-10-19

## 2020-10-23 RX ORDER — LISINOPRIL AND HYDROCHLOROTHIAZIDE 12.5; 1 MG/1; MG/1
TABLET ORAL
Qty: 30 TABLET | Refills: 4 | Status: SHIPPED | OUTPATIENT
Start: 2020-10-23 | End: 2021-04-30

## 2020-10-27 DIAGNOSIS — K86.1 CHRONIC PANCREATITIS, UNSPECIFIED PANCREATITIS TYPE (HCC): ICD-10-CM

## 2020-10-27 RX ORDER — PROMETHAZINE HYDROCHLORIDE 25 MG/1
TABLET ORAL
Qty: 90 TABLET | Refills: 0 | OUTPATIENT
Start: 2020-10-27

## 2020-11-05 RX ORDER — FUROSEMIDE 20 MG/1
TABLET ORAL
Qty: 30 TABLET | Refills: 2 | Status: SHIPPED | OUTPATIENT
Start: 2020-11-05 | End: 2021-03-01

## 2020-11-06 DIAGNOSIS — M43.02 CERVICAL SPONDYLOLYSIS: ICD-10-CM

## 2020-11-06 DIAGNOSIS — M51.36 LUMBAR DEGENERATIVE DISC DISEASE: ICD-10-CM

## 2020-11-06 DIAGNOSIS — M79.7 FIBROMYALGIA: ICD-10-CM

## 2020-11-06 DIAGNOSIS — K86.1 CHRONIC PANCREATITIS, UNSPECIFIED PANCREATITIS TYPE (HCC): ICD-10-CM

## 2020-11-06 RX ORDER — PREGABALIN 300 MG/1
CAPSULE ORAL
Qty: 60 CAPSULE | Refills: 1 | Status: CANCELLED | OUTPATIENT
Start: 2020-11-06

## 2020-11-06 RX ORDER — PROMETHAZINE HYDROCHLORIDE 25 MG/1
TABLET ORAL
Qty: 90 TABLET | Refills: 0 | Status: CANCELLED | OUTPATIENT
Start: 2020-11-06

## 2020-11-09 ENCOUNTER — OFFICE VISIT (OUTPATIENT)
Dept: FAMILY MEDICINE CLINIC | Facility: CLINIC | Age: 60
End: 2020-11-09

## 2020-11-09 DIAGNOSIS — M43.02 CERVICAL SPONDYLOLYSIS: ICD-10-CM

## 2020-11-09 DIAGNOSIS — J42 CHRONIC BRONCHITIS, UNSPECIFIED CHRONIC BRONCHITIS TYPE (HCC): ICD-10-CM

## 2020-11-09 DIAGNOSIS — M79.7 FIBROMYALGIA: Primary | ICD-10-CM

## 2020-11-09 DIAGNOSIS — M51.36 LUMBAR DEGENERATIVE DISC DISEASE: ICD-10-CM

## 2020-11-09 DIAGNOSIS — K86.1 CHRONIC PANCREATITIS, UNSPECIFIED PANCREATITIS TYPE (HCC): ICD-10-CM

## 2020-11-09 PROCEDURE — 99441 PR PHYS/QHP TELEPHONE EVALUATION 5-10 MIN: CPT | Performed by: FAMILY MEDICINE

## 2020-11-09 RX ORDER — PROMETHAZINE HYDROCHLORIDE 25 MG/1
25 TABLET ORAL EVERY 8 HOURS PRN
Qty: 90 TABLET | Refills: 0 | Status: SHIPPED | OUTPATIENT
Start: 2020-11-09 | End: 2020-12-09

## 2020-11-09 RX ORDER — PREGABALIN 300 MG/1
CAPSULE ORAL
Qty: 60 CAPSULE | Refills: 1 | Status: SHIPPED | OUTPATIENT
Start: 2020-11-09 | End: 2021-01-05 | Stop reason: SDUPTHER

## 2020-11-09 NOTE — PROGRESS NOTES
You have chosen to receive care through a telephone visit. Do you consent to use a telephone visit for your medical care today? Yes    TELEPHONE VISIT NOTE  2020    HELEN WOLF   1960      History of Present Illness   Mrs. Wolf presents today for follow-up on fibromyalgia, chronic pain due to lumbar degenerative disc disease, cervical spondylosis.  Her last visit, the dose of Lyrica increased to 300 mg twice daily.  She is taking as prescribed.  Tolerating well.  Denies side effects such as weight gain or swelling.  No increased confusion or dizziness.  She has had significant improvement in pain, stiffness.  Pleased with overall improved function, ability to perform household chores etc.  She is sleeping well.    She has COPD/chronic bronchitis.  No recent increase in cough, sputum production, chest pain, hemoptysis, fever or weight loss.  She has not yet gotten flu shot.  She is fully recovered from her Covid infection earlier this year.    She is followed by GI for chronic pancreatitis.  Request refill of her Phenergan.  No recent changes in GI symptoms.    Patient's past medical hx, surgical hx, family hx, social hx reviewed on chart. Medication and allergy lists reviewed on chart. Information updated as indicated.      Review of Systems   Constitutional: Positive for fatigue. Negative for diaphoresis, fever and unexpected weight change.   HENT: Positive for congestion and postnasal drip. Negative for ear pain, mouth sores, nosebleeds, rhinorrhea and sore throat.    Eyes: Negative for visual disturbance.   Respiratory: Positive for cough (chronic, dry, intermittent) and shortness of breath (mild with exertion). Negative for wheezing.    Cardiovascular: Negative for chest pain, palpitations and leg swelling.   Gastrointestinal: Positive for constipation and nausea. Negative for abdominal pain, blood in stool and vomiting.   Endocrine: Positive for heat intolerance. Negative for polydipsia and  polyuria.   Genitourinary: Negative for dysuria and hematuria.   Musculoskeletal: Positive for arthralgias, back pain, myalgias and neck pain.   Skin: Negative for rash and wound.   Neurological: Positive for headaches. Negative for dizziness, tremors, syncope and weakness.   Hematological: Negative for adenopathy. Does not bruise/bleed easily.   Psychiatric/Behavioral: Positive for sleep disturbance. Negative for confusion and dysphoric mood. The patient is nervous/anxious.    Pt's previous ROS reviewed and updated as indicated.         Diagnoses and all orders for this visit:    Fibromyalgia  -     pregabalin (LYRICA) 300 MG capsule; 1 po bid    Cervical spondylolysis  -     pregabalin (LYRICA) 300 MG capsule; 1 po bid    Lumbar degenerative disc disease  -     pregabalin (LYRICA) 300 MG capsule; 1 po bid    Chronic pancreatitis, unspecified pancreatitis type (CMS/HCC)  -     promethazine (PHENERGAN) 25 MG tablet; Take 1 tablet by mouth Every 8 (Eight) Hours As Needed for Nausea or Vomiting.    Chronic bronchitis, unspecified chronic bronchitis type (CMS/HCC)      Excellent clinical response to increased dose of Lyrica.  Improved function.  Minimal side effects reported.  No aberrant behavior.  Continue current treatment plan.    COPD/chronic bronchitis stable.  Continue Breo, albuterol as needed.  To get her influenza vaccine as early as possible.    Patient will f/u 2 months, sooner as needed.  Patient was encouraged to keep me informed of any acute changes, lack of improvement, or any new concerning symptoms.  Pt is aware of reasons to seek emergent care.  Patient voiced understanding of all instructions and denied further questions.  As part of patient's treatment plan I am prescribing a controlled substance.  The patient has been made aware of the appropriate use of such medications, including potential risk of somnolence, limited ability to drive and/or work safely, and potential for dependence and/or  overdose.  It has also been made clear that these medications are for use by this patient only, without concomitant use of alcohol or other substances, unless prescribed.  DANTE report reviewed and scanned into chart.  Last DANTE date 11/9/2020.  History and physical exam exhibit continued safe and appropriate use of controlled substance.  Patient has completed a prescribing agreement detailing terms of continued prescribing of controlled substances, including monitoring DANTE reports, urine drug screening, and pill counts if necessary.  Patient is aware that inappropriate use will result in cessation of prescribing such medications.      This visit has been rescheduled as a phone visit to comply with patient safety concerns in accordance with CDC recommendations. Total time of discussion was 5 minutes.

## 2020-12-08 DIAGNOSIS — K86.1 CHRONIC PANCREATITIS, UNSPECIFIED PANCREATITIS TYPE (HCC): ICD-10-CM

## 2020-12-09 RX ORDER — PROMETHAZINE HYDROCHLORIDE 25 MG/1
25 TABLET ORAL EVERY 8 HOURS PRN
Qty: 90 TABLET | Refills: 0 | Status: SHIPPED | OUTPATIENT
Start: 2020-12-09 | End: 2021-02-01

## 2021-01-04 ENCOUNTER — TELEPHONE (OUTPATIENT)
Dept: ORTHOPEDIC SURGERY | Facility: CLINIC | Age: 61
End: 2021-01-04

## 2021-01-04 DIAGNOSIS — M79.7 FIBROMYALGIA: ICD-10-CM

## 2021-01-04 DIAGNOSIS — M51.36 LUMBAR DEGENERATIVE DISC DISEASE: ICD-10-CM

## 2021-01-04 DIAGNOSIS — M43.02 CERVICAL SPONDYLOLYSIS: ICD-10-CM

## 2021-01-04 RX ORDER — PREGABALIN 300 MG/1
CAPSULE ORAL
Qty: 60 CAPSULE | Refills: 1 | Status: CANCELLED | OUTPATIENT
Start: 2021-01-04

## 2021-01-04 RX ORDER — PREGABALIN 300 MG/1
CAPSULE ORAL
Qty: 60 CAPSULE | Refills: 0 | OUTPATIENT
Start: 2021-01-04

## 2021-01-04 NOTE — TELEPHONE ENCOUNTER
Caller: HELEN WOLF    Relationship: SELF    Best call back number: [phone]    PATIENT WOULD LIKE TO RECEIVE A PHONE CALL ABOUT CLARIFICATION OF HAVING FRACTURES.     What form or medical record are you requesting: PAPERWORK FOR  STATING THAT SHE HAD FRACTURE. PATIENT STATES THAT HER  IS CLAIMING HER CLAIMS PAPERWORK MUST SAY THAT SHE HAD FRACTURES. PATIENT STATES THAT THE PAPERWORK SHE PREVIOUSLY RECEIVED DID NOT CLARIFY FRACTURES.     Who is requesting this form or medical record from you: ABDIRAHMAN HALL     How would you like to receive the form or medical records (pick-up, mail, fax): FAX  If fax, what is the fax number:    [phone]

## 2021-01-04 NOTE — TELEPHONE ENCOUNTER
Caller: Katelynn Sung    Relationship: Self    Best call back number: [phone]    Medication needed:   Requested Prescriptions     Pending Prescriptions Disp Refills   • pregabalin (LYRICA) 300 MG capsule 60 capsule 1     Si po bid         Does the patient have less than a 3 day supply:  [] Yes  [x] No    What is the patient's preferred pharmacy: [facility] - [facility], [address] - [phone] [facility] [phone] FX

## 2021-01-05 ENCOUNTER — OFFICE VISIT (OUTPATIENT)
Dept: FAMILY MEDICINE CLINIC | Facility: CLINIC | Age: 61
End: 2021-01-05

## 2021-01-05 VITALS
HEIGHT: 64 IN | DIASTOLIC BLOOD PRESSURE: 74 MMHG | BODY MASS INDEX: 31.62 KG/M2 | WEIGHT: 185.2 LBS | SYSTOLIC BLOOD PRESSURE: 126 MMHG | HEART RATE: 77 BPM | RESPIRATION RATE: 18 BRPM | OXYGEN SATURATION: 99 %

## 2021-01-05 DIAGNOSIS — K21.00 GASTROESOPHAGEAL REFLUX DISEASE WITH ESOPHAGITIS WITHOUT HEMORRHAGE: ICD-10-CM

## 2021-01-05 DIAGNOSIS — G47.00 PERSISTENT INSOMNIA: ICD-10-CM

## 2021-01-05 DIAGNOSIS — M54.2 NECK PAIN: ICD-10-CM

## 2021-01-05 DIAGNOSIS — M51.36 LUMBAR DEGENERATIVE DISC DISEASE: ICD-10-CM

## 2021-01-05 DIAGNOSIS — M79.7 FIBROMYALGIA: Primary | ICD-10-CM

## 2021-01-05 DIAGNOSIS — M62.838 CERVICAL PARASPINAL MUSCLE SPASM: ICD-10-CM

## 2021-01-05 DIAGNOSIS — M43.02 CERVICAL SPONDYLOLYSIS: ICD-10-CM

## 2021-01-05 DIAGNOSIS — F43.0 ACUTE STRESS REACTION: ICD-10-CM

## 2021-01-05 PROCEDURE — 99214 OFFICE O/P EST MOD 30 MIN: CPT | Performed by: FAMILY MEDICINE

## 2021-01-05 PROCEDURE — 96372 THER/PROPH/DIAG INJ SC/IM: CPT | Performed by: FAMILY MEDICINE

## 2021-01-05 RX ORDER — PANTOPRAZOLE SODIUM 40 MG/1
40 TABLET, DELAYED RELEASE ORAL 2 TIMES DAILY
Qty: 60 TABLET | Refills: 3 | Status: SHIPPED | OUTPATIENT
Start: 2021-01-05 | End: 2021-05-19

## 2021-01-05 RX ORDER — CYCLOBENZAPRINE HCL 5 MG
5 TABLET ORAL 3 TIMES DAILY PRN
Qty: 90 TABLET | Refills: 0 | Status: SHIPPED | OUTPATIENT
Start: 2021-01-05 | End: 2021-02-01

## 2021-01-05 RX ORDER — ZOLPIDEM TARTRATE 10 MG/1
10 TABLET ORAL NIGHTLY PRN
Qty: 30 TABLET | Refills: 5 | Status: SHIPPED | OUTPATIENT
Start: 2021-01-05 | End: 2021-06-14 | Stop reason: SDUPTHER

## 2021-01-05 RX ORDER — KETOROLAC TROMETHAMINE 30 MG/ML
60 INJECTION, SOLUTION INTRAMUSCULAR; INTRAVENOUS ONCE
Status: COMPLETED | OUTPATIENT
Start: 2021-01-05 | End: 2021-01-05

## 2021-01-05 RX ORDER — METHYLPREDNISOLONE ACETATE 80 MG/ML
80 INJECTION, SUSPENSION INTRA-ARTICULAR; INTRALESIONAL; INTRAMUSCULAR; SOFT TISSUE ONCE
Status: COMPLETED | OUTPATIENT
Start: 2021-01-05 | End: 2021-01-05

## 2021-01-05 RX ORDER — PREGABALIN 300 MG/1
CAPSULE ORAL
Qty: 60 CAPSULE | Refills: 2 | Status: SHIPPED | OUTPATIENT
Start: 2021-01-05 | End: 2021-04-01 | Stop reason: SDUPTHER

## 2021-01-05 RX ADMIN — KETOROLAC TROMETHAMINE 60 MG: 30 INJECTION, SOLUTION INTRAMUSCULAR; INTRAVENOUS at 09:27

## 2021-01-05 RX ADMIN — METHYLPREDNISOLONE ACETATE 80 MG: 80 INJECTION, SUSPENSION INTRA-ARTICULAR; INTRALESIONAL; INTRAMUSCULAR; SOFT TISSUE at 09:28

## 2021-01-05 NOTE — PROGRESS NOTES
Subjective   Katelynn Sung is a 60 y.o. female.     History of Present Illness   Mrs. Sung c/o neck pain, headache, upper back pain. Reports she is under high stress due to family relationships, son's incarceration, etc. No new focal neuro symptoms. No fall or injury. She has known C-DDD/DJD. Also with FMSx, L-DDD. Due for lyrica refill. Lower back pain stable. Diffuse muscle pain improved/stable. Denies side effects from medication.     Not currently on muscle relaxant.    Request refill of ambien. Working well for sleep initation. Denies side effects.     Has chronic GERD with previous gastritis/esophagitis. Taking protonix as rx'd. No dysphagia, no unintentional weight loss, blood in stool.    The following portions of the patient's history were reviewed and updated as appropriate: allergies, current medications, past family history, past medical history, past social history, past surgical history and problem list.    Review of Systems   Constitutional: Positive for fatigue. Negative for diaphoresis, fever and unexpected weight change.   HENT: Positive for congestion and postnasal drip. Negative for ear pain, mouth sores, nosebleeds, rhinorrhea and sore throat.    Eyes: Negative for visual disturbance.   Respiratory: Positive for cough (chronic, dry, intermittent) and shortness of breath (mild with exertion). Negative for wheezing.    Cardiovascular: Negative for chest pain, palpitations and leg swelling.   Gastrointestinal: Positive for constipation and nausea. Negative for abdominal pain, blood in stool and vomiting.   Endocrine: Positive for heat intolerance. Negative for polydipsia and polyuria.   Genitourinary: Negative for dysuria and hematuria.   Musculoskeletal: Positive for arthralgias, back pain, myalgias, neck pain and neck stiffness.   Skin: Negative for rash and wound.   Neurological: Positive for headaches. Negative for dizziness, tremors, syncope and weakness.   Hematological: Negative for  adenopathy. Does not bruise/bleed easily.   Psychiatric/Behavioral: Positive for sleep disturbance. Negative for confusion and dysphoric mood. The patient is nervous/anxious.    Pt's previous ROS reviewed and updated as indicated.       Objective    Vitals:    01/05/21 0849   BP: 126/74   Pulse: 77   Resp: 18   SpO2: 99%     Body mass index is 31.79 kg/m².      01/05/21  0849   Weight: 84 kg (185 lb 3.2 oz)       Physical Exam  Vitals signs and nursing note reviewed.   Constitutional:       General: She is in acute distress (mild due to pain).      Appearance: She is well-developed, well-groomed and overweight. She is not ill-appearing.   HENT:      Head: Normocephalic and atraumatic.   Cardiovascular:      Rate and Rhythm: Normal rate and regular rhythm.      Pulses: Normal pulses.      Heart sounds: Normal heart sounds.   Pulmonary:      Effort: Pulmonary effort is normal.      Breath sounds: Decreased breath sounds present. No wheezing, rhonchi or rales.   Musculoskeletal:      Cervical back: She exhibits decreased range of motion (mildly), tenderness (diffuse soft tissue), bony tenderness (C4-C7) and spasm.      Thoracic back: She exhibits tenderness (diffuse soft tissue) and deformity (kyphotic posture). She exhibits no bony tenderness.      Right lower leg: No edema.      Left lower leg: No edema.   Lymphadenopathy:      Cervical: No cervical adenopathy.   Skin:     General: Skin is warm and dry.      Coloration: Skin is not jaundiced or pale.      Findings: No rash.   Neurological:      Mental Status: She is alert and oriented to person, place, and time.      Cranial Nerves: Cranial nerves are intact.      Sensory: Sensation is intact.      Motor: Motor function is intact.      Gait: Gait is intact.   Psychiatric:         Mood and Affect: Mood is anxious.         Behavior: Behavior normal. Behavior is cooperative.         Cognition and Memory: Cognition normal.       Lab Results   Component Value Date    WBC  5.37 06/20/2019    HGB 14.8 06/20/2019    HCT 44.9 06/20/2019    MCV 87.2 06/20/2019     06/20/2019       Lab Results   Component Value Date    GLUCOSE 99 08/04/2016    BUN 9 03/04/2020    CREATININE 0.88 03/04/2020    EGFRIFNONA 73 03/04/2020    EGFRIFAFRI 84 03/04/2020    BCR 10 03/04/2020    K 4.3 03/04/2020    CO2 22 03/04/2020    CALCIUM 9.3 03/04/2020    PROTENTOTREF 7.7 06/20/2019    ALBUMIN 4.70 06/20/2019    LABIL2 1.6 06/20/2019    AST 64 (H) 06/20/2019    ALT 44 06/20/2019       Lab Results   Component Value Date    HGBA1C 5.7 (H) 03/04/2020       Lab Results   Component Value Date    TSH 1.690 06/20/2019           Assessment/Plan   Diagnoses and all orders for this visit:    1. Fibromyalgia (Primary)  -     pregabalin (LYRICA) 300 MG capsule; 1 po bid  Dispense: 60 capsule; Refill: 2  -     cyclobenzaprine (FLEXERIL) 5 MG tablet; Take 1 tablet by mouth 3 (Three) Times a Day As Needed for Muscle Spasms.  Dispense: 90 tablet; Refill: 0    2. Cervical spondylolysis  -     pregabalin (LYRICA) 300 MG capsule; 1 po bid  Dispense: 60 capsule; Refill: 2    3. Lumbar degenerative disc disease  -     pregabalin (LYRICA) 300 MG capsule; 1 po bid  Dispense: 60 capsule; Refill: 2    4. Persistent insomnia  -     zolpidem (AMBIEN) 10 MG tablet; Take 1 tablet by mouth At Night As Needed for Sleep.  Dispense: 30 tablet; Refill: 5    5. Acute stress reaction    6. Neck pain  -     ketorolac (TORADOL) injection 60 mg  -     methylPREDNISolone acetate (DEPO-medrol) injection 80 mg  -     cyclobenzaprine (FLEXERIL) 5 MG tablet; Take 1 tablet by mouth 3 (Three) Times a Day As Needed for Muscle Spasms.  Dispense: 90 tablet; Refill: 0    7. Cervical paraspinal muscle spasm  -     cyclobenzaprine (FLEXERIL) 5 MG tablet; Take 1 tablet by mouth 3 (Three) Times a Day As Needed for Muscle Spasms.  Dispense: 90 tablet; Refill: 0    8. Gastroesophageal reflux disease with esophagitis without hemorrhage    Other orders  -      "pantoprazole (PROTONIX) 40 MG EC tablet; Take 1 tablet by mouth 2 (Two) Times a Day.  Dispense: 60 tablet; Refill: 3       Acute neck pain in setting of chronic C-DDD/DJD. No red flag signs/symptoms. Significant muscle spasm. POC tx as above. I have reviewed risks/benefits and potential side effects of various treatment options. Pt voices understanding and wishes to proceed with trial of flexeril. Avoid NSAIDS due to GI hx. Continue lyrica.    Acute stress reaction- she does not wish to begin \"mood\" medications at this time. Feels she has a good plan of action for dealing with recent stressors. Good insight.    GERD stable. Continue protonix. She is aware of risks/benefits.    FMSx stable of lyrica. Continue current tx plan. Add flexeril as above.    Routine f/u as schduled, f/u sooner as needed.  Patient was encouraged to keep me informed of any acute changes, lack of improvement, or any new concerning symptoms.  Pt is aware of reasons to seek emergent care.  Patient voiced understanding of all instructions and denied further questions.  As part of patient's treatment plan I am prescribing a controlled substance.  The patient has been made aware of the appropriate use of such medications, including potential risk of somnolence, limited ability to drive and/or work safely, and potential for dependence and/or overdose.  It has also been made clear that these medications are for use by this patient only, without concomitant use of alcohol or other substances, unless prescribed.  History and physical exam exhibit continued safe and appropriate use of controlled substance.  Patient has completed a prescribing agreement detailing terms of continued prescribing of controlled substances, including monitoring DANTE reports, urine drug screening, and pill counts if necessary.  Patient is aware that inappropriate use will result in cessation of prescribing such medications.  dante reviewed.           "

## 2021-02-01 DIAGNOSIS — M79.7 FIBROMYALGIA: ICD-10-CM

## 2021-02-01 DIAGNOSIS — M54.2 NECK PAIN: ICD-10-CM

## 2021-02-01 DIAGNOSIS — K86.1 CHRONIC PANCREATITIS, UNSPECIFIED PANCREATITIS TYPE (HCC): ICD-10-CM

## 2021-02-01 DIAGNOSIS — M62.838 CERVICAL PARASPINAL MUSCLE SPASM: ICD-10-CM

## 2021-02-01 RX ORDER — PROMETHAZINE HYDROCHLORIDE 25 MG/1
25 TABLET ORAL EVERY 8 HOURS PRN
Qty: 90 TABLET | Refills: 2 | Status: SHIPPED | OUTPATIENT
Start: 2021-02-01 | End: 2021-04-30

## 2021-02-01 RX ORDER — CYCLOBENZAPRINE HCL 5 MG
5 TABLET ORAL 3 TIMES DAILY PRN
Qty: 90 TABLET | Refills: 2 | Status: SHIPPED | OUTPATIENT
Start: 2021-02-01 | End: 2021-04-30

## 2021-02-04 ENCOUNTER — TELEPHONE (OUTPATIENT)
Dept: FAMILY MEDICINE CLINIC | Facility: CLINIC | Age: 61
End: 2021-02-04

## 2021-02-04 NOTE — TELEPHONE ENCOUNTER
Pt called to check status of nystatin refill request from pharmacy.  Sts she really needs the med to be filled today.  Please advise.

## 2021-02-23 ENCOUNTER — PRIOR AUTHORIZATION (OUTPATIENT)
Dept: FAMILY MEDICINE CLINIC | Facility: CLINIC | Age: 61
End: 2021-02-23

## 2021-03-01 RX ORDER — FUROSEMIDE 20 MG/1
TABLET ORAL
Qty: 30 TABLET | Refills: 1 | Status: SHIPPED | OUTPATIENT
Start: 2021-03-01 | End: 2021-04-28

## 2021-03-16 ENCOUNTER — OFFICE VISIT (OUTPATIENT)
Dept: FAMILY MEDICINE CLINIC | Facility: CLINIC | Age: 61
End: 2021-03-16

## 2021-03-16 DIAGNOSIS — J01.11 ACUTE RECURRENT FRONTAL SINUSITIS: Primary | ICD-10-CM

## 2021-03-16 PROCEDURE — 99442 PR PHYS/QHP TELEPHONE EVALUATION 11-20 MIN: CPT | Performed by: NURSE PRACTITIONER

## 2021-03-16 RX ORDER — PREDNISONE 10 MG/1
TABLET ORAL
Qty: 14 TABLET | Refills: 0 | Status: SHIPPED | OUTPATIENT
Start: 2021-03-16 | End: 2021-04-19

## 2021-03-16 RX ORDER — AMOXICILLIN AND CLAVULANATE POTASSIUM 875; 125 MG/1; MG/1
1 TABLET, FILM COATED ORAL 2 TIMES DAILY
Qty: 14 TABLET | Refills: 0 | Status: SHIPPED | OUTPATIENT
Start: 2021-03-16 | End: 2021-03-23

## 2021-03-16 NOTE — PROGRESS NOTES
Subjective     Chief Complaint:    Chief Complaint   Patient presents with   • Sinusitis   • URI     You have chosen to receive care through a telephone visit. Do you consent to use a telephone visit for your medical care today? Yes    History of Present Illness:   Has been sick for 2 days. She reports feeling achy. No fever. + runny nose with congestion as well. She used astelin and it helped. No sore throat. Notes ears are bothering her a little. No change in taste or smell. She has been coughing. No SOA.   She has facial pressure. No teeth pain. Reports history of recurrent sinus infections and then bronchitis. She insists on anbx therapy and steroids. Says she has hx of resp failure that required mechanical ventilation.   Had covid is August.   She has been taking zyrtec as well.     Review of Systems  Gen- No fevers, chills  CV- No chest pain, palpitations  GI- No N/V/D, abd pain  Neuro-No dizziness, headaches      I have reviewed and/or updated the patient's past medical, surgical, family, social history and problem list as appropriate.     Medications:    Current Outpatient Medications:   •  albuterol sulfate  (90 Base) MCG/ACT inhaler, INHALE 2 PUFFS BY MOUTH EVERY 4 (FOUR) HOURS AS NEEDED FOR WHEEZING., Disp: 8.5 g, Rfl: 4  •  azelastine (ASTELIN) 0.1 % nasal spray, USE 2 SPRAYS IN EACH NOSTRIL(S) AS DIRECTED BY PROVIDER 2 (TWO) TIMES A DAY., Disp: 30 mL, Rfl: 1  •  BREO ELLIPTA 200-25 MCG/INH inhaler, INHALE 1 PUFF BY MOUTH DAILY. (Patient taking differently: Inhale 1 puff Daily.), Disp: 1 each, Rfl: 5  •  buprenorphine-naloxone (SUBOXONE) 8-2 MG per SL tablet, Place 1 tablet under the tongue 2 (Two) Times a Day., Disp: , Rfl:   •  cetirizine (zyrTEC) 10 MG tablet, TAKE 1 TABLET BY MOUTH ONCE DAILY, Disp: 30 tablet, Rfl: 1  •  cyclobenzaprine (FLEXERIL) 5 MG tablet, TAKE 1 TABLET BY MOUTH 3 (THREE) TIMES A DAY AS NEEDED FOR MUSCLE SPASMS., Disp: 90 tablet, Rfl: 2  •  fluticasone (FLONASE) 50  MCG/ACT nasal spray, USE 2 SPRAYS INTO THE NOSTRIL(S) AS DIRECTED BY PROVIDER DAILY., Disp: 15.8 g, Rfl: 4  •  furosemide (LASIX) 20 MG tablet, TAKE 1 TABLET BY MOUTH DAILY AS NEEDED FOR SWELLING, Disp: 30 tablet, Rfl: 1  •  LINZESS 72 MCG capsule capsule, , Disp: , Rfl:   •  lisinopril-hydrochlorothiazide (PRINZIDE,ZESTORETIC) 10-12.5 MG per tablet, TAKE 1 TABLET BY MOUTH ONCE DAILY, Disp: 30 tablet, Rfl: 4  •  NARCAN 4 MG/0.1ML nasal spray, INSTILL ONE SPRAY IN ONE NOSTRIL ONE TIME. MAY REPEAT EVERY 2 TO 3 MINUTES UNTIL PATIENT IS RESPONSIVE., Disp: , Rfl:   •  nystatin (MYCOSTATIN) 765305 UNIT/ML suspension, Swish and swallow 5 ml 4 times daily, Disp: 180 mL, Rfl: 0  •  pantoprazole (PROTONIX) 40 MG EC tablet, Take 1 tablet by mouth 2 (Two) Times a Day., Disp: 60 tablet, Rfl: 3  •  pregabalin (LYRICA) 300 MG capsule, 1 po bid, Disp: 60 capsule, Rfl: 2  •  promethazine (PHENERGAN) 25 MG tablet, TAKE 1 TABLET BY MOUTH EVERY 8 (EIGHT) HOURS AS NEEDED FOR NAUSEA OR VOMITING., Disp: 90 tablet, Rfl: 2  •  zolpidem (AMBIEN) 10 MG tablet, Take 1 tablet by mouth At Night As Needed for Sleep., Disp: 30 tablet, Rfl: 5  •  amoxicillin-clavulanate (Augmentin) 875-125 MG per tablet, Take 1 tablet by mouth 2 (Two) Times a Day for 7 days., Disp: 14 tablet, Rfl: 0  •  predniSONE (DELTASONE) 10 MG tablet, 4 po day 1, then decrease by 1 tablet each day until gone (4,3,2,1), Disp: 14 tablet, Rfl: 0    Allergies:  Allergies   Allergen Reactions   • Nsaids GI Intolerance       Objective     Vital Signs: There were no vitals filed for this visit.    Physical Exam:  Gen-no acute distress, telephone exam  Resp- normal WOB, able to speak in full sentences without distress  Neuro-A&Ox3, speech clear  Psych-appropriate mood, cooperative       Assessment / Plan     Assessment/Plan:   Problem List Items Addressed This Visit     None      Visit Diagnoses     Acute recurrent frontal sinusitis    -  Primary    Relevant Medications     amoxicillin-clavulanate (Augmentin) 875-125 MG per tablet    predniSONE (DELTASONE) 10 MG tablet        -- augmentin and prednisone per her request. She has been counseled on the risk and would like to proceed  -- supportive care discussed    Follow up:  As needed    Total Time of Encounter 15 minutes    Electronically signed by JESSICA López   03/16/2021 16:56 EDT      Please note that portions of this note may have been completed with a voice recognition program. Efforts were made to edit the dictations, but occasionally words are mistranscribed.

## 2021-03-22 ENCOUNTER — OFFICE VISIT (OUTPATIENT)
Dept: FAMILY MEDICINE CLINIC | Facility: CLINIC | Age: 61
End: 2021-03-22

## 2021-03-22 ENCOUNTER — TELEPHONE (OUTPATIENT)
Dept: FAMILY MEDICINE CLINIC | Facility: CLINIC | Age: 61
End: 2021-03-22

## 2021-03-22 VITALS
HEIGHT: 64 IN | SYSTOLIC BLOOD PRESSURE: 117 MMHG | WEIGHT: 182 LBS | DIASTOLIC BLOOD PRESSURE: 74 MMHG | HEART RATE: 78 BPM | RESPIRATION RATE: 20 BRPM | BODY MASS INDEX: 31.07 KG/M2 | TEMPERATURE: 97.1 F | OXYGEN SATURATION: 95 %

## 2021-03-22 DIAGNOSIS — J42 CHRONIC BRONCHITIS, UNSPECIFIED CHRONIC BRONCHITIS TYPE (HCC): Primary | ICD-10-CM

## 2021-03-22 DIAGNOSIS — B37.0 THRUSH: ICD-10-CM

## 2021-03-22 DIAGNOSIS — J44.1 COPD WITH EXACERBATION (HCC): ICD-10-CM

## 2021-03-22 PROCEDURE — 99214 OFFICE O/P EST MOD 30 MIN: CPT | Performed by: FAMILY MEDICINE

## 2021-03-22 RX ORDER — ALBUTEROL SULFATE 2.5 MG/3ML
2.5 SOLUTION RESPIRATORY (INHALATION) ONCE
Status: COMPLETED | OUTPATIENT
Start: 2021-03-22 | End: 2021-03-22

## 2021-03-22 RX ORDER — FLUCONAZOLE 150 MG/1
TABLET ORAL
Qty: 4 TABLET | Refills: 0 | Status: SHIPPED | OUTPATIENT
Start: 2021-03-22 | End: 2021-04-21

## 2021-03-22 RX ORDER — METHYLPREDNISOLONE ACETATE 80 MG/ML
80 INJECTION, SUSPENSION INTRA-ARTICULAR; INTRALESIONAL; INTRAMUSCULAR; SOFT TISSUE ONCE
Status: COMPLETED | OUTPATIENT
Start: 2021-03-22 | End: 2021-03-22

## 2021-03-22 RX ORDER — CEFTRIAXONE 1 G/1
1 INJECTION, POWDER, FOR SOLUTION INTRAMUSCULAR; INTRAVENOUS ONCE
Status: COMPLETED | OUTPATIENT
Start: 2021-03-22 | End: 2021-03-22

## 2021-03-22 RX ADMIN — CEFTRIAXONE 1 G: 1 INJECTION, POWDER, FOR SOLUTION INTRAMUSCULAR; INTRAVENOUS at 15:56

## 2021-03-22 RX ADMIN — METHYLPREDNISOLONE ACETATE 80 MG: 80 INJECTION, SUSPENSION INTRA-ARTICULAR; INTRALESIONAL; INTRAMUSCULAR; SOFT TISSUE at 15:57

## 2021-03-22 RX ADMIN — ALBUTEROL SULFATE 2.5 MG: 2.5 SOLUTION RESPIRATORY (INHALATION) at 15:58

## 2021-03-22 NOTE — TELEPHONE ENCOUNTER
Pt had telephone visit on 3/16 w [facility], but sts that she is not better and wants an in person visit with Dr Servin.  Sts that she usually gets a couple of shots to clear up her sinus infection & would like to come in for an in person visit.  She is still coughing/congested, but said she has no fever/chills or any other COVID symptoms. If pt is OK for in person visit, let me know & I will call her to schedule her.

## 2021-03-22 NOTE — PROGRESS NOTES
"Subjective   Katelynn Richy Sung is a 61 y.o. female.     History of Present Illness   Mrs. Sung presents today with complaint of worsening cough.  She called in approximately 1 week ago complaining of exacerbation of her chronic bronchitis.  She was given Augmentin and steroids at that time.  She reports it was \"no help at all\".  She continues to complain of chest tightness, wheezing, dry cough but with significant chest congestion.  No hemoptysis.  Has anterior chest pain with cough.  Using Ventolin several times per day with only short-term improvement.  Not resting well at night due to cough.    She complains of oral pain due to thrush following antibiotic use.    The following portions of the patient's history were reviewed and updated as appropriate: allergies, current medications, past family history, past medical history, past social history, past surgical history and problem list.    Review of Systems   Constitutional: Positive for fatigue. Negative for diaphoresis, fever and unexpected weight change.   HENT: Positive for congestion and postnasal drip. Negative for ear pain, mouth sores, nosebleeds, rhinorrhea and sore throat.    Eyes: Negative for visual disturbance.   Respiratory: Positive for cough, shortness of breath (mild with exertion) and wheezing.    Cardiovascular: Negative for chest pain, palpitations and leg swelling.   Gastrointestinal: Positive for constipation and nausea. Negative for abdominal pain, blood in stool and vomiting.   Endocrine: Positive for heat intolerance. Negative for polydipsia and polyuria.   Genitourinary: Negative for dysuria and hematuria.   Musculoskeletal: Positive for arthralgias, back pain, myalgias, neck pain and neck stiffness.   Skin: Negative for rash and wound.   Neurological: Positive for headaches. Negative for dizziness, tremors, syncope and weakness.   Hematological: Negative for adenopathy. Does not bruise/bleed easily.   Psychiatric/Behavioral: Positive " for sleep disturbance. Negative for confusion and dysphoric mood. The patient is nervous/anxious.    Pt's previous ROS reviewed and updated as indicated.       Objective    Vitals:    03/22/21 1516   BP: 117/74   Pulse: 78   Resp: 20   Temp: 97.1 °F (36.2 °C)   SpO2: 95%     Body mass index is 31.22 kg/m².      03/22/21  1516   Weight: 82.6 kg (182 lb)       Physical Exam  Vitals and nursing note reviewed.   Constitutional:       General: She is not in acute distress.     Appearance: She is well-developed, well-groomed and overweight. She is ill-appearing (mildly).   HENT:      Head: Normocephalic and atraumatic.      Right Ear: Tympanic membrane, ear canal and external ear normal.      Left Ear: Tympanic membrane, ear canal and external ear normal.   Eyes:      General: No scleral icterus.     Conjunctiva/sclera: Conjunctivae normal.   Cardiovascular:      Rate and Rhythm: Normal rate and regular rhythm.      Pulses: Normal pulses.      Heart sounds: Normal heart sounds.   Pulmonary:      Effort: Pulmonary effort is normal.      Breath sounds: Decreased breath sounds and wheezing present. No rhonchi or rales.   Musculoskeletal:      Right lower leg: No edema.      Left lower leg: No edema.   Lymphadenopathy:      Cervical: No cervical adenopathy.   Skin:     General: Skin is warm and dry.      Coloration: Skin is not cyanotic, jaundiced or pale.      Findings: No rash.   Neurological:      Mental Status: She is alert and oriented to person, place, and time.      Gait: Gait is intact.   Psychiatric:         Mood and Affect: Mood normal.         Behavior: Behavior normal. Behavior is cooperative.         Cognition and Memory: Cognition normal.     Pt's previous physical exam reviewed and updated as indicated.    Lab Results   Component Value Date    WBC 5.37 06/20/2019    HGB 14.8 06/20/2019    HCT 44.9 06/20/2019    MCV 87.2 06/20/2019     06/20/2019       Lab Results   Component Value Date    GLUCOSE 99  08/04/2016    BUN 9 03/04/2020    CREATININE 0.88 03/04/2020    EGFRIFNONA 73 03/04/2020    EGFRIFAFRI 84 03/04/2020    BCR 10 03/04/2020    K 4.3 03/04/2020    CO2 22 03/04/2020    CALCIUM 9.3 03/04/2020    PROTENTOTREF 7.7 06/20/2019    ALBUMIN 4.70 06/20/2019    LABIL2 1.6 06/20/2019    AST 64 (H) 06/20/2019    ALT 44 06/20/2019       Lab Results   Component Value Date    TSH 1.690 06/20/2019           Assessment/Plan   Diagnoses and all orders for this visit:    1. Chronic bronchitis, unspecified chronic bronchitis type (CMS/HCC) (Primary)  -     albuterol (PROVENTIL) nebulizer solution 0.083% 2.5 mg/3mL  -     XR Chest PA & Lateral; Future  -     Home Nebulizer    2. COPD with exacerbation (CMS/HCC)  -     albuterol (PROVENTIL) nebulizer solution 0.083% 2.5 mg/3mL  -     methylPREDNISolone acetate (DEPO-medrol) injection 80 mg  -     cefTRIAXone (ROCEPHIN) injection 1 g  -     XR Chest PA & Lateral; Future    3. Thrush  -     fluconazole (Diflucan) 150 MG tablet; 1 po now, repeat every 3 days until gone  Dispense: 4 tablet; Refill: 0    Other orders  -     albuterol (PROVENTIL) (2.5 MG/3ML) 0.083% nebulizer solution; Take 2.5 mg by nebulization Every 4 (Four) Hours As Needed for Wheezing.  Dispense: 120 mL; Refill: 12       Chronic bronchitis with current COPD exacerbation not responsive to initial use of antibiotics or corticosteroids.  For that reason, chest x-ray as above.  Point-of-care treatment with Depo-Medrol and Rocephin.  Further treatment pending review of x-ray results.  Provided Diflucan for antibiotic associated candidiasis/thrush.  She has been given nebulizer treatment here in the office with significant improvement.  Will attempt to obtain that for her as she does not have one on hand at home.    She will keep her routine follow-up as scheduled next month, follow-up sooner as needed/instructed.  I will contact patient regarding test results and provide instructions regarding any necessary  changes in plan of care.  Patient was encouraged to keep me informed of any acute changes, lack of improvement, or any new concerning symptoms.  Pt is aware of reasons to seek emergent care.      Please note that portions of this note may have been completed with a voice recognition program. Efforts were made to edit the dictations, but occasionally words are mistranscribed.

## 2021-03-23 RX ORDER — PREDNISONE 20 MG/1
TABLET ORAL
Qty: 14 TABLET | Refills: 0 | Status: SHIPPED | OUTPATIENT
Start: 2021-03-23 | End: 2021-04-19

## 2021-03-23 RX ORDER — ALBUTEROL SULFATE 2.5 MG/3ML
2.5 SOLUTION RESPIRATORY (INHALATION) EVERY 4 HOURS PRN
Qty: 120 ML | Refills: 12 | Status: SHIPPED | OUTPATIENT
Start: 2021-03-23 | End: 2022-03-09 | Stop reason: SDUPTHER

## 2021-03-23 NOTE — PROGRESS NOTES
Please inform pt her CXr showed no mass or pneumonia. She should take steroids as sent to pharmacyt and please arrange for her to get nebulizer kit. Order in chart. And neb med sent to pharmacy

## 2021-03-31 RX ORDER — LINACLOTIDE 72 UG/1
CAPSULE, GELATIN COATED ORAL
Qty: 30 CAPSULE | Refills: 4 | Status: SHIPPED | OUTPATIENT
Start: 2021-03-31 | End: 2021-09-09 | Stop reason: SDUPTHER

## 2021-03-31 NOTE — TELEPHONE ENCOUNTER
Ok to fill?      Lov: 03/22/2021    Nov: 04/05/2021      It does not look like we have ever prescribed this medication for the patient.    Please advise.

## 2021-04-01 DIAGNOSIS — M43.02 CERVICAL SPONDYLOLYSIS: ICD-10-CM

## 2021-04-01 DIAGNOSIS — M79.7 FIBROMYALGIA: ICD-10-CM

## 2021-04-01 DIAGNOSIS — M51.36 LUMBAR DEGENERATIVE DISC DISEASE: ICD-10-CM

## 2021-04-01 RX ORDER — PREGABALIN 300 MG/1
CAPSULE ORAL
Qty: 60 CAPSULE | Refills: 2 | Status: SHIPPED | OUTPATIENT
Start: 2021-04-01 | End: 2021-06-14 | Stop reason: SDUPTHER

## 2021-04-19 ENCOUNTER — OFFICE VISIT (OUTPATIENT)
Dept: FAMILY MEDICINE CLINIC | Facility: CLINIC | Age: 61
End: 2021-04-19

## 2021-04-19 VITALS
HEART RATE: 78 BPM | DIASTOLIC BLOOD PRESSURE: 72 MMHG | WEIGHT: 179.4 LBS | HEIGHT: 64 IN | TEMPERATURE: 97.1 F | OXYGEN SATURATION: 97 % | SYSTOLIC BLOOD PRESSURE: 136 MMHG | RESPIRATION RATE: 20 BRPM | BODY MASS INDEX: 30.63 KG/M2

## 2021-04-19 DIAGNOSIS — R73.09 ELEVATED HEMOGLOBIN A1C: ICD-10-CM

## 2021-04-19 DIAGNOSIS — K86.1 OTHER CHRONIC PANCREATITIS (HCC): ICD-10-CM

## 2021-04-19 DIAGNOSIS — M72.2 PLANTAR FIBROMATOSIS: ICD-10-CM

## 2021-04-19 DIAGNOSIS — G89.29 CHRONIC FOOT PAIN, LEFT: ICD-10-CM

## 2021-04-19 DIAGNOSIS — M79.672 CHRONIC FOOT PAIN, LEFT: ICD-10-CM

## 2021-04-19 DIAGNOSIS — Z13.6 ENCOUNTER FOR LIPID SCREENING FOR CARDIOVASCULAR DISEASE: ICD-10-CM

## 2021-04-19 DIAGNOSIS — J44.1 CHRONIC OBSTRUCTIVE PULMONARY DISEASE WITH ACUTE EXACERBATION (HCC): Primary | ICD-10-CM

## 2021-04-19 DIAGNOSIS — Z13.220 ENCOUNTER FOR LIPID SCREENING FOR CARDIOVASCULAR DISEASE: ICD-10-CM

## 2021-04-19 DIAGNOSIS — I10 ESSENTIAL HYPERTENSION: ICD-10-CM

## 2021-04-19 PROCEDURE — 96372 THER/PROPH/DIAG INJ SC/IM: CPT | Performed by: FAMILY MEDICINE

## 2021-04-19 PROCEDURE — 99214 OFFICE O/P EST MOD 30 MIN: CPT | Performed by: FAMILY MEDICINE

## 2021-04-19 RX ORDER — CEFTRIAXONE 1 G/1
1 INJECTION, POWDER, FOR SOLUTION INTRAMUSCULAR; INTRAVENOUS ONCE
Status: COMPLETED | OUTPATIENT
Start: 2021-04-19 | End: 2021-04-19

## 2021-04-19 RX ORDER — AZITHROMYCIN 250 MG/1
TABLET, FILM COATED ORAL
Qty: 6 TABLET | Refills: 0 | Status: SHIPPED | OUTPATIENT
Start: 2021-04-19 | End: 2021-06-14

## 2021-04-19 RX ORDER — METHYLPREDNISOLONE SODIUM SUCCINATE 125 MG/2ML
125 INJECTION, POWDER, LYOPHILIZED, FOR SOLUTION INTRAMUSCULAR; INTRAVENOUS ONCE
Status: COMPLETED | OUTPATIENT
Start: 2021-04-19 | End: 2021-04-19

## 2021-04-19 RX ADMIN — METHYLPREDNISOLONE SODIUM SUCCINATE 125 MG: 125 INJECTION, POWDER, LYOPHILIZED, FOR SOLUTION INTRAMUSCULAR; INTRAVENOUS at 16:16

## 2021-04-19 RX ADMIN — CEFTRIAXONE 1 G: 1 INJECTION, POWDER, FOR SOLUTION INTRAMUSCULAR; INTRAVENOUS at 16:15

## 2021-04-19 NOTE — PROGRESS NOTES
"Subjective   Katelynnmelvi Sung is a 61 y.o. female.     History of Present Illness   Mrs. Sung presents today for routine f/u on sevearl chronic med problems.    She has COPD/chronic bronchitis as well as seasonal allergies. She c/o increased chest/nasal congestion, increased sputum production, wheeze, SOA. Using usual meds without improvement. No fever, no chest pain, no hempotysis.    She c/o pain in bottom of left foot with \"Knot\" present. No better with change in shoes, rest.    She c/o mouth/tongue soreness, sensitivity. Apparently has not been risning her mouth out after using ICS.    Followed by GI for chronic pancreatitis. No recent flares.    On zestoretic for HTN. Taking as rx'd. Denies side effects. BP fairly well controlled. Not following cardiac prudent diet.    Noted to have elevated A1c in past; due for recheck.    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 Currently on lyrica. Taking as rx'd. Denies side effects. Good improvement in pain/function. No recent injuries or pain flares.      The following portions of the patient's history were reviewed and updated as appropriate: allergies, current medications, past family history, past medical history, past social history, past surgical history and problem list.    Review of Systems   Constitutional: Positive for fatigue. Negative for diaphoresis, fever and unexpected weight change.   HENT: Positive for congestion, mouth sores and postnasal drip. Negative for ear pain, nosebleeds, rhinorrhea and sore throat.    Eyes: Negative for visual disturbance.   Respiratory: Positive for cough, shortness of breath (mild with exertion) and wheezing.    Cardiovascular: Negative for chest pain, palpitations and leg swelling.   Gastrointestinal: Positive for constipation and nausea. Negative for abdominal pain, blood in stool and vomiting.   Endocrine: Positive for heat intolerance. Negative for polydipsia and polyuria.   Genitourinary: Negative for dysuria " and hematuria.   Musculoskeletal: Positive for arthralgias, back pain, myalgias, neck pain and neck stiffness.   Skin: Negative for rash and wound.   Neurological: Positive for headaches. Negative for dizziness, tremors, syncope and weakness.   Hematological: Negative for adenopathy. Does not bruise/bleed easily.   Psychiatric/Behavioral: Positive for sleep disturbance. Negative for confusion and dysphoric mood. The patient is nervous/anxious.    Pt's previous ROS reviewed and updated as indicated.       Objective    Vitals:    04/19/21 1537   BP: 136/72   Pulse: 78   Resp: 20   Temp: 97.1 °F (36.2 °C)   SpO2: 97%     Body mass index is 30.78 kg/m².      04/19/21  1537   Weight: 81.4 kg (179 lb 6.4 oz)       Physical Exam  Vitals and nursing note reviewed.   Constitutional:       General: She is not in acute distress.     Appearance: She is well-developed, well-groomed and overweight. She is ill-appearing (mildly).   HENT:      Head: Normocephalic and atraumatic.      Right Ear: Tympanic membrane, ear canal and external ear normal.      Left Ear: Tympanic membrane, ear canal and external ear normal.      Nose: Mucosal edema and congestion present.      Mouth/Throat:      Mouth: Mucous membranes are dry. No oral lesions.      Tongue: No lesions (+glossitis).      Pharynx: Posterior oropharyngeal erythema (mild) present.   Eyes:      General: No scleral icterus.     Extraocular Movements: Extraocular movements intact.      Conjunctiva/sclera: Conjunctivae normal.   Cardiovascular:      Rate and Rhythm: Normal rate and regular rhythm.      Pulses: Normal pulses.      Heart sounds: Normal heart sounds.   Pulmonary:      Effort: Pulmonary effort is normal.      Breath sounds: Decreased breath sounds and wheezing present. No rhonchi or rales.   Musculoskeletal:      Right lower leg: No edema.      Left lower leg: No edema.      Left foot: Tenderness (diffusely along plantar fascia, palpable mass along fascia mid foot)  present. Normal pulse.      Comments: Diffuse soft tissue TTP, kyphotic posture, loss of normal cervical lordosis   Lymphadenopathy:      Cervical: No cervical adenopathy.   Skin:     General: Skin is warm and dry.      Coloration: Skin is not cyanotic, jaundiced or pale.      Findings: No rash.   Neurological:      Mental Status: She is alert and oriented to person, place, and time.      Gait: Gait is intact.   Psychiatric:         Mood and Affect: Mood normal.         Behavior: Behavior normal. Behavior is cooperative.         Cognition and Memory: Cognition normal.     Pt's previous physical exam reviewed and updated as indicated.    Lab Results   Component Value Date    WBC 5.37 06/20/2019    HGB 14.8 06/20/2019    HCT 44.9 06/20/2019    MCV 87.2 06/20/2019     06/20/2019       Lab Results   Component Value Date    GLUCOSE 99 08/04/2016    BUN 9 03/04/2020    CREATININE 0.88 03/04/2020    EGFRIFNONA 73 03/04/2020    EGFRIFAFRI 84 03/04/2020    BCR 10 03/04/2020    K 4.3 03/04/2020    CO2 22 03/04/2020    CALCIUM 9.3 03/04/2020    PROTENTOTREF 7.7 06/20/2019    ALBUMIN 4.70 06/20/2019    LABIL2 1.6 06/20/2019    AST 64 (H) 06/20/2019    ALT 44 06/20/2019       Lab Results   Component Value Date    HGBA1C 5.7 (H) 03/04/2020       Lab Results   Component Value Date    TSH 1.690 06/20/2019       Assessment/Plan   Diagnoses and all orders for this visit:    1. Chronic obstructive pulmonary disease with acute exacerbation (CMS/HCC) (Primary)  -     cefTRIAXone (ROCEPHIN) injection 1 g  -     methylPREDNISolone sodium succinate (SOLU-Medrol) injection 125 mg  -     azithromycin (Zithromax Z-Bolivar) 250 MG tablet; Take 2 tablets the first day, then 1 tablet daily for 4 days.  Dispense: 6 tablet; Refill: 0  -     CBC (No Diff); Future    2. Chronic foot pain, left  -     Ambulatory Referral to Podiatry    3. Plantar fibromatosis  -     Ambulatory Referral to Podiatry    4. Other chronic pancreatitis (CMS/HCC)  -      CBC (No Diff); Future  -     Comprehensive Metabolic Panel; Future    5. Essential hypertension  -     TSH Rfx On Abnormal To Free T4; Future  -     CBC (No Diff); Future  -     Comprehensive Metabolic Panel; Future    6. Encounter for lipid screening for cardiovascular disease  -     Lipid Panel; Future    7. Elevated hemoglobin A1c  -     Hemoglobin A1c; Future       COPD exacerbation- abx, steroids as above. Encouraged use of inhalers as rx'd, rinsing mouth after use, etc.  F/u with GI as recommended.  HTN controlled, continue zestretic.  FMSx, diffuse DD stable. Continue lyrica.  As part of patient's treatment plan I am prescribing a controlled substance.  The patient has been made aware of the appropriate use of such medications, including potential risk of somnolence, limited ability to drive and/or work safely, and potential for dependence and/or overdose.  It has also been made clear that these medications are for use by this patient only, without concomitant use of alcohol or other substances, unless prescribed.  History and physical exam exhibit continued safe and appropriate use of controlled substance.  DANTE reviewed.  Patient has completed a prescribing agreement detailing terms of continued prescribing of controlled substances, including monitoring DANTE reports, urine drug screening, and pill counts if necessary.  Patient is aware that inappropriate use will result in cessation of prescribing such medications.    Routine surveillance labs as above.  Routine f/u in 3 months, sooner as needed/instructed.  She is reminded she is overdue for cervical cancer screening, etc- encouraged to schedule at her earliest convenience.  I will contact patient regarding test results and provide instructions regarding any necessary changes in plan of care.  Patient was encouraged to keep me informed of any acute changes, lack of improvement, or any new concerning symptoms.  Pt is aware of reasons to seek emergent  care.  Patient voiced understanding of all instructions and denied further questions.

## 2021-04-21 DIAGNOSIS — B37.0 THRUSH: ICD-10-CM

## 2021-04-21 RX ORDER — FLUCONAZOLE 150 MG/1
TABLET ORAL
Qty: 4 TABLET | Refills: 0 | Status: SHIPPED | OUTPATIENT
Start: 2021-04-21 | End: 2021-05-24

## 2021-04-28 RX ORDER — FUROSEMIDE 20 MG/1
TABLET ORAL
Qty: 30 TABLET | Refills: 0 | Status: SHIPPED | OUTPATIENT
Start: 2021-04-28 | End: 2021-06-01

## 2021-04-29 DIAGNOSIS — M54.2 NECK PAIN: ICD-10-CM

## 2021-04-29 DIAGNOSIS — M79.7 FIBROMYALGIA: ICD-10-CM

## 2021-04-29 DIAGNOSIS — K86.1 CHRONIC PANCREATITIS, UNSPECIFIED PANCREATITIS TYPE (HCC): ICD-10-CM

## 2021-04-29 DIAGNOSIS — M62.838 CERVICAL PARASPINAL MUSCLE SPASM: ICD-10-CM

## 2021-04-30 RX ORDER — PROMETHAZINE HYDROCHLORIDE 25 MG/1
25 TABLET ORAL EVERY 8 HOURS PRN
Qty: 90 TABLET | Refills: 1 | Status: SHIPPED | OUTPATIENT
Start: 2021-04-30 | End: 2021-06-28

## 2021-04-30 RX ORDER — CYCLOBENZAPRINE HCL 5 MG
5 TABLET ORAL 3 TIMES DAILY PRN
Qty: 90 TABLET | Refills: 1 | Status: SHIPPED | OUTPATIENT
Start: 2021-04-30 | End: 2021-06-28

## 2021-04-30 RX ORDER — LISINOPRIL AND HYDROCHLOROTHIAZIDE 12.5; 1 MG/1; MG/1
TABLET ORAL
Qty: 30 TABLET | Refills: 5 | Status: SHIPPED | OUTPATIENT
Start: 2021-04-30 | End: 2021-09-09 | Stop reason: SDUPTHER

## 2021-05-19 RX ORDER — PANTOPRAZOLE SODIUM 40 MG/1
TABLET, DELAYED RELEASE ORAL
Qty: 60 TABLET | Refills: 2 | Status: SHIPPED | OUTPATIENT
Start: 2021-05-19 | End: 2021-09-09 | Stop reason: SDUPTHER

## 2021-05-21 DIAGNOSIS — B37.0 THRUSH: ICD-10-CM

## 2021-05-24 RX ORDER — FLUCONAZOLE 150 MG/1
TABLET ORAL
Qty: 2 TABLET | Refills: 0 | Status: SHIPPED | OUTPATIENT
Start: 2021-05-24 | End: 2021-06-14

## 2021-06-01 RX ORDER — FUROSEMIDE 20 MG/1
TABLET ORAL
Qty: 30 TABLET | Refills: 0 | Status: SHIPPED | OUTPATIENT
Start: 2021-06-01 | End: 2021-06-28

## 2021-06-14 ENCOUNTER — OFFICE VISIT (OUTPATIENT)
Dept: FAMILY MEDICINE CLINIC | Facility: CLINIC | Age: 61
End: 2021-06-14

## 2021-06-14 VITALS
RESPIRATION RATE: 16 BRPM | HEART RATE: 70 BPM | BODY MASS INDEX: 30.37 KG/M2 | DIASTOLIC BLOOD PRESSURE: 80 MMHG | SYSTOLIC BLOOD PRESSURE: 132 MMHG | OXYGEN SATURATION: 98 % | WEIGHT: 177 LBS

## 2021-06-14 DIAGNOSIS — M51.36 LUMBAR DEGENERATIVE DISC DISEASE: ICD-10-CM

## 2021-06-14 DIAGNOSIS — Z12.4 ENCOUNTER FOR PAPANICOLAOU SMEAR FOR CERVICAL CANCER SCREENING: ICD-10-CM

## 2021-06-14 DIAGNOSIS — Z12.39 ENCOUNTER FOR SCREENING BREAST EXAMINATION: ICD-10-CM

## 2021-06-14 DIAGNOSIS — M79.7 FIBROMYALGIA: ICD-10-CM

## 2021-06-14 DIAGNOSIS — Z78.0 POSTMENOPAUSAL: ICD-10-CM

## 2021-06-14 DIAGNOSIS — M43.02 CERVICAL SPONDYLOLYSIS: ICD-10-CM

## 2021-06-14 DIAGNOSIS — M81.0 AGE-RELATED OSTEOPOROSIS WITHOUT CURRENT PATHOLOGICAL FRACTURE: ICD-10-CM

## 2021-06-14 DIAGNOSIS — Z80.3 FAMILY HISTORY OF BREAST CANCER IN SISTER: ICD-10-CM

## 2021-06-14 DIAGNOSIS — G47.00 PERSISTENT INSOMNIA: ICD-10-CM

## 2021-06-14 DIAGNOSIS — Z00.00 WELL ADULT EXAM: Primary | ICD-10-CM

## 2021-06-14 DIAGNOSIS — Z12.31 ENCOUNTER FOR SCREENING MAMMOGRAM FOR MALIGNANT NEOPLASM OF BREAST: ICD-10-CM

## 2021-06-14 PROCEDURE — 99396 PREV VISIT EST AGE 40-64: CPT | Performed by: FAMILY MEDICINE

## 2021-06-14 RX ORDER — ZOLPIDEM TARTRATE 10 MG/1
10 TABLET ORAL NIGHTLY PRN
Qty: 30 TABLET | Refills: 5 | Status: SHIPPED | OUTPATIENT
Start: 2021-06-14 | End: 2021-12-09 | Stop reason: SDUPTHER

## 2021-06-14 RX ORDER — PREGABALIN 300 MG/1
CAPSULE ORAL
Qty: 60 CAPSULE | Refills: 2 | Status: SHIPPED | OUTPATIENT
Start: 2021-06-14 | End: 2021-09-09 | Stop reason: SDUPTHER

## 2021-06-14 NOTE — PROGRESS NOTES
Subjective   Katelynn Sung is a 61 y.o. female.     History of Present Illness   Mrs. Sung presents today for her annual checkup/wellness exam.    She is overdue for mammogram, Pap smear, DEXA scanning.  She is willing to have mammogram.  She declines cervical cancer screening, pelvic exam, breast exam today.    She is a former smoker quitting around 2013.  Continues to use nicotine replacement generally form of vape/electronic cigarette.  Denies alcohol or illicit drug use.  Currently participating in Suboxone clinic for management of opioid dependency.  Reports doing quite well.  No relapse.    She is , lives alone.  Currently unemployed.  Not currently sexually active.    She requests routine refill of Lyrica for management of her fibromyalgia and diffuse degenerative disc disease.  Doing well on medication.  Denies side effects.  Also request refill of Ambien for management of sleep initiation difficulty.  Denies side effects or medication.  No parasomnia.    Has previous diagnosis of osteoporosis.  No fracture.    Reports taking her medications for chronic medical conditions as prescribed.  No new concerns today.    She is overdue for Shingrix and Tdap.  Believes she received a tetanus booster within the past 5 years at the ER after an injury.    The following portions of the patient's history were reviewed and updated as appropriate: allergies, current medications, past family history, past medical history, past social history, past surgical history and problem list.    Review of Systems   Constitutional: Positive for fatigue. Negative for diaphoresis, fever and unexpected weight change.   HENT: Positive for congestion and postnasal drip. Negative for ear pain, mouth sores, nosebleeds, rhinorrhea and sore throat.    Eyes: Negative for visual disturbance.   Respiratory: Positive for cough, shortness of breath (mild with exertion) and wheezing.    Cardiovascular: Negative for chest pain,  palpitations and leg swelling.   Gastrointestinal: Positive for constipation and nausea. Negative for abdominal pain, blood in stool and vomiting.   Endocrine: Positive for heat intolerance. Negative for polydipsia and polyuria.   Genitourinary: Negative for dysuria and hematuria.   Musculoskeletal: Positive for arthralgias, back pain, myalgias, neck pain and neck stiffness.   Skin: Negative for rash and wound.   Neurological: Positive for headaches. Negative for dizziness, tremors, syncope and weakness.   Hematological: Negative for adenopathy. Does not bruise/bleed easily.   Psychiatric/Behavioral: Positive for sleep disturbance. Negative for confusion and dysphoric mood. The patient is nervous/anxious.    Pt's previous ROS reviewed and updated as indicated.       Objective    Vitals:    06/14/21 1326   BP: 132/80   Pulse: 70   Resp: 16   SpO2: 98%     Body mass index is 30.37 kg/m².      06/14/21  1326   Weight: 80.3 kg (177 lb)       Physical Exam  Vitals and nursing note reviewed.   Constitutional:       General: She is not in acute distress.     Appearance: She is well-developed, well-groomed and overweight. She is not ill-appearing.   HENT:      Head: Normocephalic and atraumatic.   Eyes:      General: No scleral icterus.     Conjunctiva/sclera: Conjunctivae normal.   Neck:      Thyroid: No thyroid mass.      Vascular: Normal carotid pulses. No carotid bruit.   Cardiovascular:      Rate and Rhythm: Normal rate and regular rhythm.      Pulses: Normal pulses.      Heart sounds: Normal heart sounds.   Pulmonary:      Effort: Pulmonary effort is normal.      Breath sounds: Decreased breath sounds (mildly) present. No wheezing, rhonchi or rales.   Abdominal:      General: Bowel sounds are normal. There is no distension.      Palpations: Abdomen is soft. There is no hepatomegaly, splenomegaly or mass.      Tenderness: There is no abdominal tenderness.   Musculoskeletal:      Right lower leg: No edema.      Left  lower leg: No edema.   Lymphadenopathy:      Cervical: No cervical adenopathy.   Skin:     General: Skin is warm and dry.      Coloration: Skin is not jaundiced or pale.      Findings: No bruising or rash.   Neurological:      Mental Status: She is alert and oriented to person, place, and time.      Motor: No tremor.      Gait: Gait is intact.   Psychiatric:         Mood and Affect: Mood normal.         Speech: Speech normal.         Behavior: Behavior normal. Behavior is cooperative.         Thought Content: Thought content normal.         Cognition and Memory: Cognition and memory normal.         Judgment: Judgment normal.     Pt's previous physical exam reviewed and updated as indicated.      Assessment/Plan   Diagnoses and all orders for this visit:    1. Well adult exam (Primary)    2. Encounter for screening mammogram for malignant neoplasm of breast  -     Mammo Screening Digital Tomosynthesis Bilateral With CAD; Future    3. Encounter for Papanicolaou smear for cervical cancer screening  -     Ambulatory Referral to Gynecology    4. Encounter for screening breast examination  -     Ambulatory Referral to Gynecology    5. Cervical spondylolysis  -     pregabalin (LYRICA) 300 MG capsule; 1 po bid  Dispense: 60 capsule; Refill: 2    6. Lumbar degenerative disc disease  -     pregabalin (LYRICA) 300 MG capsule; 1 po bid  Dispense: 60 capsule; Refill: 2    7. Fibromyalgia  -     pregabalin (LYRICA) 300 MG capsule; 1 po bid  Dispense: 60 capsule; Refill: 2    8. Persistent insomnia  -     zolpidem (AMBIEN) 10 MG tablet; Take 1 tablet by mouth At Night As Needed for Sleep.  Dispense: 30 tablet; Refill: 5    9. Postmenopausal  -     DEXA Bone Density Axial; Future    10. Age-related osteoporosis without current pathological fracture  -     DEXA Bone Density Axial; Future    11. Family history of breast cancer in sister       I have strongly advised she undergo breast exam, pelvic exam and cervical cancer screening.   We have discussed risk/benefits of screening.  She voices understanding and is amenable to referral to gynecology.    Age appropriate preventive care reviewed including cancer screenings, safety measures, mental health concerns, supplements, prevention of CV disease and DM, etc. Handout provided.    Routine labs as previously ordered on chart.    Pt advised to eat a heart healthy diet and get regular aerobic exercise.    She will check into cost/coverage of Tdap and Shingrix with insurance and/or local pharmacy.    Routine f/u in 3 months, sooner as needed.  I will contact patient regarding test results and provide instructions regarding any necessary changes in plan of care.  Patient was encouraged to keep me informed of any acute changes,  or any new concerning symptoms.  Pt is aware of reasons to seek emergent care.  Patient voiced understanding of all instructions and denied further questions.  As part of patient's treatment plan I am prescribing a controlled substance.  The patient has been made aware of the appropriate use of such medications, including potential risk of somnolence, limited ability to drive and/or work safely, and potential for dependence and/or overdose.  It has also been made clear that these medications are for use by this patient only, without concomitant use of alcohol or other substances, unless prescribed.  History and physical exam exhibit continued safe and appropriate use of controlled substance.  DANTE reviewed.  Patient has completed a prescribing agreement detailing terms of continued prescribing of controlled substances, including monitoring DANTE reports, urine drug screening, and pill counts if necessary.  Patient is aware that inappropriate use will result in cessation of prescribing such medications.    Please note that portions of this note may have been completed with a voice recognition program. Efforts were made to edit the dictations, but occasionally words are  mistranscribed.

## 2021-06-16 PROBLEM — Z80.3 FAMILY HISTORY OF BREAST CANCER IN SISTER: Status: ACTIVE | Noted: 2021-06-16

## 2021-06-28 DIAGNOSIS — M79.7 FIBROMYALGIA: ICD-10-CM

## 2021-06-28 DIAGNOSIS — K86.1 CHRONIC PANCREATITIS, UNSPECIFIED PANCREATITIS TYPE (HCC): ICD-10-CM

## 2021-06-28 DIAGNOSIS — M62.838 CERVICAL PARASPINAL MUSCLE SPASM: ICD-10-CM

## 2021-06-28 DIAGNOSIS — M54.2 NECK PAIN: ICD-10-CM

## 2021-06-28 RX ORDER — FUROSEMIDE 20 MG/1
TABLET ORAL
Qty: 30 TABLET | Refills: 0 | Status: SHIPPED | OUTPATIENT
Start: 2021-06-28 | End: 2021-07-27

## 2021-06-28 RX ORDER — CYCLOBENZAPRINE HCL 5 MG
5 TABLET ORAL 3 TIMES DAILY PRN
Qty: 90 TABLET | Refills: 0 | Status: SHIPPED | OUTPATIENT
Start: 2021-06-28 | End: 2021-07-27

## 2021-06-28 RX ORDER — PROMETHAZINE HYDROCHLORIDE 25 MG/1
25 TABLET ORAL EVERY 8 HOURS PRN
Qty: 90 TABLET | Refills: 0 | Status: SHIPPED | OUTPATIENT
Start: 2021-06-28 | End: 2021-07-27

## 2021-07-22 DIAGNOSIS — B37.0 THRUSH: ICD-10-CM

## 2021-07-26 RX ORDER — FLUCONAZOLE 150 MG/1
TABLET ORAL
Qty: 2 TABLET | Refills: 0 | Status: SHIPPED | OUTPATIENT
Start: 2021-07-26 | End: 2021-07-27

## 2021-07-27 DIAGNOSIS — M79.7 FIBROMYALGIA: ICD-10-CM

## 2021-07-27 DIAGNOSIS — B37.0 THRUSH: ICD-10-CM

## 2021-07-27 DIAGNOSIS — M54.2 NECK PAIN: ICD-10-CM

## 2021-07-27 DIAGNOSIS — M62.838 CERVICAL PARASPINAL MUSCLE SPASM: ICD-10-CM

## 2021-07-27 DIAGNOSIS — K86.1 CHRONIC PANCREATITIS, UNSPECIFIED PANCREATITIS TYPE (HCC): ICD-10-CM

## 2021-07-27 RX ORDER — PROMETHAZINE HYDROCHLORIDE 25 MG/1
25 TABLET ORAL EVERY 8 HOURS PRN
Qty: 90 TABLET | Refills: 0 | Status: SHIPPED | OUTPATIENT
Start: 2021-07-27 | End: 2021-08-25

## 2021-07-27 RX ORDER — FUROSEMIDE 20 MG/1
TABLET ORAL
Qty: 30 TABLET | Refills: 0 | Status: SHIPPED | OUTPATIENT
Start: 2021-07-27 | End: 2021-08-25

## 2021-07-27 RX ORDER — FLUCONAZOLE 150 MG/1
TABLET ORAL
Qty: 2 TABLET | Refills: 0 | Status: SHIPPED | OUTPATIENT
Start: 2021-07-27 | End: 2021-10-27

## 2021-07-27 RX ORDER — CYCLOBENZAPRINE HCL 5 MG
5 TABLET ORAL 3 TIMES DAILY PRN
Qty: 90 TABLET | Refills: 0 | Status: SHIPPED | OUTPATIENT
Start: 2021-07-27 | End: 2021-08-25

## 2021-08-03 ENCOUNTER — TELEPHONE (OUTPATIENT)
Dept: INTERNAL MEDICINE | Facility: CLINIC | Age: 61
End: 2021-08-03

## 2021-08-05 ENCOUNTER — APPOINTMENT (OUTPATIENT)
Dept: BONE DENSITY | Facility: HOSPITAL | Age: 61
End: 2021-08-05

## 2021-08-25 DIAGNOSIS — K86.1 CHRONIC PANCREATITIS, UNSPECIFIED PANCREATITIS TYPE (HCC): ICD-10-CM

## 2021-08-25 DIAGNOSIS — M54.2 NECK PAIN: ICD-10-CM

## 2021-08-25 DIAGNOSIS — M62.838 CERVICAL PARASPINAL MUSCLE SPASM: ICD-10-CM

## 2021-08-25 DIAGNOSIS — M79.7 FIBROMYALGIA: ICD-10-CM

## 2021-08-25 RX ORDER — CYCLOBENZAPRINE HCL 5 MG
5 TABLET ORAL 3 TIMES DAILY PRN
Qty: 90 TABLET | Refills: 0 | Status: SHIPPED | OUTPATIENT
Start: 2021-08-25 | End: 2021-09-23

## 2021-08-25 RX ORDER — FUROSEMIDE 20 MG/1
TABLET ORAL
Qty: 30 TABLET | Refills: 0 | Status: SHIPPED | OUTPATIENT
Start: 2021-08-25 | End: 2021-09-23

## 2021-08-25 RX ORDER — PROMETHAZINE HYDROCHLORIDE 25 MG/1
25 TABLET ORAL EVERY 8 HOURS PRN
Qty: 90 TABLET | Refills: 0 | Status: SHIPPED | OUTPATIENT
Start: 2021-08-25 | End: 2021-09-23

## 2021-09-09 ENCOUNTER — OFFICE VISIT (OUTPATIENT)
Dept: FAMILY MEDICINE CLINIC | Facility: CLINIC | Age: 61
End: 2021-09-09

## 2021-09-09 VITALS
HEART RATE: 93 BPM | OXYGEN SATURATION: 96 % | DIASTOLIC BLOOD PRESSURE: 78 MMHG | BODY MASS INDEX: 31.89 KG/M2 | HEIGHT: 64 IN | SYSTOLIC BLOOD PRESSURE: 128 MMHG | TEMPERATURE: 97.1 F | RESPIRATION RATE: 18 BRPM | WEIGHT: 186.8 LBS

## 2021-09-09 DIAGNOSIS — K29.50 CHRONIC GASTRITIS WITHOUT BLEEDING, UNSPECIFIED GASTRITIS TYPE: ICD-10-CM

## 2021-09-09 DIAGNOSIS — K86.1 OTHER CHRONIC PANCREATITIS (HCC): ICD-10-CM

## 2021-09-09 DIAGNOSIS — M79.7 FIBROMYALGIA: ICD-10-CM

## 2021-09-09 DIAGNOSIS — M51.36 LUMBAR DEGENERATIVE DISC DISEASE: ICD-10-CM

## 2021-09-09 DIAGNOSIS — K59.09 CHRONIC CONSTIPATION: ICD-10-CM

## 2021-09-09 DIAGNOSIS — M43.02 CERVICAL SPONDYLOLYSIS: ICD-10-CM

## 2021-09-09 DIAGNOSIS — R14.0 ABDOMINAL DISTENSION: ICD-10-CM

## 2021-09-09 DIAGNOSIS — R10.84 GENERALIZED ABDOMINAL PAIN: Primary | ICD-10-CM

## 2021-09-09 PROCEDURE — 99214 OFFICE O/P EST MOD 30 MIN: CPT | Performed by: FAMILY MEDICINE

## 2021-09-09 RX ORDER — PANTOPRAZOLE SODIUM 40 MG/1
40 TABLET, DELAYED RELEASE ORAL 2 TIMES DAILY
Qty: 60 TABLET | Refills: 5 | Status: SHIPPED | OUTPATIENT
Start: 2021-09-09 | End: 2021-10-27

## 2021-09-09 RX ORDER — PREGABALIN 300 MG/1
CAPSULE ORAL
Qty: 60 CAPSULE | Refills: 2 | Status: SHIPPED | OUTPATIENT
Start: 2021-09-09 | End: 2021-12-09 | Stop reason: SDUPTHER

## 2021-09-09 RX ORDER — LISINOPRIL AND HYDROCHLOROTHIAZIDE 12.5; 1 MG/1; MG/1
1 TABLET ORAL DAILY
Qty: 30 TABLET | Refills: 5 | Status: SHIPPED | OUTPATIENT
Start: 2021-09-09 | End: 2022-03-09 | Stop reason: SDUPTHER

## 2021-09-09 NOTE — PROGRESS NOTES
Subjective   Katelynn Sung is a 61 y.o. female.     History of Present Illness   Mrs. Sung presents today with c/o increased GI disterss. She has h/o chronic constipation, chronic gastritis, and chronic pancreatitis. Last eval per Dr. Ching over a year ago and he has subsequently left are. She c/o 9 lbs weight gain in 1 month, early satiety, bloating/distension, cramping. Is taking PPI as directed. When she has severe constipation she has trouble urinating. One episode of blood instool recently, felt it was due to her hemorrhoids. Mild nausea, no vomiting. No dysuria, hematuria, no fever.    Last BM yesterday.    She request routine refill of lyrica for mgnt of FMSx, L-DDD, C-DDD. Symptoms recently stable other than increased fatigue. Denies side effects from medication. No aberrant behavior.    The following portions of the patient's history were reviewed and updated as appropriate: allergies, current medications, past family history, past medical history, past social history, past surgical history and problem list.    Review of Systems   Constitutional: Positive for fatigue. Negative for diaphoresis, fever and unexpected weight change.   HENT: Positive for congestion and postnasal drip. Negative for ear pain, mouth sores, nosebleeds, rhinorrhea and sore throat.    Eyes: Negative for visual disturbance.   Respiratory: Positive for cough (mild, intermittent, dry), shortness of breath (mild with exertion) and wheezing (intermittent).    Cardiovascular: Negative for chest pain, palpitations and leg swelling.   Gastrointestinal: Positive for abdominal distention, abdominal pain, blood in stool, constipation and nausea. Negative for rectal pain and vomiting.   Endocrine: Positive for heat intolerance. Negative for polydipsia and polyuria.   Genitourinary: Negative for dysuria, hematuria, pelvic pain, vaginal bleeding and vaginal discharge.   Musculoskeletal: Positive for arthralgias, back pain, myalgias, neck pain  and neck stiffness.   Skin: Negative for rash and wound.   Neurological: Positive for headaches. Negative for dizziness, tremors, syncope and weakness.   Hematological: Negative for adenopathy. Does not bruise/bleed easily.   Psychiatric/Behavioral: Positive for sleep disturbance. Negative for confusion and dysphoric mood. The patient is nervous/anxious.    Pt's previous ROS reviewed and updated as indicated.       Objective    Vitals:    09/09/21 0851   BP: 128/78   Pulse: 93   Resp: 18   Temp: 97.1 °F (36.2 °C)   SpO2: 96%     Body mass index is 32.04 kg/m².      09/09/21  0851   Weight: 84.7 kg (186 lb 12.8 oz)       Physical Exam  Vitals and nursing note reviewed.   Constitutional:       General: She is not in acute distress.     Appearance: She is well-developed, well-groomed and overweight. She is not ill-appearing.   HENT:      Head: Normocephalic and atraumatic.      Mouth/Throat:      Mouth: Mucous membranes are moist.   Eyes:      General: No scleral icterus.     Conjunctiva/sclera: Conjunctivae normal.   Cardiovascular:      Rate and Rhythm: Normal rate and regular rhythm.      Pulses: Normal pulses.      Heart sounds: Normal heart sounds.   Pulmonary:      Effort: Pulmonary effort is normal.      Breath sounds: Decreased breath sounds (mildly) present. No wheezing, rhonchi or rales.   Abdominal:      General: Bowel sounds are decreased. There is distension (mild).      Palpations: Abdomen is soft. There is no hepatomegaly, splenomegaly or mass.      Tenderness: There is generalized abdominal tenderness (mild). There is no right CVA tenderness, left CVA tenderness, guarding or rebound.      Comments: Bowel sounds increased in epigastrium/chest; decreased otherwise   Musculoskeletal:      Right lower leg: No edema.      Left lower leg: No edema.   Skin:     General: Skin is warm and dry.      Coloration: Skin is not jaundiced or pale.      Findings: No bruising or rash.   Neurological:      Mental Status:  She is alert and oriented to person, place, and time.      Motor: No tremor.      Gait: Gait is intact.   Psychiatric:         Mood and Affect: Mood normal.         Speech: Speech normal.         Behavior: Behavior normal. Behavior is cooperative.         Thought Content: Thought content normal.         Cognition and Memory: Cognition and memory normal.         Judgment: Judgment normal.     Pt's previous physical exam reviewed and updated as indicated.    CT abd/pelvis 1/2020 without contrast: thick walled mid small bowel loops, with decompression of terminal ilieum, fatty liver    CT abd/pelvis 6/2019 with IV contrast - gastric wall thickening    CT abd/pelvis 9/2018 without contrast - negative    Assessment/Plan   Diagnoses and all orders for this visit:    1. Generalized abdominal pain (Primary)  -     CBC & Differential  -     Comprehensive Metabolic Panel  -     Lipase  -     Amylase  -     Sedimentation Rate  -     C-reactive protein  -     CT Abdomen Pelvis With Contrast; Future  -     Ambulatory Referral to Gastroenterology    2. Other chronic pancreatitis (CMS/HCC)  -     CBC & Differential  -     Comprehensive Metabolic Panel  -     Lipase  -     Amylase  -     Sedimentation Rate  -     C-reactive protein  -     CT Abdomen Pelvis With Contrast; Future  -     Ambulatory Referral to Gastroenterology    3. Chronic constipation  -     Comprehensive Metabolic Panel  -     CT Abdomen Pelvis With Contrast; Future  -     Ambulatory Referral to Gastroenterology    4. Abdominal distension  -     CBC & Differential  -     Comprehensive Metabolic Panel  -     CT Abdomen Pelvis With Contrast; Future  -     Ambulatory Referral to Gastroenterology    5. Chronic gastritis without bleeding, unspecified gastritis type  -     Ambulatory Referral to Gastroenterology    6. Cervical spondylolysis  -     pregabalin (LYRICA) 300 MG capsule; 1 po bid  Dispense: 60 capsule; Refill: 2    7. Lumbar degenerative disc disease  -      pregabalin (LYRICA) 300 MG capsule; 1 po bid  Dispense: 60 capsule; Refill: 2    8. Fibromyalgia  -     pregabalin (LYRICA) 300 MG capsule; 1 po bid  Dispense: 60 capsule; Refill: 2    Other orders  -     linaclotide (Linzess) 72 MCG capsule capsule; Take 1 capsule by mouth Every Morning Before Breakfast.  Dispense: 30 capsule; Refill: 5  -     lisinopril-hydrochlorothiazide (PRINZIDE,ZESTORETIC) 10-12.5 MG per tablet; Take 1 tablet by mouth Daily.  Dispense: 30 tablet; Refill: 5  -     pantoprazole (PROTONIX) 40 MG EC tablet; Take 1 tablet by mouth 2 (Two) Times a Day.  Dispense: 60 tablet; Refill: 5       Multiple GI issues at baseline. Now with worsening distension/bloating, discomfort. No overt sign of obstruction. Non-acute abd exam, no clinically ill. She is amenable to labs, imaging, referral to GI.    Continue lyrica for mgnt of multifactorial chronic pain. Good functional benefit, no aberrant behavior.  As part of patient's treatment plan I am prescribing a controlled substance.  The patient has been made aware of the appropriate use of such medications, including potential risk of somnolence, limited ability to drive and/or work safely, and potential for dependence and/or overdose.  It has also been made clear that these medications are for use by this patient only, without concomitant use of alcohol or other substances, unless prescribed.  History and physical exam exhibit continued safe and appropriate use of controlled substance.  DANTE reviewed.  Patient has completed a prescribing agreement detailing terms of continued prescribing of controlled substances, including monitoring DANTE reports, urine drug screening, and pill counts if necessary.  Patient is aware that inappropriate use will result in cessation of prescribing such medications.    Routine f/u in 3 months, sooner as needed/instructed.  I will contact patient regarding test results and provide instructions regarding any necessary changes  in plan of care.  Patient was encouraged to keep me informed of any acute changes, lack of improvement, or any new concerning symptoms.  Pt is aware of reasons to seek emergent care.  Patient voiced understanding of all instructions and denied further questions.

## 2021-09-10 PROBLEM — K76.0 FATTY LIVER: Status: ACTIVE | Noted: 2021-09-10

## 2021-09-10 LAB
ALBUMIN SERPL-MCNC: 4.6 G/DL (ref 3.5–5.2)
ALBUMIN/GLOB SERPL: 1.6 G/DL
ALP SERPL-CCNC: 63 U/L (ref 39–117)
ALT SERPL-CCNC: 22 U/L (ref 1–33)
AMYLASE SERPL-CCNC: 57 U/L (ref 28–100)
AST SERPL-CCNC: 40 U/L (ref 1–32)
BASOPHILS # BLD AUTO: 0.06 10*3/MM3 (ref 0–0.2)
BASOPHILS NFR BLD AUTO: 1.3 % (ref 0–1.5)
BILIRUB SERPL-MCNC: 0.5 MG/DL (ref 0–1.2)
BUN SERPL-MCNC: 10 MG/DL (ref 8–23)
BUN/CREAT SERPL: 12 (ref 7–25)
CALCIUM SERPL-MCNC: 9.6 MG/DL (ref 8.6–10.5)
CHLORIDE SERPL-SCNC: 101 MMOL/L (ref 98–107)
CO2 SERPL-SCNC: 24.2 MMOL/L (ref 22–29)
CREAT SERPL-MCNC: 0.83 MG/DL (ref 0.57–1)
CRP SERPL-MCNC: 0.63 MG/DL (ref 0–0.5)
EOSINOPHIL # BLD AUTO: 0.5 10*3/MM3 (ref 0–0.4)
EOSINOPHIL NFR BLD AUTO: 11.1 % (ref 0.3–6.2)
ERYTHROCYTE [DISTWIDTH] IN BLOOD BY AUTOMATED COUNT: 13.1 % (ref 12.3–15.4)
ERYTHROCYTE [SEDIMENTATION RATE] IN BLOOD BY WESTERGREN METHOD: 32 MM/HR (ref 0–30)
GLOBULIN SER CALC-MCNC: 2.8 GM/DL
GLUCOSE SERPL-MCNC: 129 MG/DL (ref 65–99)
HCT VFR BLD AUTO: 44.4 % (ref 34–46.6)
HGB BLD-MCNC: 14.5 G/DL (ref 12–15.9)
IMM GRANULOCYTES # BLD AUTO: 0.01 10*3/MM3 (ref 0–0.05)
IMM GRANULOCYTES NFR BLD AUTO: 0.2 % (ref 0–0.5)
LIPASE SERPL-CCNC: 69 U/L (ref 13–60)
LYMPHOCYTES # BLD AUTO: 1.81 10*3/MM3 (ref 0.7–3.1)
LYMPHOCYTES NFR BLD AUTO: 40.3 % (ref 19.6–45.3)
MCH RBC QN AUTO: 28.4 PG (ref 26.6–33)
MCHC RBC AUTO-ENTMCNC: 32.7 G/DL (ref 31.5–35.7)
MCV RBC AUTO: 86.9 FL (ref 79–97)
MONOCYTES # BLD AUTO: 0.35 10*3/MM3 (ref 0.1–0.9)
MONOCYTES NFR BLD AUTO: 7.8 % (ref 5–12)
NEUTROPHILS # BLD AUTO: 1.76 10*3/MM3 (ref 1.7–7)
NEUTROPHILS NFR BLD AUTO: 39.3 % (ref 42.7–76)
NRBC BLD AUTO-RTO: 0 /100 WBC (ref 0–0.2)
PLATELET # BLD AUTO: 257 10*3/MM3 (ref 140–450)
POTASSIUM SERPL-SCNC: 3.7 MMOL/L (ref 3.5–5.2)
PROT SERPL-MCNC: 7.4 G/DL (ref 6–8.5)
RBC # BLD AUTO: 5.11 10*6/MM3 (ref 3.77–5.28)
SODIUM SERPL-SCNC: 137 MMOL/L (ref 136–145)
WBC # BLD AUTO: 4.49 10*3/MM3 (ref 3.4–10.8)

## 2021-09-13 ENCOUNTER — TELEPHONE (OUTPATIENT)
Dept: FAMILY MEDICINE CLINIC | Facility: CLINIC | Age: 61
End: 2021-09-13

## 2021-09-13 NOTE — TELEPHONE ENCOUNTER
Caller: Katelynn Sung    Relationship: Self    Best call back number: [phone]    What is the best time to reach you: ANY TIME    Who are you requesting to speak with (clinical staff, provider,  specific staff member): DON'T KNOW    Do you know the name of the person who called: NO    What was the call regarding: DOESN'T KNOW    Do you require a callback: YES, NO ANSWER WHEN TRIED TO WARM TRANSFER

## 2021-09-13 NOTE — PROGRESS NOTES
Please inform pt her labs are essentially stable. No new concerns. Not sure of status of GI referral

## 2021-09-15 NOTE — PROGRESS NOTES
Please inform pt her CT of abdomen and pelvis was essentially normal other than fatty liver and constipation. She has been referred to GI, unsure of status of appointment.

## 2021-09-15 NOTE — TELEPHONE ENCOUNTER
Patient was notified of lab results as well a ref.to GI. She states GI did contact her and she is now waiting on dexa scan results.

## 2021-09-16 ENCOUNTER — TELEPHONE (OUTPATIENT)
Dept: FAMILY MEDICINE CLINIC | Facility: CLINIC | Age: 61
End: 2021-09-16

## 2021-09-16 NOTE — TELEPHONE ENCOUNTER
Caller: Katelynn Sung    Relationship: Self    Best call back number: [phone]    Caller requesting test results: YES    What test was performed: SCAN ON STOMACH    When was the test performed: Monday 09/13    Where was the test performed: [facility]    Additional notes:

## 2021-09-23 DIAGNOSIS — K86.1 CHRONIC PANCREATITIS, UNSPECIFIED PANCREATITIS TYPE (HCC): ICD-10-CM

## 2021-09-23 DIAGNOSIS — M62.838 CERVICAL PARASPINAL MUSCLE SPASM: ICD-10-CM

## 2021-09-23 DIAGNOSIS — M79.7 FIBROMYALGIA: ICD-10-CM

## 2021-09-23 DIAGNOSIS — M54.2 NECK PAIN: ICD-10-CM

## 2021-09-23 RX ORDER — PROMETHAZINE HYDROCHLORIDE 25 MG/1
25 TABLET ORAL EVERY 8 HOURS PRN
Qty: 90 TABLET | Refills: 0 | Status: SHIPPED | OUTPATIENT
Start: 2021-09-23 | End: 2021-10-25

## 2021-09-23 RX ORDER — FUROSEMIDE 20 MG/1
TABLET ORAL
Qty: 30 TABLET | Refills: 0 | Status: SHIPPED | OUTPATIENT
Start: 2021-09-23 | End: 2021-10-25

## 2021-09-23 RX ORDER — CYCLOBENZAPRINE HCL 5 MG
5 TABLET ORAL 3 TIMES DAILY PRN
Qty: 90 TABLET | Refills: 0 | Status: SHIPPED | OUTPATIENT
Start: 2021-09-23 | End: 2021-10-25

## 2021-10-25 DIAGNOSIS — K86.1 CHRONIC PANCREATITIS, UNSPECIFIED PANCREATITIS TYPE (HCC): ICD-10-CM

## 2021-10-25 DIAGNOSIS — M62.838 CERVICAL PARASPINAL MUSCLE SPASM: ICD-10-CM

## 2021-10-25 DIAGNOSIS — M54.2 NECK PAIN: ICD-10-CM

## 2021-10-25 DIAGNOSIS — M79.7 FIBROMYALGIA: ICD-10-CM

## 2021-10-25 RX ORDER — FUROSEMIDE 20 MG/1
TABLET ORAL
Qty: 30 TABLET | Refills: 0 | Status: SHIPPED | OUTPATIENT
Start: 2021-10-25 | End: 2021-11-22

## 2021-10-25 RX ORDER — CYCLOBENZAPRINE HCL 5 MG
5 TABLET ORAL 3 TIMES DAILY PRN
Qty: 90 TABLET | Refills: 0 | Status: SHIPPED | OUTPATIENT
Start: 2021-10-25 | End: 2021-11-22

## 2021-10-25 RX ORDER — PROMETHAZINE HYDROCHLORIDE 25 MG/1
25 TABLET ORAL EVERY 8 HOURS PRN
Qty: 90 TABLET | Refills: 0 | Status: SHIPPED | OUTPATIENT
Start: 2021-10-25 | End: 2021-11-22

## 2021-10-27 ENCOUNTER — OFFICE VISIT (OUTPATIENT)
Dept: GASTROENTEROLOGY | Facility: CLINIC | Age: 61
End: 2021-10-27

## 2021-10-27 VITALS
HEIGHT: 64 IN | WEIGHT: 192 LBS | TEMPERATURE: 98.4 F | BODY MASS INDEX: 32.78 KG/M2 | SYSTOLIC BLOOD PRESSURE: 133 MMHG | DIASTOLIC BLOOD PRESSURE: 70 MMHG | HEART RATE: 82 BPM

## 2021-10-27 DIAGNOSIS — R68.81 EARLY SATIETY: ICD-10-CM

## 2021-10-27 DIAGNOSIS — K59.03 THERAPEUTIC OPIOID INDUCED CONSTIPATION: Primary | ICD-10-CM

## 2021-10-27 DIAGNOSIS — K76.0 NAFLD (NONALCOHOLIC FATTY LIVER DISEASE): ICD-10-CM

## 2021-10-27 DIAGNOSIS — K21.9 GASTROESOPHAGEAL REFLUX DISEASE WITHOUT ESOPHAGITIS: ICD-10-CM

## 2021-10-27 DIAGNOSIS — R93.5 ABNORMAL CT SCAN, PELVIS: ICD-10-CM

## 2021-10-27 DIAGNOSIS — R13.19 ESOPHAGEAL DYSPHAGIA: ICD-10-CM

## 2021-10-27 DIAGNOSIS — Z86.010 HISTORY OF COLON POLYPS: ICD-10-CM

## 2021-10-27 DIAGNOSIS — T40.2X5A THERAPEUTIC OPIOID INDUCED CONSTIPATION: Primary | ICD-10-CM

## 2021-10-27 DIAGNOSIS — R14.0 BLOATING: ICD-10-CM

## 2021-10-27 PROCEDURE — 99214 OFFICE O/P EST MOD 30 MIN: CPT | Performed by: PHYSICIAN ASSISTANT

## 2021-10-27 RX ORDER — DEXLANSOPRAZOLE 60 MG/1
60 CAPSULE, DELAYED RELEASE ORAL DAILY
Qty: 15 CAPSULE | Refills: 0 | COMMUNITY
Start: 2021-10-27 | End: 2021-12-11 | Stop reason: SDUPTHER

## 2021-10-27 RX ORDER — POLYETHYLENE GLYCOL 3350 17 G/17G
POWDER, FOR SOLUTION ORAL
Qty: 238 G | Refills: 0 | Status: SHIPPED | OUTPATIENT
Start: 2021-10-27 | End: 2021-12-09

## 2021-10-27 RX ORDER — SODIUM CHLORIDE 9 MG/ML
30 INJECTION, SOLUTION INTRAVENOUS CONTINUOUS PRN
Status: CANCELLED | OUTPATIENT
Start: 2021-10-27

## 2021-10-27 RX ORDER — DEXLANSOPRAZOLE 60 MG/1
60 CAPSULE, DELAYED RELEASE ORAL DAILY
Qty: 30 CAPSULE | Refills: 5 | Status: SHIPPED | OUTPATIENT
Start: 2021-10-27 | End: 2022-03-09 | Stop reason: SDUPTHER

## 2021-10-27 RX ORDER — NALOXEGOL OXALATE 25 MG/1
25 TABLET, FILM COATED ORAL EVERY MORNING
Qty: 30 TABLET | Refills: 5 | Status: SHIPPED | OUTPATIENT
Start: 2021-10-27 | End: 2021-12-11

## 2021-10-27 RX ORDER — BISACODYL 5 MG
TABLET, DELAYED RELEASE (ENTERIC COATED) ORAL
Qty: 4 TABLET | Refills: 0 | Status: SHIPPED | OUTPATIENT
Start: 2021-10-27 | End: 2022-03-09

## 2021-10-28 ENCOUNTER — TELEPHONE (OUTPATIENT)
Dept: GASTROENTEROLOGY | Facility: CLINIC | Age: 61
End: 2021-10-28

## 2021-11-06 ENCOUNTER — TELEPHONE (OUTPATIENT)
Dept: FAMILY MEDICINE CLINIC | Facility: CLINIC | Age: 61
End: 2021-11-06

## 2021-11-07 NOTE — TELEPHONE ENCOUNTER
Pt has not had DEXA scan or mammogram as ordered for her earlier this year. Does she need help scheduling these? Does she have a preferred location?

## 2021-11-20 DIAGNOSIS — M79.7 FIBROMYALGIA: ICD-10-CM

## 2021-11-20 DIAGNOSIS — M54.2 NECK PAIN: ICD-10-CM

## 2021-11-20 DIAGNOSIS — M62.838 CERVICAL PARASPINAL MUSCLE SPASM: ICD-10-CM

## 2021-11-20 DIAGNOSIS — K86.1 CHRONIC PANCREATITIS, UNSPECIFIED PANCREATITIS TYPE (HCC): ICD-10-CM

## 2021-11-22 RX ORDER — FUROSEMIDE 20 MG/1
TABLET ORAL
Qty: 30 TABLET | Refills: 0 | Status: SHIPPED | OUTPATIENT
Start: 2021-11-22 | End: 2021-12-09 | Stop reason: SDUPTHER

## 2021-11-22 RX ORDER — CYCLOBENZAPRINE HCL 5 MG
5 TABLET ORAL 3 TIMES DAILY PRN
Qty: 90 TABLET | Refills: 0 | Status: SHIPPED | OUTPATIENT
Start: 2021-11-22 | End: 2022-03-09 | Stop reason: SDUPTHER

## 2021-11-22 RX ORDER — PROMETHAZINE HYDROCHLORIDE 25 MG/1
25 TABLET ORAL EVERY 8 HOURS PRN
Qty: 90 TABLET | Refills: 0 | Status: SHIPPED | OUTPATIENT
Start: 2021-11-22 | End: 2021-12-27

## 2021-12-02 ENCOUNTER — TELEPHONE (OUTPATIENT)
Dept: GASTROENTEROLOGY | Facility: CLINIC | Age: 61
End: 2021-12-02

## 2021-12-02 DIAGNOSIS — Z01.818 PREOP TESTING: Primary | ICD-10-CM

## 2021-12-02 NOTE — TELEPHONE ENCOUNTER
CALLED PATIENT TO SCHEDULE COLONOSCOPY AND UPPER ENDOSCOPY (SEPARATE PROCEDURES). UPPER ENDOSCOPY IS SCHEDULED FOR 01/20/22 AT 6:30 AM. COLONOSCOPY IS SCHEDULED FOR 02/10/2022 AT 9:30 AM

## 2021-12-09 ENCOUNTER — OFFICE VISIT (OUTPATIENT)
Dept: FAMILY MEDICINE CLINIC | Facility: CLINIC | Age: 61
End: 2021-12-09

## 2021-12-09 VITALS
OXYGEN SATURATION: 96 % | SYSTOLIC BLOOD PRESSURE: 138 MMHG | TEMPERATURE: 98.2 F | DIASTOLIC BLOOD PRESSURE: 82 MMHG | WEIGHT: 187.6 LBS | HEIGHT: 64 IN | HEART RATE: 78 BPM | BODY MASS INDEX: 32.03 KG/M2

## 2021-12-09 DIAGNOSIS — M43.02 CERVICAL SPONDYLOLYSIS: ICD-10-CM

## 2021-12-09 DIAGNOSIS — G47.00 PERSISTENT INSOMNIA: ICD-10-CM

## 2021-12-09 DIAGNOSIS — M51.36 LUMBAR DEGENERATIVE DISC DISEASE: ICD-10-CM

## 2021-12-09 DIAGNOSIS — R25.2 MUSCLE CRAMPS: Primary | ICD-10-CM

## 2021-12-09 DIAGNOSIS — I10 PRIMARY HYPERTENSION: ICD-10-CM

## 2021-12-09 DIAGNOSIS — M79.7 FIBROMYALGIA: ICD-10-CM

## 2021-12-09 PROCEDURE — 99214 OFFICE O/P EST MOD 30 MIN: CPT | Performed by: FAMILY MEDICINE

## 2021-12-09 PROCEDURE — 96372 THER/PROPH/DIAG INJ SC/IM: CPT | Performed by: FAMILY MEDICINE

## 2021-12-09 RX ORDER — ZOLPIDEM TARTRATE 10 MG/1
10 TABLET ORAL NIGHTLY PRN
Qty: 30 TABLET | Refills: 5 | Status: SHIPPED | OUTPATIENT
Start: 2021-12-09 | End: 2022-05-26

## 2021-12-09 RX ORDER — DEXAMETHASONE SODIUM PHOSPHATE 10 MG/ML
10 INJECTION INTRAMUSCULAR; INTRAVENOUS ONCE
Status: COMPLETED | OUTPATIENT
Start: 2021-12-09 | End: 2021-12-09

## 2021-12-09 RX ORDER — FUROSEMIDE 20 MG/1
20 TABLET ORAL DAILY PRN
Qty: 30 TABLET | Refills: 0 | Status: SHIPPED | OUTPATIENT
Start: 2021-12-09 | End: 2022-01-25

## 2021-12-09 RX ORDER — PREGABALIN 300 MG/1
CAPSULE ORAL
Qty: 60 CAPSULE | Refills: 2 | Status: SHIPPED | OUTPATIENT
Start: 2021-12-09 | End: 2022-04-11

## 2021-12-09 RX ORDER — KETOROLAC TROMETHAMINE 30 MG/ML
30 INJECTION, SOLUTION INTRAMUSCULAR; INTRAVENOUS ONCE
Status: DISCONTINUED | OUTPATIENT
Start: 2021-12-09 | End: 2021-12-09

## 2021-12-09 RX ORDER — KETOROLAC TROMETHAMINE 30 MG/ML
30 INJECTION, SOLUTION INTRAMUSCULAR; INTRAVENOUS ONCE
Status: COMPLETED | OUTPATIENT
Start: 2021-12-09 | End: 2021-12-09

## 2021-12-09 RX ADMIN — KETOROLAC TROMETHAMINE 30 MG: 30 INJECTION, SOLUTION INTRAMUSCULAR; INTRAVENOUS at 10:03

## 2021-12-09 RX ADMIN — DEXAMETHASONE SODIUM PHOSPHATE 10 MG: 10 INJECTION INTRAMUSCULAR; INTRAVENOUS at 10:00

## 2021-12-09 NOTE — PROGRESS NOTES
Subjective   Katelynn Sung is a 61 y.o. female.     History of Present Illness   Mrs. Sung presents today with complaint of subacute onset pain in her right arm and right leg upon awakening yesterday.  [place] like sharp, shooting pains, intermittent, lasts few seconds.  Not associated with weakness or paresthesias/numbness.  No other new focal neurological symptoms.  She does have underlying fibromyalgia, lumbar degenerative disc disease, cervical DDD.  Has been taking her Lyrica as prescribed.  Due for refill.  Does feel her pain in general has worsened since onset of cold weather.  She is having increased muscle cramping in general.  She has benefited from corticosteroid and Toradol in the past would like to see if that is effective today.    Continues to use Ambien as needed for insomnia.  Doing well with sleep initiation.  Denies side effects.  No sleep associated behaviors/parasomnia.    Taking her Zestoretic as prescribed for management of hypertension.  Blood pressures been well controlled.  No chest pain, shortness of breath, palpitations, increased welling.    The following portions of the patient's history were reviewed and updated as appropriate: allergies, current medications, past family history, past medical history, past social history, past surgical history and problem list.    Review of Systems   Constitutional: Positive for fatigue. Negative for diaphoresis, fever and unexpected weight change.   HENT: Positive for congestion and postnasal drip. Negative for ear pain, mouth sores, nosebleeds, rhinorrhea and sore throat.    Eyes: Negative for visual disturbance.   Respiratory: Positive for cough (mild, intermittent, dry), shortness of breath (mild with exertion) and wheezing (intermittent).    Cardiovascular: Negative for chest pain, palpitations and leg swelling.   Gastrointestinal: Positive for abdominal pain (chronic), constipation and nausea. Negative for blood in stool, rectal pain and  vomiting.   Endocrine: Positive for heat intolerance. Negative for polydipsia and polyuria.   Genitourinary: Negative for dysuria, hematuria, pelvic pain, vaginal bleeding and vaginal discharge.   Musculoskeletal: Positive for arthralgias, back pain, myalgias, neck pain and neck stiffness.   Skin: Negative for rash and wound.   Neurological: Positive for headaches. Negative for dizziness, tremors, syncope and weakness.   Hematological: Negative for adenopathy. Does not bruise/bleed easily.   Psychiatric/Behavioral: Positive for sleep disturbance. Negative for confusion and dysphoric mood. The patient is nervous/anxious.    Pt's previous ROS reviewed and updated as indicated.       Objective    Vitals:    12/09/21 0920   BP: 138/82   Pulse: 78   Temp: 98.2 °F (36.8 °C)   SpO2: 96%     Body mass index is 32.2 kg/m².      12/09/21 0920   Weight: 85.1 kg (187 lb 9.6 oz)       Physical Exam  Vitals and nursing note reviewed.   Constitutional:       General: She is not in acute distress.     Appearance: She is well-developed and well-groomed. She is obese. She is not ill-appearing.   HENT:      Head: Normocephalic and atraumatic.   Eyes:      General: No scleral icterus.     Conjunctiva/sclera: Conjunctivae normal.   Cardiovascular:      Rate and Rhythm: Normal rate and regular rhythm.      Pulses: Normal pulses.      Heart sounds: Normal heart sounds.   Pulmonary:      Effort: Pulmonary effort is normal.      Breath sounds: Decreased breath sounds (mildly) present. No wheezing, rhonchi or rales.   Musculoskeletal:      Right lower leg: No edema.      Left lower leg: No edema.      Comments: Soft tissue TTP RUE, RLE and diffusely, no decreased ROM in major joints    Skin:     General: Skin is warm and dry.      Coloration: Skin is not jaundiced or pale.      Findings: No bruising or rash.   Neurological:      Mental Status: She is alert and oriented to person, place, and time.      Cranial Nerves: Cranial nerves are  intact.      Sensory: Sensation is intact.      Motor: Motor function is intact. No tremor.      Coordination: Coordination is intact.      Gait: Gait is intact.   Psychiatric:         Mood and Affect: Mood normal.         Speech: Speech normal.         Behavior: Behavior normal. Behavior is cooperative.         Cognition and Memory: Cognition and memory normal.     Pt's previous physical exam reviewed and updated as indicated.        Assessment/Plan   Diagnoses and all orders for this visit:    1. Muscle cramps (Primary)  -     Basic Metabolic Panel  -     Magnesium    2. Cervical spondylolysis  -     pregabalin (LYRICA) 300 MG capsule; 1 po bid  Dispense: 60 capsule; Refill: 2    3. Lumbar degenerative disc disease  -     pregabalin (LYRICA) 300 MG capsule; 1 po bid  Dispense: 60 capsule; Refill: 2    4. Fibromyalgia  -     pregabalin (LYRICA) 300 MG capsule; 1 po bid  Dispense: 60 capsule; Refill: 2  -     dexamethasone (DECADRON) injection 10 mg  -     Discontinue: ketorolac (TORADOL) injection 30 mg  -     ketorolac (TORADOL) injection 30 mg    5. Persistent insomnia  -     zolpidem (AMBIEN) 10 MG tablet; Take 1 tablet by mouth At Night As Needed for Sleep.  Dispense: 30 tablet; Refill: 5    6. Primary hypertension    Other orders  -     furosemide (LASIX) 20 MG tablet; Take 1 tablet by mouth Daily As Needed (FOR SWELLING).  Dispense: 30 tablet; Refill: 0       Has has for contributing factors to muscle cramping.    Multifactorial chronic pain due to cervical DDD, lumbar DDD, fibromyalgia with recent exacerbation and right-sided pain of unclear etiology.  No new focal neurological symptoms.  Trial of corticosteroid, Toradol as above.  Continue Lyrica.    She will return next week for influenza vaccination.    Continue Ambien as needed for sleep initiation.    Routine follow-up 3 months, follow-up sooner as needed/instructed.  I will contact patient regarding test results and provide instructions regarding any  necessary changes in plan of care.  Patient was encouraged to keep me informed of any acute changes, lack of improvement, or any new concerning symptoms.  Pt is aware of reasons to seek emergent care.  Patient voiced understanding of all instructions and denied further questions.  As part of patient's treatment plan I am prescribing a controlled substance.  The patient has been made aware of the appropriate use of such medications, including potential risk of somnolence, limited ability to drive and/or work safely, and potential for dependence and/or overdose.  It has also been made clear that these medications are for use by this patient only, without concomitant use of alcohol or other substances, unless prescribed.  History and physical exam exhibit continued safe and appropriate use of controlled substance.  DANTE reviewed.  Patient has completed a prescribing agreement detailing terms of continued prescribing of controlled substances, including monitoring DANTE reports, urine drug screening, and pill counts if necessary.  Patient is aware that inappropriate use will result in cessation of prescribing such medications.    Please note that portions of this note may have been completed with a voice recognition program. Efforts were made to edit the dictations, but occasionally words are mistranscribed.

## 2021-12-10 LAB
BUN SERPL-MCNC: 9 MG/DL (ref 8–23)
BUN/CREAT SERPL: 12.2 (ref 7–25)
CALCIUM SERPL-MCNC: 9.7 MG/DL (ref 8.6–10.5)
CHLORIDE SERPL-SCNC: 99 MMOL/L (ref 98–107)
CO2 SERPL-SCNC: 27 MMOL/L (ref 22–29)
CREAT SERPL-MCNC: 0.74 MG/DL (ref 0.57–1)
GLUCOSE SERPL-MCNC: 97 MG/DL (ref 65–99)
MAGNESIUM SERPL-MCNC: 2.3 MG/DL (ref 1.6–2.4)
POTASSIUM SERPL-SCNC: 4.3 MMOL/L (ref 3.5–5.2)
SODIUM SERPL-SCNC: 137 MMOL/L (ref 136–145)

## 2021-12-20 RX ORDER — AZELASTINE HYDROCHLORIDE 137 UG/1
SPRAY, METERED NASAL
Qty: 30 ML | Refills: 0 | Status: SHIPPED | OUTPATIENT
Start: 2021-12-20 | End: 2022-03-09 | Stop reason: SDUPTHER

## 2021-12-27 DIAGNOSIS — K86.1 CHRONIC PANCREATITIS, UNSPECIFIED PANCREATITIS TYPE (HCC): ICD-10-CM

## 2021-12-27 RX ORDER — PROMETHAZINE HYDROCHLORIDE 25 MG/1
25 TABLET ORAL EVERY 8 HOURS PRN
Qty: 90 TABLET | Refills: 0 | Status: SHIPPED | OUTPATIENT
Start: 2021-12-27 | End: 2022-01-21 | Stop reason: SDUPTHER

## 2022-01-13 ENCOUNTER — HOSPITAL ENCOUNTER (OUTPATIENT)
Facility: HOSPITAL | Age: 62
Setting detail: HOSPITAL OUTPATIENT SURGERY
End: 2022-01-13
Attending: INTERNAL MEDICINE | Admitting: INTERNAL MEDICINE

## 2022-01-13 PROBLEM — R14.0 BLOATING: Status: ACTIVE | Noted: 2022-01-13

## 2022-01-13 PROBLEM — R68.81 EARLY SATIETY: Status: ACTIVE | Noted: 2022-01-13

## 2022-01-20 RX ORDER — SODIUM CHLORIDE 0.9 % (FLUSH) 0.9 %
10 SYRINGE (ML) INJECTION AS NEEDED
Status: CANCELLED | OUTPATIENT
Start: 2022-01-20

## 2022-01-21 DIAGNOSIS — K86.1 CHRONIC PANCREATITIS, UNSPECIFIED PANCREATITIS TYPE: ICD-10-CM

## 2022-01-21 RX ORDER — PROMETHAZINE HYDROCHLORIDE 25 MG/1
25 TABLET ORAL EVERY 8 HOURS PRN
Qty: 90 TABLET | Refills: 0 | OUTPATIENT
Start: 2022-01-21

## 2022-01-21 NOTE — TELEPHONE ENCOUNTER
Caller: Katelynn Sung Richy    Relationship: Self    Best call back number: [phone]  Requested Prescriptions:   Requested Prescriptions     Pending Prescriptions Disp Refills   • promethazine (PHENERGAN) 25 MG tablet 90 tablet 0     Sig: Take 1 tablet by mouth Every 8 (Eight) Hours As Needed for Nausea or Vomiting.        Pharmacy where request should be sent:      [facility] ([place]) - [address], [address] - [phone] [facility] [phone]         Additional details provided by patient:     Does the patient have less than a 3 day supply:  [x] Yes  [] No    Shira Mendoza Rep   01/21/22 13:11 EST         
Rx Refill Note  Requested Prescriptions     Pending Prescriptions Disp Refills   • promethazine (PHENERGAN) 25 MG tablet 90 tablet 0     Sig: Take 1 tablet by mouth Every 8 (Eight) Hours As Needed for Nausea or Vomiting.      Last office visit with prescribing clinician: 12/9/2021      Next office visit with prescribing clinician: 1/21/2022            Radha Castro MA  01/21/22, 14:27 EST  
no

## 2022-01-22 DIAGNOSIS — K86.1 CHRONIC PANCREATITIS, UNSPECIFIED PANCREATITIS TYPE: ICD-10-CM

## 2022-01-24 RX ORDER — PROMETHAZINE HYDROCHLORIDE 25 MG/1
25 TABLET ORAL EVERY 8 HOURS PRN
Qty: 90 TABLET | Refills: 0 | OUTPATIENT
Start: 2022-01-24

## 2022-01-24 RX ORDER — PROMETHAZINE HYDROCHLORIDE 25 MG/1
25 TABLET ORAL EVERY 8 HOURS PRN
Qty: 90 TABLET | Refills: 0 | Status: SHIPPED | OUTPATIENT
Start: 2022-01-24 | End: 2022-02-15 | Stop reason: SDUPTHER

## 2022-01-25 ENCOUNTER — TELEPHONE (OUTPATIENT)
Dept: FAMILY MEDICINE CLINIC | Facility: CLINIC | Age: 62
End: 2022-01-25

## 2022-01-25 DIAGNOSIS — F41.8 SITUATIONAL ANXIETY: Primary | ICD-10-CM

## 2022-01-25 RX ORDER — FUROSEMIDE 20 MG/1
20 TABLET ORAL DAILY PRN
Qty: 30 TABLET | Refills: 0 | Status: SHIPPED | OUTPATIENT
Start: 2022-01-25 | End: 2022-03-09 | Stop reason: SDUPTHER

## 2022-01-25 RX ORDER — NALOXEGOL OXALATE 25 MG/1
25 TABLET, FILM COATED ORAL EVERY MORNING
COMMUNITY
Start: 2022-01-19 | End: 2022-04-05

## 2022-01-25 RX ORDER — ALPRAZOLAM 0.25 MG/1
TABLET ORAL
Qty: 15 TABLET | Refills: 0 | Status: SHIPPED | OUTPATIENT
Start: 2022-01-25 | End: 2022-02-22 | Stop reason: SDUPTHER

## 2022-01-25 NOTE — TELEPHONE ENCOUNTER
Spoke with patient.  She is unable to eat or sleep due to depression and anxiety.  Her daughter in law is pregnant and the baby has deformities and is not expected live.  She would like Xanax to be sent to pharmacy.

## 2022-01-25 NOTE — TELEPHONE ENCOUNTER
Caller: Katelynn Sung    Relationship: Self    Best call back number: [phone]    What medication are you requesting: XANAX    What are your current symptoms: PATIENT IS LOOSING HER GRANDSON ( HE MAY NOT LIVE MUCH LONGER)  AND NEEDS MEDICATION TO COPE     How long have you been experiencing symptoms: DAYS    Have you had these symptoms before:    [] Yes  [] No    Have you been treated for these symptoms before:   [] Yes  [] No    If a prescription is needed, what is your preferred pharmacy and phone number: [facility], [address], [address] - [phone] [facility] [phone]      Additional notes:PATIENT CALLED [facility] AND STATES THAT SHE CAN'T SLEEP, EAT OR DO ANYTHING

## 2022-02-08 PROBLEM — Z86.0100 HISTORY OF COLON POLYPS: Status: ACTIVE | Noted: 2022-02-08

## 2022-02-08 PROBLEM — Z86.010 HISTORY OF COLON POLYPS: Status: ACTIVE | Noted: 2022-02-08

## 2022-02-14 DIAGNOSIS — K86.1 CHRONIC PANCREATITIS, UNSPECIFIED PANCREATITIS TYPE: ICD-10-CM

## 2022-02-14 RX ORDER — PROMETHAZINE HYDROCHLORIDE 25 MG/1
25 TABLET ORAL EVERY 8 HOURS PRN
Qty: 90 TABLET | Refills: 0 | OUTPATIENT
Start: 2022-02-14

## 2022-02-15 ENCOUNTER — TELEPHONE (OUTPATIENT)
Dept: FAMILY MEDICINE CLINIC | Facility: CLINIC | Age: 62
End: 2022-02-15

## 2022-02-15 DIAGNOSIS — K86.1 CHRONIC PANCREATITIS, UNSPECIFIED PANCREATITIS TYPE: ICD-10-CM

## 2022-02-15 RX ORDER — PROMETHAZINE HYDROCHLORIDE 25 MG/1
25 TABLET ORAL EVERY 8 HOURS PRN
Qty: 90 TABLET | Refills: 0 | Status: SHIPPED | OUTPATIENT
Start: 2022-02-15 | End: 2022-03-09 | Stop reason: SDUPTHER

## 2022-02-15 NOTE — TELEPHONE ENCOUNTER
Caller: Katelynn Sung Richy    Relationship: Self    Best call back number: [phone]    Requested Prescriptions:   Requested Prescriptions     Pending Prescriptions Disp Refills   • promethazine (PHENERGAN) 25 MG tablet 90 tablet 0     Sig: Take 1 tablet by mouth Every 8 (Eight) Hours As Needed for Nausea or Vomiting.        Pharmacy where request should be sent: [facility] ([place]) - [address], [address] - [phone] [facility] [phone]      Additional details provided by patient: PATIENT OUT- NEEDS FILLED TODAY  Does the patient have less than a 3 day supply:  [x] Yes  [] No    Shira Pryor Rep   02/15/22 14:32 EST

## 2022-02-22 ENCOUNTER — TELEPHONE (OUTPATIENT)
Dept: FAMILY MEDICINE CLINIC | Facility: CLINIC | Age: 62
End: 2022-02-22

## 2022-02-22 DIAGNOSIS — F41.8 SITUATIONAL ANXIETY: ICD-10-CM

## 2022-02-22 NOTE — TELEPHONE ENCOUNTER
Rx Refill Note  Requested Prescriptions     Pending Prescriptions Disp Refills   • ALPRAZolam (Xanax) 0.25 MG tablet 15 tablet 0     Si-2 po bid as needed for severe anxiety      Last office visit with prescribing clinician: 2021      Next office visit with prescribing clinician: 3/9/2022            Radha Castro MA  22, 16:20 EST

## 2022-02-22 NOTE — TELEPHONE ENCOUNTER
Caller: PinedaKatelynn Richy    Relationship: Self    Best call back number: [phone]    Requested Prescriptions:   Requested Prescriptions     Pending Prescriptions Disp Refills   • ALPRAZolam (Xanax) 0.25 MG tablet 15 tablet 0     Si-2 po bid as needed for severe anxiety        Pharmacy where request should be sent: [facility] ([place] - [address], [address] - [phone] [facility] [phone]     Additional details provided by patient: COMPLETELY OUT OF MEDICATION    Does the patient have less than a 3 day supply:  [x] Yes  [] No    Shira Hood Rep   22 16:12 EST

## 2022-02-23 RX ORDER — ALPRAZOLAM 0.25 MG/1
TABLET ORAL
Qty: 15 TABLET | Refills: 0 | Status: SHIPPED | OUTPATIENT
Start: 2022-02-23 | End: 2022-03-09 | Stop reason: SDUPTHER

## 2022-03-09 ENCOUNTER — TELEPHONE (OUTPATIENT)
Dept: FAMILY MEDICINE CLINIC | Facility: CLINIC | Age: 62
End: 2022-03-09

## 2022-03-09 ENCOUNTER — OFFICE VISIT (OUTPATIENT)
Dept: FAMILY MEDICINE CLINIC | Facility: CLINIC | Age: 62
End: 2022-03-09

## 2022-03-09 VITALS
SYSTOLIC BLOOD PRESSURE: 122 MMHG | TEMPERATURE: 98.5 F | DIASTOLIC BLOOD PRESSURE: 80 MMHG | BODY MASS INDEX: 32.15 KG/M2 | HEART RATE: 74 BPM | HEIGHT: 65 IN | OXYGEN SATURATION: 98 % | WEIGHT: 193 LBS

## 2022-03-09 DIAGNOSIS — G47.00 PERSISTENT INSOMNIA: ICD-10-CM

## 2022-03-09 DIAGNOSIS — M62.838 CERVICAL PARASPINAL MUSCLE SPASM: ICD-10-CM

## 2022-03-09 DIAGNOSIS — M79.673 CHRONIC FOOT PAIN, UNSPECIFIED LATERALITY: ICD-10-CM

## 2022-03-09 DIAGNOSIS — M79.7 FIBROMYALGIA: ICD-10-CM

## 2022-03-09 DIAGNOSIS — M43.02 CERVICAL SPONDYLOLYSIS: ICD-10-CM

## 2022-03-09 DIAGNOSIS — K21.9 GASTROESOPHAGEAL REFLUX DISEASE WITHOUT ESOPHAGITIS: ICD-10-CM

## 2022-03-09 DIAGNOSIS — M51.36 LUMBAR DEGENERATIVE DISC DISEASE: ICD-10-CM

## 2022-03-09 DIAGNOSIS — M21.969 ACQUIRED DEFORMITY OF FOOT, UNSPECIFIED LATERALITY: ICD-10-CM

## 2022-03-09 DIAGNOSIS — G89.29 CHRONIC FOOT PAIN, UNSPECIFIED LATERALITY: ICD-10-CM

## 2022-03-09 DIAGNOSIS — M54.2 NECK PAIN: ICD-10-CM

## 2022-03-09 DIAGNOSIS — K86.1 CHRONIC PANCREATITIS, UNSPECIFIED PANCREATITIS TYPE: ICD-10-CM

## 2022-03-09 DIAGNOSIS — S76.911A MUSCLE STRAIN OF RIGHT THIGH, INITIAL ENCOUNTER: Primary | ICD-10-CM

## 2022-03-09 DIAGNOSIS — F41.8 SITUATIONAL ANXIETY: ICD-10-CM

## 2022-03-09 PROCEDURE — 99214 OFFICE O/P EST MOD 30 MIN: CPT | Performed by: FAMILY MEDICINE

## 2022-03-09 PROCEDURE — 96372 THER/PROPH/DIAG INJ SC/IM: CPT | Performed by: FAMILY MEDICINE

## 2022-03-09 RX ORDER — ESZOPICLONE 2 MG/1
2 TABLET, FILM COATED ORAL NIGHTLY
Qty: 30 TABLET | Refills: 2 | Status: SHIPPED | OUTPATIENT
Start: 2022-03-09 | End: 2022-06-06

## 2022-03-09 RX ORDER — FLUTICASONE PROPIONATE 50 MCG
2 SPRAY, SUSPENSION (ML) NASAL DAILY
Qty: 15.8 G | Refills: 4 | Status: SHIPPED | OUTPATIENT
Start: 2022-03-09 | End: 2022-06-13 | Stop reason: SDUPTHER

## 2022-03-09 RX ORDER — CYCLOBENZAPRINE HCL 5 MG
5 TABLET ORAL 3 TIMES DAILY PRN
Qty: 90 TABLET | Refills: 0 | Status: SHIPPED | OUTPATIENT
Start: 2022-03-09 | End: 2022-04-05

## 2022-03-09 RX ORDER — PREGABALIN 225 MG/1
225 CAPSULE ORAL 2 TIMES DAILY
Qty: 60 CAPSULE | Refills: 0 | Status: SHIPPED | OUTPATIENT
Start: 2022-03-09 | End: 2022-04-05

## 2022-03-09 RX ORDER — DEXLANSOPRAZOLE 60 MG/1
60 CAPSULE, DELAYED RELEASE ORAL DAILY
Qty: 30 CAPSULE | Refills: 5 | Status: SHIPPED | OUTPATIENT
Start: 2022-03-09 | End: 2022-06-13 | Stop reason: SDUPTHER

## 2022-03-09 RX ORDER — CETIRIZINE HYDROCHLORIDE 10 MG/1
10 TABLET ORAL DAILY
Qty: 30 TABLET | Refills: 1 | Status: SHIPPED | OUTPATIENT
Start: 2022-03-09 | End: 2022-05-05

## 2022-03-09 RX ORDER — DEXAMETHASONE SODIUM PHOSPHATE 4 MG/ML
5 INJECTION, SOLUTION INTRA-ARTICULAR; INTRALESIONAL; INTRAMUSCULAR; INTRAVENOUS; SOFT TISSUE ONCE
Status: COMPLETED | OUTPATIENT
Start: 2022-03-09 | End: 2022-03-09

## 2022-03-09 RX ORDER — DEXAMETHASONE SODIUM PHOSPHATE 10 MG/ML
5 INJECTION INTRAMUSCULAR; INTRAVENOUS ONCE
Status: DISCONTINUED | OUTPATIENT
Start: 2022-03-09 | End: 2022-03-09

## 2022-03-09 RX ORDER — PROMETHAZINE HYDROCHLORIDE 25 MG/1
25 TABLET ORAL EVERY 8 HOURS PRN
Qty: 90 TABLET | Refills: 0 | Status: SHIPPED | OUTPATIENT
Start: 2022-03-09 | End: 2022-04-05

## 2022-03-09 RX ORDER — FUROSEMIDE 20 MG/1
20 TABLET ORAL DAILY PRN
Qty: 30 TABLET | Refills: 0 | Status: SHIPPED | OUTPATIENT
Start: 2022-03-09 | End: 2022-04-05

## 2022-03-09 RX ORDER — KETOROLAC TROMETHAMINE 30 MG/ML
30 INJECTION, SOLUTION INTRAMUSCULAR; INTRAVENOUS ONCE
Status: COMPLETED | OUTPATIENT
Start: 2022-03-09 | End: 2022-03-09

## 2022-03-09 RX ORDER — ALPRAZOLAM 0.25 MG/1
TABLET ORAL
Qty: 15 TABLET | Refills: 0 | Status: SHIPPED | OUTPATIENT
Start: 2022-03-09 | End: 2022-04-07 | Stop reason: SDUPTHER

## 2022-03-09 RX ORDER — AZELASTINE HYDROCHLORIDE 137 UG/1
2 SPRAY, METERED NASAL 2 TIMES DAILY
Qty: 30 ML | Refills: 11 | Status: SHIPPED | OUTPATIENT
Start: 2022-03-09 | End: 2022-06-13 | Stop reason: SDUPTHER

## 2022-03-09 RX ORDER — LISINOPRIL AND HYDROCHLOROTHIAZIDE 12.5; 1 MG/1; MG/1
1 TABLET ORAL DAILY
Qty: 30 TABLET | Refills: 5 | Status: SHIPPED | OUTPATIENT
Start: 2022-03-09 | End: 2022-06-13 | Stop reason: SDUPTHER

## 2022-03-09 RX ORDER — ALBUTEROL SULFATE 90 UG/1
2 AEROSOL, METERED RESPIRATORY (INHALATION)
Qty: 8.5 G | Refills: 3 | Status: SHIPPED | OUTPATIENT
Start: 2022-03-09 | End: 2022-07-21

## 2022-03-09 RX ORDER — ALBUTEROL SULFATE 2.5 MG/3ML
2.5 SOLUTION RESPIRATORY (INHALATION) EVERY 4 HOURS PRN
Qty: 120 ML | Refills: 12 | Status: SHIPPED | OUTPATIENT
Start: 2022-03-09 | End: 2022-09-13 | Stop reason: SDUPTHER

## 2022-03-09 RX ADMIN — DEXAMETHASONE SODIUM PHOSPHATE 5 MG: 4 INJECTION, SOLUTION INTRA-ARTICULAR; INTRALESIONAL; INTRAMUSCULAR; INTRAVENOUS; SOFT TISSUE at 14:02

## 2022-03-09 RX ADMIN — KETOROLAC TROMETHAMINE 30 MG: 30 INJECTION, SOLUTION INTRAMUSCULAR; INTRAVENOUS at 14:00

## 2022-03-09 NOTE — TELEPHONE ENCOUNTER
Caller: Katelynn Sung    Relationship: Self    Best Call Back Number:    [phone]    What medication are you requesting:     eszopiclone (Lunesta) 2 MG tablet    PATIENT STATED DR GUERRIER WAS GOING TO PRESCRIBE MEDICATION LISTED ABOVE FOLLOWING HER APPOINTMENT TODAY    PATIENT STATED THE PHARMACY HAS NOT RECEIVED THE PRESCRIPTION YET    What are your current symptoms:     N/A    How long have you been experiencing symptoms:     N/A    Have you had these symptoms before:    [x] Yes  [] No    Have you been treated for these symptoms before:   [x] Yes  [] No    If a prescription is needed, what is your preferred pharmacy and phone number:      [facility]    TELEPHONE CONTACT:    [phone]    Additional notes:    N/A    DR GUERRIER

## 2022-03-09 NOTE — PROGRESS NOTES
"Subjective   Katelynn Sung is a 62 y.o. female.     History of Present Illness   Mrs. Sung presents today for f/u on several chronic med problems.    She is currently on high dose lyrica for mgnt of chronic pain due to FMSx, Cand L-DDD/DJD. She has gained another 10 lbs since last visit. Very concerned about weight gain. Feels it is due in part to Lyrica.    She has been on ambien for several years. Would like to try alternative as she has started having \"hallucinations from it\".     She c/o right leg thigh pain down to back of knee for 7-8 months, medial right knee up inner medial thigh. No increased warmth, no redness, no known trauma.     Left foot deformity causing foot pain, gait change. Has never had eval by podiatry. Had been wearing wider shoes.    Has had f/u with GI. No longer on Linzess, no on Movantik. GERD stable.    Anxiety recently stable.     The following portions of the patient's history were reviewed and updated as appropriate: allergies, current medications, past family history, past medical history, past social history, past surgical history and problem list.    Review of Systems   Constitutional: Positive for fatigue and unexpected weight change. Negative for diaphoresis and fever.   HENT: Positive for congestion and postnasal drip. Negative for ear pain, mouth sores, nosebleeds, rhinorrhea and sore throat.    Eyes: Negative for visual disturbance.   Respiratory: Positive for cough (mild, intermittent, dry), shortness of breath (mild with exertion) and wheezing (intermittent).    Cardiovascular: Negative for chest pain, palpitations and leg swelling.   Gastrointestinal: Positive for abdominal pain (chronic), constipation and nausea. Negative for blood in stool, rectal pain and vomiting.   Endocrine: Positive for heat intolerance. Negative for polydipsia and polyuria.   Genitourinary: Negative for dysuria, hematuria, pelvic pain, vaginal bleeding and vaginal discharge.   Musculoskeletal: " Positive for arthralgias, back pain, myalgias, neck pain and neck stiffness.   Skin: Negative for rash and wound.   Neurological: Positive for headaches. Negative for dizziness, tremors, syncope and weakness.   Hematological: Negative for adenopathy. Does not bruise/bleed easily.   Psychiatric/Behavioral: Positive for sleep disturbance. Negative for confusion and dysphoric mood. The patient is nervous/anxious.    Pt's previous ROS reviewed and updated as indicated.       Objective    Vitals:    03/09/22 0931   BP: 122/80   Pulse: 74   Temp: 98.5 °F (36.9 °C)   SpO2: 98%     Body mass index is 32.62 kg/m².      03/09/22 0931   Weight: 87.5 kg (193 lb)       Physical Exam  Vitals and nursing note reviewed.   Constitutional:       General: She is not in acute distress.     Appearance: She is well-developed and well-groomed. She is obese. She is not ill-appearing.   HENT:      Head: Normocephalic and atraumatic.   Eyes:      General: No scleral icterus.     Conjunctiva/sclera: Conjunctivae normal.   Cardiovascular:      Rate and Rhythm: Normal rate and regular rhythm.      Pulses: Normal pulses.      Heart sounds: Normal heart sounds.   Pulmonary:      Effort: Pulmonary effort is normal.      Breath sounds: Decreased breath sounds (mildly) present. No wheezing, rhonchi or rales.   Musculoskeletal:      Right lower leg: No edema.      Left lower leg: No edema.      Comments: Soft tissue TTP RUE, RLE and diffusely, no decreased ROM in major joints    Skin:     General: Skin is warm and dry.      Coloration: Skin is not jaundiced or pale.      Findings: No bruising or rash.   Neurological:      Mental Status: She is alert and oriented to person, place, and time.      Cranial Nerves: Cranial nerves are intact.      Sensory: Sensation is intact.      Motor: Motor function is intact. No tremor.      Coordination: Coordination is intact.      Gait: Gait is intact.   Psychiatric:         Mood and Affect: Mood normal.          Speech: Speech normal.         Behavior: Behavior normal. Behavior is cooperative.         Cognition and Memory: Cognition and memory normal.     Pt's previous physical exam reviewed and updated as indicated.    Lab Results   Component Value Date    WBC 4.49 09/09/2021    HGB 14.5 09/09/2021    HCT 44.4 09/09/2021    MCV 86.9 09/09/2021     09/09/2021       Lab Results   Component Value Date    GLUCOSE 97 12/09/2021    BUN 9 12/09/2021    CREATININE 0.74 12/09/2021    EGFRIFNONA 80 12/09/2021    EGFRIFAFRI 97 12/09/2021    BCR 12.2 12/09/2021    K 4.3 12/09/2021    CO2 27.0 12/09/2021    CALCIUM 9.7 12/09/2021    PROTENTOTREF 7.4 09/09/2021    ALBUMIN 4.60 09/09/2021    LABIL2 1.6 09/09/2021    AST 40 (H) 09/09/2021    ALT 22 09/09/2021       Lab Results   Component Value Date    CHLPL 206 (H) 06/14/2021    TRIG 104 06/14/2021    HDL 45 06/14/2021     (H) 06/14/2021       Lab Results   Component Value Date    HGBA1C 5.8 (H) 06/14/2021       Lab Results   Component Value Date    TSH 1.830 06/14/2021       Assessment/Plan   Diagnoses and all orders for this visit:    1. Muscle strain of right thigh, initial encounter (Primary)  -     ketorolac (TORADOL) injection 30 mg  -     Discontinue: dexamethasone (DECADRON) injection 5 mg    2. Situational anxiety  -     ALPRAZolam (Xanax) 0.25 MG tablet; 1-2 po bid as needed for severe anxiety  Dispense: 15 tablet; Refill: 0    3. Fibromyalgia  -     cyclobenzaprine (FLEXERIL) 5 MG tablet; Take 1 tablet by mouth 3 (Three) Times a Day As Needed for Muscle Spasms.  Dispense: 90 tablet; Refill: 0  -     pregabalin (Lyrica) 225 MG capsule; Take 1 capsule by mouth 2 (Two) Times a Day.  Dispense: 60 capsule; Refill: 0  -     dexamethasone (DECADRON) injection 5 mg    4. Neck pain  -     cyclobenzaprine (FLEXERIL) 5 MG tablet; Take 1 tablet by mouth 3 (Three) Times a Day As Needed for Muscle Spasms.  Dispense: 90 tablet; Refill: 0    5. Cervical paraspinal muscle  spasm  -     cyclobenzaprine (FLEXERIL) 5 MG tablet; Take 1 tablet by mouth 3 (Three) Times a Day As Needed for Muscle Spasms.  Dispense: 90 tablet; Refill: 0    6. Gastroesophageal reflux disease without esophagitis  -     dexlansoprazole (Dexilant) 60 MG capsule; Take 1 capsule by mouth Daily.  Dispense: 30 capsule; Refill: 5    7. Chronic pancreatitis, unspecified pancreatitis type (HCC)  -     promethazine (PHENERGAN) 25 MG tablet; Take 1 tablet by mouth Every 8 (Eight) Hours As Needed for Nausea or Vomiting.  Dispense: 90 tablet; Refill: 0    8. Cervical spondylolysis    9. Lumbar degenerative disc disease    10. Persistent insomnia  -     eszopiclone (Lunesta) 2 MG tablet; Take 1 tablet by mouth Every Night. Take immediately before bedtime  Dispense: 30 tablet; Refill: 2    11. Acquired deformity of foot, unspecified laterality  -     Ambulatory Referral to Orthopedic Surgery    12. Chronic foot pain, unspecified laterality  -     Ambulatory Referral to Orthopedic Surgery    Other orders  -     albuterol (PROVENTIL) (2.5 MG/3ML) 0.083% nebulizer solution; Take 2.5 mg by nebulization Every 4 (Four) Hours As Needed for Wheezing.  Dispense: 120 mL; Refill: 12  -     Azelastine HCl 137 MCG/SPRAY solution; 2 sprays into the nostril(s) as directed by provider 2 (Two) Times a Day.  Dispense: 30 mL; Refill: 11  -     Breo Ellipta 200-25 MCG/INH inhaler; Inhale 1 puff Daily.  Dispense: 60 each; Refill: 11  -     cetirizine (zyrTEC) 10 MG tablet; Take 1 tablet by mouth Daily.  Dispense: 30 tablet; Refill: 1  -     fluticasone (FLONASE) 50 MCG/ACT nasal spray; 2 sprays by Each Nare route Daily. use 2 sprays in the nostril(s) as directed by provider daily  Dispense: 15.8 g; Refill: 4  -     furosemide (LASIX) 20 MG tablet; Take 1 tablet by mouth Daily As Needed (FOR SWELLING).  Dispense: 30 tablet; Refill: 0  -     lisinopril-hydrochlorothiazide (PRINZIDE,ZESTORETIC) 10-12.5 MG per tablet; Take 1 tablet by mouth Daily.   "Dispense: 30 tablet; Refill: 5  -     nystatin (MYCOSTATIN) 100,000 unit/mL suspension; SWISH AND SWALLOW 5 MLS BY MOUTH FOUR TIMES DAILY  Dispense: 180 mL; Refill: 0  -     albuterol sulfate HFA (ProAir HFA) 108 (90 Base) MCG/ACT inhaler; Inhale 2 puffs 4 (Four) Times a Day.  Dispense: 8.5 g; Refill: 3       Multifactorial chronic pain responsive to high dose lyrica. Unfortunately she is having continued weight gain. Wishes to \"wean down' to lower dose. Change to 225 tid. Further weaning as tolerated.  As part of patient's treatment plan I am prescribing a controlled substance.  The patient has been made aware of the appropriate use of such medications, including potential risk of somnolence, limited ability to drive and/or work safely, and potential for dependence and/or overdose.  It has also been made clear that these medications are for use by this patient only, without concomitant use of alcohol or other substances, unless prescribed.  History and physical exam exhibit continued safe and appropriate use of controlled substance.  DANTE reviewed.  Patient has completed a prescribing agreement detailing terms of continued prescribing of controlled substances, including monitoring DANTE reports, urine drug screening, and pill counts if necessary.  Patient is aware that inappropriate use will result in cessation of prescribing such medications.    I have reviewed risks/benefits and potential side effects of various treatment options for insomnia due to sleep initiation problem. Pt voices understanding and wishes to proceed with trial of lunest as above. Sleep hygiene reviewed.    POC tx for suspected musculoskeletal injury to right hamstring. If no improvement consider venous doppler, PT, etc.    F/u with GI as scheduled. Continue phernergan prn, PPI, movantik.    Nutrition and activity goals reviewed including: mainly water to drink, limit white flour/processed sugar, higher lean protein, high fiber carbs, " regular meals, working toward 150 mins cardio per week, regular stretching.    Routine f/u in 3 months, sooner as needed.   Patient was encouraged to keep me informed of any acute changes, lack of improvement, or any new concerning symptoms.  Pt is aware of reasons to seek emergent care.  Patient voiced understanding of all instructions and denied further questions.    Please note that portions of this note may have been completed with a voice recognition program. Efforts were made to edit the dictations, but occasionally words are mistranscribed.

## 2022-03-12 ENCOUNTER — TELEPHONE (OUTPATIENT)
Dept: FAMILY MEDICINE CLINIC | Facility: CLINIC | Age: 62
End: 2022-03-12

## 2022-03-12 DIAGNOSIS — M79.604 PAIN AND SWELLING OF RIGHT LOWER EXTREMITY: Primary | ICD-10-CM

## 2022-03-12 DIAGNOSIS — M79.89 PAIN AND SWELLING OF RIGHT LOWER EXTREMITY: Primary | ICD-10-CM

## 2022-03-12 NOTE — TELEPHONE ENCOUNTER
DEXA and mammogram were ordered last June. She has not yet completed these. Is she willing to have these rescheduled?

## 2022-03-14 NOTE — TELEPHONE ENCOUNTER
Patient said she is still having RLE pain and said that you had told her to let you know if her sx were not improving.  She said you had mention ordering a scan on her leg.  She would like for this to be done at [facility].  She is also requesting DexaScan and Mammogram be scheduled at [facility].  Thanks

## 2022-03-15 NOTE — TELEPHONE ENCOUNTER
Called patient and provided her with appointment information and instructions for Venous US, Mammo and DEXA.

## 2022-03-21 ENCOUNTER — TELEPHONE (OUTPATIENT)
Dept: FAMILY MEDICINE CLINIC | Facility: CLINIC | Age: 62
End: 2022-03-21

## 2022-03-21 NOTE — TELEPHONE ENCOUNTER
Caller: Katelynn Sung    Relationship: Self    Best call back number: [phone]    Caller requesting test results:     What test was performed: ULTRASOUND DONE ON LEG    When was the test performed: SOMETIME LAST WEEK    Where was the test performed: N/A    Additional notes:

## 2022-03-22 DIAGNOSIS — M79.604 PAIN AND SWELLING OF RIGHT LOWER EXTREMITY: ICD-10-CM

## 2022-03-22 DIAGNOSIS — M79.89 PAIN AND SWELLING OF RIGHT LOWER EXTREMITY: ICD-10-CM

## 2022-03-25 ENCOUNTER — PRIOR AUTHORIZATION (OUTPATIENT)
Dept: FAMILY MEDICINE CLINIC | Facility: CLINIC | Age: 62
End: 2022-03-25

## 2022-03-25 NOTE — TELEPHONE ENCOUNTER
A PRIOR AUTH HAS BEEN STARTED THROUGH [employer] FOR ZOLPIDEM.    CURRENTLY WAITING ON A RESPONSE FROM THE INSURANCE.    Key: [id number]

## 2022-03-29 NOTE — TELEPHONE ENCOUNTER
Patients medication has been approved.    Patients pharmacy has been notified.    Patient has been notified.    Approval start date: 03/25/2022    Approval end date: 09/24/2022

## 2022-04-04 ENCOUNTER — LAB (OUTPATIENT)
Dept: LAB | Facility: HOSPITAL | Age: 62
End: 2022-04-04

## 2022-04-04 LAB — SARS-COV-2 RNA PNL SPEC NAA+PROBE: NOT DETECTED

## 2022-04-04 PROCEDURE — 87635 SARS-COV-2 COVID-19 AMP PRB: CPT | Performed by: INTERNAL MEDICINE

## 2022-04-04 PROCEDURE — C9803 HOPD COVID-19 SPEC COLLECT: HCPCS | Performed by: INTERNAL MEDICINE

## 2022-04-05 DIAGNOSIS — M62.838 CERVICAL PARASPINAL MUSCLE SPASM: ICD-10-CM

## 2022-04-05 DIAGNOSIS — M79.7 FIBROMYALGIA: ICD-10-CM

## 2022-04-05 DIAGNOSIS — Z01.818 PREOP TESTING: Primary | ICD-10-CM

## 2022-04-05 DIAGNOSIS — M54.2 NECK PAIN: ICD-10-CM

## 2022-04-05 DIAGNOSIS — K86.1 CHRONIC PANCREATITIS, UNSPECIFIED PANCREATITIS TYPE: ICD-10-CM

## 2022-04-05 RX ORDER — CYCLOBENZAPRINE HCL 5 MG
5 TABLET ORAL 3 TIMES DAILY PRN
Qty: 90 TABLET | Refills: 0 | Status: SHIPPED | OUTPATIENT
Start: 2022-04-05 | End: 2022-05-03

## 2022-04-05 RX ORDER — PREGABALIN 225 MG/1
225 CAPSULE ORAL 2 TIMES DAILY
Qty: 14 CAPSULE | Refills: 0 | Status: SHIPPED | OUTPATIENT
Start: 2022-04-05 | End: 2022-04-11

## 2022-04-05 RX ORDER — PROMETHAZINE HYDROCHLORIDE 25 MG/1
25 TABLET ORAL EVERY 8 HOURS PRN
Qty: 90 TABLET | Refills: 0 | Status: SHIPPED | OUTPATIENT
Start: 2022-04-05 | End: 2022-05-03

## 2022-04-05 RX ORDER — NALOXEGOL OXALATE 25 MG/1
TABLET, FILM COATED ORAL
Qty: 30 TABLET | Refills: 5 | Status: SHIPPED | OUTPATIENT
Start: 2022-04-05 | End: 2022-09-13 | Stop reason: SDUPTHER

## 2022-04-05 RX ORDER — FUROSEMIDE 20 MG/1
20 TABLET ORAL DAILY PRN
Qty: 30 TABLET | Refills: 0 | Status: SHIPPED | OUTPATIENT
Start: 2022-04-05 | End: 2022-05-03

## 2022-04-07 DIAGNOSIS — F41.8 SITUATIONAL ANXIETY: ICD-10-CM

## 2022-04-07 DIAGNOSIS — M79.7 FIBROMYALGIA: ICD-10-CM

## 2022-04-07 RX ORDER — PREGABALIN 225 MG/1
225 CAPSULE ORAL 2 TIMES DAILY
Qty: 14 CAPSULE | Refills: 0 | OUTPATIENT
Start: 2022-04-07

## 2022-04-07 RX ORDER — ALPRAZOLAM 0.25 MG/1
TABLET ORAL
Qty: 15 TABLET | Refills: 0 | Status: SHIPPED | OUTPATIENT
Start: 2022-04-07 | End: 2022-05-26

## 2022-04-07 NOTE — TELEPHONE ENCOUNTER
Rx Refill Note  Requested Prescriptions     Pending Prescriptions Disp Refills   • ALPRAZolam (Xanax) 0.25 MG tablet 15 tablet 0     Si-2 po bid as needed for severe anxiety     Refused Prescriptions Disp Refills   • pregabalin (LYRICA) 225 MG capsule 14 capsule 0     Sig: Take 1 capsule by mouth 2 (Two) Times a Day.      Last office visit with prescribing clinician: 3/9/2022      Next office visit with prescribing clinician: 2022            Radha Castro MA  22, 14:46 EDT

## 2022-04-07 NOTE — TELEPHONE ENCOUNTER
Caller: Katelynn Sung Richy    Relationship: Self    Best call back number: [phone]    Requested Prescriptions:   Requested Prescriptions     Pending Prescriptions Disp Refills   • pregabalin (LYRICA) 225 MG capsule 14 capsule 0     Sig: Take 1 capsule by mouth 2 (Two) Times a Day.        Pharmacy where request should be sent: [facility] ([place]) - [address], [address] - [phone] [facility] [phone]     Additional details provided by patient: PATIENT ONLY RECEIVED 14 CAPSULES WHICH IS ONLY A 1 WEEK SUPPLY. PATIENT NEEDS A FULL 30 DAY SUPPLY CALLED IN ASAP    PATIENT STATED SHE ALSO NEEDS HER NEW SLEEPING MEDICATIONS      Does the patient have less than a 3 day supply:  [x] Yes  [] No    Shira Miranda Rep   04/07/22 14:37 EDT

## 2022-04-11 DIAGNOSIS — M79.7 FIBROMYALGIA: ICD-10-CM

## 2022-04-11 DIAGNOSIS — Z51.81 ENCOUNTER FOR THERAPEUTIC DRUG MONITORING: ICD-10-CM

## 2022-04-11 DIAGNOSIS — M79.7 FIBROMYALGIA: Primary | ICD-10-CM

## 2022-04-11 RX ORDER — PREGABALIN 225 MG/1
225 CAPSULE ORAL 2 TIMES DAILY
Qty: 14 CAPSULE | Refills: 0 | Status: SHIPPED | OUTPATIENT
Start: 2022-04-11 | End: 2022-04-11

## 2022-04-11 RX ORDER — PREGABALIN 225 MG/1
225 CAPSULE ORAL 2 TIMES DAILY
Qty: 60 CAPSULE | Refills: 0 | Status: SHIPPED | OUTPATIENT
Start: 2022-04-11 | End: 2022-05-09 | Stop reason: SDUPTHER

## 2022-04-11 NOTE — TELEPHONE ENCOUNTER
Please confirm if pt has updated med agreement not yet scanned.    DANTE reviewed. Refill approved and printed for . Pt to have UDS at time of . Please document timing of last dose of each controlled medication. Pt to keep f/u apt as scheduled.

## 2022-04-11 NOTE — TELEPHONE ENCOUNTER
Rx Refill Note  Requested Prescriptions     Pending Prescriptions Disp Refills   • pregabalin (LYRICA) 225 MG capsule [Pharmacy Med Name: PREGABALIN 225MG] 14 capsule 0     Sig: TAKE 1 CAPSULE BY MOUTH 2 (TWO) TIMES A DAY.      Last office visit with prescribing clinician: 3/16/2021      Next office visit with prescribing clinician: Visit date not found            Radha Castro MA  04/11/22, 12:31 EDT   none

## 2022-04-12 ENCOUNTER — TELEPHONE (OUTPATIENT)
Dept: FAMILY MEDICINE CLINIC | Facility: CLINIC | Age: 62
End: 2022-04-12

## 2022-04-12 ENCOUNTER — CLINICAL SUPPORT (OUTPATIENT)
Dept: FAMILY MEDICINE CLINIC | Facility: CLINIC | Age: 62
End: 2022-04-12

## 2022-04-12 NOTE — TELEPHONE ENCOUNTER
"FYI    Patient came in for UDS and CSA.  She said she took Lyrica 04/12/2022 @7am    She last took Lunesta 04/09/2022 04/11/2022 She went to fill her \"sleeping medication\" and the pharmacy gave her the old RX of Ambien.  She said this medication makes her hallucination, but she took a small piece of the Ambien just to help her sleep.    She is going to day to  refill of Lunesta today.  "

## 2022-04-19 LAB — DRUGS UR: NORMAL

## 2022-05-03 DIAGNOSIS — M79.7 FIBROMYALGIA: ICD-10-CM

## 2022-05-03 DIAGNOSIS — M54.2 NECK PAIN: ICD-10-CM

## 2022-05-03 DIAGNOSIS — M62.838 CERVICAL PARASPINAL MUSCLE SPASM: ICD-10-CM

## 2022-05-03 DIAGNOSIS — K86.1 CHRONIC PANCREATITIS, UNSPECIFIED PANCREATITIS TYPE: ICD-10-CM

## 2022-05-03 RX ORDER — PROMETHAZINE HYDROCHLORIDE 25 MG/1
25 TABLET ORAL EVERY 8 HOURS PRN
Qty: 90 TABLET | Refills: 0 | Status: SHIPPED | OUTPATIENT
Start: 2022-05-03 | End: 2022-05-31

## 2022-05-03 RX ORDER — PREGABALIN 225 MG/1
CAPSULE ORAL
Qty: 60 CAPSULE | Refills: 0 | OUTPATIENT
Start: 2022-05-03

## 2022-05-03 RX ORDER — FUROSEMIDE 20 MG/1
20 TABLET ORAL DAILY PRN
Qty: 30 TABLET | Refills: 0 | Status: SHIPPED | OUTPATIENT
Start: 2022-05-03 | End: 2022-05-31

## 2022-05-03 RX ORDER — CYCLOBENZAPRINE HCL 5 MG
5 TABLET ORAL 3 TIMES DAILY PRN
Qty: 90 TABLET | Refills: 0 | Status: SHIPPED | OUTPATIENT
Start: 2022-05-03 | End: 2022-05-31

## 2022-05-05 RX ORDER — CETIRIZINE HYDROCHLORIDE 10 MG/1
10 TABLET ORAL DAILY
Qty: 30 TABLET | Refills: 0 | Status: SHIPPED | OUTPATIENT
Start: 2022-05-05 | End: 2022-06-13 | Stop reason: SDUPTHER

## 2022-05-07 DIAGNOSIS — M79.7 FIBROMYALGIA: ICD-10-CM

## 2022-05-09 ENCOUNTER — TELEPHONE (OUTPATIENT)
Dept: FAMILY MEDICINE CLINIC | Facility: CLINIC | Age: 62
End: 2022-05-09

## 2022-05-09 DIAGNOSIS — M79.7 FIBROMYALGIA: ICD-10-CM

## 2022-05-09 RX ORDER — PREGABALIN 225 MG/1
225 CAPSULE ORAL 2 TIMES DAILY
Qty: 60 CAPSULE | Refills: 1 | Status: SHIPPED | OUTPATIENT
Start: 2022-05-09 | End: 2022-06-13 | Stop reason: SDUPTHER

## 2022-05-09 RX ORDER — PREGABALIN 225 MG/1
CAPSULE ORAL
Qty: 60 CAPSULE | Refills: 0 | OUTPATIENT
Start: 2022-05-09

## 2022-05-09 NOTE — TELEPHONE ENCOUNTER
Caller: Katelynn Sung    Relationship: Self    Best call back number: [phone]    Requested Prescriptions:   Requested Prescriptions     Pending Prescriptions Disp Refills   • pregabalin (LYRICA) 225 MG capsule 60 capsule 0     Sig: Take 1 capsule by mouth 2 (Two) Times a Day.        Pharmacy where request should be sent: [facility] ([place]) - [address], [address] - [phone] [facility] [phone] FX     Does the patient have less than a 3 day supply:  [x] Yes  [] No    Shira Shaw Rep   05/09/22 11:21 EDT

## 2022-05-09 NOTE — TELEPHONE ENCOUNTER
Rx Refill Note  Requested Prescriptions     Pending Prescriptions Disp Refills   • pregabalin (LYRICA) 225 MG capsule 60 capsule 0     Sig: Take 1 capsule by mouth 2 (Two) Times a Day.      Last office visit with prescribing clinician: 3/9/2022      Next office visit with prescribing clinician: 6/13/2022    OK TO FILL?    UDS AND CSA ARE UP TO DATE.    Marine Arriaga MA  05/09/22, 11:28 EDT

## 2022-05-09 NOTE — TELEPHONE ENCOUNTER
Rx Refill Note  Requested Prescriptions     Pending Prescriptions Disp Refills   • pregabalin (LYRICA) 225 MG capsule [Pharmacy Med Name: PREGABALIN 225MG] 60 capsule 0     Sig: TAKE 1 CAPSULE BY MOUTH TWICE DAILY      Last office visit with prescribing clinician: 3/9/2022      Next office visit with prescribing clinician: 6/13/2022            Radha Castro MA  05/09/22, 08:04 EDT

## 2022-05-17 ENCOUNTER — TRANSCRIBE ORDERS (OUTPATIENT)
Dept: ADMINISTRATIVE | Facility: HOSPITAL | Age: 62
End: 2022-05-17

## 2022-05-17 DIAGNOSIS — Z11.59 ENCOUNTER FOR SCREENING FOR VIRAL DISEASE: Primary | ICD-10-CM

## 2022-05-26 DIAGNOSIS — F41.8 SITUATIONAL ANXIETY: ICD-10-CM

## 2022-05-26 RX ORDER — ALPRAZOLAM 0.25 MG/1
TABLET ORAL
Qty: 15 TABLET | Refills: 0 | Status: SHIPPED | OUTPATIENT
Start: 2022-05-26 | End: 2022-06-13 | Stop reason: SDUPTHER

## 2022-05-31 DIAGNOSIS — M54.2 NECK PAIN: ICD-10-CM

## 2022-05-31 DIAGNOSIS — K86.1 CHRONIC PANCREATITIS, UNSPECIFIED PANCREATITIS TYPE: ICD-10-CM

## 2022-05-31 DIAGNOSIS — M79.7 FIBROMYALGIA: ICD-10-CM

## 2022-05-31 DIAGNOSIS — M62.838 CERVICAL PARASPINAL MUSCLE SPASM: ICD-10-CM

## 2022-05-31 RX ORDER — CYCLOBENZAPRINE HCL 5 MG
5 TABLET ORAL 3 TIMES DAILY PRN
Qty: 90 TABLET | Refills: 0 | Status: SHIPPED | OUTPATIENT
Start: 2022-05-31 | End: 2022-06-13 | Stop reason: SDUPTHER

## 2022-05-31 RX ORDER — PROMETHAZINE HYDROCHLORIDE 25 MG/1
25 TABLET ORAL EVERY 8 HOURS PRN
Qty: 90 TABLET | Refills: 0 | Status: SHIPPED | OUTPATIENT
Start: 2022-05-31 | End: 2022-06-13 | Stop reason: SDUPTHER

## 2022-05-31 RX ORDER — FUROSEMIDE 20 MG/1
20 TABLET ORAL DAILY PRN
Qty: 30 TABLET | Refills: 0 | Status: SHIPPED | OUTPATIENT
Start: 2022-05-31 | End: 2022-06-13 | Stop reason: SDUPTHER

## 2022-06-03 DIAGNOSIS — G47.00 PERSISTENT INSOMNIA: ICD-10-CM

## 2022-06-06 ENCOUNTER — LAB (OUTPATIENT)
Dept: LAB | Facility: HOSPITAL | Age: 62
End: 2022-06-06

## 2022-06-06 DIAGNOSIS — Z11.59 ENCOUNTER FOR SCREENING FOR VIRAL DISEASE: ICD-10-CM

## 2022-06-06 DIAGNOSIS — G47.00 PERSISTENT INSOMNIA: ICD-10-CM

## 2022-06-06 LAB — SARS-COV-2 RNA PNL SPEC NAA+PROBE: NOT DETECTED

## 2022-06-06 PROCEDURE — U0004 COV-19 TEST NON-CDC HGH THRU: HCPCS

## 2022-06-06 PROCEDURE — C9803 HOPD COVID-19 SPEC COLLECT: HCPCS

## 2022-06-06 RX ORDER — ESZOPICLONE 2 MG/1
2 TABLET, FILM COATED ORAL NIGHTLY
Qty: 30 TABLET | Refills: 2 | OUTPATIENT
Start: 2022-06-06

## 2022-06-06 RX ORDER — ESZOPICLONE 2 MG/1
TABLET, FILM COATED ORAL
Qty: 30 TABLET | Refills: 1 | Status: SHIPPED | OUTPATIENT
Start: 2022-06-06 | End: 2022-06-13 | Stop reason: SDUPTHER

## 2022-06-06 NOTE — TELEPHONE ENCOUNTER
Rx Refill Note  Requested Prescriptions     Pending Prescriptions Disp Refills   • eszopiclone (LUNESTA) 2 MG tablet [Pharmacy Med Name: ESZOPICLONE 2MG] 30 tablet 1     Sig: TAKE 1 TABLET BY MOUTH ONCE DAILY AT BEDTIME TAKE IMMEDIATELY BEFORE BEDTIME      Last office visit with prescribing clinician: 3/9/2022      Next office visit with prescribing clinician: 6/13/2022            Sherley Blank MA  06/06/22, 08:55 EDT

## 2022-06-06 NOTE — TELEPHONE ENCOUNTER
Rx Refill Note  Requested Prescriptions     Pending Prescriptions Disp Refills   • eszopiclone (Lunesta) 2 MG tablet 30 tablet 2     Sig: Take 1 tablet by mouth Every Night. Take immediately before bedtime      Last office visit with prescribing clinician: 3/9/2022      Next office visit with prescribing clinician: 6/13/2022            Sherley Blank MA  06/06/22, 11:30 EDT

## 2022-06-06 NOTE — TELEPHONE ENCOUNTER
Caller: Katelynn Sung Richy    Relationship: Self    Best call back number: [phone]    Requested Prescriptions:   Requested Prescriptions     Pending Prescriptions Disp Refills   • eszopiclone (LUNESTA) 2 MG tablet [Pharmacy Med Name: ESZOPICLONE 2MG] 30 tablet 1     Sig: TAKE 1 TABLET BY MOUTH ONCE DAILY AT BEDTIME TAKE IMMEDIATELY BEFORE BEDTIME        Pharmacy where request should be sent: [facility] ([place] - [address], [address] - [phone] [facility] [phone]      Additional details provided by patient: PATIENT IS CALLING TO CHECK THE STATUS OF HER MEDICATION REFILL-  PATIENT IS HAVING SURGERY ON HER EYES ON Thursday AND WILL NOT BE ABLE TO DRIVE. PATIENT WILL BE OUT OF MEDICATION ON THE 9TH     Does the patient have less than a 3 day supply:  [x] Yes  [] No    Shira Richards   06/06/22 11:17 EDT

## 2022-06-06 NOTE — TELEPHONE ENCOUNTER
Rx Refill Note  Requested Prescriptions     Pending Prescriptions Disp Refills   • eszopiclone (Lunesta) 2 MG tablet 30 tablet 2     Sig: Take 1 tablet by mouth Every Night. Take immediately before bedtime      Last office visit with prescribing clinician: 3/9/2022      Next office visit with prescribing clinician: 6/13/2022            Sherley Blank MA  06/06/22, 11:22 EDTRx Refill Note  Requested Prescriptions      No prescriptions requested or ordered in this encounter      Last office visit with prescribing clinician: 3/9/2022      Next office visit with prescribing clinician: 6/13/2022            Sherley Blakn MA  06/06/22, 11:22 EDT

## 2022-06-07 ENCOUNTER — TELEPHONE (OUTPATIENT)
Dept: GASTROENTEROLOGY | Facility: CLINIC | Age: 62
End: 2022-06-07

## 2022-06-07 NOTE — TELEPHONE ENCOUNTER
----- Message from Ellie Johnson MA sent at 6/7/2022 11:01 AM EDT -----  Regarding: LVM  PT LVM NEEDS TO RESCHEDULE HER PROCEDURE. [phone]    THANKS    AP

## 2022-06-10 ENCOUNTER — PRIOR AUTHORIZATION (OUTPATIENT)
Dept: FAMILY MEDICINE CLINIC | Facility: CLINIC | Age: 62
End: 2022-06-10

## 2022-06-10 ENCOUNTER — TELEPHONE (OUTPATIENT)
Dept: GASTROENTEROLOGY | Facility: CLINIC | Age: 62
End: 2022-06-10

## 2022-06-10 NOTE — TELEPHONE ENCOUNTER
A PRIOR AUTH HAS BEEN STARTED THROUGH [employer] FOR LUNESTA.    WAITING ON A RESPONSE FROM THE INSURANCE.    Key: [id number]

## 2022-06-13 ENCOUNTER — OFFICE VISIT (OUTPATIENT)
Dept: FAMILY MEDICINE CLINIC | Facility: CLINIC | Age: 62
End: 2022-06-13

## 2022-06-13 VITALS
SYSTOLIC BLOOD PRESSURE: 120 MMHG | DIASTOLIC BLOOD PRESSURE: 40 MMHG | TEMPERATURE: 98.6 F | WEIGHT: 185.2 LBS | OXYGEN SATURATION: 98 % | BODY MASS INDEX: 30.86 KG/M2 | HEIGHT: 65 IN | HEART RATE: 66 BPM

## 2022-06-13 DIAGNOSIS — L98.9 ARM SKIN LESION, RIGHT: ICD-10-CM

## 2022-06-13 DIAGNOSIS — M21.969 ACQUIRED DEFORMITY OF FOOT, UNSPECIFIED LATERALITY: ICD-10-CM

## 2022-06-13 DIAGNOSIS — K86.1 CHRONIC PANCREATITIS, UNSPECIFIED PANCREATITIS TYPE: ICD-10-CM

## 2022-06-13 DIAGNOSIS — M81.0 AGE-RELATED OSTEOPOROSIS WITHOUT CURRENT PATHOLOGICAL FRACTURE: ICD-10-CM

## 2022-06-13 DIAGNOSIS — I10 PRIMARY HYPERTENSION: ICD-10-CM

## 2022-06-13 DIAGNOSIS — G47.00 PERSISTENT INSOMNIA: ICD-10-CM

## 2022-06-13 DIAGNOSIS — M54.2 NECK PAIN: ICD-10-CM

## 2022-06-13 DIAGNOSIS — Z12.31 ENCOUNTER FOR SCREENING MAMMOGRAM FOR MALIGNANT NEOPLASM OF BREAST: ICD-10-CM

## 2022-06-13 DIAGNOSIS — J30.89 ENVIRONMENTAL AND SEASONAL ALLERGIES: ICD-10-CM

## 2022-06-13 DIAGNOSIS — K21.9 GASTROESOPHAGEAL REFLUX DISEASE WITHOUT ESOPHAGITIS: ICD-10-CM

## 2022-06-13 DIAGNOSIS — M62.838 CERVICAL PARASPINAL MUSCLE SPASM: ICD-10-CM

## 2022-06-13 DIAGNOSIS — M79.7 FIBROMYALGIA: ICD-10-CM

## 2022-06-13 DIAGNOSIS — M79.673 PAIN OF FOOT, UNSPECIFIED LATERALITY: ICD-10-CM

## 2022-06-13 DIAGNOSIS — F41.8 SITUATIONAL ANXIETY: Primary | ICD-10-CM

## 2022-06-13 DIAGNOSIS — Z78.0 POSTMENOPAUSAL: ICD-10-CM

## 2022-06-13 PROCEDURE — 99214 OFFICE O/P EST MOD 30 MIN: CPT | Performed by: FAMILY MEDICINE

## 2022-06-13 RX ORDER — PROMETHAZINE HYDROCHLORIDE 25 MG/1
25 TABLET ORAL EVERY 8 HOURS PRN
Qty: 90 TABLET | Refills: 0 | Status: SHIPPED | OUTPATIENT
Start: 2022-06-13 | End: 2022-07-27

## 2022-06-13 RX ORDER — CYCLOBENZAPRINE HCL 5 MG
5 TABLET ORAL 3 TIMES DAILY PRN
Qty: 90 TABLET | Refills: 0 | Status: SHIPPED | OUTPATIENT
Start: 2022-06-13 | End: 2022-07-27

## 2022-06-13 RX ORDER — ESZOPICLONE 2 MG/1
2 TABLET, FILM COATED ORAL NIGHTLY
Qty: 30 TABLET | Refills: 1 | Status: SHIPPED | OUTPATIENT
Start: 2022-06-13 | End: 2022-09-13

## 2022-06-13 RX ORDER — FUROSEMIDE 20 MG/1
20 TABLET ORAL DAILY PRN
Qty: 30 TABLET | Refills: 0 | Status: SHIPPED | OUTPATIENT
Start: 2022-06-13 | End: 2022-07-27

## 2022-06-13 RX ORDER — ALBUTEROL SULFATE 90 UG/1
2 AEROSOL, METERED RESPIRATORY (INHALATION)
Qty: 8.5 G | Refills: 3 | Status: CANCELLED | OUTPATIENT
Start: 2022-06-13

## 2022-06-13 RX ORDER — FLUTICASONE PROPIONATE 50 MCG
2 SPRAY, SUSPENSION (ML) NASAL DAILY
Qty: 15.8 G | Refills: 4 | Status: SHIPPED | OUTPATIENT
Start: 2022-06-13 | End: 2023-01-03

## 2022-06-13 RX ORDER — ALPRAZOLAM 0.25 MG/1
TABLET ORAL
Qty: 15 TABLET | Refills: 0 | Status: CANCELLED | OUTPATIENT
Start: 2022-06-13

## 2022-06-13 RX ORDER — ALBUTEROL SULFATE 2.5 MG/3ML
2.5 SOLUTION RESPIRATORY (INHALATION) EVERY 4 HOURS PRN
Qty: 120 ML | Refills: 12 | Status: CANCELLED | OUTPATIENT
Start: 2022-06-13

## 2022-06-13 RX ORDER — LISINOPRIL AND HYDROCHLOROTHIAZIDE 12.5; 1 MG/1; MG/1
1 TABLET ORAL DAILY
Qty: 30 TABLET | Refills: 5 | Status: SHIPPED | OUTPATIENT
Start: 2022-06-13 | End: 2022-09-13 | Stop reason: SDUPTHER

## 2022-06-13 RX ORDER — NALOXEGOL OXALATE 25 MG/1
25 TABLET, FILM COATED ORAL EVERY MORNING
Qty: 30 TABLET | Refills: 5 | Status: CANCELLED | OUTPATIENT
Start: 2022-06-13

## 2022-06-13 RX ORDER — BUPRENORPHINE HYDROCHLORIDE AND NALOXONE HYDROCHLORIDE DIHYDRATE 8; 2 MG/1; MG/1
2 TABLET SUBLINGUAL 2 TIMES DAILY
Qty: 30 TABLET | Status: CANCELLED | OUTPATIENT
Start: 2022-06-13

## 2022-06-13 RX ORDER — DEXLANSOPRAZOLE 60 MG/1
60 CAPSULE, DELAYED RELEASE ORAL DAILY
Qty: 30 CAPSULE | Refills: 5 | Status: SHIPPED | OUTPATIENT
Start: 2022-06-13 | End: 2022-09-13 | Stop reason: SDUPTHER

## 2022-06-13 RX ORDER — AZELASTINE HYDROCHLORIDE 137 UG/1
2 SPRAY, METERED NASAL 2 TIMES DAILY
Qty: 30 ML | Refills: 11 | Status: SHIPPED | OUTPATIENT
Start: 2022-06-13 | End: 2022-09-13 | Stop reason: SDUPTHER

## 2022-06-13 RX ORDER — PREGABALIN 225 MG/1
225 CAPSULE ORAL 2 TIMES DAILY
Qty: 60 CAPSULE | Refills: 1 | Status: SHIPPED | OUTPATIENT
Start: 2022-06-13 | End: 2022-08-08 | Stop reason: SDUPTHER

## 2022-06-13 RX ORDER — ALPRAZOLAM 0.5 MG/1
0.5 TABLET ORAL DAILY PRN
Qty: 30 TABLET | Refills: 1 | Status: SHIPPED | OUTPATIENT
Start: 2022-06-13 | End: 2022-09-13 | Stop reason: SDUPTHER

## 2022-06-13 RX ORDER — CETIRIZINE HYDROCHLORIDE 10 MG/1
10 TABLET ORAL DAILY
Qty: 30 TABLET | Refills: 0 | Status: SHIPPED | OUTPATIENT
Start: 2022-06-13 | End: 2022-09-13 | Stop reason: SDUPTHER

## 2022-06-13 NOTE — PROGRESS NOTES
Subjective   Katelynn Sung is a 62 y.o. female.     History of Present Illness   Mrs. Sung presents today for f/u on several chronic med problems.     She is currently on lyrica for mgnt of chronic pain due to FMSx, C and L-DDD/DJD. Weight stable from last visit. Symptoms recently stable. Denies side effects.     Doing better with sleep initiation now that she is on lunesta.      Has had f/u with GI. On Movantik. GERD stable.     Anxiety recently increased. High psychosocial stress. Has been on low dose alprazolam once daily. Not as effective for panic during these past few weeks. she is Grieving loss of young grandson as well.    Only using Lasix 2 times per months.    Needs new referral  For foot doctor, apparently did not return voicemails regarding apt scheduling.    Taking her zestoretic for HTN. BP fairly well contorlled.    Pt's previous HPI reviewed and updated as indicated.     The following portions of the patient's history were reviewed and updated as appropriate: allergies, current medications, past family history, past medical history, past social history, past surgical history and problem list.    Review of Systems   Constitutional: Positive for fatigue. Negative for diaphoresis, fever and unexpected weight change.   HENT: Positive for congestion and postnasal drip. Negative for ear pain, mouth sores, nosebleeds, rhinorrhea and sore throat.    Eyes: Negative for visual disturbance.   Respiratory: Positive for cough (mild, intermittent, dry), shortness of breath (mild with exertion) and wheezing (intermittent).    Cardiovascular: Negative for chest pain, palpitations and leg swelling.   Gastrointestinal: Positive for abdominal pain (chronic), constipation and nausea. Negative for blood in stool, rectal pain and vomiting.   Endocrine: Positive for heat intolerance. Negative for polydipsia and polyuria.   Genitourinary: Negative for dysuria, hematuria, pelvic pain, vaginal bleeding and vaginal  discharge.   Musculoskeletal: Positive for arthralgias, back pain, myalgias, neck pain and neck stiffness.   Skin: Negative for rash and wound.   Neurological: Positive for headaches. Negative for dizziness, tremors, syncope and weakness.   Hematological: Negative for adenopathy. Does not bruise/bleed easily.   Psychiatric/Behavioral: Positive for sleep disturbance. Negative for confusion and dysphoric mood. The patient is nervous/anxious.    Pt's previous ROS reviewed and updated as indicated.       Objective    Vitals:    06/13/22 1006   BP: 120/40   Pulse: 66   Temp: 98.6 °F (37 °C)   SpO2: 98%     Body mass index is 31.2 kg/m².      06/13/22  1006   Weight: 84 kg (185 lb 3.2 oz)         Physical Exam  Vitals and nursing note reviewed.   Constitutional:       General: She is not in acute distress.     Appearance: She is well-developed and well-groomed. She is obese. She is not ill-appearing.   Eyes:      General: No scleral icterus.     Conjunctiva/sclera: Conjunctivae normal.   Neck:      Thyroid: No thyroid mass.      Vascular: Normal carotid pulses. No carotid bruit.   Cardiovascular:      Rate and Rhythm: Normal rate and regular rhythm.      Pulses: Normal pulses.      Heart sounds: Normal heart sounds.   Pulmonary:      Effort: Pulmonary effort is normal.      Breath sounds: Decreased breath sounds (mildly) present. No wheezing, rhonchi or rales.   Musculoskeletal:      Right lower leg: No edema.      Left lower leg: No edema.      Comments: Soft tissue TTP diffusely, no decreased ROM in major joints    Lymphadenopathy:      Cervical: No cervical adenopathy.   Skin:     General: Skin is warm and dry.      Coloration: Skin is not jaundiced or pale.      Findings: No bruising or rash.      Comments: Flesh colored pedunculated lesion right arm   Neurological:      Mental Status: She is alert and oriented to person, place, and time.      Motor: No tremor.      Coordination: Coordination is intact.      Gait:  Gait is intact.   Psychiatric:         Mood and Affect: Mood is anxious.         Speech: Speech normal.         Behavior: Behavior normal. Behavior is cooperative.         Thought Content: Thought content normal.         Cognition and Memory: Cognition normal.         Judgment: Judgment normal.     Pt's previous physical exam reviewed and updated as indicated.    Lab Results   Component Value Date    WBC 4.49 09/09/2021    HGB 14.5 09/09/2021    HCT 44.4 09/09/2021    MCV 86.9 09/09/2021     09/09/2021       Lab Results   Component Value Date    GLUCOSE 97 12/09/2021    BUN 9 12/09/2021    CREATININE 0.74 12/09/2021    EGFRIFNONA 80 12/09/2021    EGFRIFAFRI 97 12/09/2021    BCR 12.2 12/09/2021    K 4.3 12/09/2021    CO2 27.0 12/09/2021    CALCIUM 9.7 12/09/2021    PROTENTOTREF 7.4 09/09/2021    ALBUMIN 4.60 09/09/2021    LABIL2 1.6 09/09/2021    AST 40 (H) 09/09/2021    ALT 22 09/09/2021       Lab Results   Component Value Date    CHLPL 206 (H) 06/14/2021    TRIG 104 06/14/2021    HDL 45 06/14/2021     (H) 06/14/2021       Lab Results   Component Value Date    HGBA1C 5.8 (H) 06/14/2021       Lab Results   Component Value Date    TSH 1.830 06/14/2021         Assessment & Plan   Diagnoses and all orders for this visit:    1. Situational anxiety (Primary)  -     ALPRAZolam (XANAX) 0.5 MG tablet; Take 1 tablet by mouth Daily As Needed for Anxiety.  Dispense: 30 tablet; Refill: 1    2. Gastroesophageal reflux disease without esophagitis  -     dexlansoprazole (Dexilant) 60 MG capsule; Take 1 capsule by mouth Daily.  Dispense: 30 capsule; Refill: 5    3. Fibromyalgia  -     pregabalin (LYRICA) 225 MG capsule; Take 1 capsule by mouth 2 (Two) Times a Day.  Dispense: 60 capsule; Refill: 1  -     cyclobenzaprine (FLEXERIL) 5 MG tablet; Take 1 tablet by mouth 3 (Three) Times a Day As Needed for Muscle Spasms.  Dispense: 90 tablet; Refill: 0    4. Chronic pancreatitis, unspecified pancreatitis type (HCC)  -      promethazine (PHENERGAN) 25 MG tablet; Take 1 tablet by mouth Every 8 (Eight) Hours As Needed for Nausea or Vomiting.  Dispense: 90 tablet; Refill: 0    5. Persistent insomnia  -     eszopiclone (LUNESTA) 2 MG tablet; Take 1 tablet by mouth Every Night. Take immediately before bedtime  Dispense: 30 tablet; Refill: 1    6. Neck pain  -     cyclobenzaprine (FLEXERIL) 5 MG tablet; Take 1 tablet by mouth 3 (Three) Times a Day As Needed for Muscle Spasms.  Dispense: 90 tablet; Refill: 0    7. Cervical paraspinal muscle spasm  -     cyclobenzaprine (FLEXERIL) 5 MG tablet; Take 1 tablet by mouth 3 (Three) Times a Day As Needed for Muscle Spasms.  Dispense: 90 tablet; Refill: 0    8. Encounter for screening mammogram for malignant neoplasm of breast  -     Mammo Screening Digital Tomosynthesis Bilateral With CAD; Future    9. Postmenopausal  -     DEXA Bone Density Axial; Future    10. Age-related osteoporosis without current pathological fracture  -     DEXA Bone Density Axial; Future    11. Acquired deformity of foot, unspecified laterality  -     Ambulatory Referral to Orthopedic Surgery    12. Pain of foot, unspecified laterality  -     Ambulatory Referral to Orthopedic Surgery    13. Primary hypertension    14. Environmental and seasonal allergies    15. Arm skin lesion, right    Other orders  -     Azelastine HCl 137 MCG/SPRAY solution; 2 sprays into the nostril(s) as directed by provider 2 (Two) Times a Day.  Dispense: 30 mL; Refill: 11  -     cetirizine (zyrTEC) 10 MG tablet; Take 1 tablet by mouth Daily.  Dispense: 30 tablet; Refill: 0  -     fluticasone (FLONASE) 50 MCG/ACT nasal spray; 2 sprays by Each Nare route Daily. use 2 sprays in the nostril(s) as directed by provider daily  Dispense: 15.8 g; Refill: 4  -     lisinopril-hydrochlorothiazide (PRINZIDE,ZESTORETIC) 10-12.5 MG per tablet; Take 1 tablet by mouth Daily.  Dispense: 30 tablet; Refill: 5  -     furosemide (LASIX) 20 MG tablet; Take 1 tablet by mouth  Daily As Needed (FOR SWELLING).  Dispense: 30 tablet; Refill: 0       I have reviewed risks/benefits and potential side effects of various treatment options for situational anxiety, severe panic. Pt voices understanding and wishes to proceed with short term increase in her alprazolam to be used up to once daily as needed. Behavioral mgnt/coping skills reviewed as well.  As part of patient's treatment plan I am prescribing a controlled substance.  The patient has been made aware of the appropriate use of such medications, including potential risk of somnolence, limited ability to drive and/or work safely, and potential for dependence and/or overdose.  It has also been made clear that these medications are for use by this patient only, without concomitant use of alcohol or other substances, unless prescribed.  History and physical exam exhibit continued safe and appropriate use of controlled substance.  Larissa reviewed.    Continue lunesta as needed for sleep initiation.    F/uw ith GI as scheduled. Continue MOvantik, PPI.    HTN controlled on zestoretic.    Multifactorial chronic pain stable on Lyrica.  Continue current dose.  Risk/benefits of additional Adipex again reviewed.    She is amenable to scheduling of mammogram, bone density testing.    She will schedule skin procedure at her earliest convenience.    Routine follow-up in 3 months, sooner as needed.  I will contact patient regarding test results and provide instructions regarding any necessary changes in plan of care.  Patient was encouraged to keep me informed of any acute changes, lack of improvement, or any new concerning symptoms.  Pt is aware of reasons to seek emergent care.  Patient voiced understanding of all instructions and denied further questions.    Please note that portions of this note may have been completed with a voice recognition program. Efforts were made to edit the dictations, but occasionally words are  mistranscribed.

## 2022-06-16 PROBLEM — R10.11 RUQ PAIN: Status: RESOLVED | Noted: 2020-03-03 | Resolved: 2022-06-16

## 2022-06-16 PROBLEM — R68.81 EARLY SATIETY: Status: RESOLVED | Noted: 2022-01-13 | Resolved: 2022-06-16

## 2022-06-16 PROBLEM — Z12.11 COLON CANCER SCREENING: Status: RESOLVED | Noted: 2020-03-03 | Resolved: 2022-06-16

## 2022-06-16 PROBLEM — R10.13 EPIGASTRIC PAIN: Status: RESOLVED | Noted: 2020-02-13 | Resolved: 2022-06-16

## 2022-06-16 PROBLEM — R14.0 BLOATING: Status: RESOLVED | Noted: 2022-01-13 | Resolved: 2022-06-16

## 2022-06-16 PROBLEM — J30.89 ENVIRONMENTAL AND SEASONAL ALLERGIES: Status: ACTIVE | Noted: 2022-06-16

## 2022-07-08 ENCOUNTER — PRIOR AUTHORIZATION (OUTPATIENT)
Dept: FAMILY MEDICINE CLINIC | Facility: CLINIC | Age: 62
End: 2022-07-08

## 2022-07-08 NOTE — TELEPHONE ENCOUNTER
A PRIOR AUTH HAS BEEN STARTED THROUGH [facility] [facility] FOR LUNESTA.    WAITING ON A RESPONSE FORM THE INSURANCE.    Key: [id number]

## 2022-07-27 DIAGNOSIS — M54.2 NECK PAIN: ICD-10-CM

## 2022-07-27 DIAGNOSIS — K86.1 CHRONIC PANCREATITIS, UNSPECIFIED PANCREATITIS TYPE: ICD-10-CM

## 2022-07-27 DIAGNOSIS — M79.7 FIBROMYALGIA: ICD-10-CM

## 2022-07-27 DIAGNOSIS — M62.838 CERVICAL PARASPINAL MUSCLE SPASM: ICD-10-CM

## 2022-07-27 RX ORDER — PROMETHAZINE HYDROCHLORIDE 25 MG/1
TABLET ORAL
Qty: 90 TABLET | Refills: 0 | Status: SHIPPED | OUTPATIENT
Start: 2022-07-27 | End: 2022-09-01

## 2022-07-27 RX ORDER — CYCLOBENZAPRINE HCL 5 MG
TABLET ORAL
Qty: 90 TABLET | Refills: 0 | Status: SHIPPED | OUTPATIENT
Start: 2022-07-27 | End: 2022-09-13 | Stop reason: SDUPTHER

## 2022-07-27 RX ORDER — FUROSEMIDE 20 MG/1
20 TABLET ORAL DAILY PRN
Qty: 30 TABLET | Refills: 0 | Status: SHIPPED | OUTPATIENT
Start: 2022-07-27 | End: 2022-09-13 | Stop reason: SDUPTHER

## 2022-08-08 ENCOUNTER — OFFICE VISIT (OUTPATIENT)
Dept: FAMILY MEDICINE CLINIC | Facility: CLINIC | Age: 62
End: 2022-08-08

## 2022-08-08 ENCOUNTER — TELEPHONE (OUTPATIENT)
Dept: ORTHOPEDIC SURGERY | Facility: CLINIC | Age: 62
End: 2022-08-08

## 2022-08-08 VITALS
OXYGEN SATURATION: 96 % | HEIGHT: 65 IN | WEIGHT: 181 LBS | DIASTOLIC BLOOD PRESSURE: 82 MMHG | HEART RATE: 73 BPM | SYSTOLIC BLOOD PRESSURE: 144 MMHG | TEMPERATURE: 98.5 F | BODY MASS INDEX: 30.16 KG/M2

## 2022-08-08 DIAGNOSIS — M79.7 FIBROMYALGIA: ICD-10-CM

## 2022-08-08 DIAGNOSIS — K76.0 FATTY LIVER: ICD-10-CM

## 2022-08-08 DIAGNOSIS — R42 DIZZINESS: Primary | ICD-10-CM

## 2022-08-08 DIAGNOSIS — I10 PRIMARY HYPERTENSION: ICD-10-CM

## 2022-08-08 DIAGNOSIS — R73.03 PREDIABETES: ICD-10-CM

## 2022-08-08 PROCEDURE — 99214 OFFICE O/P EST MOD 30 MIN: CPT | Performed by: FAMILY MEDICINE

## 2022-08-08 RX ORDER — PREGABALIN 300 MG/1
CAPSULE ORAL
Qty: 60 CAPSULE | Refills: 0 | Status: SHIPPED | OUTPATIENT
Start: 2022-08-08 | End: 2022-09-05

## 2022-08-08 NOTE — PROGRESS NOTES
"Subjective   Katelynn Sung is a 62 y.o. female.     History of Present Illness  Mrs. Sung presents today with c/o intermittent dizziness x 1 month. Seems to be worse in morning. Feels as if she will \"pass out\". No syncope/LOC. No assoc'd SOA, CP, palpitations etc.    On lyrica for FMSx. Medication recent stolen. Has been out of medication x 1 week. Dizziness worsening.    Has chronic obesity with fatty liver, prediabetes.    On zestoretic for HTN. Taking as rx'd.    The following portions of the patient's history were reviewed and updated as appropriate: allergies, current medications, past family history, past medical history, past social history, past surgical history and problem list.    Review of Systems   Constitutional: Positive for fatigue. Negative for diaphoresis, fever and unexpected weight change.   HENT: Positive for congestion and postnasal drip. Negative for ear pain, mouth sores, nosebleeds, rhinorrhea and sore throat.    Eyes: Negative for visual disturbance.   Respiratory: Positive for cough (mild, intermittent, dry), shortness of breath (mild with exertion) and wheezing (intermittent).    Cardiovascular: Negative for chest pain, palpitations and leg swelling.   Gastrointestinal: Positive for abdominal pain (chronic), constipation and nausea. Negative for blood in stool, rectal pain and vomiting.   Endocrine: Positive for heat intolerance. Negative for polydipsia and polyuria.   Genitourinary: Negative for dysuria, hematuria, pelvic pain, vaginal bleeding and vaginal discharge.   Musculoskeletal: Positive for arthralgias, back pain, myalgias, neck pain and neck stiffness.   Skin: Negative for rash and wound.   Neurological: Positive for dizziness and headaches. Negative for tremors, syncope and weakness.   Hematological: Negative for adenopathy. Does not bruise/bleed easily.   Psychiatric/Behavioral: Positive for sleep disturbance. Negative for confusion and dysphoric mood. The patient is " nervous/anxious.    Pt's previous ROS reviewed and updated as indicated.       Objective    Vitals:    08/08/22 1335   BP: 144/82   Pulse: 73   Temp: 98.5 °F (36.9 °C)   SpO2: 96%     Body mass index is 30.59 kg/m².      08/08/22  1335   Weight: 82.1 kg (181 lb)     Vitals:    08/08/22 1357 08/08/22 1358 08/08/22 1359   Orthostatic BP: 142/80 136/78 138/70   Orthostatic Pulse: 62 64 67   Patient Position: Lying Sitting Standing         Physical Exam  Vitals and nursing note reviewed.   Constitutional:       General: She is not in acute distress.     Appearance: She is well-developed and well-groomed. She is obese. She is not ill-appearing.   HENT:      Head: Atraumatic.   Eyes:      General: No scleral icterus.     Conjunctiva/sclera: Conjunctivae normal.   Cardiovascular:      Rate and Rhythm: Normal rate and regular rhythm.      Pulses: Normal pulses.      Heart sounds: Normal heart sounds.   Pulmonary:      Effort: Pulmonary effort is normal.      Breath sounds: Decreased breath sounds (mildly) present. No wheezing, rhonchi or rales.   Musculoskeletal:      Right lower leg: No edema.      Left lower leg: No edema.      Comments: Soft tissue TTP diffusely, no decreased ROM in major joints    Skin:     General: Skin is warm and dry.      Coloration: Skin is not jaundiced or pale.      Findings: No bruising or rash.      Comments: Flesh colored pedunculated lesion right arm   Neurological:      Mental Status: She is alert and oriented to person, place, and time.      Cranial Nerves: Cranial nerves are intact.      Sensory: Sensation is intact.      Motor: Motor function is intact. No tremor.      Coordination: Coordination is intact.      Gait: Gait is intact.      Comments: Neg jelena-hallpike bilaterally   Psychiatric:         Mood and Affect: Mood is anxious.         Speech: Speech normal.         Behavior: Behavior normal. Behavior is cooperative.         Thought Content: Thought content normal.         Cognition  and Memory: Cognition normal.         Judgment: Judgment normal.     Pt's previous physical exam reviewed and updated as indicated.        Assessment & Plan   Diagnoses and all orders for this visit:    1. Dizziness (Primary)  -     CBC & Differential  -     Comprehensive Metabolic Panel  -     TSH Rfx On Abnormal To Free T4    2. Primary hypertension  -     CBC & Differential  -     Comprehensive Metabolic Panel  -     TSH Rfx On Abnormal To Free T4    3. Fibromyalgia  -     pregabalin (LYRICA) 300 MG capsule; 1 po bid  Dispense: 60 capsule; Refill: 0    4. Fatty liver  -     Comprehensive Metabolic Panel    5. Prediabetes  -     Comprehensive Metabolic Panel  -     Hemoglobin A1c  -     TSH Rfx On Abnormal To Free T4       Dizziness likely multifactorial. No orthostasis, no focal neuro findings. Possible medication side effects. Encouraged adequate hydration, treatment of seasonal allergies.    Assess status of preDM, tx as indicated.    BMI is >= 30 and <35. (Class 1 Obesity). The following options were offered after discussion;: exercise counseling/recommendations and nutrition counseling/recommendations. Nutrition and activity goals reviewed including: mainly water to drink, limit white flour/processed sugar, higher lean protein, high fiber carbs, regular meals, working toward 150 mins cardio per week.    HTN with BP up today likely due to lyrica withdrawal as she has gone 1 week without medication. Continue zestoretic.    FMSx very responsive to lyrica. Some recent exacerbation of pain related to heat/humidity. Increase back to 300 mg bid dosing short term.  As part of patient's treatment plan I am prescribing a controlled substance.  The patient has been made aware of the appropriate use of such medications, including potential risk of somnolence, limited ability to drive and/or work safely, and potential for dependence and/or overdose.  It has also been made clear that these medications are for use by this  patient only, without concomitant use of alcohol or other substances, unless prescribed.  History and physical exam exhibit continued safe and appropriate use of controlled substance.  Dante reviewed.  Patient has completed a prescribing agreement detailing terms of continued prescribing of controlled substances, including monitoring DANTE reports, urine drug screening, and pill counts if necessary.  Patient is aware that inappropriate use will result in cessation of prescribing such medications.    Routine f/u as scheduled in Sept, sooner as needed/instructed.  I will contact patient regarding test results and provide instructions regarding any necessary changes in plan of care.  Patient was encouraged to keep me informed of any acute changes, lack of improvement, or any new concerning symptoms.  Pt is aware of reasons to seek emergent care.  Patient voiced understanding of all instructions and denied further questions.    Please note that portions of this note may have been completed with a voice recognition program. Efforts were made to edit the dictations, but occasionally words are mistranscribed.

## 2022-08-08 NOTE — TELEPHONE ENCOUNTER
Called patient about missed appointment 8/5 patient is having car trouble and wasn't able to make it , she rescheduled for next month. I explained our 24 hour cancellation notice, she understood.

## 2022-08-09 LAB
ALBUMIN SERPL-MCNC: 4.8 G/DL (ref 3.5–5.2)
ALBUMIN/GLOB SERPL: 1.9 G/DL
ALP SERPL-CCNC: 75 U/L (ref 39–117)
ALT SERPL-CCNC: 15 U/L (ref 1–33)
AST SERPL-CCNC: 29 U/L (ref 1–32)
BASOPHILS # BLD AUTO: 0.05 10*3/MM3 (ref 0–0.2)
BASOPHILS NFR BLD AUTO: 0.9 % (ref 0–1.5)
BILIRUB SERPL-MCNC: 0.5 MG/DL (ref 0–1.2)
BUN SERPL-MCNC: 8 MG/DL (ref 8–23)
BUN/CREAT SERPL: 10.4 (ref 7–25)
CALCIUM SERPL-MCNC: 9.5 MG/DL (ref 8.6–10.5)
CHLORIDE SERPL-SCNC: 102 MMOL/L (ref 98–107)
CO2 SERPL-SCNC: 25 MMOL/L (ref 22–29)
CREAT SERPL-MCNC: 0.77 MG/DL (ref 0.57–1)
EGFRCR SERPLBLD CKD-EPI 2021: 87.9 ML/MIN/1.73
EOSINOPHIL # BLD AUTO: 0.3 10*3/MM3 (ref 0–0.4)
EOSINOPHIL NFR BLD AUTO: 5.6 % (ref 0.3–6.2)
ERYTHROCYTE [DISTWIDTH] IN BLOOD BY AUTOMATED COUNT: 13.7 % (ref 12.3–15.4)
GLOBULIN SER CALC-MCNC: 2.5 GM/DL
GLUCOSE SERPL-MCNC: 105 MG/DL (ref 65–99)
HBA1C MFR BLD: 5.6 % (ref 4.8–5.6)
HCT VFR BLD AUTO: 42.3 % (ref 34–46.6)
HGB BLD-MCNC: 13.7 G/DL (ref 12–15.9)
IMM GRANULOCYTES # BLD AUTO: 0.01 10*3/MM3 (ref 0–0.05)
IMM GRANULOCYTES NFR BLD AUTO: 0.2 % (ref 0–0.5)
LYMPHOCYTES # BLD AUTO: 2.41 10*3/MM3 (ref 0.7–3.1)
LYMPHOCYTES NFR BLD AUTO: 45 % (ref 19.6–45.3)
MCH RBC QN AUTO: 28.5 PG (ref 26.6–33)
MCHC RBC AUTO-ENTMCNC: 32.4 G/DL (ref 31.5–35.7)
MCV RBC AUTO: 88.1 FL (ref 79–97)
MONOCYTES # BLD AUTO: 0.36 10*3/MM3 (ref 0.1–0.9)
MONOCYTES NFR BLD AUTO: 6.7 % (ref 5–12)
NEUTROPHILS # BLD AUTO: 2.23 10*3/MM3 (ref 1.7–7)
NEUTROPHILS NFR BLD AUTO: 41.6 % (ref 42.7–76)
NRBC BLD AUTO-RTO: 0 /100 WBC (ref 0–0.2)
PLATELET # BLD AUTO: 238 10*3/MM3 (ref 140–450)
POTASSIUM SERPL-SCNC: 3.9 MMOL/L (ref 3.5–5.2)
PROT SERPL-MCNC: 7.3 G/DL (ref 6–8.5)
RBC # BLD AUTO: 4.8 10*6/MM3 (ref 3.77–5.28)
SODIUM SERPL-SCNC: 139 MMOL/L (ref 136–145)
TSH SERPL DL<=0.005 MIU/L-ACNC: 2.63 UIU/ML (ref 0.27–4.2)
WBC # BLD AUTO: 5.36 10*3/MM3 (ref 3.4–10.8)

## 2022-08-11 ENCOUNTER — TELEPHONE (OUTPATIENT)
Dept: FAMILY MEDICINE CLINIC | Facility: CLINIC | Age: 62
End: 2022-08-11

## 2022-08-11 ENCOUNTER — PRIOR AUTHORIZATION (OUTPATIENT)
Dept: FAMILY MEDICINE CLINIC | Facility: CLINIC | Age: 62
End: 2022-08-11

## 2022-08-11 NOTE — TELEPHONE ENCOUNTER
A PRIOR AUTH HAS BEEN STARTED THROUGH [employer] FOR LUNESTA.    PER THE PATIENT'S INSURANCE: THE PATIENT IS NOT ELIGIBLE FOR THIS MEDICATION.    WOULD YOU LIKE TO CHANGE THE PATIENTS MEDICATION?    Key: [id number]

## 2022-08-11 NOTE — TELEPHONE ENCOUNTER
Caller: Katelynn Sung    Relationship: Self    Best call back number:[phone]    What test was performed: LABS    When was the test performed: 08/08/    Where was the test performed:OFFICE

## 2022-08-15 NOTE — TELEPHONE ENCOUNTER
She will need to check with her insurance regarding what is covered for sleep. She has been on this medication for quite some time so I assume there was a formulary change?

## 2022-08-16 ENCOUNTER — TELEPHONE (OUTPATIENT)
Dept: FAMILY MEDICINE CLINIC | Facility: CLINIC | Age: 62
End: 2022-08-16

## 2022-08-25 ENCOUNTER — APPOINTMENT (OUTPATIENT)
Dept: BONE DENSITY | Facility: HOSPITAL | Age: 62
End: 2022-08-25

## 2022-08-30 VITALS — HEIGHT: 65 IN | BODY MASS INDEX: 30.16 KG/M2 | WEIGHT: 181 LBS

## 2022-08-31 PROBLEM — R14.0 BLOATING: Status: ACTIVE | Noted: 2022-01-13

## 2022-08-31 PROBLEM — R68.81 EARLY SATIETY: Status: ACTIVE | Noted: 2022-01-13

## 2022-09-01 DIAGNOSIS — K86.1 CHRONIC PANCREATITIS, UNSPECIFIED PANCREATITIS TYPE: ICD-10-CM

## 2022-09-01 DIAGNOSIS — M79.7 FIBROMYALGIA: ICD-10-CM

## 2022-09-01 RX ORDER — PROMETHAZINE HYDROCHLORIDE 25 MG/1
TABLET ORAL
Qty: 90 TABLET | Refills: 0 | Status: SHIPPED | OUTPATIENT
Start: 2022-09-01 | End: 2022-09-13 | Stop reason: SDUPTHER

## 2022-09-05 RX ORDER — PREGABALIN 300 MG/1
CAPSULE ORAL
Qty: 60 CAPSULE | Refills: 0 | Status: SHIPPED | OUTPATIENT
Start: 2022-09-05 | End: 2022-09-13 | Stop reason: SDUPTHER

## 2022-09-05 NOTE — TELEPHONE ENCOUNTER
DANTE reviewed. Refill approved. Medication E rx'd to pharmacy as requested. Pt to keep f/u apt as scheduled.    UDS and CSA UTD

## 2022-09-06 DIAGNOSIS — M79.7 FIBROMYALGIA: ICD-10-CM

## 2022-09-06 RX ORDER — PREGABALIN 300 MG/1
CAPSULE ORAL
Qty: 60 CAPSULE | Refills: 0 | Status: CANCELLED | OUTPATIENT
Start: 2022-09-06

## 2022-09-06 NOTE — TELEPHONE ENCOUNTER
Caller: Katelynn Sung Richy    Relationship: Self    Best call back number: [phone]    Requested Prescriptions:   Requested Prescriptions     Pending Prescriptions Disp Refills   • pregabalin (LYRICA) 300 MG capsule 60 capsule 0     Sig: TAKE 1 TABLET BY MOUTH TWICE DAILY        Pharmacy where request should be sent: [facility] ([place] - [address], [address] - [phone]  - [phone] FX     Additional details provided by patient: OUT OF MEDICATION    Does the patient have less than a 3 day supply:  [x] Yes  [] No    Chrissy Hammond MA   09/06/22 10:27 EDT

## 2022-09-06 NOTE — TELEPHONE ENCOUNTER
Rx Refill Note  Requested Prescriptions     Pending Prescriptions Disp Refills   • pregabalin (LYRICA) 300 MG capsule 60 capsule 0     Sig: TAKE 1 TABLET BY MOUTH TWICE DAILY      Last office visit with prescribing clinician: 8/8/2022      Next office visit with prescribing clinician: 9/13/2022            Sherley Blank MA  09/06/22, 11:33 EDT

## 2022-09-13 ENCOUNTER — OFFICE VISIT (OUTPATIENT)
Dept: FAMILY MEDICINE CLINIC | Facility: CLINIC | Age: 62
End: 2022-09-13

## 2022-09-13 VITALS
DIASTOLIC BLOOD PRESSURE: 80 MMHG | TEMPERATURE: 98.4 F | SYSTOLIC BLOOD PRESSURE: 120 MMHG | WEIGHT: 181 LBS | BODY MASS INDEX: 30.59 KG/M2 | OXYGEN SATURATION: 96 % | HEART RATE: 99 BPM

## 2022-09-13 DIAGNOSIS — M81.0 AGE-RELATED OSTEOPOROSIS WITHOUT CURRENT PATHOLOGICAL FRACTURE: ICD-10-CM

## 2022-09-13 DIAGNOSIS — M79.7 FIBROMYALGIA: ICD-10-CM

## 2022-09-13 DIAGNOSIS — K86.1 CHRONIC PANCREATITIS, UNSPECIFIED PANCREATITIS TYPE: ICD-10-CM

## 2022-09-13 DIAGNOSIS — M50.90 CERVICAL DISC DISEASE: ICD-10-CM

## 2022-09-13 DIAGNOSIS — Z78.0 POSTMENOPAUSAL: ICD-10-CM

## 2022-09-13 DIAGNOSIS — M51.36 LUMBAR DEGENERATIVE DISC DISEASE: Primary | ICD-10-CM

## 2022-09-13 DIAGNOSIS — K21.9 GASTROESOPHAGEAL REFLUX DISEASE WITHOUT ESOPHAGITIS: ICD-10-CM

## 2022-09-13 DIAGNOSIS — M62.838 CERVICAL PARASPINAL MUSCLE SPASM: ICD-10-CM

## 2022-09-13 DIAGNOSIS — Z12.31 ENCOUNTER FOR SCREENING MAMMOGRAM FOR MALIGNANT NEOPLASM OF BREAST: ICD-10-CM

## 2022-09-13 DIAGNOSIS — M54.2 NECK PAIN: ICD-10-CM

## 2022-09-13 DIAGNOSIS — F41.8 SITUATIONAL ANXIETY: ICD-10-CM

## 2022-09-13 PROCEDURE — 99214 OFFICE O/P EST MOD 30 MIN: CPT | Performed by: FAMILY MEDICINE

## 2022-09-13 PROCEDURE — 96372 THER/PROPH/DIAG INJ SC/IM: CPT | Performed by: FAMILY MEDICINE

## 2022-09-13 RX ORDER — METHYLPREDNISOLONE ACETATE 40 MG/ML
40 INJECTION, SUSPENSION INTRA-ARTICULAR; INTRALESIONAL; INTRAMUSCULAR; SOFT TISSUE ONCE
Status: COMPLETED | OUTPATIENT
Start: 2022-09-13 | End: 2022-09-13

## 2022-09-13 RX ORDER — CETIRIZINE HYDROCHLORIDE 10 MG/1
10 TABLET ORAL DAILY
Qty: 30 TABLET | Refills: 0 | Status: SHIPPED | OUTPATIENT
Start: 2022-09-13

## 2022-09-13 RX ORDER — KETOROLAC TROMETHAMINE 30 MG/ML
60 INJECTION, SOLUTION INTRAMUSCULAR; INTRAVENOUS ONCE
Status: COMPLETED | OUTPATIENT
Start: 2022-09-13 | End: 2022-09-13

## 2022-09-13 RX ORDER — AZELASTINE HYDROCHLORIDE 137 UG/1
2 SPRAY, METERED NASAL 2 TIMES DAILY
Qty: 30 ML | Refills: 11 | Status: SHIPPED | OUTPATIENT
Start: 2022-09-13

## 2022-09-13 RX ORDER — PREGABALIN 300 MG/1
CAPSULE ORAL
Qty: 60 CAPSULE | Refills: 2 | Status: SHIPPED | OUTPATIENT
Start: 2022-09-13 | End: 2023-01-03 | Stop reason: SDUPTHER

## 2022-09-13 RX ORDER — PROMETHAZINE HYDROCHLORIDE 25 MG/1
25 TABLET ORAL EVERY 8 HOURS PRN
Qty: 90 TABLET | Refills: 3 | Status: SHIPPED | OUTPATIENT
Start: 2022-09-13 | End: 2023-01-31

## 2022-09-13 RX ORDER — FUROSEMIDE 20 MG/1
20 TABLET ORAL DAILY PRN
Qty: 30 TABLET | Refills: 0 | Status: SHIPPED | OUTPATIENT
Start: 2022-09-13

## 2022-09-13 RX ORDER — LISINOPRIL AND HYDROCHLOROTHIAZIDE 12.5; 1 MG/1; MG/1
1 TABLET ORAL DAILY
Qty: 30 TABLET | Refills: 5 | Status: SHIPPED | OUTPATIENT
Start: 2022-09-13

## 2022-09-13 RX ORDER — CYCLOBENZAPRINE HCL 5 MG
5 TABLET ORAL 3 TIMES DAILY PRN
Qty: 90 TABLET | Refills: 3 | Status: SHIPPED | OUTPATIENT
Start: 2022-09-13

## 2022-09-13 RX ORDER — DEXLANSOPRAZOLE 60 MG/1
60 CAPSULE, DELAYED RELEASE ORAL DAILY
Qty: 30 CAPSULE | Refills: 5 | Status: SHIPPED | OUTPATIENT
Start: 2022-09-13

## 2022-09-13 RX ORDER — ALPRAZOLAM 0.5 MG/1
0.5 TABLET ORAL DAILY PRN
Qty: 30 TABLET | Refills: 1 | Status: SHIPPED | OUTPATIENT
Start: 2022-09-13 | End: 2022-11-28

## 2022-09-13 RX ORDER — ALBUTEROL SULFATE 2.5 MG/3ML
2.5 SOLUTION RESPIRATORY (INHALATION) EVERY 4 HOURS PRN
Qty: 120 ML | Refills: 12 | Status: SHIPPED | OUTPATIENT
Start: 2022-09-13

## 2022-09-13 RX ORDER — NALOXEGOL OXALATE 25 MG/1
25 TABLET, FILM COATED ORAL EVERY MORNING
Qty: 30 TABLET | Refills: 5 | Status: SHIPPED | OUTPATIENT
Start: 2022-09-13

## 2022-09-13 RX ORDER — ALBUTEROL SULFATE 90 UG/1
2 AEROSOL, METERED RESPIRATORY (INHALATION) EVERY 4 HOURS PRN
Qty: 18 G | Refills: 5 | Status: SHIPPED | OUTPATIENT
Start: 2022-09-13

## 2022-09-13 RX ADMIN — METHYLPREDNISOLONE ACETATE 40 MG: 40 INJECTION, SUSPENSION INTRA-ARTICULAR; INTRALESIONAL; INTRAMUSCULAR; SOFT TISSUE at 09:50

## 2022-09-13 RX ADMIN — KETOROLAC TROMETHAMINE 60 MG: 30 INJECTION, SOLUTION INTRAMUSCULAR; INTRAVENOUS at 09:50

## 2022-09-13 NOTE — PROGRESS NOTES
"Subjective   Katelynn Sung is a 62 y.o. female.     History of Present Illness   Mrs. Sung presents today for f/u on several chronic med problems.     She is currently on lyrica for mgnt of chronic pain due to FMSx, C and L-DDD/DJD. Weight stable from last visit. Symptoms recently stable. Denies side effects. Requesting toradol and \"Steroid shot\" which she has gotten intermittently in past for flares of L-DDD and joint pain. Denies previously side effects.     Struggling with sleep, RLSx, no longer taking LUnesta.      On Movantik for chronic constipation. GERD stable. Has had f/u with GI.     Has chronic anxiety; she is Grieving loss of young grandson. Using alprazolam once daily. Has had trouble tolerating SSRI, SNRI.     Only using Lasix 2 times per months.     Taking her zestoretic for HTN. BP fairly well contorlled.     Needs mammgoram, DEXA, plans to have in [place]. She is okay with us scheduling for her.    Pt's previous HPI reviewed and updated as indicated.     The following portions of the patient's history were reviewed and updated as appropriate: allergies, current medications, past family history, past medical history, past social history, past surgical history and problem list.    Review of Systems   Constitutional: Positive for fatigue. Negative for diaphoresis, fever and unexpected weight change.   HENT: Positive for congestion and postnasal drip. Negative for ear pain, mouth sores, nosebleeds, rhinorrhea and sore throat.    Eyes: Negative for visual disturbance.   Respiratory: Positive for cough (mild, intermittent, dry), shortness of breath (mild with exertion) and wheezing (intermittent).    Cardiovascular: Negative for chest pain, palpitations and leg swelling.   Gastrointestinal: Positive for abdominal pain (chronic), constipation and nausea. Negative for blood in stool, rectal pain and vomiting.   Endocrine: Positive for heat intolerance. Negative for polydipsia and polyuria. "   Genitourinary: Negative for dysuria, hematuria, pelvic pain, vaginal bleeding and vaginal discharge.   Musculoskeletal: Positive for arthralgias, back pain, myalgias, neck pain and neck stiffness.   Skin: Negative for rash and wound.   Neurological: Positive for dizziness and headaches. Negative for tremors, syncope and weakness.   Hematological: Negative for adenopathy. Does not bruise/bleed easily.   Psychiatric/Behavioral: Positive for sleep disturbance. Negative for confusion and dysphoric mood. The patient is nervous/anxious.    Pt's previous ROS reviewed and updated as indicated.       Objective    Vitals:    09/13/22 0917   BP: 120/80   Pulse: 99   Temp: 98.4 °F (36.9 °C)   SpO2: 96%     Body mass index is 30.59 kg/m².      09/13/22 0917   Weight: 82.1 kg (181 lb)       Physical Exam  Vitals and nursing note reviewed.   Constitutional:       General: She is not in acute distress.     Appearance: She is well-developed and well-groomed. She is obese. She is not ill-appearing.   HENT:      Head: Atraumatic.   Eyes:      General: No scleral icterus.     Conjunctiva/sclera: Conjunctivae normal.   Neck:      Thyroid: No thyroid mass.      Vascular: Normal carotid pulses. No carotid bruit.   Cardiovascular:      Rate and Rhythm: Normal rate and regular rhythm.      Pulses: Normal pulses.      Heart sounds: Normal heart sounds.   Pulmonary:      Effort: Pulmonary effort is normal.      Breath sounds: Decreased breath sounds (mildly) present. No wheezing, rhonchi or rales.   Abdominal:      General: Bowel sounds are normal. There is no distension.      Palpations: Abdomen is soft. There is no hepatomegaly, splenomegaly or mass.      Tenderness: There is no abdominal tenderness.   Musculoskeletal:      Right lower leg: No edema.      Left lower leg: No edema.      Comments: Soft tissue TTP diffusely, no decreased ROM in major joints    Lymphadenopathy:      Cervical: No cervical adenopathy.   Skin:     General: Skin  is warm and dry.      Coloration: Skin is not jaundiced or pale.      Findings: No bruising or rash.      Comments: Flesh colored pedunculated lesion right arm   Neurological:      Mental Status: She is alert and oriented to person, place, and time.      Gait: Gait is intact.      Comments: Neg jelena-hallpike bilaterally   Psychiatric:         Mood and Affect: Mood and affect normal.         Behavior: Behavior normal. Behavior is cooperative.         Cognition and Memory: Cognition and memory normal.     Pt's previous physical exam reviewed and updated as indicated.    Lab Results   Component Value Date    WBC 5.36 08/08/2022    HGB 13.7 08/08/2022    HCT 42.3 08/08/2022    MCV 88.1 08/08/2022     08/08/2022       Lab Results   Component Value Date    GLUCOSE 105 (H) 08/08/2022    BUN 8 08/08/2022    CREATININE 0.77 08/08/2022    EGFRIFNONA 80 12/09/2021    EGFRIFAFRI 97 12/09/2021    BCR 10.4 08/08/2022    K 3.9 08/08/2022    CO2 25.0 08/08/2022    CALCIUM 9.5 08/08/2022    PROTENTOTREF 7.3 08/08/2022    ALBUMIN 4.80 08/08/2022    LABIL2 1.9 08/08/2022    AST 29 08/08/2022    ALT 15 08/08/2022       Lab Results   Component Value Date    CHLPL 206 (H) 06/14/2021    TRIG 104 06/14/2021    HDL 45 06/14/2021     (H) 06/14/2021       Lab Results   Component Value Date    HGBA1C 5.60 08/08/2022       Lab Results   Component Value Date    TSH 2.630 08/08/2022           Assessment & Plan   Diagnoses and all orders for this visit:    1. Lumbar degenerative disc disease (Primary)  -     ketorolac (TORADOL) injection 60 mg  -     methylPREDNISolone acetate (DEPO-medrol) injection 40 mg    2. Situational anxiety  -     ALPRAZolam (XANAX) 0.5 MG tablet; Take 1 tablet by mouth Daily As Needed for Anxiety.  Dispense: 30 tablet; Refill: 1    3. Fibromyalgia  -     cyclobenzaprine (FLEXERIL) 5 MG tablet; Take 1 tablet by mouth 3 (Three) Times a Day As Needed for Muscle Spasms. for muscle spams  Dispense: 90 tablet;  Refill: 3  -     pregabalin (LYRICA) 300 MG capsule; TAKE 1 TABLET BY MOUTH TWICE DAILY  Dispense: 60 capsule; Refill: 2  -     ketorolac (TORADOL) injection 60 mg  -     methylPREDNISolone acetate (DEPO-medrol) injection 40 mg    4. Neck pain  -     cyclobenzaprine (FLEXERIL) 5 MG tablet; Take 1 tablet by mouth 3 (Three) Times a Day As Needed for Muscle Spasms. for muscle spams  Dispense: 90 tablet; Refill: 3  -     ketorolac (TORADOL) injection 60 mg  -     methylPREDNISolone acetate (DEPO-medrol) injection 40 mg    5. Cervical paraspinal muscle spasm  -     cyclobenzaprine (FLEXERIL) 5 MG tablet; Take 1 tablet by mouth 3 (Three) Times a Day As Needed for Muscle Spasms. for muscle spams  Dispense: 90 tablet; Refill: 3    6. Gastroesophageal reflux disease without esophagitis  -     dexlansoprazole (Dexilant) 60 MG capsule; Take 1 capsule by mouth Daily.  Dispense: 30 capsule; Refill: 5    7. Chronic pancreatitis, unspecified pancreatitis type (HCC)  -     promethazine (PHENERGAN) 25 MG tablet; Take 1 tablet by mouth Every 8 (Eight) Hours As Needed for Nausea or Vomiting.  Dispense: 90 tablet; Refill: 3    8. Cervical disc disease  -     ketorolac (TORADOL) injection 60 mg  -     methylPREDNISolone acetate (DEPO-medrol) injection 40 mg    9. Encounter for screening mammogram for malignant neoplasm of breast  -     Mammo Screening Digital Tomosynthesis Bilateral With CAD; Future    10. Age-related osteoporosis without current pathological fracture  -     DEXA Bone Density Axial; Future    11. Postmenopausal  -     DEXA Bone Density Axial; Future    Other orders  -     albuterol (PROVENTIL) (2.5 MG/3ML) 0.083% nebulizer solution; Take 2.5 mg by nebulization Every 4 (Four) Hours As Needed for Wheezing.  Dispense: 120 mL; Refill: 12  -     Azelastine HCl 137 MCG/SPRAY solution; 2 sprays into the nostril(s) as directed by provider 2 (Two) Times a Day.  Dispense: 30 mL; Refill: 11  -     cetirizine (zyrTEC) 10 MG tablet;  Take 1 tablet by mouth Daily.  Dispense: 30 tablet; Refill: 0  -     furosemide (LASIX) 20 MG tablet; Take 1 tablet by mouth Daily As Needed (FOR SWELLING).  Dispense: 30 tablet; Refill: 0  -     lisinopril-hydrochlorothiazide (PRINZIDE,ZESTORETIC) 10-12.5 MG per tablet; Take 1 tablet by mouth Daily.  Dispense: 30 tablet; Refill: 5  -     Naloxegol Oxalate (Movantik) 25 MG tablet; Take 1 tablet by mouth Every Morning.  Dispense: 30 tablet; Refill: 5  -     albuterol sulfate HFA (ProAir HFA) 108 (90 Base) MCG/ACT inhaler; Inhale 2 puffs Every 4 (Four) Hours As Needed for Wheezing.  Dispense: 18 g; Refill: 5       Multifactorial Chronic pain with recent exacerbation of diffuse pain. toradol and depomedrol as above. Continue lyrica, flexeril. No aberratn behavioral. Good clinical benefit.    Anxiety generally stable. Coping techniques reviewed. Continue low dose alprazolam up to once daily as needed.   As part of patient's treatment plan I am prescribing a controlled substance.  The patient has been made aware of the appropriate use of such medications, including potential risk of somnolence, limited ability to drive and/or work safely, and potential for dependence and/or overdose.  It has also been made clear that these medications are for use by this patient only, without concomitant use of alcohol or other substances, unless prescribed.  History and physical exam exhibit continued safe and appropriate use of controlled substance.  Dante reviewed.  Patient has completed a prescribing agreement detailing terms of continued prescribing of controlled substances, including monitoring DANTE reports, urine drug screening, and pill counts if necessary.  Patient is aware that inappropriate use will result in cessation of prescribing such medications.    F/u with GI as scheduled for mgnt of GERD, chronic panceratitis, etc.    I will research options for tx of RLS for her. She is requesting something to be taken  prn.    Routine f/u in 3 months, sooner as needed/instructed.  I will contact patient regarding test results and provide instructions regarding any necessary changes in plan of care.  Patient was encouraged to keep me informed of any acute changes, lack of improvement, or any new concerning symptoms.  Pt is aware of reasons to seek emergent care.  Patient voiced understanding of all instructions and denied further questions.    Please note that portions of this note may have been completed with a voice recognition program. Efforts were made to edit the dictations, but occasionally words are mistranscribed.

## 2022-09-23 ENCOUNTER — PRIOR AUTHORIZATION (OUTPATIENT)
Dept: FAMILY MEDICINE CLINIC | Facility: CLINIC | Age: 62
End: 2022-09-23

## 2022-09-23 NOTE — TELEPHONE ENCOUNTER
A PRIOR AUTH HAS BEEN STARTED THROUGH [employer] FOR ALPRAZOLAM.    WAITING ON A RESPONSE FROM THE INSURANCE.    Key: [id number]    PATIENTS MEDICATION HAS BEEN APPROVED.    APPROVAL START DATE: 09/23/2022    APPROVAL END DATE: 03/22/2023    PHARMACY HAS BEEN NOTIFIED.

## 2022-09-27 ENCOUNTER — PROCEDURE VISIT (OUTPATIENT)
Dept: FAMILY MEDICINE CLINIC | Facility: CLINIC | Age: 62
End: 2022-09-27

## 2022-09-27 VITALS
HEART RATE: 68 BPM | HEIGHT: 65 IN | OXYGEN SATURATION: 96 % | WEIGHT: 181 LBS | BODY MASS INDEX: 30.16 KG/M2 | DIASTOLIC BLOOD PRESSURE: 80 MMHG | SYSTOLIC BLOOD PRESSURE: 130 MMHG

## 2022-09-27 DIAGNOSIS — L98.9 SKIN LESION OF RIGHT ARM: Primary | ICD-10-CM

## 2022-09-27 PROCEDURE — 11102 TANGNTL BX SKIN SINGLE LES: CPT | Performed by: FAMILY MEDICINE

## 2022-09-27 RX ORDER — FLUCONAZOLE 150 MG/1
150 TABLET ORAL ONCE
Qty: 2 TABLET | Refills: 0 | Status: SHIPPED | OUTPATIENT
Start: 2022-09-27 | End: 2022-10-24

## 2022-10-03 LAB — REF LAB TEST METHOD: NORMAL

## 2022-10-24 RX ORDER — FLUCONAZOLE 150 MG/1
150 TABLET ORAL ONCE
Qty: 2 TABLET | Refills: 0 | Status: SHIPPED | OUTPATIENT
Start: 2022-10-24 | End: 2022-10-24

## 2022-11-25 DIAGNOSIS — F41.8 SITUATIONAL ANXIETY: ICD-10-CM

## 2022-11-28 DIAGNOSIS — F41.8 SITUATIONAL ANXIETY: ICD-10-CM

## 2022-11-28 RX ORDER — ALPRAZOLAM 0.5 MG/1
0.5 TABLET ORAL DAILY PRN
Qty: 30 TABLET | Refills: 1 | Status: CANCELLED | OUTPATIENT
Start: 2022-11-28

## 2022-11-28 RX ORDER — ALPRAZOLAM 0.5 MG/1
0.5 TABLET ORAL DAILY PRN
Qty: 30 TABLET | Refills: 2 | Status: SHIPPED | OUTPATIENT
Start: 2022-11-28 | End: 2022-12-12

## 2022-11-28 NOTE — TELEPHONE ENCOUNTER
Rx Refill Note  Requested Prescriptions     Pending Prescriptions Disp Refills   • ALPRAZolam (XANAX) 0.5 MG tablet [Pharmacy Med Name: ALPRAZOLAM 0.5MG] 30 tablet 0     Sig: TAKE 1 TABLET BY MOUTH DAILY AS NEEDED FOR ANXIETY.      Last office visit with prescribing clinician: 9/13/2022      Next office visit with prescribing clinician: 11/28/2022    OK TO FILL?    UDS AND CSA ARE UP TO DATE.  Marine Arriaga MA  11/28/22, 16:13 EST

## 2022-11-28 NOTE — TELEPHONE ENCOUNTER
Caller: Ktaelynn Sung Richy    Relationship: Self    Best call back number: [phone]    Requested Prescriptions:   Requested Prescriptions     Pending Prescriptions Disp Refills    ALPRAZolam (XANAX) 0.5 MG tablet 30 tablet 1     Sig: Take 1 tablet by mouth Daily As Needed for Anxiety.      Pharmacy where request should be sent: [facility] ([place] - [address], [address] - [phone] [facility] [phone] FX     Additional details provided by patient:     PATIENT ADVISED SHE IS COMPLETELY OUT OF THE MEDICATION.     Does the patient have less than a 3 day supply:  [x] Yes  [] No    Shira Urbina Rep   11/28/22 14:20 EST

## 2022-12-02 ENCOUNTER — APPOINTMENT (OUTPATIENT)
Dept: BONE DENSITY | Facility: HOSPITAL | Age: 62
End: 2022-12-02

## 2022-12-06 ENCOUNTER — READMISSION MANAGEMENT (OUTPATIENT)
Dept: CALL CENTER | Facility: HOSPITAL | Age: 62
End: 2022-12-06

## 2022-12-06 NOTE — OUTREACH NOTE
Prep Survey    Flowsheet Row Responses   [facility] facility patient discharged from? Non-[facility]   Is LACE score < 7 ? Non-BH Discharge   Emergency Room discharge w/ pulse ox? No   [facility]lig[facility]   Date of Discharge 12/06/22   Discharge Disposition Home or Self Care   Discharge diagnosis Influenza    Does the patient have one of the following disease processes/diagnoses(primary or secondary)? Other   Prep survey completed? Yes          JESSICA ECHEVERRIA - Registered Nurse

## 2022-12-07 ENCOUNTER — TRANSITIONAL CARE MANAGEMENT TELEPHONE ENCOUNTER (OUTPATIENT)
Dept: CALL CENTER | Facility: HOSPITAL | Age: 62
End: 2022-12-07

## 2022-12-07 NOTE — OUTREACH NOTE
Call Center TCM Note    Flowsheet Row Responses   [facility] patient discharged from? Non-   Does the patient have one of the following disease processes/diagnoses(primary or secondary)? Other   TCM attempt successful? Yes   Call start time 0842   Call end time 0847   Discharge diagnosis Influenza    Person spoke with today (if not patient) and relationship Patient   Meds reviewed with patient/caregiver? Yes   Is the patient taking all medications as directed (includes completed medication regime)? Yes   Medication comments Out of Tamiflu at [facility].   Comments Patient aware of upcoming appt with pcp on monday 12.12 @ 0930   Does the patient have an appointment with their PCP within 7 days of discharge? Yes   Has home health visited the patient within 72 hours of discharge? N/A   Psychosocial issues? No   Did the patient receive a copy of their discharge instructions? Yes   Nursing interventions Reviewed instructions with patient   What is the patient's perception of their health status since discharge? Improving   Additional teach back comments her 02 sats have 92/93%   TCM call completed? Yes   Wrap up additional comments Patient states she is doing okay, States she was really sick in the hospital. She is tring to take it easy. She needs a pharmacy that has tamiflu in stock and called in requesting pcp help will send office a message. Patient aware of upcoming appt with pcp on monday 12.12 @ 0930   Call end time 0847   Would this patient benefit from a Referral to Amb Social Work? No   Is the patient interested in additional calls from an ambulatory ?  NOTE:  applies to high risk patients requiring additional follow-up. No          Emily Velasquez RN    12/7/2022, 08:48 EST

## 2022-12-09 ENCOUNTER — TELEPHONE (OUTPATIENT)
Dept: FAMILY MEDICINE CLINIC | Facility: CLINIC | Age: 62
End: 2022-12-09

## 2022-12-09 NOTE — TELEPHONE ENCOUNTER
Caller: Katelynn Sung Richy    Relationship: Self    Best call back number: [phone]    Requested Prescriptions:   Requested Prescriptions      No prescriptions requested or ordered in this encounter        Pharmacy where request should be sent: [facility] ([place] - [address], [address] - [phone]  - [phone] FX     Additional details provided by patient: LEVAQUIN 750 MG WOULD NEED REFILLED DUE TO HER STILL WHEEZING FROM HAVING FLU AND BEING DISCHARGED FROM HOSPITAL ONLY GAVE HER A FEW OF THESE    PLEASE ADVISE    Does the patient have less than a 3 day supply:  [x] Yes  [] No    Would you like a call back once the refill request has been completed: [x] Yes [] No    If the office needs to give you a call back, can they leave a voicemail: [x] Yes [] No    Shira Hood Rep   12/09/22 12:22 EST

## 2022-12-12 ENCOUNTER — OFFICE VISIT (OUTPATIENT)
Dept: FAMILY MEDICINE CLINIC | Facility: CLINIC | Age: 62
End: 2022-12-12

## 2022-12-12 VITALS
WEIGHT: 179 LBS | DIASTOLIC BLOOD PRESSURE: 80 MMHG | TEMPERATURE: 98.4 F | HEIGHT: 65 IN | SYSTOLIC BLOOD PRESSURE: 130 MMHG | HEART RATE: 65 BPM | OXYGEN SATURATION: 98 % | BODY MASS INDEX: 29.82 KG/M2

## 2022-12-12 DIAGNOSIS — G47.00 INSOMNIA, UNSPECIFIED TYPE: ICD-10-CM

## 2022-12-12 DIAGNOSIS — E87.6 HYPOKALEMIA: ICD-10-CM

## 2022-12-12 DIAGNOSIS — J44.1 CHRONIC OBSTRUCTIVE PULMONARY DISEASE WITH ACUTE EXACERBATION: ICD-10-CM

## 2022-12-12 DIAGNOSIS — R79.89 ABNORMAL TSH: ICD-10-CM

## 2022-12-12 DIAGNOSIS — J18.9 PNEUMONIA OF LEFT LOWER LOBE DUE TO INFECTIOUS ORGANISM: ICD-10-CM

## 2022-12-12 DIAGNOSIS — Z09 HOSPITAL DISCHARGE FOLLOW-UP: Primary | ICD-10-CM

## 2022-12-12 DIAGNOSIS — J11.00 PNEUMONIA AND INFLUENZA: ICD-10-CM

## 2022-12-12 DIAGNOSIS — N28.9 ACUTE RENAL INSUFFICIENCY: ICD-10-CM

## 2022-12-12 DIAGNOSIS — E83.51 HYPOCALCEMIA: ICD-10-CM

## 2022-12-12 PROCEDURE — 99214 OFFICE O/P EST MOD 30 MIN: CPT | Performed by: FAMILY MEDICINE

## 2022-12-12 NOTE — TELEPHONE ENCOUNTER
Likely not indicated as she received IV abx during hospital stay and then was discharged on few days worth to complete course. I am awaiting review of her discharge summary and will let her know

## 2022-12-12 NOTE — PROGRESS NOTES
Transitional Care Follow Up Visit  Subjective     Katelynn Sung is a 62 y.o. female who presents for a transitional care management visit.    Within 48 business hours after discharge our office contacted her via telephone to coordinate her care and needs.      I reviewed and discussed the details of that call along with the discharge summary, hospital problems, inpatient lab results, inpatient diagnostic studies, and consultation reports with Katelynn.     Current outpatient and discharge medications have been reconciled for the patient.  Reviewed by: Lesvia Servin MD      Date of TCM Phone Call 12/6/2022   Hospital [facility]   Date of Discharge 12/6/2022   Discharge Disposition Home or Self Care     Risk for Readmission (LACE) No data recorded    History of Present Illness   Course During Hospital Stay:    Mrs. Sung was admitted to [facility] on 12 3/22, discharged 12/6/2022.    Discharge diagnoses include acute hypoxic respiratory failure in association with COPD exacerbation, influenza A and CAP.  Noted to have associated UTI, folate deficiency.    Temporary hyperglycemia with blood sugars over 200 noted, A1c normal.    She was able to be weaned off oxygen, discharged without oxygen as she passed her 6-minute walk test.    Discharged home on oral Levaquin and prednisone taper, Tamiflu.    Since discharge she has been using her albuterol as needed.  Still having some wheezing, fatigue and cough.  No fever, hemoptysis or chest pain.    She was started on folate supplementation.  She was maintained on her home Suboxone therapy.     Her main concern today is actually that of insomnia.  She does not wish to continue alprazolam which she is used as needed for severe anxiety and insomnia.  Trazodone previously caused bad dreams.  Ambien caused hallucinations.    The following portions of the patient's history were reviewed and updated as appropriate: allergies, current medications, past family  history, past medical history, past social history, past surgical history and problem list.    Review of Systems   Constitutional: Positive for fatigue. Negative for diaphoresis, fever and unexpected weight change.   HENT: Positive for congestion and postnasal drip. Negative for ear pain, mouth sores, nosebleeds, rhinorrhea and sore throat.    Eyes: Negative for visual disturbance.   Respiratory: Positive for cough (mild, intermittent), shortness of breath (mild with exertion) and wheezing (intermittent).    Cardiovascular: Negative for chest pain, palpitations and leg swelling.   Gastrointestinal: Positive for abdominal pain (chronic, stable), constipation and nausea. Negative for blood in stool, rectal pain and vomiting.   Endocrine: Positive for heat intolerance. Negative for polydipsia and polyuria.   Genitourinary: Negative for dysuria, hematuria, pelvic pain, vaginal bleeding and vaginal discharge.   Musculoskeletal: Positive for arthralgias, back pain, myalgias, neck pain and neck stiffness.   Skin: Negative for rash and wound.   Neurological: Positive for dizziness, weakness (generalized) and headaches. Negative for tremors and syncope.   Hematological: Negative for adenopathy. Does not bruise/bleed easily.   Psychiatric/Behavioral: Positive for sleep disturbance. Negative for confusion and dysphoric mood. The patient is nervous/anxious.    Pt's previous ROS reviewed and updated as indicated.       Objective    Vitals:    12/12/22 0937   BP: 130/80   Pulse: 65   Temp: 98.4 °F (36.9 °C)   SpO2: 98%     Body mass index is 30.25 kg/m².      12/12/22  0937   Weight: 81.2 kg (179 lb)       Physical Exam  Vitals and nursing note reviewed.   Constitutional:       General: She is not in acute distress.     Appearance: She is well-developed and well-groomed. She is obese. She is ill-appearing (mildly).   HENT:      Head: Atraumatic.      Mouth/Throat:      Mouth: Mucous membranes are moist.   Eyes:      General: No  scleral icterus.     Conjunctiva/sclera: Conjunctivae normal.   Neck:      Thyroid: No thyroid mass.   Cardiovascular:      Rate and Rhythm: Normal rate and regular rhythm.      Pulses: Normal pulses.      Heart sounds: Normal heart sounds.   Pulmonary:      Effort: Pulmonary effort is normal.      Breath sounds: Decreased breath sounds (mildly) present. No wheezing, rhonchi or rales.   Musculoskeletal:      Right lower leg: No edema.      Left lower leg: No edema.      Comments: Soft tissue TTP diffusely, no decreased ROM in major joints    Lymphadenopathy:      Cervical: No cervical adenopathy.   Skin:     General: Skin is warm and dry.      Coloration: Skin is not cyanotic, jaundiced or pale.      Findings: No bruising or rash.   Neurological:      Mental Status: She is alert and oriented to person, place, and time.      Gait: Gait is intact.   Psychiatric:         Mood and Affect: Mood and affect normal.         Behavior: Behavior normal. Behavior is cooperative.         Cognition and Memory: Cognition and memory normal.     Pt's previous physical exam reviewed and updated as indicated.    XR Chest 1 View  Result Date: 12/4/2022  Opacity  as above, likely secondary to pneumonia.  Follow-up to complete resolution recommended . Images reviewed, interpreted, and dictated by VIKA Aguirre MD    Recent Results (from the past 336 hour(s))   MAGNESIUM    Collection Time: 12/04/22 12:14 AM    Specimen: Blood   Result Value Ref Range    Magnesium 1.5 (L) 1.8 - 2.4 mg/dL   PROCALCITONIN    Collection Time: 12/04/22  2:49 AM    Specimen: Blood   Result Value Ref Range    Procalcitonin 0.20 0.05 - 0.50 ng/mL   Vitamin B12    Collection Time: 12/05/22  5:20 AM    Specimen: Blood   Result Value Ref Range    Vitamin B-12 457 193 - 986 pg/mL   FERRITIN    Collection Time: 12/05/22  5:20 AM    Specimen: Blood   Result Value Ref Range    Ferritin 143.00 8.00 - 388.00 ng/mL   Folate, Serum    Collection Time: 12/05/22   5:20 AM    Specimen: Blood   Result Value Ref Range    Folate 4.90 (L) 8.60 - 58.90 ng/mL   MAGNESIUM    Collection Time: 12/05/22  5:20 AM    Specimen: Blood   Result Value Ref Range    Magnesium 1.8 1.8 - 2.4 mg/dL   TSH    Collection Time: 12/05/22  5:20 AM    Specimen: Blood   Result Value Ref Range    TSH 0.133 (L) 0.358 - 3.740 uIU/mL   PROCALCITONIN    Collection Time: 12/05/22  5:20 AM    Specimen: Blood   Result Value Ref Range    Procalcitonin <0.05 (L) 0.05 - 0.50 ng/mL   T4, FREE    Collection Time: 12/06/22 12:41 PM    Specimen: Blood   Result Value Ref Range    Free T4 1.00 0.76 - 1.46 ng/dL   TSH Rfx On Abnormal To Free T4    Collection Time: 12/12/22 10:17 AM    Specimen: Blood   Result Value Ref Range    TSH 2.430 0.270 - 4.200 uIU/mL   CBC & Differential    Collection Time: 12/12/22 10:17 AM    Specimen: Blood   Result Value Ref Range    WBC 8.41 3.40 - 10.80 10*3/mm3    RBC 5.13 3.77 - 5.28 10*6/mm3    Hemoglobin 14.8 12.0 - 15.9 g/dL    Hematocrit 42.5 34.0 - 46.6 %    MCV 82.8 79.0 - 97.0 fL    MCH 28.8 26.6 - 33.0 pg    MCHC 34.8 31.5 - 35.7 g/dL    RDW 12.9 12.3 - 15.4 %    Platelets 267 140 - 450 10*3/mm3    Neutrophil Rel % 47.9 42.7 - 76.0 %    Lymphocyte Rel % 42.7 19.6 - 45.3 %    Monocyte Rel % 7.1 5.0 - 12.0 %    Eosinophil Rel % 1.1 0.3 - 6.2 %    Basophil Rel % 0.5 0.0 - 1.5 %    Neutrophils Absolute 4.03 1.70 - 7.00 10*3/mm3    Lymphocytes Absolute 3.59 (H) 0.70 - 3.10 10*3/mm3    Monocytes Absolute 0.60 0.10 - 0.90 10*3/mm3    Eosinophils Absolute 0.09 0.00 - 0.40 10*3/mm3    Basophils Absolute 0.04 0.00 - 0.20 10*3/mm3    Immature Granulocyte Rel % 0.7 (H) 0.0 - 0.5 %    Immature Grans Absolute 0.06 (H) 0.00 - 0.05 10*3/mm3    nRBC 0.0 0.0 - 0.2 /100 WBC   C-reactive protein    Collection Time: 12/12/22 10:17 AM    Specimen: Blood   Result Value Ref Range    C-Reactive Protein <0.30 0.00 - 0.50 mg/dL   Comprehensive Metabolic Panel    Collection Time: 12/12/22 10:17 AM    Specimen:  Blood   Result Value Ref Range    Glucose 83 65 - 99 mg/dL    BUN 11 8 - 23 mg/dL    Creatinine 0.89 0.57 - 1.00 mg/dL    EGFR Result 73.9 >60.0 mL/min/1.73    BUN/Creatinine Ratio 12.4 7.0 - 25.0    Sodium 139 136 - 145 mmol/L    Potassium 4.2 3.5 - 5.2 mmol/L    Chloride 101 98 - 107 mmol/L    Total CO2 27.3 22.0 - 29.0 mmol/L    Calcium 9.3 8.6 - 10.5 mg/dL    Total Protein 7.6 6.0 - 8.5 g/dL    Albumin 4.80 3.50 - 5.20 g/dL    Globulin 2.8 gm/dL    A/G Ratio 1.7 g/dL    Total Bilirubin 0.4 0.0 - 1.2 mg/dL    Alkaline Phosphatase 77 39 - 117 U/L    AST (SGOT) 19 1 - 32 U/L    ALT (SGPT) 9 1 - 33 U/L         Assessment & Plan   Diagnoses and all orders for this visit:    1. Hospital discharge follow-up (Primary)    2. Pneumonia and influenza    3. Pneumonia of left lower lobe due to infectious organism  -     CBC & Differential  -     C-reactive protein    4. Acute renal insufficiency  -     Comprehensive Metabolic Panel    5. Abnormal TSH  -     TSH Rfx On Abnormal To Free T4    6. Hypocalcemia  -     Comprehensive Metabolic Panel    7. Hypokalemia  -     Comprehensive Metabolic Panel    8. Insomnia, unspecified type  -     temazepam (Restoril) 7.5 MG capsule; Take 1 capsule by mouth At Night As Needed for Sleep.  Dispense: 30 capsule; Refill: 1    9. Chronic obstructive pulmonary disease with acute exacerbation (HCC)      Good improvement since discharge from hospital.  Maintaining O2 sats without oxygen supplementation.  Cough slowly improving.    In review of labs recheck potassium, calcium and TSH.  Recheck folate in approximately 2 months as she is just initiated replacement.    She will need follow-up chest x-ray in 4 to 6 weeks.    Encouraged continuation of albuterol nebs, continuation of steroid taper.    I have reviewed risks/benefits and potential side effects of various treatment options for insomnia. Pt voices understanding and wishes to proceed with trial of restoril if covered by insurance. She has  dc'd xanax.  As part of patient's treatment plan I am prescribing a controlled substance.  The patient has been made aware of the appropriate use of such medications, including potential risk of somnolence, limited ability to drive and/or work safely, and potential for dependence and/or overdose.  It has also been made clear that these medications are for use by this patient only, without concomitant use of alcohol or other substances, unless prescribed.  Larissa reviewed.    Acute renal insufficiency noted on admission labs likely secondary to dehydration, recheck as above and address as indicated.    Follow-up in 6 weeks, sooner as needed/instructed.  I will contact patient regarding test results and provide instructions regarding any necessary changes in plan of care.  Patient was encouraged to keep me informed of any acute changes, lack of improvement, or any new concerning symptoms.  Pt is aware of reasons to seek emergent care.  Patient voiced understanding of all instructions and denied further questions.      This transition of care management visit required high complexity medical decision making took place within 1 week of hospital discharge.    Please note that portions of this note may have been completed with a voice recognition program. Efforts were made to edit the dictations, but occasionally words are mistranscribed.

## 2022-12-13 LAB
ALBUMIN SERPL-MCNC: 4.8 G/DL (ref 3.5–5.2)
ALBUMIN/GLOB SERPL: 1.7 G/DL
ALP SERPL-CCNC: 77 U/L (ref 39–117)
ALT SERPL-CCNC: 9 U/L (ref 1–33)
AST SERPL-CCNC: 19 U/L (ref 1–32)
BASOPHILS # BLD AUTO: 0.04 10*3/MM3 (ref 0–0.2)
BASOPHILS NFR BLD AUTO: 0.5 % (ref 0–1.5)
BILIRUB SERPL-MCNC: 0.4 MG/DL (ref 0–1.2)
BUN SERPL-MCNC: 11 MG/DL (ref 8–23)
BUN/CREAT SERPL: 12.4 (ref 7–25)
CALCIUM SERPL-MCNC: 9.3 MG/DL (ref 8.6–10.5)
CHLORIDE SERPL-SCNC: 101 MMOL/L (ref 98–107)
CO2 SERPL-SCNC: 27.3 MMOL/L (ref 22–29)
CREAT SERPL-MCNC: 0.89 MG/DL (ref 0.57–1)
CRP SERPL-MCNC: <0.3 MG/DL (ref 0–0.5)
EGFRCR SERPLBLD CKD-EPI 2021: 73.9 ML/MIN/1.73
EOSINOPHIL # BLD AUTO: 0.09 10*3/MM3 (ref 0–0.4)
EOSINOPHIL NFR BLD AUTO: 1.1 % (ref 0.3–6.2)
ERYTHROCYTE [DISTWIDTH] IN BLOOD BY AUTOMATED COUNT: 12.9 % (ref 12.3–15.4)
GLOBULIN SER CALC-MCNC: 2.8 GM/DL
GLUCOSE SERPL-MCNC: 83 MG/DL (ref 65–99)
HCT VFR BLD AUTO: 42.5 % (ref 34–46.6)
HGB BLD-MCNC: 14.8 G/DL (ref 12–15.9)
IMM GRANULOCYTES # BLD AUTO: 0.06 10*3/MM3 (ref 0–0.05)
IMM GRANULOCYTES NFR BLD AUTO: 0.7 % (ref 0–0.5)
LYMPHOCYTES # BLD AUTO: 3.59 10*3/MM3 (ref 0.7–3.1)
LYMPHOCYTES NFR BLD AUTO: 42.7 % (ref 19.6–45.3)
MCH RBC QN AUTO: 28.8 PG (ref 26.6–33)
MCHC RBC AUTO-ENTMCNC: 34.8 G/DL (ref 31.5–35.7)
MCV RBC AUTO: 82.8 FL (ref 79–97)
MONOCYTES # BLD AUTO: 0.6 10*3/MM3 (ref 0.1–0.9)
MONOCYTES NFR BLD AUTO: 7.1 % (ref 5–12)
NEUTROPHILS # BLD AUTO: 4.03 10*3/MM3 (ref 1.7–7)
NEUTROPHILS NFR BLD AUTO: 47.9 % (ref 42.7–76)
NRBC BLD AUTO-RTO: 0 /100 WBC (ref 0–0.2)
PLATELET # BLD AUTO: 267 10*3/MM3 (ref 140–450)
POTASSIUM SERPL-SCNC: 4.2 MMOL/L (ref 3.5–5.2)
PROT SERPL-MCNC: 7.6 G/DL (ref 6–8.5)
RBC # BLD AUTO: 5.13 10*6/MM3 (ref 3.77–5.28)
SODIUM SERPL-SCNC: 139 MMOL/L (ref 136–145)
TSH SERPL DL<=0.005 MIU/L-ACNC: 2.43 UIU/ML (ref 0.27–4.2)
WBC # BLD AUTO: 8.41 10*3/MM3 (ref 3.4–10.8)

## 2022-12-14 ENCOUNTER — PRIOR AUTHORIZATION (OUTPATIENT)
Dept: FAMILY MEDICINE CLINIC | Facility: CLINIC | Age: 62
End: 2022-12-14

## 2022-12-14 ENCOUNTER — TELEPHONE (OUTPATIENT)
Dept: FAMILY MEDICINE CLINIC | Facility: CLINIC | Age: 62
End: 2022-12-14

## 2022-12-14 RX ORDER — TEMAZEPAM 7.5 MG/1
7.5 CAPSULE ORAL NIGHTLY PRN
Qty: 30 CAPSULE | Refills: 1 | Status: SHIPPED | OUTPATIENT
Start: 2022-12-14 | End: 2023-01-12

## 2022-12-14 NOTE — TELEPHONE ENCOUNTER
A PRIOR AUTH HAS BEEN STARTED THROUGH [employer] FOR TEMAZEPAM.    PATIENTS MEDICATION HAS BEEN APPROVED.    APPROVAL START:  12/14/2022    APPROVAL END: 06/13/2023    PHARMACY HAS BEEN NOTIFIED.    PATIENT HAS BEEN NOTIFIED.

## 2022-12-14 NOTE — TELEPHONE ENCOUNTER
SPOKE WITH PATIENT REGARDING PRIOR AUTHORIZATION APPROVAL FOR TEMAZEPAM.    PATIENT WOULD LIKE TO KNOW THE RESULTS OF HER RECENT BLOOD WORK.

## 2022-12-29 ENCOUNTER — TELEPHONE (OUTPATIENT)
Dept: FAMILY MEDICINE CLINIC | Facility: CLINIC | Age: 62
End: 2022-12-29

## 2022-12-29 DIAGNOSIS — F51.04 CHRONIC INSOMNIA: Primary | ICD-10-CM

## 2022-12-29 DIAGNOSIS — G25.81 RESTLESS LEG SYNDROME: ICD-10-CM

## 2022-12-29 NOTE — TELEPHONE ENCOUNTER
Caller: Katelynn Sung    Relationship: Self    Best call back number:    [phone]      What medication are you requesting:  SLEEP MEDICATION    What are your current symptoms: UNABLE TO SLEEP    How long have you been experiencing symptoms: YEARS    Have you had these symptoms before:    [x] Yes  [] No    Have you been treated for these symptoms before:   [x] Yes  [] No    If a prescription is needed, what is your preferred pharmacy and phone number: [facility] [address] - [phone] [facility] [phone]     Additional notes:RESTORIL IS NOT WORKING NOR MELATONIN

## 2022-12-31 DIAGNOSIS — M79.7 FIBROMYALGIA: ICD-10-CM

## 2023-01-03 DIAGNOSIS — M79.7 FIBROMYALGIA: ICD-10-CM

## 2023-01-03 RX ORDER — FLUTICASONE PROPIONATE 50 MCG
SPRAY, SUSPENSION (ML) NASAL
Qty: 16 G | Refills: 3 | Status: SHIPPED | OUTPATIENT
Start: 2023-01-03

## 2023-01-03 NOTE — TELEPHONE ENCOUNTER
Caller: Katelynn Sung Richy    Relationship: Self    Best call back number: [phone]    Requested Prescriptions:   Requested Prescriptions     Pending Prescriptions Disp Refills   • pregabalin (LYRICA) 300 MG capsule 60 capsule 2     Sig: TAKE 1 TABLET BY MOUTH TWICE DAILY        Pharmacy where request should be sent: [facility] ([place] - [address], [address] - [phone]  - [phone]     Does the patient have less than a 3 day supply:  [x] Yes  [] No    Would you like a call back once the refill request has been completed: [x] Yes [] No    If the office needs to give you a call back, can they leave a voicemail: [x] Yes [] No    Shira Zaman Rep   01/03/23 16:17 EST

## 2023-01-03 NOTE — TELEPHONE ENCOUNTER
Rx Refill Note  Requested Prescriptions     Pending Prescriptions Disp Refills   • pregabalin (LYRICA) 300 MG capsule 60 capsule 2     Sig: TAKE 1 TABLET BY MOUTH TWICE DAILY      Last office visit with prescribing clinician: 12/12/2022   Last telemedicine visit with prescribing clinician: 1/23/2023   Next office visit with prescribing clinician: 1/23/2023                         Would you like a call back once the refill request has been completed: [] Yes [] No    If the office needs to give you a call back, can they leave a voicemail: [] Yes [] No    Sherley Blank MA  01/03/23, 16:23 EST

## 2023-01-04 ENCOUNTER — OFFICE VISIT (OUTPATIENT)
Dept: FAMILY MEDICINE CLINIC | Facility: CLINIC | Age: 63
End: 2023-01-04
Payer: MEDICAID

## 2023-01-04 VITALS
HEIGHT: 64 IN | TEMPERATURE: 97.2 F | WEIGHT: 179 LBS | BODY MASS INDEX: 30.56 KG/M2 | OXYGEN SATURATION: 100 % | SYSTOLIC BLOOD PRESSURE: 125 MMHG | HEART RATE: 62 BPM | DIASTOLIC BLOOD PRESSURE: 70 MMHG

## 2023-01-04 DIAGNOSIS — R06.02 SOB (SHORTNESS OF BREATH): ICD-10-CM

## 2023-01-04 DIAGNOSIS — J42 CHRONIC BRONCHITIS, UNSPECIFIED CHRONIC BRONCHITIS TYPE: ICD-10-CM

## 2023-01-04 DIAGNOSIS — U07.1 COVID-19: ICD-10-CM

## 2023-01-04 LAB
EXPIRATION DATE: ABNORMAL
FLUAV AG UPPER RESP QL IA.RAPID: NOT DETECTED
FLUBV AG UPPER RESP QL IA.RAPID: NOT DETECTED
INTERNAL CONTROL: ABNORMAL
Lab: ABNORMAL
SARS-COV-2 AG UPPER RESP QL IA.RAPID: DETECTED

## 2023-01-04 PROCEDURE — 99213 OFFICE O/P EST LOW 20 MIN: CPT | Performed by: NURSE PRACTITIONER

## 2023-01-04 PROCEDURE — 87428 SARSCOV & INF VIR A&B AG IA: CPT | Performed by: NURSE PRACTITIONER

## 2023-01-04 RX ORDER — PREGABALIN 300 MG/1
CAPSULE ORAL
Qty: 60 CAPSULE | Refills: 0 | Status: SHIPPED | OUTPATIENT
Start: 2023-01-04 | End: 2023-02-01

## 2023-01-04 RX ORDER — DEXAMETHASONE 4 MG/1
4 TABLET ORAL DAILY
Qty: 5 TABLET | Refills: 0 | Status: SHIPPED | OUTPATIENT
Start: 2023-01-04 | End: 2023-01-12

## 2023-01-04 RX ORDER — AZITHROMYCIN 250 MG/1
TABLET, FILM COATED ORAL
Qty: 6 TABLET | Refills: 0 | Status: SHIPPED | OUTPATIENT
Start: 2023-01-04 | End: 2023-01-12

## 2023-01-04 RX ORDER — PREGABALIN 300 MG/1
CAPSULE ORAL
Qty: 60 CAPSULE | Refills: 1 | OUTPATIENT
Start: 2023-01-04

## 2023-01-04 RX ORDER — TEMAZEPAM 7.5 MG/1
7.5 CAPSULE ORAL NIGHTLY PRN
Qty: 30 CAPSULE | Refills: 1 | Status: CANCELLED | OUTPATIENT
Start: 2023-01-04

## 2023-01-05 ENCOUNTER — TELEPHONE (OUTPATIENT)
Dept: FAMILY MEDICINE CLINIC | Facility: CLINIC | Age: 63
End: 2023-01-05

## 2023-01-06 NOTE — PROGRESS NOTES
Subjective   Katelynn Sung is a 62 y.o. female.     History of Present Illness  Patient is here today for complaints of cough, congestion, fatigue and just not feeling well for the past few days. She always has some cough and congestion due to her COPDShe just found out today that her daughter was diagnosed with Covid, and they have been together for the past couple few days. She wants to make sure she does not have Covid and find out if she needs antibiotics because she frequently gets pneumonia. She has not any fever that she is aware of. She has been using her inhalers but not nebulizers because she does not have a machine.       The following portions of the patient's history were reviewed and updated as appropriate: allergies, current medications, past family history, past medical history, past social history, past surgical history and problem list.    Review of Systems   Constitutional: Positive for activity change and fatigue. Negative for appetite change, chills, diaphoresis and fever.   HENT: Positive for congestion. Negative for ear pain, mouth sores, nosebleeds, rhinorrhea, sinus pressure and sore throat.    Eyes: Negative for discharge, redness and itching.   Respiratory: Positive for cough, shortness of breath and wheezing.    Cardiovascular: Negative for chest pain, palpitations and leg swelling.   Gastrointestinal: Negative for diarrhea, nausea and vomiting.   Musculoskeletal: Negative for myalgias.   Skin: Negative for rash.   Neurological: Negative for headaches.     Vitals:    01/04/23 1523   BP: 125/70   Pulse: 62   Temp: 97.2 °F (36.2 °C)   SpO2: 100%   Weight: 81.2 kg (179 lb)   Height: 163.8 cm (64.49\")     Objective   Physical Exam  Vitals and nursing note reviewed.   Constitutional:       Appearance: Normal appearance.   HENT:      Head: Normocephalic.      Right Ear: Tympanic membrane normal.      Left Ear: Tympanic membrane normal.   Eyes:      Conjunctiva/sclera: Conjunctivae normal.    Cardiovascular:      Pulses: Normal pulses.      Heart sounds: Normal heart sounds.   Pulmonary:      Effort: Pulmonary effort is normal.      Breath sounds: Examination of the right-upper field reveals wheezing. Examination of the left-upper field reveals wheezing. Examination of the right-middle field reveals wheezing. Examination of the left-middle field reveals wheezing. Wheezing present.   Abdominal:      General: Bowel sounds are normal. There is no distension.      Tenderness: There is no abdominal tenderness.   Skin:     General: Skin is warm.      Findings: No rash.   Neurological:      General: No focal deficit present.      Mental Status: She is alert and oriented to person, place, and time.   Psychiatric:         Mood and Affect: Mood normal.         Assessment & Plan   Diagnoses and all orders for this visit:    1. SOB (shortness of breath)  -     POCT SARS-CoV-2 Antigen MICHELLE + Flu  -     azithromycin (Zithromax Z-Bolivar) 250 MG tablet; Take 2 tablets by mouth on day 1, then 1 tablet daily on days 2-5  Dispense: 6 tablet; Refill: 0  -     dexamethasone (DECADRON) 4 MG tablet; Take 1 tablet by mouth Daily.  Dispense: 5 tablet; Refill: 0    2. COVID-19  -     azithromycin (Zithromax Z-Bolivar) 250 MG tablet; Take 2 tablets by mouth on day 1, then 1 tablet daily on days 2-5  Dispense: 6 tablet; Refill: 0  -     dexamethasone (DECADRON) 4 MG tablet; Take 1 tablet by mouth Daily.  Dispense: 5 tablet; Refill: 0    3. Chronic bronchitis, unspecified chronic bronchitis type (HCC)       Covid test positive in clinic today. Given her COPD and SOA, will prescribe Z-Bolivar and Decadron. She was educated on possible SE but has taken both in the past and tolerated well.  Nebulizer machine given in the clinic today and she was advised to use nebulizers every 6 hours as needed.  Continue inhalers as directed.    Seek emergency treatment for any worsening shortness of breath.    She will return to clinic next week if no  improvement in symptoms.    Patient was encouraged to keep me informed of any acute changes, lack of improvement, or any new concerning symptoms.Patient voiced understanding of all instructions and denied further questions.

## 2023-01-10 ENCOUNTER — TELEPHONE (OUTPATIENT)
Dept: FAMILY MEDICINE CLINIC | Facility: CLINIC | Age: 63
End: 2023-01-10

## 2023-01-10 DIAGNOSIS — J18.9 PNEUMONIA DUE TO INFECTIOUS ORGANISM, UNSPECIFIED LATERALITY, UNSPECIFIED PART OF LUNG: Primary | ICD-10-CM

## 2023-01-10 NOTE — TELEPHONE ENCOUNTER
Chest xray order on chart. Can't tell from message if she actually tried temazepam (restoril) or not?

## 2023-01-10 NOTE — TELEPHONE ENCOUNTER
PHONE CALL FROM PATIENT.  SHE THOUGHT SHE WAS SUPPOSE TO DO ANOTHER CHEST XRAY PRIOR TO THE NEXT APPOINTMENT.      SHE WAS TO TRY temazepam (Restoril) 7.5 MG capsule.  STILL HAVING PROBLEMS FALLING ASLEEP.  SHE WOULD LIKE TO TRY ANOTHER MEDICATION.  THEY HAD DISCUSSED ATIVAN OR KLONOPIN.      PLEASE CALL [phone]

## 2023-01-12 ENCOUNTER — OFFICE VISIT (OUTPATIENT)
Dept: FAMILY MEDICINE CLINIC | Facility: CLINIC | Age: 63
End: 2023-01-12
Payer: MEDICAID

## 2023-01-12 VITALS
HEIGHT: 65 IN | OXYGEN SATURATION: 96 % | BODY MASS INDEX: 30.99 KG/M2 | DIASTOLIC BLOOD PRESSURE: 80 MMHG | HEART RATE: 73 BPM | TEMPERATURE: 98.6 F | SYSTOLIC BLOOD PRESSURE: 130 MMHG | WEIGHT: 186 LBS

## 2023-01-12 DIAGNOSIS — F41.9 ANXIETY: ICD-10-CM

## 2023-01-12 DIAGNOSIS — F51.04 CHRONIC INSOMNIA: ICD-10-CM

## 2023-01-12 DIAGNOSIS — R06.02 PERSISTENT SHORTNESS OF BREATH AFTER COVID-19: ICD-10-CM

## 2023-01-12 DIAGNOSIS — J42 CHRONIC BRONCHITIS, UNSPECIFIED CHRONIC BRONCHITIS TYPE: ICD-10-CM

## 2023-01-12 DIAGNOSIS — Z86.16 HISTORY OF COVID-19: ICD-10-CM

## 2023-01-12 DIAGNOSIS — U09.9 PERSISTENT SHORTNESS OF BREATH AFTER COVID-19: ICD-10-CM

## 2023-01-12 PROBLEM — J11.00 PNEUMONIA AND INFLUENZA: Status: RESOLVED | Noted: 2022-12-04 | Resolved: 2023-01-12

## 2023-01-12 PROCEDURE — 99213 OFFICE O/P EST LOW 20 MIN: CPT | Performed by: NURSE PRACTITIONER

## 2023-01-12 RX ORDER — ALPRAZOLAM 0.5 MG/1
0.5 TABLET ORAL 2 TIMES DAILY PRN
Qty: 60 TABLET | Refills: 0 | Status: SHIPPED | OUTPATIENT
Start: 2023-01-12 | End: 2023-02-08

## 2023-01-12 NOTE — PROGRESS NOTES
"Subjective   Katelynn Sung is a 62 y.o. female.     History of Present Illness  Patient is here today for 1 week follow up for Covid infection and COPD exacerbation. She reports she is doing much better but is still having shortness of breath with exertion, like climbing stairs or walking long distances. She is not having shortness of breath otherwise and feels good overall.     She would also like to discuss her insomnia and anxiety. She reports that she used to take Xanax but thought she could stop taking them, so had her regular PCP stop this. She then started having trouble sleeping and with her anxiety, so was started on Temazepam. She tried 7.5 mg and 15 mg and neither have helped. She is only getting a few hours a sleep per night, so would like to try Ativan or Klonopin, or be restarted on her Xanax.       The following portions of the patient's history were reviewed and updated as appropriate: allergies, current medications, past family history, past medical history, past social history, past surgical history and problem list.    Review of Systems   Constitutional: Negative.    Respiratory: Positive for cough and shortness of breath.    Cardiovascular: Negative.    Gastrointestinal: Negative for diarrhea, nausea and vomiting.   Musculoskeletal: Negative.  Negative for arthralgias and myalgias.   Skin: Negative.    Neurological: Negative for dizziness, syncope, weakness and numbness.   Hematological: Negative for adenopathy.   Psychiatric/Behavioral: Positive for sleep disturbance. Negative for confusion and suicidal ideas. The patient is nervous/anxious.      Vitals:    01/12/23 1645   BP: 130/80   Pulse: 73   Temp: 98.6 °F (37 °C)   SpO2: 96%   Weight: 84.4 kg (186 lb)   Height: 163.8 cm (64.5\")     Objective   Physical Exam  Vitals and nursing note reviewed.   Constitutional:       Appearance: Normal appearance.   HENT:      Nose: No congestion or rhinorrhea.   Eyes:      Conjunctiva/sclera: " Conjunctivae normal.   Cardiovascular:      Rate and Rhythm: Normal rate and regular rhythm.      Heart sounds: Normal heart sounds.   Pulmonary:      Effort: Pulmonary effort is normal.      Breath sounds: Normal breath sounds.   Abdominal:      General: Bowel sounds are normal. There is no distension.   Skin:     General: Skin is warm and dry.   Neurological:      General: No focal deficit present.      Mental Status: She is alert and oriented to person, place, and time.   Psychiatric:         Mood and Affect: Mood normal.         Behavior: Behavior normal.         Assessment & Plan   Diagnoses and all orders for this visit:    1. History of COVID-19    2. Persistent shortness of breath after COVID-19    3. Chronic bronchitis, unspecified chronic bronchitis type (HCC)    4. Chronic insomnia  -     ALPRAZolam (XANAX) 0.5 MG tablet; Take 1 tablet by mouth 2 (Two) Times a Day As Needed for Anxiety for up to 30 days.  Dispense: 60 tablet; Refill: 0    5. Anxiety  -     ALPRAZolam (XANAX) 0.5 MG tablet; Take 1 tablet by mouth 2 (Two) Times a Day As Needed for Anxiety for up to 30 days.  Dispense: 60 tablet; Refill: 0       History of Covid with shortness of breath/COPD  -Much improved and all signs and symptoms resolved besides exertional shortness of breath. Did advise patient that increased shortness of breath if common for weeks to months status post Covid, particularly in those with lung disease.    Anxiety/insomnia  -Will stop Restoril and restart Xanax, since she has had good results with this in the past. Did not give any refills and advised patient she will need to follow up with PCP for further refills or any changes.  As part of patient's treatment plan I am prescribing a controlled substance.  The patient has been made aware of the appropriate use of such medications, including potential risk of somnolence, limited ability to drive and/or work safely, and potential for dependence and/or overdose.  It has  also been made clear that these medications are for use by this patient only, without concomitant use of alcohol or other substances, unless prescribed.Patient has completed a prescribing agreement detailing terms of continued prescribing of controlled substances, including monitoring DANTE reports, urine drug screening, and pill counts if necessary.  Patient is aware that inappropriate use will result in cessation of prescribing such medications.     Patient was encouraged to keep me informed of any acute changes, lack of improvement, or any new concerning symptoms. Patient voiced understanding of all instructions and denied further questions.    She will RTC for regular follow up with PCP on 1/23.

## 2023-01-23 ENCOUNTER — OFFICE VISIT (OUTPATIENT)
Dept: FAMILY MEDICINE CLINIC | Facility: CLINIC | Age: 63
End: 2023-01-23
Payer: MEDICAID

## 2023-01-23 VITALS
HEIGHT: 65 IN | TEMPERATURE: 99.1 F | HEART RATE: 76 BPM | DIASTOLIC BLOOD PRESSURE: 82 MMHG | BODY MASS INDEX: 30.75 KG/M2 | OXYGEN SATURATION: 97 % | SYSTOLIC BLOOD PRESSURE: 132 MMHG | WEIGHT: 184.6 LBS

## 2023-01-23 DIAGNOSIS — M81.0 AGE-RELATED OSTEOPOROSIS WITHOUT CURRENT PATHOLOGICAL FRACTURE: ICD-10-CM

## 2023-01-23 DIAGNOSIS — Z23 NEED FOR INFLUENZA VACCINATION: ICD-10-CM

## 2023-01-23 DIAGNOSIS — Z86.010 HISTORY OF COLON POLYPS: ICD-10-CM

## 2023-01-23 DIAGNOSIS — F51.04 CHRONIC INSOMNIA: Primary | ICD-10-CM

## 2023-01-23 DIAGNOSIS — Z12.31 SCREENING MAMMOGRAM FOR BREAST CANCER: ICD-10-CM

## 2023-01-23 DIAGNOSIS — Z12.11 SCREEN FOR COLON CANCER: ICD-10-CM

## 2023-01-23 DIAGNOSIS — J42 CHRONIC BRONCHITIS, UNSPECIFIED CHRONIC BRONCHITIS TYPE: ICD-10-CM

## 2023-01-23 DIAGNOSIS — Z78.0 POSTMENOPAUSAL: ICD-10-CM

## 2023-01-23 PROCEDURE — 99213 OFFICE O/P EST LOW 20 MIN: CPT | Performed by: FAMILY MEDICINE

## 2023-01-23 PROCEDURE — 90686 IIV4 VACC NO PRSV 0.5 ML IM: CPT | Performed by: FAMILY MEDICINE

## 2023-01-23 PROCEDURE — G0008 ADMIN INFLUENZA VIRUS VAC: HCPCS | Performed by: FAMILY MEDICINE

## 2023-01-23 RX ORDER — TEMAZEPAM 7.5 MG/1
CAPSULE ORAL
COMMUNITY
Start: 2023-01-12 | End: 2023-01-23

## 2023-01-23 NOTE — PROGRESS NOTES
Subjective   Katelynn Sung is a 62 y.o. female.     History of Present Illness   Mrs. Sung presents today for f/u on recent medication change. Seen earlier this month for COVID. Feels she has recovered well. Has COPD/chronic bronchitis. Seen by APRN also for persistent insomnia assoc'd with anxiety. Restarted on alprazolam. Pt followed by suboxone mgnt clinic and they prefer she be on long acting benzo rather than alprazolam.     Her colorectal cancer screening - due this year, last done per Dr. Avila no longer in area. She is amenable to referral to Dr. Fan    Needs to rescheduled her mammogram, DEXA    The following portions of the patient's history were reviewed and updated as appropriate: allergies, current medications, past family history, past medical history, past social history, past surgical history and problem list.    Review of Systems   Constitutional: Positive for fatigue. Negative for diaphoresis, fever and unexpected weight change.   HENT: Positive for congestion and postnasal drip. Negative for ear pain, mouth sores, nosebleeds, rhinorrhea and sore throat.    Eyes: Negative for visual disturbance.   Respiratory: Positive for cough (mild, intermittent), shortness of breath (mild with exertion) and wheezing (intermittent).    Cardiovascular: Negative for chest pain, palpitations and leg swelling.   Gastrointestinal: Positive for abdominal pain (chronic, stable), constipation and nausea. Negative for blood in stool, rectal pain and vomiting.   Endocrine: Positive for heat intolerance. Negative for polydipsia and polyuria.   Genitourinary: Negative for dysuria, hematuria, pelvic pain, vaginal bleeding and vaginal discharge.   Musculoskeletal: Positive for arthralgias, back pain, myalgias, neck pain and neck stiffness.   Skin: Negative for rash and wound.   Neurological: Positive for dizziness, weakness (generalized) and headaches. Negative for tremors and syncope.   Hematological: Negative for  adenopathy. Does not bruise/bleed easily.   Psychiatric/Behavioral: Positive for sleep disturbance. Negative for confusion and dysphoric mood. The patient is nervous/anxious.    Pt's previous ROS reviewed and updated as indicated.     Objective    Vitals:    01/23/23 0917   BP: 132/82   Pulse: 76   Temp: 99.1 °F (37.3 °C)   SpO2: 97%     Body mass index is 31.2 kg/m².      01/23/23 0917   Weight: 83.7 kg (184 lb 9.6 oz)     Physical Exam  Vitals and nursing note reviewed.   Constitutional:       General: She is not in acute distress.     Appearance: She is well-developed and well-groomed. She is obese. She is not ill-appearing.   HENT:      Head: Atraumatic.      Mouth/Throat:      Mouth: Mucous membranes are moist.   Eyes:      General: No scleral icterus.     Conjunctiva/sclera: Conjunctivae normal.   Neck:      Thyroid: No thyroid mass.   Cardiovascular:      Rate and Rhythm: Normal rate and regular rhythm.      Pulses: Normal pulses.      Heart sounds: Normal heart sounds.   Pulmonary:      Effort: Pulmonary effort is normal.      Breath sounds: Decreased breath sounds (mildly) present. No wheezing, rhonchi or rales.   Musculoskeletal:      Right lower leg: No edema.      Left lower leg: No edema.      Comments: Soft tissue TTP diffusely, no decreased ROM in major joints    Lymphadenopathy:      Cervical: No cervical adenopathy.   Skin:     General: Skin is warm and dry.      Coloration: Skin is not cyanotic, jaundiced or pale.      Findings: No bruising or rash.   Neurological:      Mental Status: She is alert and oriented to person, place, and time.      Gait: Gait is intact.   Psychiatric:         Mood and Affect: Mood and affect normal.         Behavior: Behavior normal. Behavior is cooperative.         Cognition and Memory: Cognition and memory normal.     Pt's previous physical exam reviewed and updated as indicated.      Assessment & Plan   Diagnoses and all orders for this visit:    1. Chronic insomnia  (Primary)    2. Chronic bronchitis, unspecified chronic bronchitis type (HCC)    3. Need for influenza vaccination  -     FluLaval/Fluzone >6 mos (7740-1242)    4. Screen for colon cancer  -     Ambulatory Referral For Screening Colonoscopy    5. History of colon polyps  -     Ambulatory Referral For Screening Colonoscopy    6. Screening mammogram for breast cancer  -     Mammo Screening Digital Tomosynthesis Bilateral With CAD; Future    7. Postmenopausal  -     DEXA Bone Density Axial; Future    8. Age-related osteoporosis without current pathological fracture  -     DEXA Bone Density Axial; Future       Chronic insomnia assoc'd with anxiety. I agree with suboxone provider that long term use of alprazolam is not in her best interest. She will use the medication she has left qhs prn. When due for refill we will transition her to Klonopin qhs.     COPD/chronic bronchitis back to baseline after COVID. Continue albuterol prn, consider ICS if increased albuterol use from baseline and if she is able to afford.    Preventive care measures as above.    Routine f/u in 3 months, sooner as needed  I will contact patient regarding test results and provide instructions regarding any necessary changes in plan of care.  Patient was encouraged to keep me informed of any acute changes, lack of improvement, or any new concerning symptoms.  Pt is aware of reasons to seek emergent care.  Patient voiced understanding of all instructions and denied further questions.    Please note that portions of this note may have been completed with a voice recognition program.

## 2023-01-31 DIAGNOSIS — K86.1 CHRONIC PANCREATITIS, UNSPECIFIED PANCREATITIS TYPE: ICD-10-CM

## 2023-01-31 DIAGNOSIS — M79.7 FIBROMYALGIA: ICD-10-CM

## 2023-01-31 RX ORDER — PROMETHAZINE HYDROCHLORIDE 25 MG/1
25 TABLET ORAL EVERY 8 HOURS PRN
Qty: 90 TABLET | Refills: 2 | Status: SHIPPED | OUTPATIENT
Start: 2023-01-31

## 2023-02-01 RX ORDER — PREGABALIN 300 MG/1
CAPSULE ORAL
Qty: 60 CAPSULE | Refills: 1 | Status: SHIPPED | OUTPATIENT
Start: 2023-02-01 | End: 2023-03-28 | Stop reason: SDUPTHER

## 2023-02-07 ENCOUNTER — TELEPHONE (OUTPATIENT)
Dept: FAMILY MEDICINE CLINIC | Facility: CLINIC | Age: 63
End: 2023-02-07

## 2023-02-07 DIAGNOSIS — F51.04 CHRONIC INSOMNIA: Primary | ICD-10-CM

## 2023-02-07 DIAGNOSIS — F41.9 ANXIETY: ICD-10-CM

## 2023-02-08 RX ORDER — CLONAZEPAM 0.5 MG/1
0.5 TABLET ORAL NIGHTLY PRN
Qty: 30 TABLET | Refills: 0 | Status: SHIPPED | OUTPATIENT
Start: 2023-02-08 | End: 2023-03-08

## 2023-02-08 NOTE — TELEPHONE ENCOUNTER
Caller: Katelynn Sung    Relationship: Self    Best call back number: [phone]    What is the best time to reach you: ANYTIME, OK TO LEAVE VOICEMAIL.    Who are you requesting to speak with (clinical staff, provider,  specific staff member): CLINICAL STAFF    Do you know the name of the person who called: PATIENT    What was the call regarding: PATIENT ADVISES SHE IS OUT OF HER XANAX MEDICATION AND EXPLAINED THAT HER PRIMARY CARE PROVIDER WAS GOING TO CHANGE THIS MEDICATION TO CLONOPIN INSTEAD THIS MONTH. SHE IS NEEDING IT TO BE FILLED. PLEASE ADVISE.    Do you require a callback: YES          
Patient notified.  
Patient said that Alprazolam was being changed to Klonopin.  Please advise as she is out of her medication.  
Sent into pharmacy. She should not be out of her medication until 2/10/23 as 30 day supply of xanax filled on 1/12/23. She can fill on 2/10  
negative - no bleeding

## 2023-02-21 ENCOUNTER — OFFICE VISIT (OUTPATIENT)
Dept: FAMILY MEDICINE CLINIC | Facility: CLINIC | Age: 63
End: 2023-02-21
Payer: MEDICAID

## 2023-02-21 VITALS
TEMPERATURE: 98 F | SYSTOLIC BLOOD PRESSURE: 122 MMHG | HEIGHT: 65 IN | HEART RATE: 78 BPM | BODY MASS INDEX: 30.49 KG/M2 | DIASTOLIC BLOOD PRESSURE: 82 MMHG | RESPIRATION RATE: 16 BRPM | WEIGHT: 183 LBS | OXYGEN SATURATION: 98 %

## 2023-02-21 DIAGNOSIS — M79.604 RIGHT LEG PAIN: Primary | ICD-10-CM

## 2023-02-21 DIAGNOSIS — M79.89 RIGHT LEG SWELLING: ICD-10-CM

## 2023-02-21 PROCEDURE — 99213 OFFICE O/P EST LOW 20 MIN: CPT | Performed by: NURSE PRACTITIONER

## 2023-02-23 ENCOUNTER — TELEPHONE (OUTPATIENT)
Dept: FAMILY MEDICINE CLINIC | Facility: CLINIC | Age: 63
End: 2023-02-23

## 2023-02-23 NOTE — TELEPHONE ENCOUNTER
Caller: Katelynn Sung    Relationship: Self    Best call back number: [phone]     What test was performed: XRAY AND U/S OF RIGHT LEG    When was the test performed:02.21.23  Where was the test performed: [facility]    Additional notes: CALL WITH RESULTS

## 2023-03-08 DIAGNOSIS — F41.9 ANXIETY: ICD-10-CM

## 2023-03-08 DIAGNOSIS — F51.04 CHRONIC INSOMNIA: ICD-10-CM

## 2023-03-08 RX ORDER — CLONAZEPAM 0.5 MG/1
TABLET ORAL
Qty: 30 TABLET | Refills: 0 | Status: SHIPPED | OUTPATIENT
Start: 2023-03-08 | End: 2023-03-30 | Stop reason: SDUPTHER

## 2023-03-08 RX ORDER — CLONAZEPAM 0.5 MG/1
TABLET ORAL
Qty: 30 TABLET | Refills: 0 | OUTPATIENT
Start: 2023-03-08

## 2023-03-08 NOTE — TELEPHONE ENCOUNTER
DANTE reviewed. Refill approved. Medication E rx'd to pharmacy as requested. Pt to keep f/u apt as scheduled.'    UDS and CSA UTD

## 2023-03-14 ENCOUNTER — PRIOR AUTHORIZATION (OUTPATIENT)
Dept: FAMILY MEDICINE CLINIC | Facility: CLINIC | Age: 63
End: 2023-03-14
Payer: MEDICAID

## 2023-03-14 NOTE — TELEPHONE ENCOUNTER
A PRIOR AUTH HAS BEEN STARTED THROUGH [employer] MED[employer] FOR DEXLANSOPRAZOLE.    WAITING ON A RESPONSE FROM THE INSURANCE.    Key: [id number]

## 2023-03-17 NOTE — TELEPHONE ENCOUNTER
PRIOR AUTH HAS BEEN APPROVED.    START: 03/14/2023    END: 03/13/2024    PHARMACY HAS BEEN NOTIFIED.

## 2023-03-27 ENCOUNTER — TELEPHONE (OUTPATIENT)
Dept: FAMILY MEDICINE CLINIC | Facility: CLINIC | Age: 63
End: 2023-03-27
Payer: MEDICAID

## 2023-03-27 DIAGNOSIS — M79.7 FIBROMYALGIA: ICD-10-CM

## 2023-03-27 NOTE — TELEPHONE ENCOUNTER
Caller: Katelynn Sung    Relationship: Self    Best call back number: [phone]    Requested Prescriptions:   pregabalin (LYRICA) 300 MG capsule     Pharmacy where request should be sent: [facility] - [place], [address] - [phone]  - [phone] FX     Last office visit with prescribing clinician: 1/23/2023   Last telemedicine visit with prescribing clinician: 4/24/2023   Next office visit with prescribing clinician: 4/24/2023     Additional details provided by patient: PLEASE REFILL OR CALL TO ADVISE.     Does the patient have less than a 3 day supply:  [x] Yes  [] No    Would you like a call back once the refill request has been completed: [] Yes [x] No    If the office needs to give you a call back, can they leave a voicemail: [] Yes [x] No    Shira Portillo Rep   03/27/23 10:07 EDT

## 2023-03-28 RX ORDER — PREGABALIN 300 MG/1
CAPSULE ORAL
Qty: 60 CAPSULE | Refills: 1 | Status: SHIPPED | OUTPATIENT
Start: 2023-03-28

## 2023-03-29 RX ORDER — PREGABALIN 300 MG/1
CAPSULE ORAL
Qty: 60 CAPSULE | Refills: 0 | OUTPATIENT
Start: 2023-03-29

## 2023-03-30 ENCOUNTER — TELEPHONE (OUTPATIENT)
Dept: FAMILY MEDICINE CLINIC | Facility: CLINIC | Age: 63
End: 2023-03-30
Payer: MEDICAID

## 2023-03-30 DIAGNOSIS — F41.9 ANXIETY: ICD-10-CM

## 2023-03-30 DIAGNOSIS — F51.04 CHRONIC INSOMNIA: ICD-10-CM

## 2023-03-30 RX ORDER — CLONAZEPAM 1 MG/1
1 TABLET ORAL NIGHTLY PRN
Qty: 30 TABLET | Refills: 0 | Status: SHIPPED | OUTPATIENT
Start: 2023-03-30

## 2023-03-30 NOTE — TELEPHONE ENCOUNTER
Caller: Katelynn Sung    Relationship: Self    Best call back number: [phone]    What medications are you currently taking:   Current Outpatient Medications on File Prior to Visit   Medication Sig Dispense Refill   • albuterol (PROVENTIL) (2.5 MG/3ML) 0.083% nebulizer solution Take 2.5 mg by nebulization Every 4 (Four) Hours As Needed for Wheezing. 120 mL 12   • albuterol sulfate HFA (ProAir HFA) 108 (90 Base) MCG/ACT inhaler Inhale 2 puffs Every 4 (Four) Hours As Needed for Wheezing. 18 g 5   • Azelastine HCl 137 MCG/SPRAY solution 2 sprays into the nostril(s) as directed by provider 2 (Two) Times a Day. 30 mL 11   • buprenorphine-naloxone (SUBOXONE) 8-2 MG per SL tablet Place 1 tablet under the tongue 2 (Two) Times a Day.     • cetirizine (zyrTEC) 10 MG tablet Take 1 tablet by mouth Daily. 30 tablet 0   • clonazePAM (KlonoPIN) 0.5 MG tablet TAKE 1 TABLET BY MOUTH ONCE DAILY AT BEDTIME AS NEEDED FOR ANXIETY 30 tablet 0   • cyclobenzaprine (FLEXERIL) 5 MG tablet Take 1 tablet by mouth 3 (Three) Times a Day As Needed for Muscle Spasms. for muscle spams 90 tablet 3   • dexlansoprazole (Dexilant) 60 MG capsule Take 1 capsule by mouth Daily. 30 capsule 5   • fluticasone (FLONASE) 50 MCG/ACT nasal spray INSTILL 2 SPRAYS IN EACH NOSTRIL DAILY. AS DIRECTED 16 g 3   • furosemide (LASIX) 20 MG tablet Take 1 tablet by mouth Daily As Needed (FOR SWELLING). 30 tablet 0   • lisinopril-hydrochlorothiazide (PRINZIDE,ZESTORETIC) 10-12.5 MG per tablet Take 1 tablet by mouth Daily. 30 tablet 5   • Naloxegol Oxalate (Movantik) 25 MG tablet Take 1 tablet by mouth Every Morning. 30 tablet 5   • nystatin (MYCOSTATIN) 100,000 unit/mL suspension SWISH AND SWALLOW 5 MLS BY MOUTH FOUR TIMES DAILY 180 mL 0   • pregabalin (LYRICA) 300 MG capsule TAKE 1 CAPSULE BY MOUTH TWICE DAILY 60 capsule 1   • promethazine (PHENERGAN) 25 MG tablet TAKE 1 TABLET BY MOUTH EVERY 8 (EIGHT) HOURS AS NEEDED FOR NAUSEA OR VOMITING. 90 tablet 2      No current facility-administered medications on file prior to visit.          What are your concerns: PATIENT STATES THAT SHE IS NEEDING TO TAKE 2 OF THE CLONIPIN TO GET TO SLEEP, WOULD LIKE TO HAVE A NEW PRESCRIPTION SENT IN    How long have you had these concerns: FOR A WHILE. NO SPECIFIED DATE.    PLEASE ADVISE. PATIENT IS REQUESTING SOMETHING BEFORE THIS WEEKEND

## 2023-04-10 DIAGNOSIS — F41.9 ANXIETY: ICD-10-CM

## 2023-04-10 DIAGNOSIS — F51.04 CHRONIC INSOMNIA: ICD-10-CM

## 2023-04-10 RX ORDER — CLONAZEPAM 0.5 MG/1
TABLET ORAL
Qty: 30 TABLET | Refills: 0 | OUTPATIENT
Start: 2023-04-10

## 2023-04-24 ENCOUNTER — OFFICE VISIT (OUTPATIENT)
Dept: FAMILY MEDICINE CLINIC | Facility: CLINIC | Age: 63
End: 2023-04-24
Payer: MEDICAID

## 2023-04-24 VITALS
HEIGHT: 65 IN | HEART RATE: 77 BPM | BODY MASS INDEX: 31.12 KG/M2 | SYSTOLIC BLOOD PRESSURE: 120 MMHG | TEMPERATURE: 98 F | DIASTOLIC BLOOD PRESSURE: 80 MMHG | OXYGEN SATURATION: 98 % | WEIGHT: 186.8 LBS

## 2023-04-24 DIAGNOSIS — Z12.4 SCREENING FOR CERVICAL CANCER: ICD-10-CM

## 2023-04-24 DIAGNOSIS — K21.9 GASTROESOPHAGEAL REFLUX DISEASE WITHOUT ESOPHAGITIS: ICD-10-CM

## 2023-04-24 DIAGNOSIS — M81.0 AGE-RELATED OSTEOPOROSIS WITHOUT CURRENT PATHOLOGICAL FRACTURE: ICD-10-CM

## 2023-04-24 DIAGNOSIS — Z12.31 ENCOUNTER FOR SCREENING MAMMOGRAM FOR MALIGNANT NEOPLASM OF BREAST: ICD-10-CM

## 2023-04-24 DIAGNOSIS — Z28.21 COVID-19 VACCINATION DECLINED: ICD-10-CM

## 2023-04-24 DIAGNOSIS — E53.8 VITAMIN B 12 DEFICIENCY: ICD-10-CM

## 2023-04-24 DIAGNOSIS — Z00.00 WELL ADULT EXAM: Primary | ICD-10-CM

## 2023-04-24 DIAGNOSIS — Z78.0 POSTMENOPAUSAL: ICD-10-CM

## 2023-04-24 PROCEDURE — 96372 THER/PROPH/DIAG INJ SC/IM: CPT | Performed by: FAMILY MEDICINE

## 2023-04-24 PROCEDURE — 1160F RVW MEDS BY RX/DR IN RCRD: CPT | Performed by: FAMILY MEDICINE

## 2023-04-24 PROCEDURE — 3079F DIAST BP 80-89 MM HG: CPT | Performed by: FAMILY MEDICINE

## 2023-04-24 PROCEDURE — 99396 PREV VISIT EST AGE 40-64: CPT | Performed by: FAMILY MEDICINE

## 2023-04-24 PROCEDURE — 3074F SYST BP LT 130 MM HG: CPT | Performed by: FAMILY MEDICINE

## 2023-04-24 PROCEDURE — 1159F MED LIST DOCD IN RCRD: CPT | Performed by: FAMILY MEDICINE

## 2023-04-24 RX ORDER — CYANOCOBALAMIN 1000 UG/ML
1000 INJECTION, SOLUTION INTRAMUSCULAR; SUBCUTANEOUS ONCE
Status: COMPLETED | OUTPATIENT
Start: 2023-04-24 | End: 2023-04-24

## 2023-04-24 RX ADMIN — CYANOCOBALAMIN 1000 MCG: 1000 INJECTION, SOLUTION INTRAMUSCULAR; SUBCUTANEOUS at 16:19

## 2023-04-24 NOTE — PROGRESS NOTES
Subjective   Katelynn Sung is a 63 y.o. female.     History of Present Illness   Here for annual check up.    Reproductive History  A1  Pregnancy complications (HTN, DM): none  Sexual Activity: not currently   Contraception: n/a  Menses: postmenopausal  STD hx: no  H/O abnormal pap: no  Cervical procedures: no    Social Hx  Household members: self  Marital status:   Tobacco use: former, quit   EtOH use: no  Illicit drug use: no    Lifestyle  Diet: varied, excess calorie, higher salt, simple carbohydrate  Exercise: none regularly  Using seatbelt: yes  Sunscreen: yes  Mental Health: no new concerns, mood stable  Feels safe in home: yes    Screenings  Last pap: ?  Last mammogram: overdue  Colonoscopy: scheduled for 2023  DEXA: overdue  FLP: <3 yrs  BG/A1c: <3 yrs  Vitamin D: previously deficient, unsure last chedked  Last dental check up: n/a, dentures for over 10 yrs  Last vision exam: few months ago per Dr. Tadeo, readers only  Immunizations UTD: no  Needs: COVID 19 which she declines, Zoster, Tdap and Shingrix - declines    Since last visit has had f/u with Dr. Flores. Being scheduled for sleep study due to excessive daytime sleepiness.    Low B12 in past. IM injection has helped with fatigue in past.    The following portions of the patient's history were reviewed and updated as appropriate: allergies, current medications, past family history, past medical history, past social history, past surgical history and problem list.    Review of Systems   Constitutional: Positive for fatigue. Negative for diaphoresis, fever and unexpected weight change.   HENT: Positive for congestion and postnasal drip. Negative for ear pain, mouth sores, nosebleeds, rhinorrhea and sore throat.    Eyes: Negative for visual disturbance.   Respiratory: Positive for cough (mild, intermittent), shortness of breath (mild with exertion) and wheezing (intermittent).    Cardiovascular: Negative for chest pain,  palpitations and leg swelling.   Gastrointestinal: Positive for abdominal pain (chronic, stable), constipation and nausea. Negative for blood in stool, rectal pain and vomiting.   Endocrine: Positive for heat intolerance. Negative for polydipsia and polyuria.   Genitourinary: Negative for difficulty urinating, dysuria, genital sores, hematuria, pelvic pain, vaginal bleeding and vaginal discharge.   Musculoskeletal: Positive for arthralgias, back pain, myalgias, neck pain and neck stiffness.   Skin: Negative for rash and wound.   Neurological: Positive for dizziness, weakness (generalized) and headaches. Negative for tremors and syncope.   Hematological: Negative for adenopathy. Does not bruise/bleed easily.   Psychiatric/Behavioral: Positive for sleep disturbance. Negative for confusion and dysphoric mood. The patient is nervous/anxious.    Pt's previous ROS reviewed and updated as indicated.       Objective    Vitals:    04/24/23 1321   BP: 120/80   Pulse: 77   Temp: 98 °F (36.7 °C)   SpO2: 98%     Body mass index is 31.57 kg/m².      04/24/23  1321   Weight: 84.7 kg (186 lb 12.8 oz)       Physical Exam  Vitals and nursing note reviewed.   Constitutional:       General: She is not in acute distress.     Appearance: She is well-developed and well-groomed. She is obese. She is not ill-appearing.   HENT:      Head: Atraumatic.      Right Ear: Tympanic membrane, ear canal and external ear normal.      Left Ear: Tympanic membrane, ear canal and external ear normal.      Nose: Nose normal.      Mouth/Throat:      Mouth: Mucous membranes are moist. No oral lesions.      Pharynx: Oropharynx is clear.   Eyes:      General: No scleral icterus.     Conjunctiva/sclera: Conjunctivae normal.   Neck:      Thyroid: No thyroid mass.      Vascular: Normal carotid pulses. No carotid bruit.   Cardiovascular:      Rate and Rhythm: Normal rate and regular rhythm.      Pulses: Normal pulses.      Heart sounds: Normal heart sounds.    Pulmonary:      Effort: Pulmonary effort is normal.      Breath sounds: Decreased breath sounds (mildly) present. No wheezing, rhonchi or rales.   Abdominal:      General: Bowel sounds are decreased. There is no distension.      Palpations: Abdomen is soft. There is no hepatomegaly, splenomegaly or mass.      Tenderness: There is no abdominal tenderness.   Musculoskeletal:      Right lower leg: No edema.      Left lower leg: No edema.      Comments: Soft tissue TTP diffusely, no decreased ROM in major joints    Lymphadenopathy:      Cervical: No cervical adenopathy.   Skin:     General: Skin is warm and dry.      Coloration: Skin is not cyanotic, jaundiced or pale.      Findings: No bruising or rash.   Neurological:      Mental Status: She is alert and oriented to person, place, and time.      Gait: Gait is intact.   Psychiatric:         Mood and Affect: Mood normal. Affect is flat.         Behavior: Behavior normal. Behavior is cooperative.         Cognition and Memory: Cognition and memory normal.     Pt's previous physical exam reviewed and updated as indicated.    Lab Results   Component Value Date    WBC 8.41 12/12/2022    HGB 14.8 12/12/2022    HCT 42.5 12/12/2022    MCV 82.8 12/12/2022     12/12/2022       Lab Results   Component Value Date    GLUCOSE 83 12/12/2022    BUN 11 12/12/2022    CREATININE 0.89 12/12/2022    EGFRIFNONA 80 12/09/2021    EGFRIFAFRI 97 12/09/2021    BCR 12.4 12/12/2022    K 4.2 12/12/2022    CO2 27.3 12/12/2022    CALCIUM 9.3 12/12/2022    PROTENTOTREF 7.6 12/12/2022    ALBUMIN 4.80 12/12/2022    LABIL2 1.7 12/12/2022    AST 19 12/12/2022    ALT 9 12/12/2022       Lab Results   Component Value Date    CHLPL 206 (H) 06/14/2021    TRIG 104 06/14/2021    HDL 45 06/14/2021     (H) 06/14/2021       Lab Results   Component Value Date    HGBA1C 5.60 08/08/2022       Lab Results   Component Value Date    TSH 2.430 12/12/2022       Lab Results   Component Value Date     XLGLFERW29 457 12/05/2022         Assessment & Plan   Diagnoses and all orders for this visit:    1. Well adult exam (Primary)    2. Screening for cervical cancer  -     Ambulatory Referral to Gynecology    3. Vitamin B 12 deficiency  -     cyanocobalamin injection 1,000 mcg    4. Encounter for screening mammogram for malignant neoplasm of breast  -     Mammo Screening Digital Tomosynthesis Bilateral With CAD; Future    5. Age-related osteoporosis without current pathological fracture  -     DEXA Bone Density Axial; Future    6. Postmenopausal  -     DEXA Bone Density Axial; Future    7. COVID-19 vaccination declined       Age appropriate preventive care reviewed including cancer screenings, safety measures, mental health concerns, supplements, prevention of CV disease and DM, etc. Handout provided.    BMI is >= 30 and <35. (Class 1 Obesity). The following options were offered after discussion;: exercise counseling/recommendations and nutrition counseling/recommendations. Nutrition and activity goals reviewed including: mainly water to drink, limit white flour/processed sugar, higher lean protein, high fiber carbs, regular meals, working toward 150 mins cardio per week.    She requests referral to GYN for updating of breast exam, pap smear, etc.    She will check into cost/coverage of Tdap and Shingirx with insurance and/or local pharmacy.    Encouraged to follow through with sleep study.    Encouraged to follow through with colon CA screening in June.    Routine f/u in 3 months, sooner as needed/instructed.  I will contact patient regarding test results and provide instructions regarding any necessary changes in plan of care.  Patient was encouraged to keep me informed of any acute changes,or any new concerning symptoms.  Pt is aware of reasons to seek emergent care.  Patient voiced understanding of all instructions and denied further questions.    Please note that portions of this note may have been completed with  a voice recognition program.

## 2023-04-25 DIAGNOSIS — K21.9 GASTROESOPHAGEAL REFLUX DISEASE WITHOUT ESOPHAGITIS: ICD-10-CM

## 2023-04-25 RX ORDER — DEXLANSOPRAZOLE 60 MG/1
60 CAPSULE, DELAYED RELEASE ORAL DAILY
Qty: 30 CAPSULE | Refills: 4 | Status: SHIPPED | OUTPATIENT
Start: 2023-04-25

## 2023-04-26 RX ORDER — DEXLANSOPRAZOLE 60 MG/1
60 CAPSULE, DELAYED RELEASE ORAL DAILY
Qty: 30 CAPSULE | Refills: 5 | OUTPATIENT
Start: 2023-04-26

## 2023-04-27 DIAGNOSIS — K86.1 CHRONIC PANCREATITIS, UNSPECIFIED PANCREATITIS TYPE: ICD-10-CM

## 2023-04-27 RX ORDER — LISINOPRIL AND HYDROCHLOROTHIAZIDE 12.5; 1 MG/1; MG/1
1 TABLET ORAL DAILY
Qty: 30 TABLET | Refills: 4 | Status: SHIPPED | OUTPATIENT
Start: 2023-04-27

## 2023-04-27 RX ORDER — FLUTICASONE PROPIONATE 50 MCG
SPRAY, SUSPENSION (ML) NASAL
Qty: 16 G | Refills: 2 | Status: SHIPPED | OUTPATIENT
Start: 2023-04-27

## 2023-04-27 RX ORDER — PROMETHAZINE HYDROCHLORIDE 25 MG/1
25 TABLET ORAL EVERY 8 HOURS PRN
Qty: 90 TABLET | Refills: 1 | Status: SHIPPED | OUTPATIENT
Start: 2023-04-27

## 2023-04-28 DIAGNOSIS — F41.9 ANXIETY: ICD-10-CM

## 2023-04-28 DIAGNOSIS — F51.04 CHRONIC INSOMNIA: ICD-10-CM

## 2023-04-28 RX ORDER — CLONAZEPAM 1 MG/1
TABLET ORAL
Qty: 30 TABLET | Refills: 0 | OUTPATIENT
Start: 2023-04-28

## 2023-04-28 NOTE — TELEPHONE ENCOUNTER
Rx Refill Note  Requested Prescriptions     Pending Prescriptions Disp Refills   • clonazePAM (KlonoPIN) 1 MG tablet [Pharmacy Med Name: CLONAZEPAM 1MG] 30 tablet 0     Sig: TAKE 1 TABLET BY MOUTH ONCE DAILY AT BEDTIME AS NEEDED FOR INSOMNIA      Last office visit with prescribing clinician: 4/24/2023   Last telemedicine visit with prescribing clinician: 7/24/2023   Next office visit with prescribing clinician: 7/24/2023                         Would you like a call back once the refill request has been completed: [] Yes [] No    If the office needs to give you a call back, can they leave a voicemail: [] Yes [] No    Sherley Blank MA  04/28/23, 11:00 EDT

## 2023-04-29 DIAGNOSIS — F41.9 ANXIETY: ICD-10-CM

## 2023-04-29 DIAGNOSIS — F51.04 CHRONIC INSOMNIA: ICD-10-CM

## 2023-04-29 RX ORDER — CLONAZEPAM 1 MG/1
1 TABLET ORAL NIGHTLY PRN
Qty: 30 TABLET | Refills: 0 | Status: SHIPPED | OUTPATIENT
Start: 2023-04-29

## 2023-04-29 NOTE — TELEPHONE ENCOUNTER
DANTE reviewed. CSA needs to updated. Refill approved and printed for . Pt to have UDS at time of . Please document timing of last dose of each controlled medication. Pt to keep f/u apt as scheduled.

## 2023-05-01 ENCOUNTER — TELEPHONE (OUTPATIENT)
Dept: FAMILY MEDICINE CLINIC | Facility: CLINIC | Age: 63
End: 2023-05-01
Payer: MEDICAID

## 2023-05-01 DIAGNOSIS — F41.9 ANXIETY: ICD-10-CM

## 2023-05-01 DIAGNOSIS — F51.04 CHRONIC INSOMNIA: ICD-10-CM

## 2023-05-02 ENCOUNTER — CLINICAL SUPPORT (OUTPATIENT)
Dept: FAMILY MEDICINE CLINIC | Facility: CLINIC | Age: 63
End: 2023-05-02
Payer: MEDICAID

## 2023-05-02 DIAGNOSIS — F41.9 ANXIETY: ICD-10-CM

## 2023-05-02 DIAGNOSIS — Z51.81 ENCOUNTER FOR THERAPEUTIC DRUG MONITORING: Primary | ICD-10-CM

## 2023-05-02 DIAGNOSIS — F51.04 CHRONIC INSOMNIA: ICD-10-CM

## 2023-05-02 RX ORDER — CLONAZEPAM 1 MG/1
1 TABLET ORAL NIGHTLY PRN
Qty: 30 TABLET | Refills: 0 | OUTPATIENT
Start: 2023-05-02

## 2023-05-03 RX ORDER — CLONAZEPAM 1 MG/1
1 TABLET ORAL NIGHTLY PRN
Qty: 30 TABLET | Refills: 0 | OUTPATIENT
Start: 2023-05-03

## 2023-05-03 RX ORDER — CLONAZEPAM 1 MG/1
TABLET ORAL
Qty: 30 TABLET | Refills: 0 | OUTPATIENT
Start: 2023-05-03

## 2023-05-07 LAB — DRUGS UR: NORMAL

## 2023-05-10 NOTE — PROGRESS NOTES
"Please remind pt that she should not be using \"left over\" medications. UDS was + for medication dc'd several months ago.     (She did let us know at the time of her UDS)"

## 2023-05-24 DIAGNOSIS — M79.7 FIBROMYALGIA: ICD-10-CM

## 2023-05-24 RX ORDER — PREGABALIN 300 MG/1
CAPSULE ORAL
Qty: 60 CAPSULE | Refills: 0 | Status: SHIPPED | OUTPATIENT
Start: 2023-05-26

## 2023-05-24 NOTE — TELEPHONE ENCOUNTER
Rx Refill Note  Requested Prescriptions     Pending Prescriptions Disp Refills   • pregabalin (LYRICA) 300 MG capsule [Pharmacy Med Name: PREGABALIN 300MG] 60 capsule 0     Sig: TAKE 1 CAPSULE BY MOUTH TWICE DAILY      Last office visit with prescribing clinician: 4/24/2023   Last telemedicine visit with prescribing clinician: Visit date not found   Next office visit with prescribing clinician: 7/24/2023                         Would you like a call back once the refill request has been completed: [] Yes [] No    If the office needs to give you a call back, can they leave a voicemail: [] Yes [] No    Radha Castro MA  05/24/23, 11:21 EDT

## 2023-05-26 RX ORDER — NALOXEGOL OXALATE 25 MG/1
TABLET, FILM COATED ORAL
Qty: 30 TABLET | Refills: 4 | Status: SHIPPED | OUTPATIENT
Start: 2023-05-26

## 2023-05-30 ENCOUNTER — TELEPHONE (OUTPATIENT)
Dept: FAMILY MEDICINE CLINIC | Facility: CLINIC | Age: 63
End: 2023-05-30

## 2023-05-30 DIAGNOSIS — F41.9 ANXIETY: ICD-10-CM

## 2023-05-30 DIAGNOSIS — F51.04 CHRONIC INSOMNIA: ICD-10-CM

## 2023-05-30 NOTE — TELEPHONE ENCOUNTER
pt is asking for a letter to be typed out stating her stomach issues for pain management for her suboxone, it makes her sick and with letter stating her stomach issues so her suboxone can be switched to subutex. is that okay with you?

## 2023-05-31 RX ORDER — CLONAZEPAM 1 MG/1
TABLET ORAL
Qty: 30 TABLET | Refills: 1 | Status: SHIPPED | OUTPATIENT
Start: 2023-05-31

## 2023-05-31 NOTE — TELEPHONE ENCOUNTER
Rx Refill Note  Requested Prescriptions     Pending Prescriptions Disp Refills   • clonazePAM (KlonoPIN) 1 MG tablet [Pharmacy Med Name: CLONAZEPAM 1MG] 30 tablet 0     Sig: TAKE 1 TABLET BY MOUTH IN THE EVENING DAILY AS NEEDED FOR INSOMNIA      Last office visit with prescribing clinician: 4/24/2023   Last telemedicine visit with prescribing clinician: Visit date not found   Next office visit with prescribing clinician: 7/24/2023                         Would you like a call back once the refill request has been completed: [] Yes [] No    If the office needs to give you a call back, can they leave a voicemail: [] Yes [] No    Radha Castro MA  05/31/23, 10:16 EDT

## 2023-05-31 NOTE — TELEPHONE ENCOUNTER
PATIENT CALLED CHECKING ON STATUS OF LETTER.  SHE HAS AN APPOINTMENT AT THAT DOCTOR AT 8:30 AND NEEDS LETTER BEFORE THEN.      PLEASE CALL [phone]

## 2023-07-07 PROBLEM — K59.00 CONSTIPATION: Status: RESOLVED | Noted: 2020-03-03 | Resolved: 2023-07-07

## 2023-07-24 ENCOUNTER — OFFICE VISIT (OUTPATIENT)
Dept: FAMILY MEDICINE CLINIC | Facility: CLINIC | Age: 63
End: 2023-07-24
Payer: MEDICAID

## 2023-07-24 VITALS
DIASTOLIC BLOOD PRESSURE: 88 MMHG | SYSTOLIC BLOOD PRESSURE: 132 MMHG | BODY MASS INDEX: 32.13 KG/M2 | WEIGHT: 188.2 LBS | HEIGHT: 64 IN | HEART RATE: 71 BPM | TEMPERATURE: 98.1 F | OXYGEN SATURATION: 98 %

## 2023-07-24 DIAGNOSIS — K21.9 GASTROESOPHAGEAL REFLUX DISEASE WITHOUT ESOPHAGITIS: ICD-10-CM

## 2023-07-24 DIAGNOSIS — Z11.3 SCREEN FOR STD (SEXUALLY TRANSMITTED DISEASE): ICD-10-CM

## 2023-07-24 DIAGNOSIS — M79.7 FIBROMYALGIA: Primary | ICD-10-CM

## 2023-07-24 DIAGNOSIS — E53.8 VITAMIN B 12 DEFICIENCY: ICD-10-CM

## 2023-07-24 DIAGNOSIS — R79.9 ABNORMAL BLOOD CHEMISTRY: ICD-10-CM

## 2023-07-24 DIAGNOSIS — K76.0 FATTY LIVER: ICD-10-CM

## 2023-07-24 DIAGNOSIS — Z51.81 MEDICATION MONITORING ENCOUNTER: ICD-10-CM

## 2023-07-24 DIAGNOSIS — I10 PRIMARY HYPERTENSION: ICD-10-CM

## 2023-07-24 DIAGNOSIS — Z71.51 ENCOUNTER FOR COUNSELING AND SURVEILLANCE FOR DRUG USE DISORDER: ICD-10-CM

## 2023-07-24 DIAGNOSIS — F11.11 H/O OPIOID ABUSE: ICD-10-CM

## 2023-07-24 DIAGNOSIS — K22.719 BARRETT'S ESOPHAGUS WITH DYSPLASIA: ICD-10-CM

## 2023-07-24 DIAGNOSIS — Z11.4 ENCOUNTER FOR SCREENING FOR HIV: ICD-10-CM

## 2023-07-24 RX ORDER — PREGABALIN 300 MG/1
300 CAPSULE ORAL 2 TIMES DAILY
Qty: 60 CAPSULE | Refills: 2 | Status: SHIPPED | OUTPATIENT
Start: 2023-07-24

## 2023-07-24 NOTE — PROGRESS NOTES
"Subjective   Katelynn Sung is a 63 y.o. female.     History of Present Illness  Here for routine f/u on several chronic med problems.    Now on subutex. They are now also writing her klonopin    She c/o intermittent sharp pain behind right eye. Saw eye doctor, eye check up fine. Started 2 months, getting worse. Lasts just few seconds. No other neuro symptoms. Not staying well hydrated. No known triggers.    Trouble swallowing. Has Required stretching before. Upcoming scope in Sept. Reports \"getting choked more\".     She c/o chronic fatigue, needs f/u   B12 shot    Has Pap smear, scheduled  Mammog, dEXa scheduled    Concerned about possible to sleep medicine. Never had sleep study.    Other chronic conditions include fatty liver, HTN, FMSx. Currently on zestoretic. Lyrica.     She has brought in lab order from from Suboxone clinic. Wishes to have them drawn here.    The following portions of the patient's history were reviewed and updated as appropriate: allergies, current medications, past family history, past medical history, past social history, past surgical history, and problem list.    Review of Systems   Constitutional:  Positive for fatigue. Negative for diaphoresis, fever and unexpected weight change.   HENT:  Positive for congestion and postnasal drip. Negative for ear pain, mouth sores, nosebleeds, rhinorrhea and sore throat.    Eyes:  Negative for visual disturbance.   Respiratory:  Positive for cough (mild, intermittent), shortness of breath (mild with exertion) and wheezing (intermittent).    Cardiovascular:  Negative for chest pain, palpitations and leg swelling.   Gastrointestinal:  Positive for abdominal pain (chronic, stable), constipation and nausea. Negative for blood in stool, rectal pain and vomiting.   Endocrine: Positive for heat intolerance. Negative for polydipsia and polyuria.   Genitourinary:  Negative for difficulty urinating, dysuria, genital sores, hematuria, pelvic pain, vaginal " bleeding and vaginal discharge.   Musculoskeletal:  Positive for arthralgias, back pain, myalgias, neck pain and neck stiffness.   Skin:  Negative for rash and wound.   Neurological:  Positive for dizziness, weakness (generalized) and headaches. Negative for tremors and syncope.   Hematological:  Negative for adenopathy. Does not bruise/bleed easily.   Psychiatric/Behavioral:  Positive for sleep disturbance. Negative for confusion and dysphoric mood. The patient is nervous/anxious.    Pt's previous ROS reviewed and updated as indicated.     Objective   Vitals:    07/24/23 1352   BP: 132/88   Pulse: 71   Temp: 98.1 °F (36.7 °C)   SpO2: 98%     Body mass index is 32.3 kg/m².      07/24/23  1352   Weight: 85.4 kg (188 lb 3.2 oz)           Physical Exam  Vitals and nursing note reviewed.   Constitutional:       General: She is not in acute distress.     Appearance: She is well-developed and well-groomed. She is obese. She is not ill-appearing.   HENT:      Head: Atraumatic.      Mouth/Throat:      Mouth: Mucous membranes are moist. No oral lesions.   Eyes:      General: No scleral icterus.     Conjunctiva/sclera: Conjunctivae normal.   Neck:      Thyroid: No thyroid mass.   Cardiovascular:      Rate and Rhythm: Normal rate and regular rhythm.      Pulses: Normal pulses.      Heart sounds: Normal heart sounds.   Pulmonary:      Effort: Pulmonary effort is normal.      Breath sounds: Decreased breath sounds (mildly) present. No wheezing, rhonchi or rales.   Musculoskeletal:      Right lower leg: No edema.      Left lower leg: No edema.      Left foot: Deformity (hallux valgus) present.      Comments: Soft tissue TTP diffusely, no decreased ROM in major joints    Lymphadenopathy:      Cervical: No cervical adenopathy.   Skin:     General: Skin is warm and dry.      Coloration: Skin is not jaundiced or pale.      Findings: No bruising or rash.   Neurological:      Mental Status: She is alert and oriented to person, place,  and time.      Cranial Nerves: Cranial nerves 2-12 are intact.      Sensory: Sensation is intact.      Motor: Motor function is intact.      Coordination: Coordination is intact.      Gait: Gait is intact.   Psychiatric:         Mood and Affect: Mood and affect normal.         Behavior: Behavior normal. Behavior is cooperative.         Cognition and Memory: Cognition and memory normal.   Pt's previous physical exam reviewed and updated as indicated.      Assessment & Plan   Diagnoses and all orders for this visit:    1. Fibromyalgia (Primary)  -     pregabalin (LYRICA) 300 MG capsule; Take 1 capsule by mouth 2 (Two) Times a Day.  Dispense: 60 capsule; Refill: 2    2. Encounter for counseling and surveillance for drug use disorder  -     CBC & Differential  -     Comprehensive Metabolic Panel    3. H/O opioid abuse  -     CBC & Differential  -     Comprehensive Metabolic Panel    4. Screen for STD (sexually transmitted disease)  -     Hepatitis panel, acute  -     RPR    5. Encounter for screening for HIV  -     HIV-1 / O / 2 Ag / Antibody 4th Generation    6. Medication monitoring encounter  -     CBC & Differential  -     Comprehensive Metabolic Panel    7. Primary hypertension  -     TSH Rfx On Abnormal To Free T4    8. Fatty liver  -     CBC & Differential  -     Comprehensive Metabolic Panel  -     Lipid Panel    9. Abnormal blood chemistry  -     Hemoglobin A1c    10. Vitamin B 12 deficiency    11. Humphreys's esophagus with dysplasia    12. Gastroesophageal reflux disease without esophagitis    Other orders  -     Interpretation:       Stable FMSx - continue lyrica.    GI symptoms of nausea, abd cramping/distension seem better on Subutex vs Suboxone.    Continue B12 replacement.    Continue zestoretic for HTN.    GERD with worsening dysphagia. H/o esophgeal stricture. Notice sent to gastroenterology regarding her increased symptoms. Continue PPI.  Encouraged to follow through with DEXA, mammogram, pap smear  apts.    Nutrition and activity goals reviewed including: mainly water to drink, limit white flour/processed sugar, higher lean protein, high fiber carbs, regular meals, working toward 150 mins cardio per week.    Routine f/u in 3 months, sooner as needed/instructed.  I will contact patient regarding test results and provide instructions regarding any necessary changes in plan of care.  Patient was encouraged to keep me informed of any acute changes, lack of improvement, or any new concerning symptoms.  Pt is aware of reasons to seek emergent care.  Patient voiced understanding of all instructions and denied further questions.    Please note that portions of this note may have been completed with a voice recognition program.

## 2023-07-25 LAB
ALBUMIN SERPL-MCNC: 4.6 G/DL (ref 3.9–4.9)
ALBUMIN/GLOB SERPL: 1.6 {RATIO} (ref 1.2–2.2)
ALP SERPL-CCNC: 79 IU/L (ref 44–121)
ALT SERPL-CCNC: 12 IU/L (ref 0–32)
AST SERPL-CCNC: 26 IU/L (ref 0–40)
BASOPHILS # BLD AUTO: 0.1 X10E3/UL (ref 0–0.2)
BASOPHILS NFR BLD AUTO: 2 %
BILIRUB SERPL-MCNC: 0.3 MG/DL (ref 0–1.2)
BUN SERPL-MCNC: 13 MG/DL (ref 8–27)
BUN/CREAT SERPL: 16 (ref 12–28)
CALCIUM SERPL-MCNC: 9.1 MG/DL (ref 8.7–10.3)
CHLORIDE SERPL-SCNC: 103 MMOL/L (ref 96–106)
CHOLEST SERPL-MCNC: 186 MG/DL (ref 100–199)
CO2 SERPL-SCNC: 23 MMOL/L (ref 20–29)
CREAT SERPL-MCNC: 0.82 MG/DL (ref 0.57–1)
EGFRCR SERPLBLD CKD-EPI 2021: 81 ML/MIN/1.73
EOSINOPHIL # BLD AUTO: 0.3 X10E3/UL (ref 0–0.4)
EOSINOPHIL NFR BLD AUTO: 5 %
ERYTHROCYTE [DISTWIDTH] IN BLOOD BY AUTOMATED COUNT: 13.7 % (ref 11.7–15.4)
GLOBULIN SER CALC-MCNC: 2.8 G/DL (ref 1.5–4.5)
GLUCOSE SERPL-MCNC: 103 MG/DL (ref 70–99)
HAV IGM SERPL QL IA: NEGATIVE
HBA1C MFR BLD: 5.7 % (ref 4.8–5.6)
HBV CORE IGM SERPL QL IA: NEGATIVE
HBV SURFACE AG SERPL QL IA: NEGATIVE
HCT VFR BLD AUTO: 40.7 % (ref 34–46.6)
HCV AB SERPL QL IA: NORMAL
HCV IGG SERPL QL IA: NON REACTIVE
HDLC SERPL-MCNC: 43 MG/DL
HGB BLD-MCNC: 13.6 G/DL (ref 11.1–15.9)
HIV 1+2 AB+HIV1 P24 AG SERPL QL IA: NON REACTIVE
IMM GRANULOCYTES # BLD AUTO: 0 X10E3/UL (ref 0–0.1)
IMM GRANULOCYTES NFR BLD AUTO: 0 %
LDLC SERPL CALC-MCNC: 109 MG/DL (ref 0–99)
LYMPHOCYTES # BLD AUTO: 2.7 X10E3/UL (ref 0.7–3.1)
LYMPHOCYTES NFR BLD AUTO: 52 %
MCH RBC QN AUTO: 28.9 PG (ref 26.6–33)
MCHC RBC AUTO-ENTMCNC: 33.4 G/DL (ref 31.5–35.7)
MCV RBC AUTO: 87 FL (ref 79–97)
MONOCYTES # BLD AUTO: 0.4 X10E3/UL (ref 0.1–0.9)
MONOCYTES NFR BLD AUTO: 8 %
NEUTROPHILS # BLD AUTO: 1.7 X10E3/UL (ref 1.4–7)
NEUTROPHILS NFR BLD AUTO: 33 %
PLATELET # BLD AUTO: 246 X10E3/UL (ref 150–450)
POTASSIUM SERPL-SCNC: 4.3 MMOL/L (ref 3.5–5.2)
PROT SERPL-MCNC: 7.4 G/DL (ref 6–8.5)
RBC # BLD AUTO: 4.7 X10E6/UL (ref 3.77–5.28)
RPR SER QL: NON REACTIVE
SODIUM SERPL-SCNC: 141 MMOL/L (ref 134–144)
TRIGL SERPL-MCNC: 194 MG/DL (ref 0–149)
TSH SERPL DL<=0.005 MIU/L-ACNC: 2.25 UIU/ML (ref 0.45–4.5)
VLDLC SERPL CALC-MCNC: 34 MG/DL (ref 5–40)
WBC # BLD AUTO: 5.2 X10E3/UL (ref 3.4–10.8)

## 2023-07-26 ENCOUNTER — TELEPHONE (OUTPATIENT)
Dept: FAMILY MEDICINE CLINIC | Facility: CLINIC | Age: 63
End: 2023-07-26

## 2023-07-26 NOTE — TELEPHONE ENCOUNTER
DELETE AFTER REVIEWING: Telephone encounter to be sent to the clinical pool.    Caller: Katelynn Sung    Relationship: Self    Best call back number: [phone]    What test was performed: LABS    When was the test performed: 7/24/23    Where was the test performed: IN OFFICE

## 2023-07-27 RX ORDER — BUPRENORPHINE HYDROCHLORIDE 8 MG/1
TABLET SUBLINGUAL
COMMUNITY
Start: 2023-07-24

## 2023-07-27 RX ORDER — AZELASTINE HYDROCHLORIDE 137 UG/1
SPRAY, METERED NASAL
COMMUNITY
Start: 2023-07-25

## 2023-07-31 ENCOUNTER — TELEPHONE (OUTPATIENT)
Dept: FAMILY MEDICINE CLINIC | Facility: CLINIC | Age: 63
End: 2023-07-31
Payer: MEDICAID

## 2023-08-01 ENCOUNTER — TELEPHONE (OUTPATIENT)
Dept: FAMILY MEDICINE CLINIC | Facility: CLINIC | Age: 63
End: 2023-08-01

## 2023-08-01 DIAGNOSIS — F41.9 ANXIETY: ICD-10-CM

## 2023-08-01 DIAGNOSIS — F51.04 CHRONIC INSOMNIA: ICD-10-CM

## 2023-08-02 ENCOUNTER — TELEPHONE (OUTPATIENT)
Dept: FAMILY MEDICINE CLINIC | Facility: CLINIC | Age: 63
End: 2023-08-02

## 2023-08-02 RX ORDER — CLONAZEPAM 1 MG/1
TABLET ORAL
Qty: 30 TABLET | Refills: 0 | OUTPATIENT
Start: 2023-08-02

## 2023-08-02 NOTE — TELEPHONE ENCOUNTER
Rx Refill Note  Requested Prescriptions     Pending Prescriptions Disp Refills    clonazePAM (KlonoPIN) 1 MG tablet [Pharmacy Med Name: CLONAZEPAM 1MG] 30 tablet 0     Sig: TAKE 1 TABLET BY MOUTH IN THE EVENING DAILY AS NEEDED FOR INSOMNIA      Last office visit with prescribing clinician: 7/24/2023   Last telemedicine visit with prescribing clinician: Visit date not found   Next office visit with prescribing clinician: 10/25/2023                         Would you like a call back once the refill request has been completed: [] Yes [] No    If the office needs to give you a call back, can they leave a voicemail: [] Yes [] No    Sherley Swan MA  08/02/23, 09:59 EDT

## 2023-08-02 NOTE — TELEPHONE ENCOUNTER
Caller: Katelynn Sung Richy    Relationship: Self    Best call back number: [phone]    Requested Prescriptions:   Requested Prescriptions      No prescriptions requested or ordered in this encounter      [facility]    Pharmacy where request should be sent: [facility] - [address], [address] - [phone] PH - [phone] FX     Last office visit with prescribing clinician: 7/24/2023   Last telemedicine visit with prescribing clinician: Visit date not found   Next office visit with prescribing clinician: 10/25/2023     Additional details provided by patient:     NEEDS AS SOON AS POSSIBLE     Does the patient have less than a 3 day supply:  [x] Yes  [] No    Would you like a call back once the refill request has been completed: [] Yes [x] No    If the office needs to give you a call back, can they leave a voicemail: [] Yes [x] No    Erika Riojas, PCT   08/02/23 12:35 EDT

## 2023-08-03 ENCOUNTER — TELEPHONE (OUTPATIENT)
Dept: FAMILY MEDICINE CLINIC | Facility: CLINIC | Age: 63
End: 2023-08-03
Payer: MEDICAID

## 2023-08-03 RX ORDER — ESCITALOPRAM OXALATE 5 MG/1
TABLET ORAL
COMMUNITY
Start: 2023-07-31 | End: 2023-10-25

## 2023-08-03 NOTE — TELEPHONE ENCOUNTER
I don't seen this on her current or historical med list. Can you confirm with pharmacy if she has filled previously. Ok to refill and update med list.

## 2023-08-04 RX ORDER — VALACYCLOVIR HYDROCHLORIDE 500 MG/1
TABLET, FILM COATED ORAL
Qty: 30 TABLET | Refills: 2 | Status: SHIPPED | OUTPATIENT
Start: 2023-08-04

## 2023-08-04 NOTE — TELEPHONE ENCOUNTER
Pt stated that she has some bumps pop up and said it was herpes. She stated that she has gotten these from here before, but I do not see that.

## 2023-08-23 ENCOUNTER — TELEPHONE (OUTPATIENT)
Dept: FAMILY MEDICINE CLINIC | Facility: CLINIC | Age: 63
End: 2023-08-23
Payer: MEDICAID

## 2023-08-23 DIAGNOSIS — K86.1 CHRONIC PANCREATITIS, UNSPECIFIED PANCREATITIS TYPE: ICD-10-CM

## 2023-08-23 RX ORDER — PROMETHAZINE HYDROCHLORIDE 25 MG/1
25 TABLET ORAL EVERY 8 HOURS PRN
Qty: 90 TABLET | Refills: 0 | Status: SHIPPED | OUTPATIENT
Start: 2023-08-23

## 2023-08-23 RX ORDER — PROMETHAZINE HYDROCHLORIDE 25 MG/1
25 TABLET ORAL EVERY 8 HOURS PRN
Qty: 90 TABLET | Refills: 0 | OUTPATIENT
Start: 2023-08-23
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
## 2023-08-23 NOTE — TELEPHONE ENCOUNTER
Caller: Katelynn Sung Richy    Relationship: Self    Best call back number: [phone]    Requested Prescriptions:   Requested Prescriptions     Pending Prescriptions Disp Refills    promethazine (PHENERGAN) 25 MG tablet 90 tablet 0     Sig: Take 1 tablet by mouth Every 8 (Eight) Hours As Needed for Nausea or Vomiting.    Naloxegol Oxalate (Movantik) 25 MG tablet 30 tablet 4        Pharmacy where request should be sent: [facility] ([place] [address], [address] - [phone] [facility] [phone]      Last office visit with prescribing clinician: 7/24/2023   Last telemedicine visit with prescribing clinician: Visit date not found   Next office visit with prescribing clinician: 10/25/2023     Additional details provided by patient: COMPLETELY OUT OF MEDICATION    Does the patient have less than a 3 day supply:  [x] Yes  [] No    Would you like a call back once the refill request has been completed: [x] Yes [] No    If the office needs to give you a call back, can they leave a voicemail: [x] Yes [] No    Shira Hood Rep   08/23/23 11:01 EDT

## 2023-08-24 ENCOUNTER — OFFICE VISIT (OUTPATIENT)
Dept: FAMILY MEDICINE CLINIC | Facility: CLINIC | Age: 63
End: 2023-08-24
Payer: MEDICAID

## 2023-08-24 VITALS
RESPIRATION RATE: 16 BRPM | OXYGEN SATURATION: 97 % | BODY MASS INDEX: 32.2 KG/M2 | HEIGHT: 64 IN | WEIGHT: 188.6 LBS | SYSTOLIC BLOOD PRESSURE: 128 MMHG | TEMPERATURE: 97.9 F | HEART RATE: 68 BPM | DIASTOLIC BLOOD PRESSURE: 78 MMHG

## 2023-08-24 DIAGNOSIS — M77.31 CALCANEAL SPUR OF FOOT, RIGHT: Primary | ICD-10-CM

## 2023-08-24 DIAGNOSIS — M21.612 BUNION, LEFT FOOT: ICD-10-CM

## 2023-08-24 DIAGNOSIS — M79.671 RIGHT FOOT PAIN: ICD-10-CM

## 2023-08-24 PROCEDURE — 1160F RVW MEDS BY RX/DR IN RCRD: CPT | Performed by: NURSE PRACTITIONER

## 2023-08-24 PROCEDURE — 3074F SYST BP LT 130 MM HG: CPT | Performed by: NURSE PRACTITIONER

## 2023-08-24 PROCEDURE — 3078F DIAST BP <80 MM HG: CPT | Performed by: NURSE PRACTITIONER

## 2023-08-24 PROCEDURE — 99213 OFFICE O/P EST LOW 20 MIN: CPT | Performed by: NURSE PRACTITIONER

## 2023-08-24 PROCEDURE — 1159F MED LIST DOCD IN RCRD: CPT | Performed by: NURSE PRACTITIONER

## 2023-08-24 RX ORDER — SERTRALINE HYDROCHLORIDE 25 MG/1
TABLET, FILM COATED ORAL
COMMUNITY
Start: 2023-08-14

## 2023-08-24 NOTE — PROGRESS NOTES
"                      Established Patient        Chief Complaint:   Chief Complaint   Patient presents with    Foot Swelling     Pain on right foot on the right side below the ankle. Pt would like a referral to ortho to have this checked out.          History of Present Illness:    Katelynn Sung is a 63 y.o. female who presents today for persistent right foot/ankle pain. Patient states that symptoms have been ongoing for several months ,worsened lately. Reports worsened pain with inversion of ankle. Pain radiates up leg. Denies LYNSEY. Xray and US on 2/21/23. US negative for DVT at that time. Xray revealed small plantar calcaneal spur. Patient states that her sister works in PT and taped her foot and it seemed to help with her discomfort.     Subjective     The following portions of the patient's history were reviewed and updated as appropriate: allergies, current medications, past family history, past medical history, past social history, past surgical history and problem list.    ALLERGIES  Allergies   Allergen Reactions    Nsaids GI Intolerance       ROS  Review of Systems   Musculoskeletal:  Positive for gait problem and joint swelling.     Objective     Vital Signs:   /78   Pulse 68   Temp 97.9 °F (36.6 °C)   Resp 16   Ht 162.6 cm (64\")   Wt 85.5 kg (188 lb 9.6 oz)   LMP  (LMP Unknown)   SpO2 97%   BMI 32.37 kg/m²             Physical Exam   Physical Exam  Vitals and nursing note reviewed.   Pulmonary:      Effort: Pulmonary effort is normal.   Musculoskeletal:      Left foot: Bunion present.        Feet:    Neurological:      Mental Status: She is oriented to person, place, and time.   Psychiatric:         Mood and Affect: Mood normal.         Behavior: Behavior normal.       Assessment and Plan      Assessment/Plan:   Diagnoses and all orders for this visit:    1. Calcaneal spur of foot, right (Primary)  -     Ambulatory Referral to Podiatry    2. Right foot pain  -     Ambulatory Referral to " Podiatry    3. Bunion, left foot    Referral to podiatrist for evaluation of right calcaneal spur and left bunion. Patient encouraged to wear supportive tennis shoes for comfort and apply ice as needed for swelling/pain.     Patient was encouraged to keep me informed of any acute changes, lack of improvement, or any new concerning symptoms.    Patient voiced understanding of all instructions and denied further questions.      I have reviewed and updated all copied forward information, as appropriate.  I attest to the accuracy and relevance of any unchanged information.      Follow up:  Return for Follow up with PCP if symptoms worsen or persist..     Patient Education:  There are no Patient Instructions on file for this visit.    JESSICA Yung  09/04/23  17:03 EDT          Please note that portions of this note may have been completed with a voice recognition program.

## 2023-08-25 NOTE — TELEPHONE ENCOUNTER
I already spoke to patient about this referral. Notified her of her appointment details/instructions for [facility] [place].

## 2023-09-21 DIAGNOSIS — K21.9 GASTROESOPHAGEAL REFLUX DISEASE WITHOUT ESOPHAGITIS: ICD-10-CM

## 2023-09-21 DIAGNOSIS — K86.1 CHRONIC PANCREATITIS, UNSPECIFIED PANCREATITIS TYPE: ICD-10-CM

## 2023-09-21 RX ORDER — LISINOPRIL AND HYDROCHLOROTHIAZIDE 12.5; 1 MG/1; MG/1
1 TABLET ORAL DAILY
Qty: 30 TABLET | Refills: 3 | Status: SHIPPED | OUTPATIENT
Start: 2023-09-21

## 2023-09-21 RX ORDER — DEXLANSOPRAZOLE 60 MG/1
CAPSULE, DELAYED RELEASE ORAL
Qty: 30 CAPSULE | Refills: 3 | Status: SHIPPED | OUTPATIENT
Start: 2023-09-21

## 2023-09-21 RX ORDER — AZELASTINE HYDROCHLORIDE 137 UG/1
SPRAY, METERED NASAL
Qty: 30 ML | Refills: 10 | Status: SHIPPED | OUTPATIENT
Start: 2023-09-21

## 2023-09-21 RX ORDER — PROMETHAZINE HYDROCHLORIDE 25 MG/1
25 TABLET ORAL EVERY 8 HOURS PRN
Qty: 90 TABLET | Refills: 0 | Status: SHIPPED | OUTPATIENT
Start: 2023-09-21

## 2023-10-18 DIAGNOSIS — M79.7 FIBROMYALGIA: ICD-10-CM

## 2023-10-18 DIAGNOSIS — K86.1 CHRONIC PANCREATITIS, UNSPECIFIED PANCREATITIS TYPE: ICD-10-CM

## 2023-10-18 RX ORDER — PROMETHAZINE HYDROCHLORIDE 25 MG/1
25 TABLET ORAL EVERY 8 HOURS PRN
Qty: 90 TABLET | Refills: 0 | Status: SHIPPED | OUTPATIENT
Start: 2023-10-18

## 2023-10-18 NOTE — TELEPHONE ENCOUNTER
Rx Refill Note  Requested Prescriptions     Pending Prescriptions Disp Refills    pregabalin (LYRICA) 300 MG capsule 60 capsule 2     Sig: Take 1 capsule by mouth 2 (Two) Times a Day.     Signed Prescriptions Disp Refills    promethazine (PHENERGAN) 25 MG tablet 90 tablet 0     Sig: Take 1 tablet by mouth Every 8 (Eight) Hours As Needed for Nausea or Vomiting.     Authorizing Provider: ERROL GUERRIER     Ordering User: DAGMAR HERNANDEZ      Last office visit with prescribing clinician: 7/24/2023   Last telemedicine visit with prescribing clinician: Visit date not found   Next office visit with prescribing clinician: 10/25/2023                         Would you like a call back once the refill request has been completed: [] Yes [] No    If the office needs to give you a call back, can they leave a voicemail: [] Yes [] No    Dagmar Hernandez MA  10/18/23, 16:17 EDT

## 2023-10-18 NOTE — TELEPHONE ENCOUNTER
Caller: Katelynn Sung    Relationship: Self    Best call back number: [phone]     Requested Prescriptions:   Requested Prescriptions     Pending Prescriptions Disp Refills    pregabalin (LYRICA) 300 MG capsule 60 capsule 2     Sig: Take 1 capsule by mouth 2 (Two) Times a Day.    promethazine (PHENERGAN) 25 MG tablet 90 tablet 0     Sig: Take 1 tablet by mouth Every 8 (Eight) Hours As Needed for Nausea or Vomiting.        Pharmacy where request should be sent: [facility] ([place] [address], [address] - [phone]  - [phone] FX     Last office visit with prescribing clinician: 7/24/2023   Last telemedicine visit with prescribing clinician: Visit date not found   Next office visit with prescribing clinician: 10/25/2023     Additional details provided by patient: HAS 3 OR 4 DAYS REMAINING     Does the patient have less than a 3 day supply:  [] Yes  [x] No    Would you like a call back once the refill request has been completed: [] Yes [x] No    If the office needs to give you a call back, can they leave a voicemail: [] Yes [x] No    Shira Gibson Rep   10/18/23 14:52 EDT

## 2023-10-19 DIAGNOSIS — M79.7 FIBROMYALGIA: ICD-10-CM

## 2023-10-19 RX ORDER — PREGABALIN 300 MG/1
300 CAPSULE ORAL 2 TIMES DAILY
Qty: 60 CAPSULE | Refills: 1 | OUTPATIENT
Start: 2023-10-19

## 2023-10-19 RX ORDER — PREGABALIN 300 MG/1
300 CAPSULE ORAL 2 TIMES DAILY
Qty: 60 CAPSULE | Refills: 2 | Status: SHIPPED | OUTPATIENT
Start: 2023-10-19

## 2023-10-19 NOTE — TELEPHONE ENCOUNTER
Rx Refill Note  Requested Prescriptions     Pending Prescriptions Disp Refills    pregabalin (LYRICA) 300 MG capsule 60 capsule 2     Sig: Take 1 capsule by mouth 2 (Two) Times a Day.     Signed Prescriptions Disp Refills    promethazine (PHENERGAN) 25 MG tablet 90 tablet 0     Sig: Take 1 tablet by mouth Every 8 (Eight) Hours As Needed for Nausea or Vomiting.     Authorizing Provider: ERROL GUERRIER     Ordering User: DAGMAR HERNANDEZ      Last office visit with prescribing clinician: 7/24/2023   Last telemedicine visit with prescribing clinician: Visit date not found   Next office visit with prescribing clinician: 10/19/2023                         Would you like a call back once the refill request has been completed: [] Yes [] No    If the office needs to give you a call back, can they leave a voicemail: [] Yes [] No    Dagmar Hernandez MA  10/19/23, 13:07 EDT

## 2023-10-19 NOTE — TELEPHONE ENCOUNTER
Rx Refill Note  Requested Prescriptions     Pending Prescriptions Disp Refills    pregabalin (LYRICA) 300 MG capsule [Pharmacy Med Name: PREGABALIN 300MG] 60 capsule 1     Sig: TAKE 1 CAPSULE BY MOUTH 2 (TWO) TIMES A DAY.      Last office visit with prescribing clinician: 7/24/2023   Last telemedicine visit with prescribing clinician: Visit date not found   Next office visit with prescribing clinician: 10/25/2023     Adrienne Medrano MA  10/19/23, 10:20 EDT

## 2023-10-25 ENCOUNTER — OFFICE VISIT (OUTPATIENT)
Dept: FAMILY MEDICINE CLINIC | Facility: CLINIC | Age: 63
End: 2023-10-25
Payer: MEDICAID

## 2023-10-25 VITALS
DIASTOLIC BLOOD PRESSURE: 80 MMHG | SYSTOLIC BLOOD PRESSURE: 125 MMHG | HEART RATE: 71 BPM | HEIGHT: 64 IN | BODY MASS INDEX: 32.61 KG/M2 | WEIGHT: 191 LBS | OXYGEN SATURATION: 92 %

## 2023-10-25 DIAGNOSIS — R29.818 SUSPECTED SLEEP APNEA: ICD-10-CM

## 2023-10-25 DIAGNOSIS — J42 CHRONIC BRONCHITIS, UNSPECIFIED CHRONIC BRONCHITIS TYPE: Primary | ICD-10-CM

## 2023-10-25 DIAGNOSIS — E66.09 CLASS 1 OBESITY DUE TO EXCESS CALORIES WITH SERIOUS COMORBIDITY AND BODY MASS INDEX (BMI) OF 32.0 TO 32.9 IN ADULT: ICD-10-CM

## 2023-10-25 DIAGNOSIS — R92.8 ABNORMALITY OF LEFT BREAST ON SCREENING MAMMOGRAM: ICD-10-CM

## 2023-10-25 DIAGNOSIS — Z23 NEED FOR INFLUENZA VACCINATION: ICD-10-CM

## 2023-10-25 DIAGNOSIS — G47.19 EXCESSIVE DAYTIME SLEEPINESS: ICD-10-CM

## 2023-10-25 RX ORDER — FLUTICASONE FUROATE AND VILANTEROL 200; 25 UG/1; UG/1
1 POWDER RESPIRATORY (INHALATION)
Qty: 28 EACH | Refills: 11 | Status: SHIPPED | OUTPATIENT
Start: 2023-10-25

## 2023-10-25 RX ORDER — METHYLPREDNISOLONE ACETATE 40 MG/ML
40 INJECTION, SUSPENSION INTRA-ARTICULAR; INTRALESIONAL; INTRAMUSCULAR; SOFT TISSUE ONCE
Status: COMPLETED | OUTPATIENT
Start: 2023-10-25 | End: 2023-10-25

## 2023-10-25 RX ADMIN — METHYLPREDNISOLONE ACETATE 40 MG: 40 INJECTION, SUSPENSION INTRA-ARTICULAR; INTRALESIONAL; INTRAMUSCULAR; SOFT TISSUE at 14:55

## 2023-10-25 NOTE — PROGRESS NOTES
Subjective   Katelynn Sung is a 63 y.o. female.     History of Present Illness  Here requesting review of recent mammogram results.     She c/o chronic fatigue, apneic spells during sleep. Strong family h/o MARY.     She has COPD/chronic bronchitis. Recent increase in daily wheezing, SOA. Using combivent more often.       The following portions of the patient's history were reviewed and updated as appropriate: allergies, current medications, past family history, past medical history, past social history, past surgical history, and problem list.    Review of Systems   Constitutional:  Positive for fatigue and unexpected weight change. Negative for diaphoresis and fever.   HENT:  Positive for congestion and postnasal drip. Negative for ear pain, mouth sores, nosebleeds, rhinorrhea and sore throat.    Eyes:  Negative for visual disturbance.   Respiratory:  Positive for cough (mild, intermittent), shortness of breath (mild with exertion) and wheezing (intermittent).    Cardiovascular:  Negative for chest pain, palpitations and leg swelling.   Gastrointestinal:  Positive for abdominal pain (chronic, stable), constipation and nausea. Negative for blood in stool, rectal pain and vomiting.   Endocrine: Positive for heat intolerance. Negative for polydipsia and polyuria.   Genitourinary:  Negative for difficulty urinating, dysuria, genital sores, hematuria, pelvic pain, vaginal bleeding and vaginal discharge.   Musculoskeletal:  Positive for arthralgias, back pain, myalgias, neck pain and neck stiffness.   Skin:  Negative for rash and wound.   Neurological:  Positive for dizziness, weakness (generalized) and headaches. Negative for tremors and syncope.   Hematological:  Negative for adenopathy. Does not bruise/bleed easily.   Psychiatric/Behavioral:  Positive for sleep disturbance. Negative for confusion and dysphoric mood. The patient is nervous/anxious.    Pt's previous ROS reviewed and updated as indicated.        Objective   Vitals:    10/25/23 1309   BP: 125/80   Pulse: 71   SpO2: 92%     Body mass index is 32.79 kg/m².      10/25/23  1309   Weight: 86.6 kg (191 lb)       Physical Exam  Vitals and nursing note reviewed.   Constitutional:       General: She is not in acute distress.     Appearance: She is well-developed and well-groomed. She is obese. She is not ill-appearing.   HENT:      Head: Atraumatic.      Mouth/Throat:      Mouth: Mucous membranes are moist. No oral lesions.   Eyes:      General: No scleral icterus.     Conjunctiva/sclera: Conjunctivae normal.   Neck:      Thyroid: No thyroid mass.   Cardiovascular:      Rate and Rhythm: Normal rate and regular rhythm.      Pulses: Normal pulses.      Heart sounds: Normal heart sounds.   Pulmonary:      Effort: Pulmonary effort is normal.      Breath sounds: Decreased breath sounds present. No wheezing, rhonchi or rales.   Musculoskeletal:      Right lower leg: No edema.      Left lower leg: No edema.      Comments: Soft tissue TTP diffusely, no decreased ROM in major joints    Lymphadenopathy:      Cervical: No cervical adenopathy.   Skin:     General: Skin is warm and dry.      Coloration: Skin is not cyanotic, jaundiced or pale.      Findings: No bruising or rash.   Neurological:      Mental Status: She is alert and oriented to person, place, and time.      Gait: Gait is intact.   Psychiatric:         Mood and Affect: Mood normal. Affect is flat.         Behavior: Behavior normal. Behavior is cooperative.         Cognition and Memory: Cognition and memory normal.     Pt's previous physical exam reviewed and updated as indicated.    Mammogram reviewed with patient.      Assessment & Plan   Diagnoses and all orders for this visit:    1. Chronic bronchitis, unspecified chronic bronchitis type (Primary)  -     Fluticasone Furoate-Vilanterol (Breo Ellipta) 200-25 MCG/ACT inhaler; Inhale 1 puff Daily.  Dispense: 28 each; Refill: 11  -     methylPREDNISolone acetate  (DEPO-medrol) injection 40 mg    2. Need for influenza vaccination  -     Cancel: Fluzone (or Fluarix & Flulaval for VFC) >6 Mos ([id number])    3. Abnormality of left breast on screening mammogram  -     Mammo diagnostic digital tomosynthesis left w CAD; Future  -     US breast left limited; Future    4. Class 1 obesity due to excess calories with serious comorbidity and body mass index (BMI) of 32.0 to 32.9 in adult  -     Ambulatory Referral to Sleep Medicine    5. Excessive daytime sleepiness  -     Ambulatory Referral to Sleep Medicine    6. Suspected sleep apnea  -     Ambulatory Referral to Sleep Medicine       COPD/chronic bronchitis with mild exacerbation and worsened overall symptoms past few months. Continue combivent prn, add Breo. POC IM CS as above.    Nutrition and activity goals reviewed including: mainly water to drink, limit white flour/processed sugar, higher lean protein, high fiber carbs, regular meals, working toward 150 mins cardio per week.    Mammogram results reviewed. She requests f/u breast imaging at [facility].     Routine f/u as scheduled, f/u sooner as needed.  Patient was encouraged to keep me informed of any acute changes, lack of improvement, or any new concerning symptoms.  Pt is aware of reasons to seek emergent care.  Patient voiced understanding of all instructions and denied further questions.    Please note that portions of this note may have been completed with a voice recognition program.

## 2023-10-27 ENCOUNTER — TELEPHONE (OUTPATIENT)
Dept: FAMILY MEDICINE CLINIC | Facility: CLINIC | Age: 63
End: 2023-10-27

## 2023-10-27 NOTE — TELEPHONE ENCOUNTER
Caller: Katelynn Sung    Relationship: Self    Best call back number: [phone]     What was the call regarding:   PATIENT WOULD LIKE A CALL BACK REGARDING THE STATUS OF GETTING SCHEDULED AS SOON AS POSSIBLE WITH [facility] FOR A MAMMOGRAM AND ULTRASOUND OF LEFT BREAST     PATIENT WOULD LIKE A CALL BACK REGARDING THE STATUS OF GETTING SCHEDULED WITH SLEEPY STUDY TO HELP HER SLEEP     PATIENT STATED THAT HER MEDICATION NEEDS A PRIOR AUTHORIZATION FOR INSURANCE TO COVER      ipratropium-albuterol (Combivent Respimat)  MCG/ACT inhaler

## 2023-11-12 NOTE — PATIENT INSTRUCTIONS
Allergic Rhinitis, Adult  Allergic rhinitis is an allergic reaction that affects the mucous membrane inside the nose. It causes sneezing, a runny or stuffy nose, and the feeling of mucus going down the back of the throat (postnasal drip). Allergic rhinitis can be mild to severe.  There are two types of allergic rhinitis:  · Seasonal. This type is also called hay fever. It happens only during certain seasons.  · Perennial. This type can happen at any time of the year.    What are the causes?  This condition happens when the body's defense system (immune system) responds to certain harmless substances called allergens as though they were germs.   Seasonal allergic rhinitis is triggered by pollen, which can come from grasses, trees, and weeds. Perennial allergic rhinitis may be caused by:  · House dust mites.  · Pet dander.  · Mold spores.    What are the signs or symptoms?  Symptoms of this condition include:  · Sneezing.  · Runny or stuffy nose (nasal congestion).  · Postnasal drip.  · Itchy nose.  · Tearing of the eyes.  · Trouble sleeping.  · Daytime sleepiness.    How is this diagnosed?  This condition may be diagnosed based on:  · Your medical history.  · A physical exam.  · Tests to check for related conditions, such as:  ? Asthma.  ? Pink eye.  ? Ear infection.  ? Upper respiratory infection.  · Tests to find out which allergens trigger your symptoms. These may include skin or blood tests.    How is this treated?  There is no cure for this condition, but treatment can help control symptoms. Treatment may include:  · Taking medicines that block allergy symptoms, such as antihistamines. Medicine may be given as a shot, nasal spray, or pill.  · Avoiding the allergen.  · Desensitization. This treatment involves getting ongoing shots until your body becomes less sensitive to the allergen. This treatment may be done if other treatments do not help.  · If taking medicine and avoiding the allergen does not work, new,  Reason for Disposition  • Question about upcoming scheduled surgery, procedure, or test, no triage required and triager able to answer question    Answer Assessment - Initial Assessment Questions  1. REASON FOR CALL: "What is the main reason for your call? "I am calling to see if my daughter is having a colonoscopy and endoscopy on Monday and what time should we arrive?"    2. SYMPTOMS: "Does your child have any symptoms?"       Denies    3. OTHER QUESTIONS: "Do you have any other questions?"    Denies    Protocols used:  Information Only Call - No Triage[facility].    Follow these instructions at home:  · Find out what you are allergic to. Common allergens include smoke, dust, and pollen.  · Avoid the things you are allergic to. These are some things you can do to help avoid allergens:  ? Replace carpet with wood, tile, or vinyl rolando. Carpet can trap dander and dust.  ? Do not smoke. Do not allow smoking in your home.  ? Change your heating and air conditioning filter at least once a month.  ? During allergy season:  § Keep windows closed as much as possible.  § Plan outdoor activities when pollen counts are lowest. This is usually during the evening hours.  § When coming indoors, change clothing and shower before sitting on furniture or bedding.  · Take over-the-counter and prescription medicines only as told by your health care provider.  · Keep all follow-up visits as told by your health care provider. This is important.  Contact a health care provider if:  · You have a fever.  · You develop a persistent cough.  · You make whistling sounds when you breathe (you wheeze).  · Your symptoms interfere with your normal daily activities.  Get help right away if:  · You have shortness of breath.  Summary  · This condition can be managed by taking medicines as directed and avoiding allergens.  · Contact your health care provider if you develop a persistent cough or fever.  · During allergy season, keep windows closed as much as possible.  This information is not intended to replace advice given to you by your health care provider. Make sure you discuss any questions you have with your health care provider.  Document Released: 09/12/2002 Document Revised: 01/25/2018 Document Reviewed: 01/25/2018  Elsevier Interactive Patient Education © 2018 [employer]

## 2023-11-14 RX ORDER — NALOXEGOL OXALATE 25 MG/1
TABLET, FILM COATED ORAL
Qty: 30 TABLET | Refills: 3 | Status: SHIPPED | OUTPATIENT
Start: 2023-11-14

## 2023-11-15 DIAGNOSIS — K86.1 CHRONIC PANCREATITIS, UNSPECIFIED PANCREATITIS TYPE: ICD-10-CM

## 2023-11-15 RX ORDER — PROMETHAZINE HYDROCHLORIDE 25 MG/1
25 TABLET ORAL EVERY 8 HOURS PRN
Qty: 90 TABLET | Refills: 0 | Status: SHIPPED | OUTPATIENT
Start: 2023-11-15

## 2023-11-28 ENCOUNTER — TELEPHONE (OUTPATIENT)
Dept: FAMILY MEDICINE CLINIC | Facility: CLINIC | Age: 63
End: 2023-11-28

## 2023-11-28 ENCOUNTER — OFFICE VISIT (OUTPATIENT)
Dept: FAMILY MEDICINE CLINIC | Facility: CLINIC | Age: 63
End: 2023-11-28
Payer: MEDICAID

## 2023-11-28 VITALS
SYSTOLIC BLOOD PRESSURE: 130 MMHG | BODY MASS INDEX: 31.5 KG/M2 | HEIGHT: 64 IN | WEIGHT: 184.5 LBS | HEART RATE: 65 BPM | DIASTOLIC BLOOD PRESSURE: 80 MMHG | OXYGEN SATURATION: 95 %

## 2023-11-28 DIAGNOSIS — J42 CHRONIC BRONCHITIS, UNSPECIFIED CHRONIC BRONCHITIS TYPE: ICD-10-CM

## 2023-11-28 DIAGNOSIS — M79.7 FIBROMYALGIA: ICD-10-CM

## 2023-11-28 DIAGNOSIS — R09.89 CHEST CONGESTION: ICD-10-CM

## 2023-11-28 DIAGNOSIS — J44.1 COPD WITH EXACERBATION: ICD-10-CM

## 2023-11-28 DIAGNOSIS — R05.9 COUGH, UNSPECIFIED TYPE: Primary | ICD-10-CM

## 2023-11-28 LAB
EXPIRATION DATE: NORMAL
FLUAV AG UPPER RESP QL IA.RAPID: NOT DETECTED
FLUBV AG UPPER RESP QL IA.RAPID: NOT DETECTED
INTERNAL CONTROL: NORMAL
Lab: NORMAL
SARS-COV-2 AG UPPER RESP QL IA.RAPID: NOT DETECTED

## 2023-11-28 RX ORDER — AZITHROMYCIN 250 MG/1
TABLET, FILM COATED ORAL
Qty: 6 TABLET | Refills: 0 | Status: SHIPPED | OUTPATIENT
Start: 2023-11-28

## 2023-11-28 RX ORDER — METHYLPREDNISOLONE ACETATE 40 MG/ML
40 INJECTION, SUSPENSION INTRA-ARTICULAR; INTRALESIONAL; INTRAMUSCULAR; SOFT TISSUE ONCE
Status: COMPLETED | OUTPATIENT
Start: 2023-11-28 | End: 2023-11-28

## 2023-11-28 RX ORDER — PREGABALIN 225 MG/1
225 CAPSULE ORAL 2 TIMES DAILY
Qty: 60 CAPSULE | Refills: 1 | Status: SHIPPED | OUTPATIENT
Start: 2023-11-28

## 2023-11-28 RX ADMIN — METHYLPREDNISOLONE ACETATE 40 MG: 40 INJECTION, SUSPENSION INTRA-ARTICULAR; INTRALESIONAL; INTRAMUSCULAR; SOFT TISSUE at 12:37

## 2023-11-28 NOTE — PROGRESS NOTES
Subjective   Katelynn Sung is a 63 y.o. female.     History of Present Illness  She c/o increased cough, chest congestion starting yesterday. Has COPD/chronic bronchitis, has been using meds as rx'd.     Would like to decreased dose of lyrica to see if she can lose weight, be less sluggish.    She is waiting for sleep medicine eval for suspected sleep apnea.     The following portions of the patient's history were reviewed and updated as appropriate: allergies, current medications, past family history, past medical history, past social history, past surgical history, and problem list.    Review of Systems   Constitutional:  Positive for fatigue. Negative for diaphoresis and fever.   HENT:  Positive for congestion and postnasal drip. Negative for ear pain, mouth sores, nosebleeds, rhinorrhea and sore throat.    Eyes:  Negative for visual disturbance.   Respiratory:  Positive for cough, shortness of breath (mild with exertion) and wheezing (intermittent).    Cardiovascular:  Negative for chest pain, palpitations and leg swelling.   Gastrointestinal:  Positive for abdominal pain (chronic, stable), constipation and nausea. Negative for blood in stool, rectal pain and vomiting.   Endocrine: Positive for heat intolerance. Negative for polydipsia and polyuria.   Genitourinary:  Negative for difficulty urinating, dysuria, genital sores, hematuria, pelvic pain, vaginal bleeding and vaginal discharge.   Musculoskeletal:  Positive for arthralgias, back pain, myalgias, neck pain and neck stiffness.   Skin:  Negative for rash and wound.   Neurological:  Positive for dizziness, weakness (generalized) and headaches. Negative for tremors and syncope.   Hematological:  Negative for adenopathy. Does not bruise/bleed easily.   Psychiatric/Behavioral:  Positive for sleep disturbance. Negative for confusion and dysphoric mood. The patient is nervous/anxious.    Pt's previous ROS reviewed and updated as indicated.       Objective    Vitals:    11/28/23 1054   BP: 130/80   Pulse: 65   SpO2: 95%     Body mass index is 31.65 kg/m².      11/28/23  1054   Weight: 83.7 kg (184 lb 8 oz)       Physical Exam  Vitals and nursing note reviewed.   Constitutional:       General: She is not in acute distress.     Appearance: She is well-developed and well-groomed. She is obese. She is not ill-appearing.   HENT:      Head: Atraumatic.      Mouth/Throat:      Mouth: Mucous membranes are moist. No oral lesions.   Eyes:      General: No scleral icterus.     Conjunctiva/sclera: Conjunctivae normal.   Neck:      Thyroid: No thyroid mass.   Cardiovascular:      Rate and Rhythm: Normal rate and regular rhythm.      Pulses: Normal pulses.      Heart sounds: Normal heart sounds.   Pulmonary:      Effort: Pulmonary effort is normal.      Breath sounds: Decreased breath sounds present. No wheezing, rhonchi or rales.   Musculoskeletal:      Right lower leg: No edema.      Left lower leg: No edema.      Comments: Soft tissue TTP diffusely, no decreased ROM in major joints    Lymphadenopathy:      Cervical: No cervical adenopathy.   Skin:     General: Skin is warm and dry.      Coloration: Skin is not cyanotic, jaundiced or pale.      Findings: No bruising or rash.   Neurological:      Mental Status: She is alert and oriented to person, place, and time.      Gait: Gait is intact.   Psychiatric:         Mood and Affect: Mood normal. Affect is flat.         Behavior: Behavior normal. Behavior is cooperative.         Cognition and Memory: Cognition and memory normal.     Pt's previous physical exam reviewed and updated as indicated.    Recent Results (from the past 72 hour(s))   POCT SARS-CoV-2 Antigen MICHELLE + Flu    Collection Time: 11/28/23 11:18 AM    Specimen: Swab   Result Value Ref Range    SARS Antigen Not Detected Not Detected, Presumptive Negative    Influenza A Antigen MICHELLE Not Detected Not Detected    Influenza B Antigen MICHELLE Not Detected Not Detected    Internal  Control Passed Passed    Lot Number [id number]     Expiration Date 10/10/2024          Assessment & Plan   Diagnoses and all orders for this visit:    1. Cough, unspecified type (Primary)  -     POCT SARS-CoV-2 Antigen MICHELLE + Flu    2. Chest congestion  -     POCT SARS-CoV-2 Antigen MICHELLE + Flu    3. Chronic bronchitis, unspecified chronic bronchitis type    4. Fibromyalgia  -     pregabalin (LYRICA) 225 MG capsule; Take 1 capsule by mouth 2 (Two) Times a Day.  Dispense: 60 capsule; Refill: 1    5. COPD with exacerbation  -     azithromycin (Zithromax Z-Bolivar) 250 MG tablet; Take 2 tablets the first day, then 1 tablet daily for 4 days.  Dispense: 6 tablet; Refill: 0  -     methylPREDNISolone acetate (DEPO-medrol) injection 40 mg       Continue albuterol, combivent, breo.  Wean dose of lyrica to 225 mg bid. She will let me know if she wishes to wean further at the end of December.  F/u per routine, sooner as needed.  Patient was encouraged to keep me informed of any acute changes, lack of improvement, or any new concerning symptoms.  Pt is aware of reasons to seek emergent care.  Patient voiced understanding of all instructions and denied further questions.  As part of patient's treatment plan I am prescribing a controlled substance.  The patient has been made aware of the appropriate use of such medications, including potential risk of somnolence, limited ability to drive and/or work safely, and potential for dependence and/or overdose.  It has also been made clear that these medications are for use by this patient only, without concomitant use of alcohol or other substances, unless prescribed.  History and physical exam exhibit continued safe and appropriate use of controlled substance.  DANTE reviewed.  Patient has completed a prescribing agreement detailing terms of continued prescribing of controlled substances, including monitoring DANTE reports, urine drug screening, and pill counts if necessary.  Patient is aware  that inappropriate use will result in cessation of prescribing such medications.    Please note that portions of this note may have been completed with a voice recognition program.

## 2023-12-14 DIAGNOSIS — K86.1 CHRONIC PANCREATITIS, UNSPECIFIED PANCREATITIS TYPE: ICD-10-CM

## 2023-12-14 RX ORDER — PROMETHAZINE HYDROCHLORIDE 25 MG/1
25 TABLET ORAL EVERY 8 HOURS PRN
Qty: 90 TABLET | Refills: 0 | Status: SHIPPED | OUTPATIENT
Start: 2023-12-14

## 2024-01-01 NOTE — PROGRESS NOTES
"Adriana Duran is a 80 y.o. female on day 3 of admission presenting with Cari-prosthetic supracondylar fracture of femur.    Subjective   Patient says she is doing well. She denies any concerns. She denies any pain, denies fever, chills, nausea and vomiting.          Objective     Physical Exam  Constitutional:       Appearance: Normal appearance.   HENT:      Head: Normocephalic.   Cardiovascular:      Rate and Rhythm: Normal rate and regular rhythm.      Heart sounds: Normal heart sounds.   Pulmonary:      Effort: Pulmonary effort is normal.      Breath sounds: Normal breath sounds.   Abdominal:      General: Bowel sounds are normal. There is no distension.      Palpations: Abdomen is soft.   Musculoskeletal:      Comments: Ace wrap intact to LLE with no strike-through noted.    Skin:     General: Skin is warm and dry.   Neurological:      General: No focal deficit present.      Mental Status: She is alert. Mental status is at baseline.   Psychiatric:         Mood and Affect: Mood normal.         Behavior: Behavior normal.         Last Recorded Vitals  Blood pressure 108/52, pulse 69, temperature 36.4 °C (97.5 °F), resp. rate 18, height 1.6 m (5' 2.99\"), weight 78.9 kg (174 lb), SpO2 94 %.  Intake/Output last 3 Shifts:  I/O last 3 completed shifts:  In: 400.7 (5.1 mL/kg) [Blood:400.7]  Out: 800 (10.1 mL/kg) [Urine:800 (0.3 mL/kg/hr)]  Weight: 78.9 kg     Relevant Results  Scheduled medications  atorvastatin, 20 mg, oral, Nightly  busPIRone, 10 mg, oral, TID  calcium carbonate-vitamin D3, 1 tablet, oral, BID  DULoxetine, 60 mg, oral, Daily  enoxaparin, 40 mg, subcutaneous, Daily  losartan, 100 mg, oral, Daily  melatonin, 5 mg, oral, Nightly  polyethylene glycol, 17 g, oral, Daily      Continuous medications  [Held by provider] D5 % and 0.9 % sodium chloride, 125 mL/hr      PRN medications  PRN medications: acetaminophen, OLANZapine, oxyCODONE, oxyCODONE          Assessment/Plan   Principal Problem:    " Subjective   Katelynn Sung is a 57 y.o. female.     HPI Comments: Patient is here today for complaints of sore throat and diarrhea for the past couple days. She states her son has had bronchitis and she may have caught something from him. She states she never has diarrhea, she always has constipation, never diarrhea. She states her brother has diarrhea as well, so she thinks it is a virus. She states she was going all throughout the day but stopped about an hour ago.     Patient states also gets thrush frequently and feels like she has that again.       The following portions of the patient's history were reviewed and updated as appropriate: allergies, current medications, past family history, past medical history, past social history, past surgical history and problem list.    Review of Systems   Constitutional: Negative.    HENT: Positive for ear pain, rhinorrhea, sinus pain, sinus pressure and sore throat.         Sore tongue   Eyes: Negative.    Respiratory: Negative.    Cardiovascular: Negative.    Gastrointestinal: Positive for diarrhea. Negative for abdominal pain, constipation, nausea and vomiting.   Genitourinary: Negative.    Musculoskeletal: Negative.    Skin: Negative.    Allergic/Immunologic: Positive for environmental allergies. Negative for immunocompromised state.   Neurological: Negative for dizziness and headaches.   Hematological: Negative.    Psychiatric/Behavioral: Negative for decreased concentration, dysphoric mood and sleep disturbance.       Objective   Physical Exam   Constitutional: She is oriented to person, place, and time. She appears well-developed and well-nourished. No distress.   HENT:   Head: Normocephalic.   Right Ear: External ear normal.   Left Ear: External ear normal.   Nose: Nose normal.   Mouth/Throat: Oropharyngeal exudate (PND) present.   White coating on tongue   Eyes: Conjunctivae are normal. Pupils are equal, round, and reactive to light.   Neck: Normal range of  Cari-prosthetic supracondylar fracture of femur  Active Problems:    Periprosthetic supracondylar fracture of femur, initial encounter    Adriana Duran is a 80 year old F with a PMHx of HTN, DM 2 (not on meds), HLD, anxiety and depression, previous L TKA and ORIF to left hip who presented to the ED as a transfer from OSH for definitive treatment of a displaced left femoral diaphysis fracture.  Orthopedics took patient to the OR and patient underwent surgical intervention on 12/29/23 for L femoral shaft fracture percutaneous assisted reduction and retrograde intramedullary nailing. Patient admitted to Hospitalist Team D for further medical management.      Interprosthetic left femur diaphysis fracture   S/p Left femoral shaft fracture percutaneous assisted reduction and retrograde intramedullary nailing and Left femoral shaft prophylactic plating on 12/29/23  :: mechanical fall from standing   - Orthopedics consulted. Appreciate recs  ::: WBAT LLE  - Pain control:     Tylenol 965 mg q6h PRN for mild pain     Oxy 5/10 PRN for moderate/ severe pain   - Discontinued IV dilaudid 0.2 mg for breakthrough pain   - OT rec mod intensity      Fever  - Overnight fever recorded 100.9 overnight  - UA culture and reflex ordered  - Will empirically start ceftriaxone once UA collected    Agitation at night  - says this is her baseline   -Discontinued sitter yesterday; can re-order as needed   -Olanzapine 2.5 mg q6hrs PRN agitation. Did not require any overnight.      Acute Anemia (Likely 2/2 surgical intervention)  - Patient received 2 unit pRBCs post-operatively. Hb responded appropriately to 8.1. Hb pending today      HTN   - Continue home losartan 100 mg every day      History of DMII  :: HbA1c of 5.7 on 2/2023  :: not on DM meds      HLD   - Continue home atorvastatin 20 mg      MDD  Anxiety   - Continue home meds: Cymbalta 60 mg and buspar 10 mg TID  - Off home citalopram 40 mg as worry Serotonin Syndrome with SSRI  motion. Neck supple. No tracheal deviation present. No thyromegaly present.   Cardiovascular: Normal rate, regular rhythm, normal heart sounds and intact distal pulses.    No murmur heard.  Pulmonary/Chest: Effort normal and breath sounds normal. No respiratory distress. She has no wheezes. She has no rales. She exhibits no tenderness.   Abdominal: Soft. Bowel sounds are normal. She exhibits no distension and no mass. There is no hepatosplenomegaly or splenomegaly. There is no tenderness. There is no rebound and no guarding. No hernia.   Musculoskeletal: Normal range of motion. She exhibits no edema or tenderness.   Lymphadenopathy:     She has no cervical adenopathy.        Right cervical: No superficial cervical, no deep cervical and no posterior cervical adenopathy present.       Left cervical: No superficial cervical, no deep cervical and no posterior cervical adenopathy present.   Neurological: She is alert and oriented to person, place, and time. Coordination and gait normal.   Skin: Skin is warm and dry. No rash noted.   Psychiatric: She has a normal mood and affect. Her behavior is normal. Judgment and thought content normal.   Nursing note and vitals reviewed.      Assessment/Plan   Katelynn was seen today for sore throat and diarrhea.    Diagnoses and all orders for this visit:    Acute pharyngitis, unspecified etiology    PND (post-nasal drip)    Oral candidiasis  -     nystatin (MYCOSTATIN) 851751 UNIT/ML suspension; Take 5 mL by mouth 4 (Four) Times a Day for 14 days.  -     fluconazole (DIFLUCAN) 150 MG tablet; Take 1 tablet by mouth 1 (One) Time for 1 dose.  -     penicillin G benzathine (BICILLIN-LA) injection 1.2 Million Units; Inject 2 mL into the shoulder, thigh, or buttocks 1 (One) Time.  -     methylPREDNISolone acetate (DEPO-medrol) injection 120 mg; Inject 1.5 mL into the shoulder, thigh, or buttocks 1 (One) Time.  -     POCT rapid strep A    Acute non-recurrent maxillary sinusitis  -      and SNRI     Dispo  - Pending [facility]- referral sent      F: none  E: replete PRN   N: regular diet  GI ppx: not indicated   DVT ppx: SCDs; lovenox 40 mg daily       Patient dicussed with attending, Dr. Lowe.      Plan preliminary until cosigned by attending physician.      Nancie Jeffrey MD   PGY1, Family Medicine   [facility]  Available by Epic Message        levoFLOXacin (LEVAQUIN) 500 MG tablet; Take 1 tablet by mouth Daily for 7 days.  -     penicillin G benzathine (BICILLIN-LA) injection 1.2 Million Units; Inject 2 mL into the shoulder, thigh, or buttocks 1 (One) Time.  -     methylPREDNISolone acetate (DEPO-medrol) injection 120 mg; Inject 1.5 mL into the shoulder, thigh, or buttocks 1 (One) Time.  -     POCT rapid strep A    Acute non-recurrent frontal sinusitis  -     levoFLOXacin (LEVAQUIN) 500 MG tablet; Take 1 tablet by mouth Daily for 7 days.  -     penicillin G benzathine (BICILLIN-LA) injection 1.2 Million Units; Inject 2 mL into the shoulder, thigh, or buttocks 1 (One) Time.  -     methylPREDNISolone acetate (DEPO-medrol) injection 120 mg; Inject 1.5 mL into the shoulder, thigh, or buttocks 1 (One) Time.  -     POCT rapid strep A       Strept screen negative in the clinic today. ST is a result of her PND and oral candidiasis. Diflucan prescribed today for this and patient advised to use her Allegra and Flonase as directed.     A Bicillin and Medrol injection given in the clinic today, for her sinusitis. A prescription of Levaquin was given to patient to start in 72 hours if her symptoms have not improved.     Patient was encouraged to keep me informed of any acute changes, lack of improvement, or any new concerning symptoms. Patient voiced understanding of all instructions and denied further questions.    Patient to RTC prn.

## 2024-01-11 DIAGNOSIS — K21.9 GASTROESOPHAGEAL REFLUX DISEASE WITHOUT ESOPHAGITIS: ICD-10-CM

## 2024-01-11 RX ORDER — DEXLANSOPRAZOLE 60 MG/1
CAPSULE, DELAYED RELEASE ORAL
Qty: 30 CAPSULE | Refills: 2 | Status: SHIPPED | OUTPATIENT
Start: 2024-01-11

## 2024-01-11 RX ORDER — LISINOPRIL AND HYDROCHLOROTHIAZIDE 12.5; 1 MG/1; MG/1
1 TABLET ORAL DAILY
Qty: 30 TABLET | Refills: 2 | Status: SHIPPED | OUTPATIENT
Start: 2024-01-11

## 2024-01-13 DIAGNOSIS — M79.7 FIBROMYALGIA: ICD-10-CM

## 2024-01-13 DIAGNOSIS — K86.1 CHRONIC PANCREATITIS, UNSPECIFIED PANCREATITIS TYPE: ICD-10-CM

## 2024-01-13 DIAGNOSIS — M54.2 NECK PAIN: ICD-10-CM

## 2024-01-13 DIAGNOSIS — M62.838 CERVICAL PARASPINAL MUSCLE SPASM: ICD-10-CM

## 2024-01-16 RX ORDER — CYCLOBENZAPRINE HCL 5 MG
5 TABLET ORAL 3 TIMES DAILY PRN
Qty: 90 TABLET | Refills: 2 | Status: SHIPPED | OUTPATIENT
Start: 2024-01-16

## 2024-01-16 RX ORDER — PROMETHAZINE HYDROCHLORIDE 25 MG/1
25 TABLET ORAL EVERY 8 HOURS PRN
Qty: 90 TABLET | Refills: 0 | Status: SHIPPED | OUTPATIENT
Start: 2024-01-16

## 2024-01-23 DIAGNOSIS — M79.7 FIBROMYALGIA: ICD-10-CM

## 2024-01-23 RX ORDER — PREGABALIN 225 MG/1
225 CAPSULE ORAL 2 TIMES DAILY
Qty: 60 CAPSULE | Refills: 0 | OUTPATIENT
Start: 2024-01-23

## 2024-01-23 NOTE — TELEPHONE ENCOUNTER
Caller: Pineda Katelynn Richy    Relationship: Self    Best call back number: [phone]     Requested Prescriptions:   Requested Prescriptions     Pending Prescriptions Disp Refills    pregabalin (LYRICA) 225 MG capsule 60 capsule 1     Sig: Take 1 capsule by mouth 2 (Two) Times a Day.        Pharmacy where request should be sent: [facility] ([email or web address]) - [address], [address] - [phone] [facility] [phone]      Last office visit with prescribing clinician: 11/28/2023   Last telemedicine visit with prescribing clinician: Visit date not found   Next office visit with prescribing clinician: 1/29/2024     Additional details provided by patient: PATIENT HAS 4 DAYS REMAINING OF THIS MEDICATION        Does the patient have less than a 3 day supply:  [] Yes  [x] No    Would you like a call back once the refill request has been completed: [] Yes [x] No    If the office needs to give you a call back, can they leave a voicemail: [] Yes [x] No    Shira Gracia Rep   01/23/24 10:14 EST

## 2024-01-23 NOTE — TELEPHONE ENCOUNTER
Rx Refill Note  Requested Prescriptions     Pending Prescriptions Disp Refills    pregabalin (LYRICA) 225 MG capsule 60 capsule 1     Sig: Take 1 capsule by mouth 2 (Two) Times a Day.      Last office visit with prescribing clinician: 11/28/2023   Last telemedicine visit with prescribing clinician: Visit date not found   Next office visit with prescribing clinician: 1/23/2024                         Would you like a call back once the refill request has been completed: [] Yes [] No    If the office needs to give you a call back, can they leave a voicemail: [] Yes [] No    Radha Castro MA  01/23/24, 17:45 EST

## 2024-01-23 NOTE — TELEPHONE ENCOUNTER
Rx Refill Note  Requested Prescriptions     Pending Prescriptions Disp Refills    pregabalin (LYRICA) 225 MG capsule 60 capsule 1     Sig: Take 1 capsule by mouth 2 (Two) Times a Day.      Last office visit with prescribing clinician: 11/28/2023   Last telemedicine visit with prescribing clinician: Visit date not found   Next office visit with prescribing clinician: 1/29/2024    Ok to fill?        Marine Arriaga MA  01/23/24, 15:44 EST

## 2024-01-24 DIAGNOSIS — M79.7 FIBROMYALGIA: ICD-10-CM

## 2024-01-24 NOTE — TELEPHONE ENCOUNTER
PATIENT IS COMPLETELY OUT AND NEEDS THIS MEDICATION ASAP     pregabalin (LYRICA) 225 MG capsule    [facility] ([place] - [place] - [address] [place] [address], [address] - [phone]  - [phone] [place]

## 2024-01-24 NOTE — TELEPHONE ENCOUNTER
Caller: Katelynn Sung    Relationship: Self    Best call back number: [phone]    Requested Prescriptions:   Requested Prescriptions     Pending Prescriptions Disp Refills    pregabalin (LYRICA) 225 MG capsule 60 capsule 1     Sig: Take 1 capsule by mouth 2 (Two) Times a Day.        Pharmacy where request should be sent: [facility] ([place]) [address], [address] - [phone]  - [phone] FX     Last office visit with prescribing clinician: 11/28/2023   Last telemedicine visit with prescribing clinician: Visit date not found   Next office visit with prescribing clinician: 1/29/2024       Does the patient have less than a 3 day supply:  [x] Yes  [] No    Would you like a call back once the refill request has been completed: [] Yes [x] No    If the office needs to give you a call back, can they leave a voicemail: [] Yes [x] No    Shira Olsen Rep   01/24/24 13:06 EST

## 2024-01-25 RX ORDER — PREGABALIN 225 MG/1
225 CAPSULE ORAL 2 TIMES DAILY
Qty: 60 CAPSULE | Refills: 0 | Status: SHIPPED | OUTPATIENT
Start: 2024-01-25

## 2024-01-25 RX ORDER — PREGABALIN 225 MG/1
225 CAPSULE ORAL 2 TIMES DAILY
Qty: 60 CAPSULE | Refills: 1 | OUTPATIENT
Start: 2024-01-25

## 2024-01-29 ENCOUNTER — OFFICE VISIT (OUTPATIENT)
Dept: FAMILY MEDICINE CLINIC | Facility: CLINIC | Age: 64
End: 2024-01-29
Payer: MEDICAID

## 2024-01-29 VITALS
DIASTOLIC BLOOD PRESSURE: 84 MMHG | OXYGEN SATURATION: 97 % | RESPIRATION RATE: 16 BRPM | WEIGHT: 184 LBS | SYSTOLIC BLOOD PRESSURE: 136 MMHG | BODY MASS INDEX: 30.66 KG/M2 | TEMPERATURE: 97.9 F | HEART RATE: 79 BPM | HEIGHT: 65 IN

## 2024-01-29 DIAGNOSIS — I10 PRIMARY HYPERTENSION: Primary | ICD-10-CM

## 2024-01-29 DIAGNOSIS — M79.7 FIBROMYALGIA: ICD-10-CM

## 2024-01-29 DIAGNOSIS — J42 CHRONIC BRONCHITIS, UNSPECIFIED CHRONIC BRONCHITIS TYPE: ICD-10-CM

## 2024-01-29 DIAGNOSIS — Z79.899 ENCOUNTER FOR LONG-TERM (CURRENT) USE OF OTHER MEDICATIONS: ICD-10-CM

## 2024-01-29 DIAGNOSIS — Z12.11 SCREENING FOR COLON CANCER: ICD-10-CM

## 2024-01-29 DIAGNOSIS — K22.719 BARRETT'S ESOPHAGUS WITH DYSPLASIA: ICD-10-CM

## 2024-01-29 DIAGNOSIS — Z71.51 ENCOUNTER FOR DRUG REHABILITATION: ICD-10-CM

## 2024-01-29 DIAGNOSIS — M81.0 AGE-RELATED OSTEOPOROSIS WITHOUT CURRENT PATHOLOGICAL FRACTURE: ICD-10-CM

## 2024-01-29 DIAGNOSIS — R73.03 PREDIABETES: ICD-10-CM

## 2024-01-29 DIAGNOSIS — F11.11 OPIOID ABUSE, IN REMISSION: ICD-10-CM

## 2024-01-29 DIAGNOSIS — Z20.828 EXPOSURE TO VIRAL DISEASE: ICD-10-CM

## 2024-01-29 DIAGNOSIS — Z11.4 SCREENING FOR HUMAN IMMUNODEFICIENCY VIRUS: ICD-10-CM

## 2024-01-29 DIAGNOSIS — M50.90 CERVICAL DISC DISEASE: ICD-10-CM

## 2024-01-29 DIAGNOSIS — Z11.3 SCREENING EXAMINATION FOR VENEREAL DISEASE: ICD-10-CM

## 2024-01-29 DIAGNOSIS — M54.2 NECK PAIN: ICD-10-CM

## 2024-01-29 DIAGNOSIS — K21.9 GASTROESOPHAGEAL REFLUX DISEASE WITHOUT ESOPHAGITIS: ICD-10-CM

## 2024-01-29 DIAGNOSIS — F51.04 CHRONIC INSOMNIA: ICD-10-CM

## 2024-01-29 DIAGNOSIS — Z78.9 MEDICALLY COMPLEX PATIENT: ICD-10-CM

## 2024-01-29 RX ORDER — KETOROLAC TROMETHAMINE 30 MG/ML
30 INJECTION, SOLUTION INTRAMUSCULAR; INTRAVENOUS EVERY 6 HOURS PRN
Status: SHIPPED | OUTPATIENT
Start: 2024-01-29 | End: 2024-02-03

## 2024-01-29 RX ORDER — METHYLPREDNISOLONE ACETATE 40 MG/ML
40 INJECTION, SUSPENSION INTRA-ARTICULAR; INTRALESIONAL; INTRAMUSCULAR; SOFT TISSUE ONCE
Status: COMPLETED | OUTPATIENT
Start: 2024-01-29 | End: 2024-01-29

## 2024-01-29 RX ADMIN — KETOROLAC TROMETHAMINE 30 MG: 30 INJECTION, SOLUTION INTRAMUSCULAR; INTRAVENOUS at 13:42

## 2024-01-29 RX ADMIN — METHYLPREDNISOLONE ACETATE 40 MG: 40 INJECTION, SUSPENSION INTRA-ARTICULAR; INTRALESIONAL; INTRAMUSCULAR; SOFT TISSUE at 13:42

## 2024-01-29 NOTE — PROGRESS NOTES
Subjective   Katelynn Sung is a 63 y.o. female.     History of Present Illness  She presents today for routine f/u on several chronic med problems.    She is due for colon cancer screening, last was 10 yrs per Dr. Avila she believes. She also c/o worsening bloating, bowel changes. Also has previous dx of chronic pancreatitis, lance's esophagus, GERD.. Currently on Movantik, PPI    [facility] for MAT requesting routine labs.    On zestoretic for HTN - BP fairly well controlled.    On Lyrica for chronic related to FMSx, C-DDD. Recent weaning down on dose. Symptoms stable, less side effects. Some increase neck pain, stiffness with recent cold weather. Requesting POC IM meds which have been helpful in past.    Chronic bronchitis/COPD recently stable, improved from last visit. Taking her Breo and combivent as rx'd.      The following portions of the patient's history were reviewed and updated as appropriate: allergies, current medications, past family history, past medical history, past social history, past surgical history, and problem list.    Review of Systems   Constitutional:  Positive for fatigue. Negative for diaphoresis and fever.   HENT:  Positive for congestion and postnasal drip. Negative for ear pain, mouth sores, nosebleeds, rhinorrhea and sore throat.    Eyes:  Negative for visual disturbance.   Respiratory:  Positive for cough, shortness of breath (mild with exertion) and wheezing (intermittent).    Cardiovascular:  Negative for chest pain, palpitations and leg swelling.   Gastrointestinal:  Positive for abdominal pain (chronic, stable), constipation and nausea. Negative for blood in stool, rectal pain and vomiting.   Endocrine: Positive for heat intolerance. Negative for polydipsia and polyuria.   Genitourinary:  Negative for difficulty urinating, dysuria, genital sores, hematuria, pelvic pain, vaginal bleeding and vaginal discharge.   Musculoskeletal:  Positive for arthralgias, back  pain, myalgias, neck pain and neck stiffness.   Skin:  Negative for rash and wound.   Neurological:  Positive for dizziness, weakness (generalized) and headaches. Negative for tremors and syncope.   Hematological:  Negative for adenopathy. Does not bruise/bleed easily.   Psychiatric/Behavioral:  Positive for sleep disturbance. Negative for confusion and dysphoric mood. The patient is nervous/anxious.    Pt's previous ROS reviewed and updated as indicated.       Objective   Vitals:    01/29/24 1303   BP: 136/84   Pulse: 79   Resp: 16   Temp: 97.9 °F (36.6 °C)   SpO2: 97%     Body mass index is 31.03 kg/m².      01/29/24  1303   Weight: 83.5 kg (184 lb)         Physical Exam  Vitals and nursing note reviewed.   Constitutional:       General: She is not in acute distress.     Appearance: She is well-developed and well-groomed. She is obese. She is not ill-appearing.   HENT:      Head: Atraumatic.      Mouth/Throat:      Mouth: Mucous membranes are moist. No oral lesions.   Eyes:      General: No scleral icterus.     Conjunctiva/sclera: Conjunctivae normal.   Neck:      Thyroid: No thyroid mass.      Vascular: Normal carotid pulses. No carotid bruit.   Cardiovascular:      Rate and Rhythm: Normal rate and regular rhythm.      Pulses: Normal pulses.      Heart sounds: Normal heart sounds.   Pulmonary:      Effort: Pulmonary effort is normal.      Breath sounds: Decreased breath sounds present. No wheezing, rhonchi or rales.   Abdominal:      General: There is no distension.      Palpations: Abdomen is soft. There is no mass.      Tenderness: There is generalized abdominal tenderness (mild).   Musculoskeletal:      Cervical back: Spasms and tenderness present. Decreased range of motion.      Right lower leg: No edema.      Left lower leg: No edema.      Comments: Soft tissue TTP diffusely, no decreased ROM in major joints    Lymphadenopathy:      Cervical: No cervical adenopathy.   Skin:     General: Skin is warm and dry.       Coloration: Skin is not jaundiced or pale.      Findings: No bruising or rash.   Neurological:      Mental Status: She is alert and oriented to person, place, and time.      Sensory: Sensation is intact.      Motor: Motor function is intact.      Gait: Gait is intact.   Psychiatric:         Mood and Affect: Mood and affect normal.         Behavior: Behavior normal. Behavior is cooperative.         Cognition and Memory: Cognition and memory normal.     Pt's previous physical exam reviewed and updated as indicated.      Assessment & Plan   Diagnoses and all orders for this visit:    1. Primary hypertension (Primary)  -     Comprehensive Metabolic Panel    2. Chronic insomnia    3. Chronic bronchitis, unspecified chronic bronchitis type    4. Age-related osteoporosis without current pathological fracture    5. Fibromyalgia  -     ketorolac (TORADOL) injection 30 mg  -     methylPREDNISolone acetate (DEPO-medrol) injection 40 mg    6. Cervical disc disease  -     ketorolac (TORADOL) injection 30 mg  -     methylPREDNISolone acetate (DEPO-medrol) injection 40 mg    7. Neck pain  -     ketorolac (TORADOL) injection 30 mg  -     methylPREDNISolone acetate (DEPO-medrol) injection 40 mg    8. Encounter for drug rehabilitation  -     CBC & Differential  -     Comprehensive Metabolic Panel  -     Lipid Panel  -     Hepatitis panel, acute  -     RPR  -     HIV-1 / O / 2 Ag / Antibody    9. Medically complex patient  -     CBC & Differential  -     Comprehensive Metabolic Panel  -     Lipid Panel  -     Hepatitis panel, acute  -     RPR  -     HIV-1 / O / 2 Ag / Antibody    10. Encounter for long-term (current) use of other medications  -     CBC & Differential  -     Comprehensive Metabolic Panel  -     Lipid Panel  -     Hepatitis panel, acute  -     RPR  -     HIV-1 / O / 2 Ag / Antibody    11. Opioid abuse, in remission  -     CBC & Differential  -     Comprehensive Metabolic Panel  -     Lipid Panel  -     Hepatitis  panel, acute  -     RPR    12. Screening examination for venereal disease  -     RPR    13. Screening for human immunodeficiency virus  -     HIV-1 / O / 2 Ag / Antibody    14. Exposure to viral disease  -     Hepatitis panel, acute    15. Prediabetes  -     Comprehensive Metabolic Panel  -     Hemoglobin A1c    16. Screening for colon cancer  -     Ambulatory Referral to Gastroenterology    17. Gastroesophageal reflux disease without esophagitis  -     Ambulatory Referral to Gastroenterology    18. Humphreys's esophagus with dysplasia  -     Ambulatory Referral to Gastroenterology       F/u in 3 months, sooner as needed/instructed.  I will contact patient regarding test results and provide instructions regarding any necessary changes in plan of care.  Patient was encouraged to keep me informed of any acute changes, lack of improvement, or any new concerning symptoms.  Pt is aware of reasons to seek emergent care.  Patient voiced understanding of all instructions and denied further questions.    As part of patient's treatment plan I am prescribing a controlled substance.  The patient has been made aware of the appropriate use of such medications, including potential risk of somnolence, limited ability to drive and/or work safely, and potential for dependence and/or overdose.  It has also been made clear that these medications are for use by this patient only, without concomitant use of alcohol or other substances, unless prescribed.  History and physical exam exhibit continued safe and appropriate use of controlled substance.  DANTE reviewed.  Patient has completed a prescribing agreement detailing terms of continued prescribing of controlled substances, including monitoring DANTE reports, urine drug screening, and pill counts if necessary.  Patient is aware that inappropriate use will result in cessation of prescribing such medications.    Please note that portions of this note may have been completed with a voice  recognition program.

## 2024-01-30 LAB
ALBUMIN SERPL-MCNC: 4.6 G/DL (ref 3.5–5.2)
ALBUMIN/GLOB SERPL: 1.8 G/DL
ALP SERPL-CCNC: 78 U/L (ref 39–117)
ALT SERPL-CCNC: 16 U/L (ref 1–33)
AST SERPL-CCNC: 22 U/L (ref 1–32)
BASOPHILS # BLD AUTO: 0.04 10*3/MM3 (ref 0–0.2)
BASOPHILS NFR BLD AUTO: 0.8 % (ref 0–1.5)
BILIRUB SERPL-MCNC: 0.3 MG/DL (ref 0–1.2)
BUN SERPL-MCNC: 13 MG/DL (ref 8–23)
BUN/CREAT SERPL: 17.3 (ref 7–25)
CALCIUM SERPL-MCNC: 9.1 MG/DL (ref 8.6–10.5)
CHLORIDE SERPL-SCNC: 101 MMOL/L (ref 98–107)
CHOLEST SERPL-MCNC: 200 MG/DL (ref 0–200)
CO2 SERPL-SCNC: 25.5 MMOL/L (ref 22–29)
CREAT SERPL-MCNC: 0.75 MG/DL (ref 0.57–1)
EGFRCR SERPLBLD CKD-EPI 2021: 90.1 ML/MIN/1.73
EOSINOPHIL # BLD AUTO: 0.06 10*3/MM3 (ref 0–0.4)
EOSINOPHIL NFR BLD AUTO: 1.2 % (ref 0.3–6.2)
ERYTHROCYTE [DISTWIDTH] IN BLOOD BY AUTOMATED COUNT: 14.2 % (ref 12.3–15.4)
GLOBULIN SER CALC-MCNC: 2.6 GM/DL
GLUCOSE SERPL-MCNC: 91 MG/DL (ref 65–99)
HAV IGM SERPL QL IA: NEGATIVE
HBA1C MFR BLD: 5.9 % (ref 4.8–5.6)
HBV CORE IGM SERPL QL IA: NEGATIVE
HBV SURFACE AG SERPL QL IA: NEGATIVE
HCT VFR BLD AUTO: 39.1 % (ref 34–46.6)
HCV AB SERPL QL IA: NORMAL
HCV IGG SERPL QL IA: NON REACTIVE
HDLC SERPL-MCNC: 43 MG/DL (ref 40–60)
HGB BLD-MCNC: 13.1 G/DL (ref 12–15.9)
HIV 1+2 AB+HIV1 P24 AG SERPL QL IA: NON REACTIVE
IMM GRANULOCYTES # BLD AUTO: 0.01 10*3/MM3 (ref 0–0.05)
IMM GRANULOCYTES NFR BLD AUTO: 0.2 % (ref 0–0.5)
LDLC SERPL CALC-MCNC: 123 MG/DL (ref 0–100)
LYMPHOCYTES # BLD AUTO: 2.52 10*3/MM3 (ref 0.7–3.1)
LYMPHOCYTES NFR BLD AUTO: 50.1 % (ref 19.6–45.3)
MCH RBC QN AUTO: 28.1 PG (ref 26.6–33)
MCHC RBC AUTO-ENTMCNC: 33.5 G/DL (ref 31.5–35.7)
MCV RBC AUTO: 83.9 FL (ref 79–97)
MONOCYTES # BLD AUTO: 0.37 10*3/MM3 (ref 0.1–0.9)
MONOCYTES NFR BLD AUTO: 7.4 % (ref 5–12)
NEUTROPHILS # BLD AUTO: 2.03 10*3/MM3 (ref 1.7–7)
NEUTROPHILS NFR BLD AUTO: 40.3 % (ref 42.7–76)
NRBC BLD AUTO-RTO: 0.2 /100 WBC (ref 0–0.2)
PLATELET # BLD AUTO: 265 10*3/MM3 (ref 140–450)
POTASSIUM SERPL-SCNC: 4.1 MMOL/L (ref 3.5–5.2)
PROT SERPL-MCNC: 7.2 G/DL (ref 6–8.5)
RBC # BLD AUTO: 4.66 10*6/MM3 (ref 3.77–5.28)
RPR SER QL: NON REACTIVE
SODIUM SERPL-SCNC: 136 MMOL/L (ref 136–145)
TRIGL SERPL-MCNC: 192 MG/DL (ref 0–150)
VLDLC SERPL CALC-MCNC: 34 MG/DL (ref 5–40)
WBC # BLD AUTO: 5.03 10*3/MM3 (ref 3.4–10.8)

## 2024-01-31 NOTE — PROGRESS NOTES
Inform pt her recent labs are stable. No new concerns. Does she want us to fax these to [facility]?

## 2024-02-08 ENCOUNTER — TELEPHONE (OUTPATIENT)
Dept: FAMILY MEDICINE CLINIC | Facility: CLINIC | Age: 64
End: 2024-02-08

## 2024-02-08 NOTE — TELEPHONE ENCOUNTER
DELETE AFTER REVIEWING: Telephone encounter to be sent to the clinical pool.    Caller: Katelynn Sung    Relationship: Self    Best call back number: [phone]     Caller requesting test results:  Katelynn     What test was performed: BLOOD WORK    When was the test performed: [id number] ??    Where was the test performed: OFFICE     Additional notes: calling for results

## 2024-02-10 DIAGNOSIS — K86.1 CHRONIC PANCREATITIS, UNSPECIFIED PANCREATITIS TYPE: ICD-10-CM

## 2024-02-12 ENCOUNTER — OFFICE VISIT (OUTPATIENT)
Dept: FAMILY MEDICINE CLINIC | Facility: CLINIC | Age: 64
End: 2024-02-12
Payer: MEDICAID

## 2024-02-12 ENCOUNTER — APPOINTMENT (OUTPATIENT)
Dept: GENERAL RADIOLOGY | Facility: HOSPITAL | Age: 64
End: 2024-02-12
Payer: MEDICAID

## 2024-02-12 ENCOUNTER — HOSPITAL ENCOUNTER (EMERGENCY)
Facility: HOSPITAL | Age: 64
Discharge: ANOTHER HOSPITAL | End: 2024-02-12
Attending: EMERGENCY MEDICINE | Admitting: EMERGENCY MEDICINE
Payer: MEDICAID

## 2024-02-12 VITALS
BODY MASS INDEX: 30.73 KG/M2 | SYSTOLIC BLOOD PRESSURE: 109 MMHG | OXYGEN SATURATION: 96 % | WEIGHT: 180 LBS | HEIGHT: 64 IN | DIASTOLIC BLOOD PRESSURE: 89 MMHG | HEART RATE: 38 BPM | RESPIRATION RATE: 16 BRPM | TEMPERATURE: 98.3 F

## 2024-02-12 VITALS — HEART RATE: 57 BPM | SYSTOLIC BLOOD PRESSURE: 160 MMHG | OXYGEN SATURATION: 95 % | DIASTOLIC BLOOD PRESSURE: 90 MMHG

## 2024-02-12 DIAGNOSIS — I48.91 ATRIAL FIBRILLATION, UNSPECIFIED TYPE: ICD-10-CM

## 2024-02-12 DIAGNOSIS — R00.1 SYMPTOMATIC BRADYCARDIA: Primary | ICD-10-CM

## 2024-02-12 DIAGNOSIS — R00.1 BRADYCARDIA: ICD-10-CM

## 2024-02-12 DIAGNOSIS — R53.1 WEAKNESS: Primary | ICD-10-CM

## 2024-02-12 DIAGNOSIS — R94.31 ABNORMAL EKG: ICD-10-CM

## 2024-02-12 DIAGNOSIS — R07.89 CHEST PRESSURE: ICD-10-CM

## 2024-02-12 LAB
ALBUMIN SERPL-MCNC: 4.4 G/DL (ref 3.5–5.2)
ALBUMIN/GLOB SERPL: 1.3 G/DL
ALP SERPL-CCNC: 71 U/L (ref 39–117)
ALT SERPL W P-5'-P-CCNC: 9 U/L (ref 1–33)
ANION GAP SERPL CALCULATED.3IONS-SCNC: 8.9 MMOL/L (ref 5–15)
AST SERPL-CCNC: 23 U/L (ref 1–32)
BASOPHILS # BLD AUTO: 0.04 10*3/MM3 (ref 0–0.2)
BASOPHILS NFR BLD AUTO: 0.6 % (ref 0–1.5)
BILIRUB SERPL-MCNC: 0.3 MG/DL (ref 0–1.2)
BUN SERPL-MCNC: 14 MG/DL (ref 8–23)
BUN/CREAT SERPL: 15.9 (ref 7–25)
CALCIUM SPEC-SCNC: 9 MG/DL (ref 8.6–10.5)
CHLORIDE SERPL-SCNC: 108 MMOL/L (ref 98–107)
CO2 SERPL-SCNC: 23.1 MMOL/L (ref 22–29)
CREAT SERPL-MCNC: 0.88 MG/DL (ref 0.57–1)
DEPRECATED RDW RBC AUTO: 44.4 FL (ref 37–54)
EGFRCR SERPLBLD CKD-EPI 2021: 74.4 ML/MIN/1.73
EOSINOPHIL # BLD AUTO: 0.03 10*3/MM3 (ref 0–0.4)
EOSINOPHIL NFR BLD AUTO: 0.4 % (ref 0.3–6.2)
ERYTHROCYTE [DISTWIDTH] IN BLOOD BY AUTOMATED COUNT: 13.9 % (ref 12.3–15.4)
FLUAV SUBTYP SPEC NAA+PROBE: NOT DETECTED
FLUBV RNA ISLT QL NAA+PROBE: NOT DETECTED
GLOBULIN UR ELPH-MCNC: 3.5 GM/DL
GLUCOSE SERPL-MCNC: 101 MG/DL (ref 65–99)
HCT VFR BLD AUTO: 38.1 % (ref 34–46.6)
HGB BLD-MCNC: 13 G/DL (ref 12–15.9)
HOLD SPECIMEN: NORMAL
HOLD SPECIMEN: NORMAL
IMM GRANULOCYTES # BLD AUTO: 0.03 10*3/MM3 (ref 0–0.05)
IMM GRANULOCYTES NFR BLD AUTO: 0.4 % (ref 0–0.5)
LYMPHOCYTES # BLD AUTO: 3.49 10*3/MM3 (ref 0.7–3.1)
LYMPHOCYTES NFR BLD AUTO: 49.1 % (ref 19.6–45.3)
MAGNESIUM SERPL-MCNC: 2 MG/DL (ref 1.6–2.4)
MCH RBC QN AUTO: 29.6 PG (ref 26.6–33)
MCHC RBC AUTO-ENTMCNC: 34.1 G/DL (ref 31.5–35.7)
MCV RBC AUTO: 86.8 FL (ref 79–97)
MONOCYTES # BLD AUTO: 0.52 10*3/MM3 (ref 0.1–0.9)
MONOCYTES NFR BLD AUTO: 7.3 % (ref 5–12)
NEUTROPHILS NFR BLD AUTO: 3 10*3/MM3 (ref 1.7–7)
NEUTROPHILS NFR BLD AUTO: 42.2 % (ref 42.7–76)
NRBC BLD AUTO-RTO: 0 /100 WBC (ref 0–0.2)
PLATELET # BLD AUTO: 237 10*3/MM3 (ref 140–450)
PMV BLD AUTO: 11.3 FL (ref 6–12)
POTASSIUM SERPL-SCNC: 4.1 MMOL/L (ref 3.5–5.2)
PROT SERPL-MCNC: 7.9 G/DL (ref 6–8.5)
RBC # BLD AUTO: 4.39 10*6/MM3 (ref 3.77–5.28)
SARS-COV-2 RNA RESP QL NAA+PROBE: NOT DETECTED
SODIUM SERPL-SCNC: 140 MMOL/L (ref 136–145)
TROPONIN T SERPL HS-MCNC: 9 NG/L
WBC NRBC COR # BLD AUTO: 7.11 10*3/MM3 (ref 3.4–10.8)
WHOLE BLOOD HOLD COAG: NORMAL
WHOLE BLOOD HOLD SPECIMEN: NORMAL

## 2024-02-12 PROCEDURE — 87636 SARSCOV2 & INF A&B AMP PRB: CPT | Performed by: NURSE PRACTITIONER

## 2024-02-12 PROCEDURE — 3077F SYST BP >= 140 MM HG: CPT | Performed by: FAMILY MEDICINE

## 2024-02-12 PROCEDURE — 3080F DIAST BP >= 90 MM HG: CPT | Performed by: FAMILY MEDICINE

## 2024-02-12 PROCEDURE — 1159F MED LIST DOCD IN RCRD: CPT | Performed by: FAMILY MEDICINE

## 2024-02-12 PROCEDURE — 99285 EMERGENCY DEPT VISIT HI MDM: CPT

## 2024-02-12 PROCEDURE — 99214 OFFICE O/P EST MOD 30 MIN: CPT | Performed by: FAMILY MEDICINE

## 2024-02-12 PROCEDURE — 96374 THER/PROPH/DIAG INJ IV PUSH: CPT

## 2024-02-12 PROCEDURE — 85025 COMPLETE CBC W/AUTO DIFF WBC: CPT

## 2024-02-12 PROCEDURE — 84484 ASSAY OF TROPONIN QUANT: CPT

## 2024-02-12 PROCEDURE — 71045 X-RAY EXAM CHEST 1 VIEW: CPT

## 2024-02-12 PROCEDURE — 93000 ELECTROCARDIOGRAM COMPLETE: CPT | Performed by: FAMILY MEDICINE

## 2024-02-12 PROCEDURE — 83735 ASSAY OF MAGNESIUM: CPT

## 2024-02-12 PROCEDURE — 25010000002 ATROPINE SULFATE: Performed by: NURSE PRACTITIONER

## 2024-02-12 PROCEDURE — 80053 COMPREHEN METABOLIC PANEL: CPT

## 2024-02-12 PROCEDURE — 1160F RVW MEDS BY RX/DR IN RCRD: CPT | Performed by: FAMILY MEDICINE

## 2024-02-12 PROCEDURE — 36415 COLL VENOUS BLD VENIPUNCTURE: CPT

## 2024-02-12 PROCEDURE — 93005 ELECTROCARDIOGRAM TRACING: CPT

## 2024-02-12 RX ORDER — SODIUM CHLORIDE 0.9 % (FLUSH) 0.9 %
10 SYRINGE (ML) INJECTION AS NEEDED
Status: DISCONTINUED | OUTPATIENT
Start: 2024-02-12 | End: 2024-02-12 | Stop reason: HOSPADM

## 2024-02-12 RX ORDER — PROMETHAZINE HYDROCHLORIDE 25 MG/1
25 TABLET ORAL EVERY 8 HOURS PRN
Qty: 90 TABLET | Refills: 0 | OUTPATIENT
Start: 2024-02-12

## 2024-02-12 RX ADMIN — ATROPINE SULFATE 0.5 MG: 0.1 INJECTION INTRAVENOUS at 17:08

## 2024-02-14 DIAGNOSIS — K86.1 CHRONIC PANCREATITIS, UNSPECIFIED PANCREATITIS TYPE: ICD-10-CM

## 2024-02-14 RX ORDER — PROMETHAZINE HYDROCHLORIDE 25 MG/1
25 TABLET ORAL EVERY 8 HOURS PRN
Qty: 90 TABLET | Refills: 0 | Status: SHIPPED | OUTPATIENT
Start: 2024-02-14

## 2024-02-20 ENCOUNTER — OFFICE VISIT (OUTPATIENT)
Dept: FAMILY MEDICINE CLINIC | Facility: CLINIC | Age: 64
End: 2024-02-20
Payer: MEDICAID

## 2024-02-20 VITALS
RESPIRATION RATE: 16 BRPM | DIASTOLIC BLOOD PRESSURE: 62 MMHG | SYSTOLIC BLOOD PRESSURE: 122 MMHG | HEIGHT: 64 IN | BODY MASS INDEX: 31.1 KG/M2 | TEMPERATURE: 98.2 F | HEART RATE: 68 BPM | OXYGEN SATURATION: 97 % | WEIGHT: 182.2 LBS

## 2024-02-20 DIAGNOSIS — M79.7 FIBROMYALGIA: ICD-10-CM

## 2024-02-20 DIAGNOSIS — F32.9 REACTIVE DEPRESSION: Primary | ICD-10-CM

## 2024-02-20 PROCEDURE — 1159F MED LIST DOCD IN RCRD: CPT | Performed by: FAMILY MEDICINE

## 2024-02-20 PROCEDURE — 3074F SYST BP LT 130 MM HG: CPT | Performed by: FAMILY MEDICINE

## 2024-02-20 PROCEDURE — 3078F DIAST BP <80 MM HG: CPT | Performed by: FAMILY MEDICINE

## 2024-02-20 PROCEDURE — 99214 OFFICE O/P EST MOD 30 MIN: CPT | Performed by: FAMILY MEDICINE

## 2024-02-20 PROCEDURE — 1160F RVW MEDS BY RX/DR IN RCRD: CPT | Performed by: FAMILY MEDICINE

## 2024-02-20 RX ORDER — PREGABALIN 200 MG/1
200 CAPSULE ORAL 2 TIMES DAILY
Qty: 60 CAPSULE | Refills: 0 | Status: SHIPPED | OUTPATIENT
Start: 2024-02-20

## 2024-02-20 NOTE — PROGRESS NOTES
Subjective   Katelynn Sung is a 63 y.o. female.     History of Present Illness  Here with c/o worsened fatigue and depression since having her pacemaker placed earlier this month.    Denies SI. Taking meds as rx'd. No recent changes in meds.     The following portions of the patient's history were reviewed and updated as appropriate: allergies, current medications, past family history, past medical history, past social history, past surgical history, and problem list.    Review of Systems   Constitutional:  Positive for fatigue. Negative for diaphoresis and fever.   HENT:  Positive for congestion and postnasal drip. Negative for ear pain, mouth sores, nosebleeds, rhinorrhea and sore throat.    Eyes:  Negative for visual disturbance.   Respiratory:  Positive for shortness of breath (mild with exertion). Negative for cough and wheezing.    Cardiovascular:  Negative for chest pain, palpitations and leg swelling.   Gastrointestinal:  Positive for abdominal pain (chronic, stable), constipation and nausea. Negative for blood in stool, rectal pain and vomiting.   Endocrine: Positive for heat intolerance. Negative for polydipsia and polyuria.   Genitourinary:  Negative for difficulty urinating, dysuria, genital sores, hematuria, pelvic pain, vaginal bleeding and vaginal discharge.   Musculoskeletal:  Positive for arthralgias, back pain, myalgias, neck pain and neck stiffness.   Skin:  Negative for rash and wound.   Neurological:  Positive for dizziness, weakness (generalized) and headaches. Negative for tremors and syncope.   Hematological:  Negative for adenopathy. Does not bruise/bleed easily.   Psychiatric/Behavioral:  Positive for dysphoric mood. Negative for confusion and suicidal ideas. The patient is nervous/anxious.    Pt's previous ROS reviewed and updated as indicated.       Objective   Vitals:    02/20/24 0941   BP: 122/62   Pulse: 68   Resp: 16   Temp: 98.2 °F (36.8 °C)   SpO2: 97%     Body mass index is  31.27 kg/m².      02/20/24  0941   Weight: 82.6 kg (182 lb 3.2 oz)       Physical Exam  Vitals and nursing note reviewed.   Constitutional:       General: She is not in acute distress.     Appearance: She is well-groomed and overweight. She is not ill-appearing.   HENT:      Head: Atraumatic.      Mouth/Throat:      Mouth: Mucous membranes are moist.   Eyes:      General: No scleral icterus.     Conjunctiva/sclera: Conjunctivae normal.   Neck:      Thyroid: No thyroid mass.   Cardiovascular:      Rate and Rhythm: Normal rate and regular rhythm.      Pulses: Normal pulses.      Heart sounds: Normal heart sounds.   Pulmonary:      Effort: Pulmonary effort is normal.      Breath sounds: Decreased breath sounds present. No wheezing, rhonchi or rales.   Musculoskeletal:      Cervical back: Spasms and tenderness present. Decreased range of motion.      Right lower leg: No edema.      Left lower leg: No edema.      Comments: Soft tissue TTP diffusely, no decreased ROM in major joints    Lymphadenopathy:      Cervical: No cervical adenopathy.   Skin:     General: Skin is warm and dry.      Coloration: Skin is not jaundiced or pale.      Findings: No bruising or rash.   Neurological:      Mental Status: She is alert and oriented to person, place, and time.      Gait: Gait is intact.   Psychiatric:         Mood and Affect: Affect normal. Mood is depressed.         Speech: Speech normal.         Behavior: Behavior normal. Behavior is cooperative.         Thought Content: Thought content normal. Thought content is not paranoid or delusional. Thought content does not include suicidal ideation.         Cognition and Memory: Cognition normal.     Pt's previous physical exam reviewed and updated as indicated.    Lab Results   Component Value Date    WBC 5.55 02/14/2024    HGB 13.7 02/14/2024    HCT 41.5 02/14/2024    MCV 87 02/14/2024     02/14/2024       Lab Results   Component Value Date    GLUCOSE 101 (H) 02/12/2024     BUN 14 02/12/2024    CREATININE 0.88 02/12/2024    EGFRIFNONA 80 12/09/2021    EGFRIFAFRI 97 12/09/2021    BCR 15.9 02/12/2024    K 4.1 02/12/2024    CO2 23.1 02/12/2024    CALCIUM 9.0 02/12/2024    PROTENTOTREF 7.2 01/29/2024    ALBUMIN 4.4 02/12/2024    LABIL2 1.8 01/29/2024    AST 23 02/12/2024    ALT 9 02/12/2024       Lab Results   Component Value Date    CHLPL 200 01/29/2024    TRIG 192 (H) 01/29/2024    HDL 43 01/29/2024     (H) 01/29/2024       Lab Results   Component Value Date    HGBA1C 5.90 (H) 01/29/2024       Lab Results   Component Value Date    TSH 2.250 07/24/2023     Lab Results   Component Value Date    FERRITIN 143.00 12/05/2022     Lab Results   Component Value Date    AQAYBOHT09 457 12/05/2022     XR Chest 1 View  Result Date: 2/13/2024  Left chest ICD with leads overlying the right atrium and right ventricle. No other significant change. CRITICAL RESULT:   No COMMUNICATION: Per this written report. Drafted by Wendy Fields MD on 2/13/2024 7:28 PM Final report signed by Wendy Fields MD on 2/13/2024 7:32 PM      Assessment & Plan   Diagnoses and all orders for this visit:    1. Reactive depression (Primary)    2. Fibromyalgia  -     pregabalin (LYRICA) 200 MG capsule; Take 1 capsule by mouth 2 (Two) Times a Day.  Dispense: 60 capsule; Refill: 0    Other orders  -     sertraline (Zoloft) 50 MG tablet; 1/2 PO DAILY X 1 WEEK, THEN 1 PO DAILY  Dispense: 30 tablet; Refill: 2       I have reviewed risks/benefits and potential side effects of various treatment options for depression. Pt voices understanding and wishes to proceed with trial of zoloft as above.    Chronic multifactorial fatigue. She is amenable to wean down on lyrica to lessen potential side effects.  As part of patient's treatment plan I am prescribing a controlled substance.  The patient has been made aware of the appropriate use of such medications, including potential risk of somnolence, limited ability to drive and/or work  safely, and potential for dependence and/or overdose.  It has also been made clear that these medications are for use by this patient only, without concomitant use of alcohol or other substances, unless prescribed.  History and physical exam exhibit continued safe and appropriate use of controlled substance.  DANTE reviewed.  Patient has completed a prescribing agreement detailing terms of continued prescribing of controlled substances, including monitoring DANTE reports, urine drug screening, and pill counts if necessary.  Patient is aware that inappropriate use will result in cessation of prescribing such medications.    F/u in 1 month, sooner as needed.  Patient was encouraged to keep me informed of any acute changes, lack of improvement, or any new concerning symptoms.  Pt is aware of reasons to seek emergent care.  Patient voiced understanding of all instructions and denied further questions.    Please note that portions of this note may have been completed with a voice recognition program.

## 2024-03-06 ENCOUNTER — TELEPHONE (OUTPATIENT)
Dept: FAMILY MEDICINE CLINIC | Facility: CLINIC | Age: 64
End: 2024-03-06

## 2024-03-06 NOTE — TELEPHONE ENCOUNTER
PT IS CALLING TO REQUEST A PRESCRIPTION FOR KLONOPIN. SHE STATES SHE HAS TRIED THE LEXAPRO AND SHE DIDN'T REAL RIGHT ON IT.

## 2024-03-07 ENCOUNTER — TELEPHONE (OUTPATIENT)
Dept: FAMILY MEDICINE CLINIC | Facility: CLINIC | Age: 64
End: 2024-03-07

## 2024-03-07 NOTE — TELEPHONE ENCOUNTER
PATIENT CALLED IN AND IS ASKING FOR KLONOPIN INSTEAD OF WHAT WAS GIVEN TO HER SHE SAID SHE HAS BEEN ON IT BEFORE AND DOES HER BETTER

## 2024-03-07 NOTE — TELEPHONE ENCOUNTER
Caller: Katelynn Sung    Relationship: Self    Best call back number: [phone]     What is the best time to reach you: ANY    Who are you requesting to speak with (clinical staff, provider,  specific staff member): CLINICAL     What was the call regarding: PATIENT STATED THAT SHE IS NEEDING A LITTLE SOMETHING TO HELP HER GET TO HER NEXT APPOINTMENT ON 3/20/24   WANTING THE KLONOPIN.

## 2024-03-11 DIAGNOSIS — K86.1 CHRONIC PANCREATITIS, UNSPECIFIED PANCREATITIS TYPE: ICD-10-CM

## 2024-03-11 RX ORDER — PROMETHAZINE HYDROCHLORIDE 25 MG/1
25 TABLET ORAL EVERY 8 HOURS PRN
Qty: 90 TABLET | Refills: 0 | Status: SHIPPED | OUTPATIENT
Start: 2024-03-11

## 2024-03-15 ENCOUNTER — TELEPHONE (OUTPATIENT)
Dept: FAMILY MEDICINE CLINIC | Facility: CLINIC | Age: 64
End: 2024-03-15

## 2024-03-15 NOTE — TELEPHONE ENCOUNTER
Caller: Katelynn Sung    Relationship: Self    Best call back number:  [phone]    What test/procedure requested: PRIOR AUTHORIZATION FOR dexlansoprazole (DEXILANT) 60 MG capsule 1XDAY    When is it needed: ASAP. PATIENT ONLY HAS ONE PILL LEFT.    Additional information or concerns: PLEASE SEND TODAY AND ADVISE PATIENT ONCE SENT.  THANK YOU.

## 2024-03-15 NOTE — TELEPHONE ENCOUNTER
Caller: Katelynn Sung Richy    Relationship: Self    Best call back number:  [phone]     Requested Prescriptions:   Requested Prescriptions     Pending Prescriptions Disp Refills    Naloxegol Oxalate (Movantik) 25 MG tablet 30 tablet 3        Pharmacy where request should be sent: [facility] ([place]) [address], [address] - [phone]  - [phone] FX     Last office visit with prescribing clinician: 2/20/2024   Last telemedicine visit with prescribing clinician: Visit date not found   Next office visit with prescribing clinician: 3/20/2024     Additional details provided by patient: IS OUT.PLEASE SEND TODAY, IF POSSIBLE. THANK YOU.    Does the patient have less than a 3 day supply:  [x] Yes  [] No    Would you like a call back once the refill request has been completed: [x] Yes [] No    If the office needs to give you a call back, can they leave a voicemail: [x] Yes [] No    Shira Doss Rep   03/15/24 13:08 EDT

## 2024-03-15 NOTE — TELEPHONE ENCOUNTER
Caller: HELEN MARYANN    Relationship:PATIENT    Callback number:  [phone]   Is it ok to leave a message: [x] Yes [] No    Requested medication for samples:     dexlansoprazole (DEXILANT) 60 MG capsule       How much medication does the patient currently have left: ONE PILL    Who will be picking up the samples: EITHER PATIENT OR DAUGHTER ROGER MORALES    Additional details provided: PATIENT WILL BE OUT OVER WEEKEND WHILE WAITING FOR US TO SEND A PRIOR AUTHORIZATION FOR THIS MEDICATION.  SHE STATED SHE CAN'T GO WITHOUT IT.  PLEASE ADVISE PATIENT TODAY, IF WE HAVE SAMPLES.  THANK YOU.

## 2024-03-18 ENCOUNTER — APPOINTMENT (OUTPATIENT)
Dept: GENERAL RADIOLOGY | Facility: HOSPITAL | Age: 64
End: 2024-03-18
Payer: MEDICAID

## 2024-03-18 ENCOUNTER — PRIOR AUTHORIZATION (OUTPATIENT)
Dept: FAMILY MEDICINE CLINIC | Facility: CLINIC | Age: 64
End: 2024-03-18
Payer: MEDICAID

## 2024-03-18 ENCOUNTER — APPOINTMENT (OUTPATIENT)
Dept: CT IMAGING | Facility: HOSPITAL | Age: 64
End: 2024-03-18
Payer: MEDICAID

## 2024-03-18 ENCOUNTER — HOSPITAL ENCOUNTER (EMERGENCY)
Facility: HOSPITAL | Age: 64
Discharge: HOME | End: 2024-03-18
Attending: STUDENT IN AN ORGANIZED HEALTH CARE EDUCATION/TRAINING PROGRAM | Admitting: STUDENT IN AN ORGANIZED HEALTH CARE EDUCATION/TRAINING PROGRAM
Payer: MEDICAID

## 2024-03-18 ENCOUNTER — TELEPHONE (OUTPATIENT)
Dept: FAMILY MEDICINE CLINIC | Facility: CLINIC | Age: 64
End: 2024-03-18
Payer: MEDICAID

## 2024-03-18 VITALS
SYSTOLIC BLOOD PRESSURE: 148 MMHG | TEMPERATURE: 98.2 F | OXYGEN SATURATION: 95 % | WEIGHT: 184 LBS | HEART RATE: 61 BPM | DIASTOLIC BLOOD PRESSURE: 86 MMHG | RESPIRATION RATE: 16 BRPM | HEIGHT: 64 IN | BODY MASS INDEX: 31.41 KG/M2

## 2024-03-18 DIAGNOSIS — J06.9 VIRAL URI WITH COUGH: Primary | ICD-10-CM

## 2024-03-18 LAB
ALBUMIN SERPL-MCNC: 4.4 G/DL (ref 3.5–5.2)
ALBUMIN/GLOB SERPL: 1.3 G/DL
ALP SERPL-CCNC: 79 U/L (ref 39–117)
ALT SERPL W P-5'-P-CCNC: 10 U/L (ref 1–33)
ANION GAP SERPL CALCULATED.3IONS-SCNC: 10.5 MMOL/L (ref 5–15)
AST SERPL-CCNC: 25 U/L (ref 1–32)
BASOPHILS # BLD AUTO: 0.05 10*3/MM3 (ref 0–0.2)
BASOPHILS NFR BLD AUTO: 0.9 % (ref 0–1.5)
BILIRUB SERPL-MCNC: 0.3 MG/DL (ref 0–1.2)
BUN SERPL-MCNC: 11 MG/DL (ref 8–23)
BUN/CREAT SERPL: 13.3 (ref 7–25)
CALCIUM SPEC-SCNC: 9.2 MG/DL (ref 8.6–10.5)
CHLORIDE SERPL-SCNC: 103 MMOL/L (ref 98–107)
CO2 SERPL-SCNC: 21.5 MMOL/L (ref 22–29)
CREAT SERPL-MCNC: 0.83 MG/DL (ref 0.57–1)
CRP SERPL-MCNC: 0.71 MG/DL (ref 0–0.5)
D DIMER PPP FEU-MCNC: 1.18 MCGFEU/ML (ref 0–0.63)
DEPRECATED RDW RBC AUTO: 39.7 FL (ref 37–54)
EGFRCR SERPLBLD CKD-EPI 2021: 79.3 ML/MIN/1.73
EOSINOPHIL # BLD AUTO: 0.24 10*3/MM3 (ref 0–0.4)
EOSINOPHIL NFR BLD AUTO: 4.4 % (ref 0.3–6.2)
ERYTHROCYTE [DISTWIDTH] IN BLOOD BY AUTOMATED COUNT: 12.5 % (ref 12.3–15.4)
FLUAV SUBTYP SPEC NAA+PROBE: NOT DETECTED
FLUBV RNA ISLT QL NAA+PROBE: NOT DETECTED
GLOBULIN UR ELPH-MCNC: 3.5 GM/DL
GLUCOSE SERPL-MCNC: 98 MG/DL (ref 65–99)
HCT VFR BLD AUTO: 41.9 % (ref 34–46.6)
HGB BLD-MCNC: 14.3 G/DL (ref 12–15.9)
IMM GRANULOCYTES # BLD AUTO: 0.04 10*3/MM3 (ref 0–0.05)
IMM GRANULOCYTES NFR BLD AUTO: 0.7 % (ref 0–0.5)
LYMPHOCYTES # BLD AUTO: 2.18 10*3/MM3 (ref 0.7–3.1)
LYMPHOCYTES NFR BLD AUTO: 39.7 % (ref 19.6–45.3)
MAGNESIUM SERPL-MCNC: 2.2 MG/DL (ref 1.6–2.4)
MCH RBC QN AUTO: 29.5 PG (ref 26.6–33)
MCHC RBC AUTO-ENTMCNC: 34.1 G/DL (ref 31.5–35.7)
MCV RBC AUTO: 86.6 FL (ref 79–97)
MONOCYTES # BLD AUTO: 0.45 10*3/MM3 (ref 0.1–0.9)
MONOCYTES NFR BLD AUTO: 8.2 % (ref 5–12)
NEUTROPHILS NFR BLD AUTO: 2.53 10*3/MM3 (ref 1.7–7)
NEUTROPHILS NFR BLD AUTO: 46.1 % (ref 42.7–76)
NRBC BLD AUTO-RTO: 0 /100 WBC (ref 0–0.2)
NT-PROBNP SERPL-MCNC: 96.9 PG/ML (ref 0–900)
PLATELET # BLD AUTO: 227 10*3/MM3 (ref 140–450)
PMV BLD AUTO: 10.8 FL (ref 6–12)
POTASSIUM SERPL-SCNC: 3.9 MMOL/L (ref 3.5–5.2)
PROCALCITONIN SERPL-MCNC: 0.03 NG/ML (ref 0–0.25)
PROT SERPL-MCNC: 7.9 G/DL (ref 6–8.5)
RBC # BLD AUTO: 4.84 10*6/MM3 (ref 3.77–5.28)
SARS-COV-2 RNA RESP QL NAA+PROBE: NOT DETECTED
SODIUM SERPL-SCNC: 135 MMOL/L (ref 136–145)
TROPONIN T SERPL HS-MCNC: <6 NG/L
WBC NRBC COR # BLD AUTO: 5.49 10*3/MM3 (ref 3.4–10.8)

## 2024-03-18 PROCEDURE — 83735 ASSAY OF MAGNESIUM: CPT | Performed by: STUDENT IN AN ORGANIZED HEALTH CARE EDUCATION/TRAINING PROGRAM

## 2024-03-18 PROCEDURE — 83880 ASSAY OF NATRIURETIC PEPTIDE: CPT | Performed by: STUDENT IN AN ORGANIZED HEALTH CARE EDUCATION/TRAINING PROGRAM

## 2024-03-18 PROCEDURE — 80053 COMPREHEN METABOLIC PANEL: CPT | Performed by: STUDENT IN AN ORGANIZED HEALTH CARE EDUCATION/TRAINING PROGRAM

## 2024-03-18 PROCEDURE — 87636 SARSCOV2 & INF A&B AMP PRB: CPT | Performed by: STUDENT IN AN ORGANIZED HEALTH CARE EDUCATION/TRAINING PROGRAM

## 2024-03-18 PROCEDURE — 93005 ELECTROCARDIOGRAM TRACING: CPT | Performed by: STUDENT IN AN ORGANIZED HEALTH CARE EDUCATION/TRAINING PROGRAM

## 2024-03-18 PROCEDURE — 71045 X-RAY EXAM CHEST 1 VIEW: CPT

## 2024-03-18 PROCEDURE — 99285 EMERGENCY DEPT VISIT HI MDM: CPT

## 2024-03-18 PROCEDURE — 84145 PROCALCITONIN (PCT): CPT | Performed by: STUDENT IN AN ORGANIZED HEALTH CARE EDUCATION/TRAINING PROGRAM

## 2024-03-18 PROCEDURE — 85379 FIBRIN DEGRADATION QUANT: CPT | Performed by: STUDENT IN AN ORGANIZED HEALTH CARE EDUCATION/TRAINING PROGRAM

## 2024-03-18 PROCEDURE — 25510000001 IOPAMIDOL 61 % SOLUTION: Performed by: STUDENT IN AN ORGANIZED HEALTH CARE EDUCATION/TRAINING PROGRAM

## 2024-03-18 PROCEDURE — 36415 COLL VENOUS BLD VENIPUNCTURE: CPT

## 2024-03-18 PROCEDURE — 84484 ASSAY OF TROPONIN QUANT: CPT | Performed by: STUDENT IN AN ORGANIZED HEALTH CARE EDUCATION/TRAINING PROGRAM

## 2024-03-18 PROCEDURE — 85025 COMPLETE CBC W/AUTO DIFF WBC: CPT | Performed by: STUDENT IN AN ORGANIZED HEALTH CARE EDUCATION/TRAINING PROGRAM

## 2024-03-18 PROCEDURE — 86140 C-REACTIVE PROTEIN: CPT | Performed by: STUDENT IN AN ORGANIZED HEALTH CARE EDUCATION/TRAINING PROGRAM

## 2024-03-18 PROCEDURE — 71275 CT ANGIOGRAPHY CHEST: CPT

## 2024-03-18 RX ORDER — HYDROXYZINE PAMOATE 25 MG/1
25 CAPSULE ORAL ONCE
Status: COMPLETED | OUTPATIENT
Start: 2024-03-18 | End: 2024-03-18

## 2024-03-18 RX ADMIN — IOPAMIDOL 100 ML: 612 INJECTION, SOLUTION INTRAVENOUS at 13:31

## 2024-03-18 RX ADMIN — HYDROXYZINE PAMOATE 25 MG: 25 CAPSULE ORAL at 13:36

## 2024-03-18 NOTE — ED PROVIDER NOTES
Subjective:  History of Present Illness:    Patient is a 61-year-old female with history of COPD, fibromyalgia, GERD, hypertension, obesity, Humphreys's esophagus, restless leg syndrome who presents today with chest pain.  Reports ongoing chest pain with pleuritic chest pain over the last 3 days.  Denies any severe shortness of breath.  No cough congestion.  Denies fever.  No abdominal pain nausea vomiting or diarrhea.  Denies any unilateral leg swelling or leg pain.  No personal history of PE/DVT.      Nurses Notes reviewed and agree, including vitals, allergies, social history and prior medical history.     REVIEW OF SYSTEMS: All systems reviewed and not pertinent unless noted.  Review of Systems   Constitutional:  Positive for activity change, appetite change, chills and fatigue. Negative for fever.   HENT:  Positive for congestion. Negative for rhinorrhea, sinus pressure and sinus pain.    Eyes:  Negative for discharge and itching.   Respiratory:  Positive for cough. Negative for shortness of breath and wheezing.    Cardiovascular:  Positive for chest pain. Negative for palpitations and leg swelling.   Gastrointestinal:  Negative for abdominal distention, abdominal pain, diarrhea, nausea and vomiting.   Endocrine: Negative for cold intolerance and heat intolerance.   Genitourinary:  Negative for decreased urine volume, difficulty urinating, flank pain, frequency, urgency, vaginal bleeding, vaginal discharge and vaginal pain.   Musculoskeletal:  Negative for gait problem, neck pain and neck stiffness.   Skin:  Negative for color change.   Allergic/Immunologic: Negative for environmental allergies.   Neurological:  Negative for seizures, syncope, facial asymmetry and speech difficulty.   Psychiatric/Behavioral:  Negative for self-injury and suicidal ideas.        Past Medical History:   Diagnosis Date    Adnexal mass     pelvic US 10/26/11 showed 2 cm left abdnexal cystic lesion, resolved on f/u 5/14/15    Anxiety      Arm fracture, left     Back ache     Humphreys's esophagus     Body piercing     Both ears    Class 1 obesity due to excess calories with serious comorbidity and body mass index (BMI) of 30.0 to 30.9 in adult 2018    Colon polyps     COPD (chronic obstructive pulmonary disease)     Cor pulmonale     Degenerative arthritis of lumbar spine     Depression     Fibromyalgia     Finger fracture     GERD (gastroesophageal reflux disease)     Herpes simplex     Hypertension     Insomnia     Lumbar degenerative disc disease     Mild sleep apnea 2013    Sleep study 13 showing mild degree    Narcotic dependence     Osteoporosis     Palpitations     Pancreatitis     Pneumonia     Respiratory failure requiring intubation     Restless leg syndrome     Seasonal allergies     Sleep apnea     does not wear cpap    Wears dentures        Allergies:    Nsaids      Past Surgical History:   Procedure Laterality Date    COLONOSCOPY N/A     Complete    ENDOSCOPY N/A 3/11/2020    Procedure: ESOPHAGOGASTRODUODENOSCOPY;  Surgeon: Charbel Ching MD;  Location: [facility] ENDOSCOPY;  Service: Gastroenterology;  Laterality: N/A;    TUBAL ABDOMINAL LIGATION      UPPER GASTROINTESTINAL ENDOSCOPY N/A          Social History     Socioeconomic History    Marital status:    Tobacco Use    Smoking status: Former     Current packs/day: 0.00     Average packs/day: 0.8 packs/day for 3.0 years (2.3 ttl pk-yrs)     Types: Cigarettes, Electronic Cigarette     Start date:      Quit date:      Years since quittin.2     Passive exposure: Never    Smokeless tobacco: Never    Tobacco comments:      started using nicotine containing substance in combustion-free vaporization device. PT not longer uses vapor stick.   Vaping Use    Vaping status: Former    Substances: Nicotine   Substance and Sexual Activity    Alcohol use: No    Drug use: Not Currently     Types: Oxycodone     Comment: Patient is currently on  "suboxone    Sexual activity: Defer     Comment:          Family History   Problem Relation Age of Onset    Stroke Mother     Liver disease Mother         Chronic    Cirrhosis Father     Heart attack Father     Breast cancer Sister     Stroke Brother     Cancer Sister     Colon cancer Neg Hx        Objective  Physical Exam:  /86 (BP Location: Left arm, Patient Position: Sitting)   Pulse 61   Temp 98.2 °F (36.8 °C) (Oral)   Resp 16   Ht 162.6 cm (64\")   Wt 83.5 kg (184 lb)   LMP  (LMP Unknown)   SpO2 95%   BMI 31.58 kg/m²      Physical Exam  Constitutional:       General: She is not in acute distress.     Appearance: Normal appearance. She is obese. She is not ill-appearing.   HENT:      Head: Normocephalic and atraumatic.      Nose: Nose normal. No congestion or rhinorrhea.      Mouth/Throat:      Mouth: Mucous membranes are dry.      Pharynx: Oropharynx is clear. No oropharyngeal exudate or posterior oropharyngeal erythema.   Eyes:      Extraocular Movements: Extraocular movements intact.      Conjunctiva/sclera: Conjunctivae normal.      Pupils: Pupils are equal, round, and reactive to light.   Cardiovascular:      Rate and Rhythm: Normal rate and regular rhythm.      Pulses: Normal pulses.      Heart sounds: Normal heart sounds.   Pulmonary:      Effort: Pulmonary effort is normal. No respiratory distress.      Breath sounds: Normal breath sounds.   Abdominal:      General: Abdomen is flat. Bowel sounds are normal. There is no distension.      Palpations: Abdomen is soft.      Tenderness: There is no abdominal tenderness.   Musculoskeletal:         General: No swelling or tenderness. Normal range of motion.      Cervical back: Normal range of motion and neck supple.   Skin:     General: Skin is warm and dry.      Capillary Refill: Capillary refill takes less than 2 seconds.   Neurological:      General: No focal deficit present.      Mental Status: She is alert and oriented to person, place, " and time. Mental status is at baseline.      Cranial Nerves: No cranial nerve deficit.      Sensory: No sensory deficit.      Motor: No weakness.      Coordination: Coordination normal.   Psychiatric:         Mood and Affect: Mood normal.         Behavior: Behavior normal.         Thought Content: Thought content normal.         Judgment: Judgment normal.         Procedures    ED Course:    ED Course as of 03/18/24 1626   Mon Mar 18, 2024   1116 EKG interpreted by me, normal sinus rhythm with no concerning ST changes noted, rate 68 [JE]   1203 No acute process by my review of plain film imaging prior to radiology overread [JE]      ED Course User Index  [JE] Wayne Mccarthy MD       Lab Results (last 24 hours)       Procedure Component Value Units Date/Time    COVID-19 and FLU A/B PCR, 1 HR TAT - Swab, Nasopharynx [[id number]]  (Normal) Collected: 03/18/24 1112    Specimen: Swab from Nasopharynx Updated: 03/18/24 1134     COVID19 Not Detected     Influenza A PCR Not Detected     Influenza B PCR Not Detected    Narrative:      Fact sheet for providers: https://www.fda.gov/media/928329/download    Fact sheet for patients: https://www.fda.gov/media/434556/download    Test performed by PCR.    CBC & Differential [[id number]]  (Abnormal) Collected: 03/18/24 1137    Specimen: Blood Updated: 03/18/24 1143    Narrative:      The following orders were created for panel order CBC & Differential.  Procedure                               Abnormality         Status                     ---------                               -----------         ------                     CBC Auto Differential[[id number]]        Abnormal            Final result                 Please view results for these tests on the individual orders.    Comprehensive Metabolic Panel [[id number]]  (Abnormal) Collected: 03/18/24 1137    Specimen: Blood Updated: 03/18/24 1202     Glucose 98 mg/dL      BUN 11 mg/dL      Creatinine 0.83 mg/dL      Sodium 135  mmol/L      Potassium 3.9 mmol/L      Chloride 103 mmol/L      CO2 21.5 mmol/L      Calcium 9.2 mg/dL      Total Protein 7.9 g/dL      Albumin 4.4 g/dL      ALT (SGPT) 10 U/L      AST (SGOT) 25 U/L      Alkaline Phosphatase 79 U/L      Total Bilirubin 0.3 mg/dL      Globulin 3.5 gm/dL      A/G Ratio 1.3 g/dL      BUN/Creatinine Ratio 13.3     Anion Gap 10.5 mmol/L      eGFR 79.3 mL/min/1.73     Narrative:      GFR Normal >60  Chronic Kidney Disease <60  Kidney Failure <15      High Sensitivity Troponin T [[id number]]  (Normal) Collected: 03/18/24 1137    Specimen: Blood Updated: 03/18/24 1205     HS Troponin T <6 ng/L     Narrative:      High Sensitive Troponin T Reference Range:  <14.0 ng/L- Negative Female for AMI  <22.0 ng/L- Negative Male for AMI  >=14 - Abnormal Female indicating possible myocardial injury.  >=22 - Abnormal Male indicating possible myocardial injury.   Clinicians would have to utilize clinical acumen, EKG, Troponin, and serial changes to determine if it is an Acute Myocardial Infarction or myocardial injury due to an underlying chronic condition.         D-dimer, Quantitative [[id number]]  (Abnormal) Collected: 03/18/24 1137    Specimen: Blood Updated: 03/18/24 1204     D-Dimer, Quantitative 1.18 MCGFEU/mL     Narrative:      According to the assay 's published package insert, a normal (<0.50 MCGFEU/mL) D-dimer result in conjunction with a non-high clinical probability assessment, excludes deep vein thrombosis (DVT) and pulmonary embolism (PE) with high sensitivity.    D-dimer values increase with age and this can make VTE exclusion of an older population difficult. To address this, the [employer] College [employer], based on best available evidence and recent guidelines, recommends that clinicians use age-adjusted D-dimer thresholds in patients greater than 50 years of age with: a) a low probability of PE who do not meet all Pulmonary Embolism Rule Out Criteria, or b) in those with  "intermediate probability of PE.   The formula for an age-adjusted D-dimer cut-off is \"age/100\".  For example, a 60 year old patient would have an age-adjusted cut-off of 0.60 MCGFEU/mL and an 80 year old 0.80 MCGFEU/mL.    BNP [[id number]]  (Normal) Collected: 03/18/24 1137    Specimen: Blood Updated: 03/18/24 1205     proBNP 96.9 pg/mL     Narrative:      This assay is used as an aid in the diagnosis of individuals suspected of having heart failure. It can be used as an aid in the diagnosis of acute decompensated heart failure (ADHF) in patients presenting with signs and symptoms of ADHF to the emergency department (ED). In addition, NT-proBNP of <300 pg/mL indicates ADHF is not likely.    Age Range Result Interpretation  NT-proBNP Concentration (pg/mL:      <50             Positive            >450                   Gray                 300-450                    Negative             <300    50-75           Positive            >900                  Gray                300-900                  Negative            <300      >75             Positive            >1800                  Gray                300-1800                  Negative            <300    C-reactive Protein [[id number]]  (Abnormal) Collected: 03/18/24 1137    Specimen: Blood Updated: 03/18/24 1202     C-Reactive Protein 0.71 mg/dL     Procalcitonin [[id number]]  (Normal) Collected: 03/18/24 1137    Specimen: Blood Updated: 03/18/24 1211     Procalcitonin 0.03 ng/mL     Narrative:      As a Marker for Sepsis (Non-Neonates):    1. <0.5 ng/mL represents a low risk of severe sepsis and/or septic shock.  2. >2 ng/mL represents a high risk of severe sepsis and/or septic shock.    As a Marker for Lower Respiratory Tract Infections that require antibiotic therapy:    PCT on Admission    Antibiotic Therapy       6-12 Hrs later    >0.5                Strongly Recommended  >0.25 - <0.5        Recommended   0.1 - 0.25          Discouraged              " "Remeasure/reassess PCT  <0.1                Strongly Discouraged     Remeasure/reassess PCT    As 28 day mortality risk marker: \"Change in Procalcitonin Result\" (>80% or <=80%) if Day 0 (or Day 1) and Day 4 values are available. Refer to http://www.[facility]-pct-calculator.com    Change in PCT <=80%  A decrease of PCT levels below or equal to 80% defines a positive change in PCT test result representing a higher risk for 28-day all-cause mortality of patients diagnosed with severe sepsis for septic shock.    Change in PCT >80%  A decrease of PCT levels of more than 80% defines a negative change in PCT result representing a lower risk for 28-day all-cause mortality of patients diagnosed with severe sepsis or septic shock.       Magnesium [[id number]]  (Normal) Collected: 03/18/24 1137    Specimen: Blood Updated: 03/18/24 1202     Magnesium 2.2 mg/dL     CBC Auto Differential [[id number]]  (Abnormal) Collected: 03/18/24 1137    Specimen: Blood Updated: 03/18/24 1143     WBC 5.49 10*3/mm3      RBC 4.84 10*6/mm3      Hemoglobin 14.3 g/dL      Hematocrit 41.9 %      MCV 86.6 fL      MCH 29.5 pg      MCHC 34.1 g/dL      RDW 12.5 %      RDW-SD 39.7 fl      MPV 10.8 fL      Platelets 227 10*3/mm3      Neutrophil % 46.1 %      Lymphocyte % 39.7 %      Monocyte % 8.2 %      Eosinophil % 4.4 %      Basophil % 0.9 %      Immature Grans % 0.7 %      Neutrophils, Absolute 2.53 10*3/mm3      Lymphocytes, Absolute 2.18 10*3/mm3      Monocytes, Absolute 0.45 10*3/mm3      Eosinophils, Absolute 0.24 10*3/mm3      Basophils, Absolute 0.05 10*3/mm3      Immature Grans, Absolute 0.04 10*3/mm3      nRBC 0.0 /100 WBC              CT Angiogram Chest Pulmonary Embolism    Result Date: 3/18/2024  PROCEDURE: CT ANGIOGRAM CHEST PULMONARY EMBOLISM-  HISTORY: Pulmonary embolism (PE) suspected, positive D-dimer  COMPARISON: None .  TECHNIQUE: Multiple axial CT images were obtained from the thoracic inlet through the upper abdomen following the " administration of Isovue 300 per the CT PE protocol. Coronal and oblique 3D MIP images were reconstructed from the original axial data set.  FINDINGS: There are no filling defects within the pulmonary arteries to suggest an embolus. The thoracic aorta is normal in caliber with no evidence of dissection. The heart is mildly enlarged. There are no pleural or pericardial effusions. There is no adenopathy.  Lung windows reveal no focal opacities or suspicious pulmonary nodules. There is minimal left lower lobe atelectasis. The visualized upper abdomen is unremarkable. Bone windows reveal no acute osseous abnormalities.      Impression: No evidence of pulmonary embolus or dissection.     DLP: 289.75 mGy.cm CTDI: 8.96 mGy   This study was performed with techniques to keep radiation doses as low as reasonably achievable (ALARA). Individualized dose reduction techniques using automated exposure control or adjustment of mA and/or kV according to the patient size were employed.     Images were reviewed, interpreted, and dictated by Dr. Epifanio Amado MD Transcribed by Lidia Varner PA-C.  This report was signed and finalized on 3/18/2024 2:07 PM by Epifanio Amado MD.      XR Chest 1 View    Result Date: 3/18/2024  X-RAY CHEST ONE VIEW  HISTORY: CP, abnormal cardiovascular exam.  COMPARISON: 02/12/2024  FINDINGS:  Portable view of the chest demonstrates the lungs to be grossly clear. There is no evidence of effusion, pneumothorax or other significant pleural disease. The mediastinum is unremarkable.  The heart size is normal. Left-sided pacer is present.      Impression: Unremarkable portable chest.    This report was signed and finalized on 3/18/2024 1:19 PM by Epifanio Amado MD.          MDM      Initial impression of presenting illness: Chest pain    DDX: includes but is not limited to: ACS, MI, PE, COVID-19, bacterial pneumonia, pneumothorax    Patient arrives stable with vitals interpreted by myself.     Pertinent  features from physical exam: Clear oscitation, regular rhythm, no murmur, nontender abdominal palpation.    Initial diagnostic plan: CBC, CMP, troponin, BNP, D-dimer, ECG, chest x-ray, CRP, Pro-Cosme    Results from initial plan were reviewed and interpreted by me revealing no acute process on lab work or CT PE    Diagnostic information from other sources: Discussed with EMS on arrival and reviewed past medical records    Interventions / Re-evaluation: Observed in emergency department for over 3 hours no change in vital signs.  Patient requesting treatment for anxiety.  Given hydralazine while in the emergency department    Results/clinical rationale were discussed with patient daughter at bedside    Consultations/Discussion of results with other physicians: Discussed negative cardiac workup in emergency department, no concern for acute cardiac process at this time.  Encourage patient to follow-up with her primary care doctor for further evaluation and to discuss anxiety therapies moving forward.  Given strict turn precaution for severe increase in chest pain or shortness of breath    Disposition plan: Discharge  -----        Final diagnoses:   Viral URI with cough          Wayne Mccarthy MD  03/18/24 9036

## 2024-03-18 NOTE — DISCHARGE INSTRUCTIONS
You were evaluated for chest pain and shortness of breath.  We got labs, such as COVID and flu and got a chest x-ray then a CT scan of your chest.  This showed no concern for problems with your heart or blood clots in your lungs.  You are now stable for discharge.  As we discussed, we believe your symptoms are likely due to a viral illness.  We did recommend you are complaining fluids, Tylenol ibuprofen at home for discomfort, and follow-up with your primary care doctor to ensure that you improve appropriately.  You are now stable for discharge.

## 2024-03-18 NOTE — Clinical Note
[facility] EMERGENCY DEPARTMENT  [address] [address]  Phone: [phone]    Katelynn Sung was seen and treated in our emergency department on 3/18/2024.  She may return to work on 03/21/2024.         Thank you for choosing [facility].    Wayne Mccarthy MD

## 2024-03-18 NOTE — TELEPHONE ENCOUNTER
(Key: [id number])  The request has been approved. The authorization is effective from 03/18/2024 to 03/18/2025, as long as the member is enrolled in their current health plan. A written notification letter will follow with additional details.  PA sent to insurance for Dexlansopraxole.  Waiting for reply.    Paige Rosas CMA  
Capillary refill less/equal to 2 seconds/Strong peripheral pulses

## 2024-03-18 NOTE — TELEPHONE ENCOUNTER
Caller: Katelynn Sung    Relationship: Self    Best call back number: [phone]     Requested Prescriptions:   WAS ON KOLONIPIN AND WANTS THAT BACK        Pharmacy where request should be sent: [facility] [place] [address], [address] - [phone]  - [phone]     Last office visit with prescribing clinician: 2/20/2024   Last telemedicine visit with prescribing clinician: Visit date not found   Next office visit with prescribing clinician: 3/20/2024     Additional details provided by patient: WAS SEEN AT EMERGENCY ROOM TODAY AND STRESS IS CAUSING ISSUES     Does the patient have less than a 3 day supply:  [x] Yes  [] No    Would you like a call back once the refill request has been completed: [] Yes [x] No    If the office needs to give you a call back, can they leave a voicemail: [] Yes [x] No    Shira Gould   03/18/24 14:19 EDT

## 2024-03-20 ENCOUNTER — OFFICE VISIT (OUTPATIENT)
Dept: FAMILY MEDICINE CLINIC | Facility: CLINIC | Age: 64
End: 2024-03-20
Payer: MEDICAID

## 2024-03-20 VITALS
SYSTOLIC BLOOD PRESSURE: 146 MMHG | DIASTOLIC BLOOD PRESSURE: 90 MMHG | WEIGHT: 186.6 LBS | HEART RATE: 84 BPM | OXYGEN SATURATION: 96 % | HEIGHT: 64 IN | BODY MASS INDEX: 31.86 KG/M2

## 2024-03-20 DIAGNOSIS — K21.9 GASTROESOPHAGEAL REFLUX DISEASE WITHOUT ESOPHAGITIS: ICD-10-CM

## 2024-03-20 DIAGNOSIS — Z12.11 SCREEN FOR COLON CANCER: ICD-10-CM

## 2024-03-20 DIAGNOSIS — F43.0 ACUTE STRESS REACTION: ICD-10-CM

## 2024-03-20 DIAGNOSIS — F41.8 SITUATIONAL ANXIETY: Primary | ICD-10-CM

## 2024-03-20 DIAGNOSIS — F32.9 REACTIVE DEPRESSION: ICD-10-CM

## 2024-03-20 DIAGNOSIS — M79.7 FIBROMYALGIA: ICD-10-CM

## 2024-03-20 RX ORDER — DEXLANSOPRAZOLE 60 MG/1
1 CAPSULE, DELAYED RELEASE ORAL DAILY
Qty: 30 CAPSULE | Refills: 5 | Status: SHIPPED | OUTPATIENT
Start: 2024-03-20

## 2024-03-20 RX ORDER — CLONAZEPAM 0.5 MG/1
0.5 TABLET ORAL 2 TIMES DAILY PRN
Qty: 30 TABLET | Refills: 0 | Status: SHIPPED | OUTPATIENT
Start: 2024-03-20

## 2024-03-20 RX ORDER — NALOXEGOL OXALATE 25 MG/1
TABLET, FILM COATED ORAL
Qty: 30 TABLET | Refills: 2 | OUTPATIENT
Start: 2024-03-20

## 2024-03-20 RX ORDER — PREGABALIN 200 MG/1
200 CAPSULE ORAL 2 TIMES DAILY
Qty: 60 CAPSULE | Refills: 2 | Status: SHIPPED | OUTPATIENT
Start: 2024-03-20

## 2024-03-20 NOTE — PROGRESS NOTES
"Subjective   Katelynn Sung is a 64 y.o. female.     History of Present Illness  She presents today for follow-up on depression with anxiety.  At her last visit she reported increasing dysphoric mood, generalized anxiety.  Placed on Zoloft.  She states she took for several days but discontinued as it \"made her worse\".  Currently being treated at local Suboxone clinic.  Doing well.  Receiving therapy.  Previously treated with clonazepam per that office.  She slowly weaned herself off of clonazepam due to improvement in overall symptoms.  Unfortunately, she is experienced recent marked increase in psychosocial stress.  Does not feel she is coping well.  Denies suicidal ideation.    She request refill of her Dexilant for management of GERD, Humphreys's esophagus.  Denies worsening symptoms at this time.    On Lyrica for fibromyalgia.  Symptoms currently stable.  Refill requested.  Also on Flexeril, avoiding NSAIDs as instructed.    The following portions of the patient's history were reviewed and updated as appropriate: allergies, current medications, past family history, past medical history, past social history, past surgical history, and problem list.    Review of Systems   Constitutional:  Positive for fatigue. Negative for diaphoresis and fever.   HENT:  Positive for congestion and postnasal drip. Negative for ear pain, mouth sores, nosebleeds, rhinorrhea and sore throat.    Eyes:  Negative for visual disturbance.   Respiratory:  Positive for shortness of breath (mild with exertion). Negative for cough and wheezing.    Cardiovascular:  Negative for chest pain, palpitations and leg swelling.   Gastrointestinal:  Positive for abdominal pain (chronic, stable), constipation and nausea. Negative for blood in stool, rectal pain and vomiting.   Endocrine: Positive for heat intolerance. Negative for polydipsia and polyuria.   Genitourinary:  Negative for difficulty urinating, dysuria, genital sores, hematuria, pelvic " pain, vaginal bleeding and vaginal discharge.   Musculoskeletal:  Positive for arthralgias, back pain, myalgias, neck pain and neck stiffness.   Skin:  Negative for rash and wound.   Neurological:  Positive for dizziness, weakness (generalized) and headaches. Negative for tremors and syncope.   Hematological:  Negative for adenopathy. Does not bruise/bleed easily.   Psychiatric/Behavioral:  Positive for dysphoric mood. Negative for confusion and suicidal ideas. The patient is nervous/anxious.    Pt's previous ROS reviewed and updated as indicated.       Objective   Vitals:    03/20/24 0953   BP: 146/90   Pulse: 84   SpO2: 96%     Body mass index is 32.03 kg/m².      03/20/24  0953   Weight: 84.6 kg (186 lb 9.6 oz)       Physical Exam  Vitals and nursing note reviewed.   Constitutional:       General: She is not in acute distress.     Appearance: She is well-groomed and overweight. She is not ill-appearing.   HENT:      Head: Atraumatic.      Mouth/Throat:      Mouth: Mucous membranes are moist.   Eyes:      General: No scleral icterus.     Conjunctiva/sclera: Conjunctivae normal.   Cardiovascular:      Rate and Rhythm: Normal rate and regular rhythm.      Pulses: Normal pulses.      Heart sounds: Normal heart sounds.   Pulmonary:      Effort: Pulmonary effort is normal.      Breath sounds: Decreased breath sounds present. No wheezing, rhonchi or rales.   Musculoskeletal:      Right lower leg: No edema.      Left lower leg: No edema.   Skin:     General: Skin is warm and dry.      Coloration: Skin is not jaundiced or pale.      Findings: No bruising or rash.   Neurological:      Mental Status: She is alert and oriented to person, place, and time.      Gait: Gait is intact.   Psychiatric:         Mood and Affect: Mood and affect normal.         Speech: Speech normal.         Behavior: Behavior normal. Behavior is cooperative.         Thought Content: Thought content normal.         Cognition and Memory: Cognition  normal.     Pt's previous physical exam reviewed and updated as indicated.    Lab Results   Component Value Date    TSH 2.250 07/24/2023     Lab Results   Component Value Date    WBC 5.49 03/18/2024    HGB 14.3 03/18/2024    HCT 41.9 03/18/2024    MCV 86.6 03/18/2024     03/18/2024     Lab Results   Component Value Date    GLUCOSE 98 03/18/2024    BUN 11 03/18/2024    CREATININE 0.83 03/18/2024    EGFRRESULT 90.1 01/29/2024    EGFR 79.3 03/18/2024    BCR 13.3 03/18/2024    K 3.9 03/18/2024    CO2 21.5 (L) 03/18/2024    CALCIUM 9.2 03/18/2024    PROTENTOTREF 7.2 01/29/2024    ALBUMIN 4.4 03/18/2024    BILITOT 0.3 03/18/2024    AST 25 03/18/2024    ALT 10 03/18/2024     Lab Results   Component Value Date    DCLVVQCV01 457 12/05/2022         Assessment & Plan   Diagnoses and all orders for this visit:    1. Situational anxiety (Primary)  -     clonazePAM (KlonoPIN) 0.5 MG tablet; Take 1 tablet by mouth 2 (Two) Times a Day As Needed for Anxiety.  Dispense: 30 tablet; Refill: 0  -     Ambulatory Referral to Behavioral Health    2. Fibromyalgia  -     pregabalin (LYRICA) 200 MG capsule; Take 1 capsule by mouth 2 (Two) Times a Day.  Dispense: 60 capsule; Refill: 2    3. Gastroesophageal reflux disease without esophagitis  -     dexlansoprazole (DEXILANT) 60 MG capsule; Take 1 capsule by mouth Daily.  Dispense: 30 capsule; Refill: 5    4. Acute stress reaction  -     Ambulatory Referral to Behavioral Health    5. Reactive depression  -     Ambulatory Referral to Behavioral Health    6. Screen for colon cancer  -     Ambulatory Referral For Screening Colonoscopy    Other orders  -     Naloxegol Oxalate (Movantik) 25 MG tablet; Take 1 tablet by mouth Every Morning.  Dispense: 30 tablet; Refill: 5       Chronic depression with anxiety.  Recent exacerbation of anxiety symptoms.  She did well on low-dose clonazepam per behavioral health in the past.  Was able to wean off when symptoms stabilized.  Requesting reinitiation  of this medication.  She unfortunately has not tolerated SSRI.  May benefit from SNRI as she has comorbid fibromyalgia.  She is amenable to reevaluation by behavioral health.  Referral placed.  As part of patient's treatment plan I am prescribing a controlled substance.  The patient has been made aware of the appropriate use of such medications, including potential risk of somnolence, limited ability to drive and/or work safely, and potential for dependence and/or overdose.  It has also been made clear that these medications are for use by this patient only, without concomitant use of alcohol or other substances, unless prescribed.  DANTE reviewed.    FMS stable, continue Lyrica.    Provided refills of Movantik and PPI per her request.  Encouraged to follow-up with gastroenterology per routine.    Keep routine follow-up as scheduled, sooner as needed.  Patient was encouraged to keep me informed of any acute changes, lack of improvement, or any new concerning symptoms.  Pt is aware of reasons to seek emergent care.  Patient voiced understanding of all instructions and denied further questions.    Please note that portions of this note may have been completed with a voice recognition program.

## 2024-04-10 DIAGNOSIS — K86.1 CHRONIC PANCREATITIS, UNSPECIFIED PANCREATITIS TYPE: ICD-10-CM

## 2024-04-10 RX ORDER — PROMETHAZINE HYDROCHLORIDE 25 MG/1
25 TABLET ORAL EVERY 8 HOURS PRN
Qty: 90 TABLET | Refills: 0 | Status: SHIPPED | OUTPATIENT
Start: 2024-04-10

## 2024-04-10 RX ORDER — PROMETHAZINE HYDROCHLORIDE 25 MG/1
TABLET ORAL
Qty: 90 TABLET | Refills: 0 | OUTPATIENT
Start: 2024-04-10

## 2024-04-10 NOTE — TELEPHONE ENCOUNTER
Caller: Katelynn Sung Richy    Relationship: Self    Best call back number: [phone]     Requested Prescriptions:   Requested Prescriptions     Pending Prescriptions Disp Refills    promethazine (PHENERGAN) 25 MG tablet 90 tablet 0     Sig: Take 1 tablet by mouth Every 8 (Eight) Hours As Needed for Nausea or Vomiting.        Pharmacy where request should be sent: [facility] ([place]) - [address], [address] - [phone] [facility] [phone] FX     Last office visit with prescribing clinician: 3/20/2024   Last telemedicine visit with prescribing clinician: Visit date not found   Next office visit with prescribing clinician: 4/29/2024         Does the patient have less than a 3 day supply:  [x] Yes  [] No    Would you like a call back once the refill request has been completed: [] Yes [x] No    If the office needs to give you a call back, can they leave a voicemail: [] Yes [x] No    Shira Griffin Rep   04/10/24 09:48 EDT

## 2024-04-23 DIAGNOSIS — F41.8 SITUATIONAL ANXIETY: ICD-10-CM

## 2024-04-23 RX ORDER — CLONAZEPAM 0.5 MG/1
0.5 TABLET ORAL 2 TIMES DAILY PRN
Qty: 30 TABLET | Refills: 1 | Status: SHIPPED | OUTPATIENT
Start: 2024-04-23

## 2024-04-23 NOTE — TELEPHONE ENCOUNTER
Rx Refill Note  Requested Prescriptions     Pending Prescriptions Disp Refills    clonazePAM (KlonoPIN) 0.5 MG tablet [Pharmacy Med Name: CLONAZEPAM 0.5MG] 30 tablet 0     Sig: TAKE 1 TABLET BY MOUTH 2 (TWO) TIMES A DAY AS NEEDED FOR ANXIETY.      Last office visit with prescribing clinician: 3/20/2024   Last telemedicine visit with prescribing clinician: Visit date not found   Next office visit with prescribing clinician: 4/29/2024                         Would you like a call back once the refill request has been completed: [] Yes [] No    If the office needs to give you a call back, can they leave a voicemail: [] Yes [] No    Sherley Swan MA  04/23/24, 14:18 EDT

## 2024-04-29 ENCOUNTER — OFFICE VISIT (OUTPATIENT)
Dept: FAMILY MEDICINE CLINIC | Facility: CLINIC | Age: 64
End: 2024-04-29
Payer: MEDICAID

## 2024-04-29 VITALS
DIASTOLIC BLOOD PRESSURE: 82 MMHG | WEIGHT: 191.6 LBS | HEIGHT: 64 IN | OXYGEN SATURATION: 96 % | HEART RATE: 84 BPM | SYSTOLIC BLOOD PRESSURE: 136 MMHG | BODY MASS INDEX: 32.71 KG/M2

## 2024-04-29 DIAGNOSIS — I10 PRIMARY HYPERTENSION: ICD-10-CM

## 2024-04-29 DIAGNOSIS — M81.0 AGE-RELATED OSTEOPOROSIS WITHOUT CURRENT PATHOLOGICAL FRACTURE: ICD-10-CM

## 2024-04-29 DIAGNOSIS — E66.09 CLASS 1 OBESITY DUE TO EXCESS CALORIES WITH SERIOUS COMORBIDITY AND BODY MASS INDEX (BMI) OF 32.0 TO 32.9 IN ADULT: Primary | ICD-10-CM

## 2024-04-29 DIAGNOSIS — B37.0 THRUSH: ICD-10-CM

## 2024-04-29 DIAGNOSIS — K59.09 CHRONIC CONSTIPATION: ICD-10-CM

## 2024-04-29 DIAGNOSIS — M79.7 FIBROMYALGIA: ICD-10-CM

## 2024-04-29 DIAGNOSIS — Z78.0 POSTMENOPAUSAL: ICD-10-CM

## 2024-04-29 PROCEDURE — 3079F DIAST BP 80-89 MM HG: CPT | Performed by: FAMILY MEDICINE

## 2024-04-29 PROCEDURE — 1160F RVW MEDS BY RX/DR IN RCRD: CPT | Performed by: FAMILY MEDICINE

## 2024-04-29 PROCEDURE — 1159F MED LIST DOCD IN RCRD: CPT | Performed by: FAMILY MEDICINE

## 2024-04-29 PROCEDURE — 99214 OFFICE O/P EST MOD 30 MIN: CPT | Performed by: FAMILY MEDICINE

## 2024-04-29 PROCEDURE — 3075F SYST BP GE 130 - 139MM HG: CPT | Performed by: FAMILY MEDICINE

## 2024-04-29 RX ORDER — PHENTERMINE HYDROCHLORIDE 37.5 MG/1
TABLET ORAL
Qty: 30 TABLET | Refills: 0 | Status: SHIPPED | OUTPATIENT
Start: 2024-04-29

## 2024-04-29 RX ORDER — FLUCONAZOLE 150 MG/1
TABLET ORAL
Qty: 2 TABLET | Refills: 0 | Status: SHIPPED | OUTPATIENT
Start: 2024-04-29

## 2024-04-29 RX ORDER — GABAPENTIN 800 MG/1
800 TABLET ORAL 3 TIMES DAILY
Qty: 90 TABLET | Refills: 0 | Status: SHIPPED | OUTPATIENT
Start: 2024-04-29

## 2024-04-29 NOTE — PROGRESS NOTES
Subjective   Katelynn Sung is a 64 y.o. female.     History of Present Illness  Here for f/u on FMSx. She continues to have chronic fatigue, difficulty losing weight, aching in joints. Wants to try weight loss med. Weaning down on Lyrica has not helped her lose weight. Would like to try gabapentin in place of lyrica.    Use MOM for chronic constipation, taking Movantik as well. Does not feel it was as helpful as previously.    Seen in ER 1 month ago for CP. Negative eval. Told it was likely anxiety and/or GI upset.    Has recurrent thrush. Requesting diflucan as nystatin susp does not work as well.     Believes her last pap smear was done by Dr. Palomino. She is unsure of date. Feels it was over 5 yrs ago.    Overdue for DEXA. Not currently on bisphosphonate. On chronic PPI due to Humphreys's esophagus.    HTN - on zestoretic. Taking as rx'd. BP fairly well controlled.    Chronic dep with anxiety. Feels mood recently stable.   On klonopin bid. No aberrant behavior. Followed closely by suboxone clinic.    The following portions of the patient's history were reviewed and updated as appropriate: allergies, current medications, past family history, past medical history, past social history, past surgical history, and problem list.    Review of Systems   Constitutional:  Positive for fatigue. Negative for diaphoresis and fever.   HENT:  Positive for congestion and postnasal drip. Negative for ear pain, mouth sores, nosebleeds, rhinorrhea and sore throat.    Eyes:  Negative for visual disturbance.   Respiratory:  Positive for shortness of breath (mild with exertion). Negative for cough and wheezing.    Cardiovascular:  Negative for chest pain, palpitations and leg swelling.   Gastrointestinal:  Positive for abdominal pain (chronic, stable), constipation and nausea. Negative for blood in stool, rectal pain and vomiting.   Endocrine: Positive for heat intolerance. Negative for polydipsia and polyuria.   Genitourinary:   Negative for difficulty urinating, dysuria, genital sores, hematuria, pelvic pain, vaginal bleeding and vaginal discharge.   Musculoskeletal:  Positive for arthralgias, back pain, myalgias, neck pain and neck stiffness.   Skin:  Negative for rash and wound.   Neurological:  Positive for dizziness, weakness (generalized) and headaches. Negative for tremors and syncope.   Hematological:  Negative for adenopathy. Does not bruise/bleed easily.   Psychiatric/Behavioral:  Positive for dysphoric mood. Negative for confusion and suicidal ideas. The patient is nervous/anxious.    Pt's previous ROS reviewed and updated as indicated.       Objective   Vitals:    04/29/24 1354   BP: 136/82   Pulse: 84   SpO2: 96%     Body mass index is 32.89 kg/m².      04/29/24  1354   Weight: 86.9 kg (191 lb 9.6 oz)         Physical Exam  Vitals and nursing note reviewed.   Constitutional:       General: She is not in acute distress.     Appearance: She is well-groomed. She is obese. She is not ill-appearing.   HENT:      Head: Atraumatic.      Mouth/Throat:      Mouth: Mucous membranes are moist.   Eyes:      General: No scleral icterus.     Conjunctiva/sclera: Conjunctivae normal.   Neck:      Thyroid: No thyroid mass.   Cardiovascular:      Rate and Rhythm: Normal rate and regular rhythm.      Pulses: Normal pulses.      Heart sounds: Normal heart sounds.   Pulmonary:      Effort: Pulmonary effort is normal.      Breath sounds: Decreased breath sounds present. No wheezing, rhonchi or rales.   Abdominal:      Palpations: There is no mass.      Tenderness: There is no abdominal tenderness.   Musculoskeletal:      Right lower leg: No edema.      Left lower leg: No edema.   Lymphadenopathy:      Cervical: No cervical adenopathy.   Skin:     General: Skin is warm and dry.      Coloration: Skin is not jaundiced or pale.      Findings: No bruising or rash.   Neurological:      Mental Status: She is alert and oriented to person, place, and time.       Gait: Gait is intact.   Psychiatric:         Mood and Affect: Mood and affect normal.         Speech: Speech normal.         Behavior: Behavior normal. Behavior is cooperative.         Thought Content: Thought content normal.         Cognition and Memory: Cognition normal.     Pt's previous physical exam reviewed and updated as indicated.    Lab Results   Component Value Date    WBC 5.49 03/18/2024    HGB 14.3 03/18/2024    HCT 41.9 03/18/2024    MCV 86.6 03/18/2024     03/18/2024       Lab Results   Component Value Date    GLUCOSE 98 03/18/2024    BUN 11 03/18/2024    CREATININE 0.83 03/18/2024    EGFRIFNONA 80 12/09/2021    EGFRIFAFRI 97 12/09/2021    BCR 13.3 03/18/2024    K 3.9 03/18/2024    CO2 21.5 (L) 03/18/2024    CALCIUM 9.2 03/18/2024    PROTENTOTREF 7.2 01/29/2024    ALBUMIN 4.4 03/18/2024    LABIL2 1.8 01/29/2024    AST 25 03/18/2024    ALT 10 03/18/2024       Lab Results   Component Value Date    CHLPL 200 01/29/2024    TRIG 192 (H) 01/29/2024    HDL 43 01/29/2024     (H) 01/29/2024       Lab Results   Component Value Date    HGBA1C 5.90 (H) 01/29/2024       Lab Results   Component Value Date    TSH 2.250 07/24/2023         Assessment & Plan   Diagnoses and all orders for this visit:    1. Class 1 obesity due to excess calories with serious comorbidity and body mass index (BMI) of 32.0 to 32.9 in adult (Primary)  -     phentermine (Adipex-P) 37.5 MG tablet; 1/2 po at 10 AM x 2 weeks, the increase to 1/2 po bid if tolerated  Dispense: 30 tablet; Refill: 0    2. Fibromyalgia  -     gabapentin (Neurontin) 800 MG tablet; Take 1 tablet by mouth 3 (Three) Times a Day. TO BE FILLED 5/19/24.  Dispense: 90 tablet; Refill: 0    3. Age-related osteoporosis without current pathological fracture  -     DEXA Bone Density Axial; Future    4. Postmenopausal  -     DEXA Bone Density Axial; Future    5. Primary hypertension    6. Chronic constipation    7. Thrush    Other orders  -     fluconazole  (Diflucan) 150 MG tablet; 1 PO NOW, REPEAT 5 DAYS  Dispense: 2 tablet; Refill: 0       Risks/benefits and potential side effects of various treatment options for obesity have been reviewed with patient.  I have also reviewed the necessity of consistent lifestyle modification (including willingness to exercise on a regular basis and adopt healthful dietary habits) in order to attain and maintain a healthy BMI. Not able to take GLP meds due ot h/o chronic pancreatitis.  Patient voiced understanding and wishes to proceed with a trial of Adipex.  An informational handout specific to this medication has been provided for patient review.  FDA Medication Guide provided as indicated.    Nutrition and activity goals reviewed including: mainly water to drink, limit white flour/processed sugar, higher lean protein, high fiber carbs, regular meals, working toward 150 mins cardio per week. Goal of less than 1800 kcal per day.    HTN - controlled. Continue zestoretic. Pt advised to eat a heart healthy diet and get regular aerobic exercise.    Continue movantik, MOM for chronic constipation. Encouraged regular f/u with GI.    FMSx with weight gain over time while on lyrica. Trial of gabapentin instead at time of next refill.   As part of patient's treatment plan I am prescribing a controlled substance.  The patient has been made aware of the appropriate use of such medications, including potential risk of somnolence, limited ability to drive and/or work safely, and potential for dependence and/or overdose.  It has also been made clear that these medications are for use by this patient only, without concomitant use of alcohol or other substances, unless prescribed.  History and physical exam exhibit continued safe and appropriate use of controlled substance.  Dante reviewed  Patient has completed a prescribing agreement detailing terms of continued prescribing of controlled substances, including monitoring DANTE reports, urine  drug screening, and pill counts if necessary.  Patient is aware that inappropriate use will result in cessation of prescribing such medications.    She is reminded she is overdue for pap smear, encouraged to schedule with PCP or GYN at earliest convenience.    Reminded she is overdue for colon CA screening. Order placed 3/2024. She is encouraged to keep regular f/u with gastroenterology.    Routine f/u in 1 month, f/u sooner as needed.  Patient was encouraged to keep me informed of any acute changes, lack of improvement, or any new concerning symptoms.  Pt is aware of reasons to seek emergent care.  Patient voiced understanding of all instructions and denied further questions.    Please note that portions of this note may have been completed with a voice recognition program.

## 2024-05-09 DIAGNOSIS — K86.1 CHRONIC PANCREATITIS, UNSPECIFIED PANCREATITIS TYPE: ICD-10-CM

## 2024-05-09 RX ORDER — PROMETHAZINE HYDROCHLORIDE 25 MG/1
25 TABLET ORAL EVERY 8 HOURS PRN
Qty: 90 TABLET | Refills: 0 | Status: SHIPPED | OUTPATIENT
Start: 2024-05-09

## 2024-05-15 ENCOUNTER — OFFICE VISIT (OUTPATIENT)
Dept: FAMILY MEDICINE CLINIC | Facility: CLINIC | Age: 64
End: 2024-05-15
Payer: MEDICAID

## 2024-05-15 VITALS
SYSTOLIC BLOOD PRESSURE: 128 MMHG | HEART RATE: 71 BPM | HEIGHT: 64 IN | DIASTOLIC BLOOD PRESSURE: 80 MMHG | OXYGEN SATURATION: 93 % | BODY MASS INDEX: 31.28 KG/M2 | WEIGHT: 183.2 LBS

## 2024-05-15 DIAGNOSIS — M79.7 FIBROMYALGIA: ICD-10-CM

## 2024-05-15 DIAGNOSIS — J42 CHRONIC BRONCHITIS, UNSPECIFIED CHRONIC BRONCHITIS TYPE: ICD-10-CM

## 2024-05-15 DIAGNOSIS — E66.09 CLASS 1 OBESITY DUE TO EXCESS CALORIES WITH SERIOUS COMORBIDITY AND BODY MASS INDEX (BMI) OF 31.0 TO 31.9 IN ADULT: Primary | ICD-10-CM

## 2024-05-15 DIAGNOSIS — I10 PRIMARY HYPERTENSION: ICD-10-CM

## 2024-05-15 DIAGNOSIS — R73.03 PREDIABETES: ICD-10-CM

## 2024-05-15 PROCEDURE — 3074F SYST BP LT 130 MM HG: CPT | Performed by: FAMILY MEDICINE

## 2024-05-15 PROCEDURE — 3079F DIAST BP 80-89 MM HG: CPT | Performed by: FAMILY MEDICINE

## 2024-05-15 PROCEDURE — 99214 OFFICE O/P EST MOD 30 MIN: CPT | Performed by: FAMILY MEDICINE

## 2024-05-15 PROCEDURE — 1160F RVW MEDS BY RX/DR IN RCRD: CPT | Performed by: FAMILY MEDICINE

## 2024-05-15 PROCEDURE — 1159F MED LIST DOCD IN RCRD: CPT | Performed by: FAMILY MEDICINE

## 2024-05-15 PROCEDURE — 1126F AMNT PAIN NOTED NONE PRSNT: CPT | Performed by: FAMILY MEDICINE

## 2024-05-15 RX ORDER — FLUTICASONE FUROATE AND VILANTEROL 200; 25 UG/1; UG/1
1 POWDER RESPIRATORY (INHALATION)
Qty: 28 EACH | Refills: 11 | Status: SHIPPED | OUTPATIENT
Start: 2024-05-15

## 2024-05-15 RX ORDER — PREGABALIN 200 MG/1
200 CAPSULE ORAL 2 TIMES DAILY
Qty: 60 CAPSULE | Refills: 2 | Status: SHIPPED | OUTPATIENT
Start: 2024-05-15

## 2024-05-15 NOTE — PROGRESS NOTES
Subjective   Katelynn Sung is a 64 y.o. female.     History of Present Illness  Here for f/u on medical mgnt of obesity. Started on phentermine last visit. Had to stop as she developed irritability, gritting of teeth. She kept up life modification efforts including limiting sugar, decreasing portions, avoiding soda, etc. She is down 8 lbs. Feeling better.    Comorbidities include HTN, preDM. On zestoretic. Taking as rx'd. Working on diabetic appropriate eating.    COPD/chronic bronchitis - recently stable. Needs refill of Breo.     Has FMSx, currently on Lyrica. Due for routine refills. Denies side effects. Symptoms recently stable.    The following portions of the patient's history were reviewed and updated as appropriate: allergies, current medications, past family history, past medical history, past social history, past surgical history, and problem list.    Review of Systems   Constitutional:  Positive for fatigue. Negative for diaphoresis and fever.   HENT:  Positive for congestion and postnasal drip. Negative for ear pain, mouth sores, nosebleeds, rhinorrhea and sore throat.    Eyes:  Negative for visual disturbance.   Respiratory:  Positive for shortness of breath (mild with exertion). Negative for cough and wheezing.    Cardiovascular:  Negative for chest pain, palpitations and leg swelling.   Gastrointestinal:  Positive for abdominal pain (chronic, stable), constipation and nausea. Negative for blood in stool, rectal pain and vomiting.   Endocrine: Positive for heat intolerance. Negative for polydipsia and polyuria.   Genitourinary:  Negative for difficulty urinating, dysuria, genital sores, hematuria, pelvic pain, vaginal bleeding and vaginal discharge.   Musculoskeletal:  Positive for arthralgias, back pain, myalgias, neck pain and neck stiffness.   Skin:  Negative for rash and wound.   Neurological:  Positive for dizziness, weakness (generalized) and headaches. Negative for tremors and syncope.    Hematological:  Negative for adenopathy. Does not bruise/bleed easily.   Psychiatric/Behavioral:  Positive for dysphoric mood. Negative for confusion and suicidal ideas. The patient is nervous/anxious.    Pt's previous ROS reviewed and updated as indicated.       Objective   Vitals:    05/15/24 1029   BP: 128/80   Pulse: 71   SpO2: 93%     Body mass index is 31.45 kg/m².      05/15/24  1029   Weight: 83.1 kg (183 lb 3.2 oz)       Physical Exam  Vitals and nursing note reviewed.   Constitutional:       General: She is not in acute distress.     Appearance: She is well-groomed. She is obese. She is not ill-appearing.   HENT:      Head: Atraumatic.      Mouth/Throat:      Mouth: Mucous membranes are moist.   Eyes:      General: No scleral icterus.     Conjunctiva/sclera: Conjunctivae normal.   Cardiovascular:      Rate and Rhythm: Normal rate and regular rhythm.      Pulses: Normal pulses.      Heart sounds: Normal heart sounds.   Pulmonary:      Effort: Pulmonary effort is normal.      Breath sounds: Decreased breath sounds present. No wheezing, rhonchi or rales.   Musculoskeletal:      Right lower leg: No edema.      Left lower leg: No edema.   Skin:     General: Skin is warm and dry.      Coloration: Skin is not jaundiced or pale.      Findings: No bruising or rash.   Neurological:      Mental Status: She is alert and oriented to person, place, and time.      Gait: Gait is intact.   Psychiatric:         Mood and Affect: Mood and affect normal.         Behavior: Behavior normal. Behavior is cooperative.         Cognition and Memory: Cognition normal.     Pt's previous physical exam reviewed and updated as indicated.    Lab Results   Component Value Date    WBC 5.49 03/18/2024    HGB 14.3 03/18/2024    HCT 41.9 03/18/2024    MCV 86.6 03/18/2024     03/18/2024       Lab Results   Component Value Date    GLUCOSE 98 03/18/2024    BUN 11 03/18/2024    CREATININE 0.83 03/18/2024    EGFRIFNONA 80 12/09/2021     EGFRIFAFRI 97 12/09/2021    BCR 13.3 03/18/2024    K 3.9 03/18/2024    CO2 21.5 (L) 03/18/2024    CALCIUM 9.2 03/18/2024    PROTENTOTREF 7.2 01/29/2024    ALBUMIN 4.4 03/18/2024    LABIL2 1.8 01/29/2024    AST 25 03/18/2024    ALT 10 03/18/2024       Lab Results   Component Value Date    CHLPL 200 01/29/2024    TRIG 192 (H) 01/29/2024    HDL 43 01/29/2024     (H) 01/29/2024       Lab Results   Component Value Date    HGBA1C 5.90 (H) 01/29/2024       Lab Results   Component Value Date    TSH 2.250 07/24/2023       Assessment & Plan   Diagnoses and all orders for this visit:    1. Class 1 obesity due to excess calories with serious comorbidity and body mass index (BMI) of 31.0 to 31.9 in adult (Primary)    2. Chronic bronchitis, unspecified chronic bronchitis type  -     Fluticasone Furoate-Vilanterol (Breo Ellipta) 200-25 MCG/ACT inhaler; Inhale 1 puff Daily.  Dispense: 28 each; Refill: 11    3. Fibromyalgia  -     pregabalin (LYRICA) 200 MG capsule; Take 1 capsule by mouth 2 (Two) Times a Day.  Dispense: 60 capsule; Refill: 2    4. Primary hypertension    5. Prediabetes       BMI is >= 30 and <35. (Class 1 Obesity). The following options were offered after discussion;: exercise counseling/recommendations and nutrition counseling/recommendations. Nutrition and activity goals reviewed including: mainly water to drink, limit white flour/processed sugar, higher lean protein, high fiber carbs, regular meals, working toward 150 mins cardio per week.    COPD stable, continue Breo.     HTN - continue zestoretic.  Pt advised to eat a heart healthy diet and get regular aerobic exercise.    preDM - preventive measures reviewed.    FMSx - stable. Continue lyrica. Denies side effects. No aberrant behavior.  I will contact patient regarding test results and provide instructions regarding any necessary changes in plan of care.  Patient was encouraged to keep me informed of any acute changes, lack of improvement, or any new  concerning symptoms.  Pt is aware of reasons to seek emergent care.  Patient voiced understanding of all instructions and denied further questions.    Please note that portions of this note may have been completed with a voice recognition program.

## 2024-05-16 ENCOUNTER — PRIOR AUTHORIZATION (OUTPATIENT)
Dept: FAMILY MEDICINE CLINIC | Facility: CLINIC | Age: 64
End: 2024-05-16
Payer: MEDICAID

## 2024-05-16 NOTE — TELEPHONE ENCOUNTER
A PRIOR AUTH HAS BEEN STARTED THROUGH [employer] FOR BREO.    WAITING ON A RESPONSE FROM THE INSURANCE.    Key: [id number]    APPROVED.    START: 05/16/2024    END: 05/16/2025      PHARMACY HAS BEEN NOTIFIED.

## 2024-05-18 PROBLEM — R73.03 PREDIABETES: Status: ACTIVE | Noted: 2024-05-18

## 2024-05-30 DIAGNOSIS — F41.8 SITUATIONAL ANXIETY: ICD-10-CM

## 2024-05-30 NOTE — TELEPHONE ENCOUNTER
Rx Refill Note  Requested Prescriptions     Pending Prescriptions Disp Refills    clonazePAM (KlonoPIN) 0.5 MG tablet [Pharmacy Med Name: CLONAZEPAM 0.5MG] 30 tablet 0     Sig: TAKE 1 TABLET BY MOUTH 2 (TWO) TIMES A DAY AS NEEDED FOR ANXIETY.      Last office visit with prescribing clinician: 5/15/2024   Last telemedicine visit with prescribing clinician: Visit date not found   Next office visit with prescribing clinician: 8/19/2024                         Would you like a call back once the refill request has been completed: [] Yes [] No    If the office needs to give you a call back, can they leave a voicemail: [] Yes [] No    Sherley Swan MA  05/30/24, 11:07 EDT

## 2024-06-01 RX ORDER — CLONAZEPAM 0.5 MG/1
0.5 TABLET ORAL 2 TIMES DAILY PRN
Qty: 45 TABLET | Refills: 0 | Status: SHIPPED | OUTPATIENT
Start: 2024-06-01

## 2024-06-07 DIAGNOSIS — K86.1 CHRONIC PANCREATITIS, UNSPECIFIED PANCREATITIS TYPE: ICD-10-CM

## 2024-06-07 RX ORDER — PROMETHAZINE HYDROCHLORIDE 25 MG/1
25 TABLET ORAL EVERY 8 HOURS PRN
Qty: 90 TABLET | Refills: 0 | Status: SHIPPED | OUTPATIENT
Start: 2024-06-07

## 2024-06-26 RX ORDER — FLUCONAZOLE 150 MG/1
TABLET ORAL
Qty: 2 TABLET | Refills: 0 | OUTPATIENT
Start: 2024-06-26

## 2024-07-02 ENCOUNTER — TELEPHONE (OUTPATIENT)
Dept: FAMILY MEDICINE CLINIC | Facility: CLINIC | Age: 64
End: 2024-07-02
Payer: MEDICAID

## 2024-07-02 DIAGNOSIS — F41.8 SITUATIONAL ANXIETY: ICD-10-CM

## 2024-07-02 DIAGNOSIS — K86.1 CHRONIC PANCREATITIS, UNSPECIFIED PANCREATITIS TYPE: ICD-10-CM

## 2024-07-02 RX ORDER — PROMETHAZINE HYDROCHLORIDE 25 MG/1
25 TABLET ORAL EVERY 8 HOURS PRN
Qty: 90 TABLET | Refills: 0 | Status: SHIPPED | OUTPATIENT
Start: 2024-07-02

## 2024-07-02 NOTE — TELEPHONE ENCOUNTER
Rx Refill Note  Requested Prescriptions     Pending Prescriptions Disp Refills    clonazePAM (KlonoPIN) 0.5 MG tablet [Pharmacy Med Name: CLONAZEPAM 0.5MG] 45 tablet 0     Sig: TAKE 1 TABLET BY MOUTH 2 (TWO) TIMES A DAY AS NEEDED FOR ANXIETY.     Signed Prescriptions Disp Refills    promethazine (PHENERGAN) 25 MG tablet 90 tablet 0     Sig: TAKE 1 TABLET BY MOUTH EVERY 8 (EIGHT) HOURS AS NEEDED FOR NAUSEA OR VOMITING.     Authorizing Provider: ERROL GUERRIER     Ordering User: SHERLEY SWAN      Last office visit with prescribing clinician: 5/15/2024   Last telemedicine visit with prescribing clinician: Visit date not found   Next office visit with prescribing clinician: 8/19/2024                         Would you like a call back once the refill request has been completed: [] Yes [] No    If the office needs to give you a call back, can they leave a voicemail: [] Yes [] No    Sherley Swan MA  07/02/24, 17:15 EDT

## 2024-07-03 NOTE — TELEPHONE ENCOUNTER
DANTE reviewed. CSA needs to be updated. Pt needs to drop in for updated UDS. Let me know when this is completed and I will send her med in electronically. Pt to keep f/u apt as scheduled.

## 2024-07-08 ENCOUNTER — CLINICAL SUPPORT (OUTPATIENT)
Dept: FAMILY MEDICINE CLINIC | Facility: CLINIC | Age: 64
End: 2024-07-08
Payer: MEDICAID

## 2024-07-08 DIAGNOSIS — Z51.81 THERAPEUTIC DRUG MONITORING: Primary | ICD-10-CM

## 2024-07-08 RX ORDER — CLONAZEPAM 0.5 MG/1
0.5 TABLET ORAL 2 TIMES DAILY PRN
Qty: 45 TABLET | Refills: 2 | Status: SHIPPED | OUTPATIENT
Start: 2024-07-08

## 2024-07-09 ENCOUNTER — OFFICE VISIT (OUTPATIENT)
Dept: FAMILY MEDICINE CLINIC | Facility: CLINIC | Age: 64
End: 2024-07-09
Payer: MEDICAID

## 2024-07-09 VITALS
SYSTOLIC BLOOD PRESSURE: 128 MMHG | HEART RATE: 74 BPM | WEIGHT: 183.2 LBS | HEIGHT: 64 IN | BODY MASS INDEX: 31.28 KG/M2 | DIASTOLIC BLOOD PRESSURE: 84 MMHG | OXYGEN SATURATION: 93 %

## 2024-07-09 DIAGNOSIS — J42 CHRONIC BRONCHITIS, UNSPECIFIED CHRONIC BRONCHITIS TYPE: ICD-10-CM

## 2024-07-09 DIAGNOSIS — J06.9 URI, ACUTE: Primary | ICD-10-CM

## 2024-07-09 PROCEDURE — 99213 OFFICE O/P EST LOW 20 MIN: CPT | Performed by: FAMILY MEDICINE

## 2024-07-09 PROCEDURE — 3079F DIAST BP 80-89 MM HG: CPT | Performed by: FAMILY MEDICINE

## 2024-07-09 PROCEDURE — 1160F RVW MEDS BY RX/DR IN RCRD: CPT | Performed by: FAMILY MEDICINE

## 2024-07-09 PROCEDURE — 3074F SYST BP LT 130 MM HG: CPT | Performed by: FAMILY MEDICINE

## 2024-07-09 PROCEDURE — 1159F MED LIST DOCD IN RCRD: CPT | Performed by: FAMILY MEDICINE

## 2024-07-09 PROCEDURE — 1126F AMNT PAIN NOTED NONE PRSNT: CPT | Performed by: FAMILY MEDICINE

## 2024-07-09 RX ORDER — AZITHROMYCIN 250 MG/1
TABLET, FILM COATED ORAL
Qty: 6 TABLET | Refills: 0 | Status: SHIPPED | OUTPATIENT
Start: 2024-07-09

## 2024-07-09 RX ORDER — FLUCONAZOLE 150 MG/1
TABLET ORAL
Qty: 2 TABLET | Refills: 0 | Status: SHIPPED | OUTPATIENT
Start: 2024-07-09

## 2024-07-09 RX ORDER — PREDNISONE 10 MG/1
10 TABLET ORAL DAILY
Qty: 5 TABLET | Refills: 0 | Status: SHIPPED | OUTPATIENT
Start: 2024-07-09 | End: 2024-07-14

## 2024-07-09 RX ORDER — METHYLPREDNISOLONE ACETATE 40 MG/ML
40 INJECTION, SUSPENSION INTRA-ARTICULAR; INTRALESIONAL; INTRAMUSCULAR; SOFT TISSUE ONCE
Status: SHIPPED | OUTPATIENT
Start: 2024-07-09

## 2024-07-10 NOTE — PROGRESS NOTES
Subjective   Katelynn Sung is a 64 y.o. female.     History of Present Illness  She c/o cough  Cough  This is a recurrent problem. The current episode started 1 to 4 weeks ago (approx 10 days). The problem has been worse. The problem occurs intermittent. The cough is Productive of sputum. Associated symptoms include headaches, myalgias, nasal congestion, postnasal drip, shortness of breath and wheezing. Pertinent negatives include no chest pain, ear pain, fever, hemoptysis, rash, rhinorrhea, sore throat or weight loss. The symptoms are aggravated by lying down and exercise. She has tried a beta-agonist inhaler for the symptoms. The treatment provided mild relief. Her past medical history is significant for COPD.       The following portions of the patient's history were reviewed and updated as appropriate: allergies, current medications, past family history, past medical history, past social history, past surgical history, and problem list.    Review of Systems   Constitutional:  Positive for fatigue. Negative for diaphoresis, fever, unexpected weight change and weight loss.   HENT:  Positive for congestion and postnasal drip. Negative for ear pain, mouth sores, nosebleeds, rhinorrhea and sore throat.    Eyes:  Negative for visual disturbance.   Respiratory:  Positive for cough, shortness of breath and wheezing. Negative for hemoptysis.    Cardiovascular:  Negative for chest pain, palpitations and leg swelling.   Gastrointestinal:  Positive for abdominal pain (chronic, stable), constipation and nausea. Negative for blood in stool, rectal pain and vomiting.   Endocrine: Positive for heat intolerance. Negative for polydipsia and polyuria.   Genitourinary:  Negative for difficulty urinating, dysuria, genital sores, hematuria, pelvic pain, vaginal bleeding and vaginal discharge.   Musculoskeletal:  Positive for arthralgias, back pain, myalgias, neck pain and neck stiffness.   Skin:  Negative for rash and wound.    Neurological:  Positive for dizziness, weakness (generalized) and headaches. Negative for tremors and syncope.   Hematological:  Negative for adenopathy. Does not bruise/bleed easily.   Psychiatric/Behavioral:  Positive for dysphoric mood. Negative for confusion and suicidal ideas. The patient is nervous/anxious.    Pt's previous ROS reviewed and updated as indicated.       Objective   Vitals:    07/09/24 1009   BP: 128/84   Pulse: 74   SpO2: 93%     Body mass index is 31.45 kg/m².      07/09/24  1009   Weight: 83.1 kg (183 lb 3.2 oz)           Physical Exam  Vitals and nursing note reviewed.   Constitutional:       General: She is not in acute distress.     Appearance: She is well-groomed and overweight. She is ill-appearing (mildly).   HENT:      Right Ear: Tympanic membrane, ear canal and external ear normal.      Left Ear: Tympanic membrane, ear canal and external ear normal.      Nose: Congestion present. No rhinorrhea.      Right Sinus: Maxillary sinus tenderness present.      Left Sinus: Maxillary sinus tenderness present.      Mouth/Throat:      Mouth: Mucous membranes are moist.      Pharynx: Oropharynx is clear. No oropharyngeal exudate or posterior oropharyngeal erythema.   Eyes:      General: No scleral icterus.     Conjunctiva/sclera: Conjunctivae normal.   Cardiovascular:      Rate and Rhythm: Normal rate and regular rhythm.      Pulses: Normal pulses.      Heart sounds: Normal heart sounds.   Pulmonary:      Effort: Pulmonary effort is normal.      Breath sounds: Decreased breath sounds (mildly) present. No wheezing, rhonchi or rales.   Musculoskeletal:      Right lower leg: No edema.      Left lower leg: No edema.   Lymphadenopathy:      Cervical: No cervical adenopathy.   Skin:     General: Skin is warm and dry.      Coloration: Skin is not cyanotic, jaundiced or pale.      Findings: No bruising or rash.   Neurological:      Mental Status: She is alert and oriented to person, place, and time.       Gait: Gait is intact.   Psychiatric:         Behavior: Behavior normal. Behavior is cooperative.         Cognition and Memory: Cognition normal.     Pt's previous physical exam reviewed and updated as indicated.        Assessment & Plan   Diagnoses and all orders for this visit:    1. URI, acute (Primary)  -     methylPREDNISolone acetate (DEPO-medrol) injection 40 mg    2. Chronic bronchitis, unspecified chronic bronchitis type    Other orders  -     azithromycin (Zithromax Z-Bolivar) 250 MG tablet; Take 2 tablets the first day, then 1 tablet daily for 4 days.  Dispense: 6 tablet; Refill: 0  -     fluconazole (Diflucan) 150 MG tablet; 1 now and repeat in 5 days  Dispense: 2 tablet; Refill: 0  -     predniSONE (DELTASONE) 10 MG tablet; Take 1 tablet by mouth Daily for 5 days. Start Wednesday  Dispense: 5 tablet; Refill: 0       Continue albuterol prn, as well as daily Breo.  Routine f/u as scheduled, f/u sooner as needed.  Patient was encouraged to keep me informed of any acute changes, lack of improvement, or any new concerning symptoms.  Pt is aware of reasons to seek emergent care.  Patient voiced understanding of all instructions and denied further questions.    Please note that portions of this note may have been completed with a voice recognition program.

## 2024-07-17 LAB — DRUGS UR: NORMAL

## 2024-07-29 ENCOUNTER — TELEPHONE (OUTPATIENT)
Dept: FAMILY MEDICINE CLINIC | Facility: CLINIC | Age: 64
End: 2024-07-29

## 2024-07-29 DIAGNOSIS — F41.8 SITUATIONAL ANXIETY: ICD-10-CM

## 2024-07-29 RX ORDER — CLONAZEPAM 0.5 MG/1
0.5 TABLET ORAL 2 TIMES DAILY PRN
Qty: 45 TABLET | Refills: 2 | Status: CANCELLED | OUTPATIENT
Start: 2024-07-29

## 2024-07-29 NOTE — TELEPHONE ENCOUNTER
Rx Refill Note  Requested Prescriptions     Pending Prescriptions Disp Refills    clonazePAM (KlonoPIN) 0.5 MG tablet 45 tablet 2     Sig: Take 1 tablet by mouth 2 (Two) Times a Day As Needed for Anxiety.      Last office visit with prescribing clinician: 7/9/2024   Last telemedicine visit with prescribing clinician: Visit date not found   Next office visit with prescribing clinician: 8/19/2024     Adrienne Medrano MA  07/29/24, 17:08 EDT

## 2024-07-29 NOTE — TELEPHONE ENCOUNTER
Caller: Katelynn Sung    Relationship: Self    Best call back number: [phone]     Requested Prescriptions: NEEDS SOONER REFILL DUE TO FAMILY ISSUE   Requested Prescriptions     Pending Prescriptions Disp Refills    clonazePAM (KlonoPIN) 0.5 MG tablet 45 tablet 2     Sig: Take 1 tablet by mouth 2 (Two) Times a Day As Needed for Anxiety.        Pharmacy where request should be sent: [facility] ([place]) - [address], [address] - [phone] [facility] [phone] FX     Last office visit with prescribing clinician: 7/9/2024   Last telemedicine visit with prescribing clinician: Visit date not found   Next office visit with prescribing clinician: 8/19/2024     Additional details provided by patient: NEEDS SOONER REFILL DUE TO FAMILY ISSUE. CALL IF ANY QUESTIONS OR UNABLE TO FILL.     Does the patient have less than a 3 day supply:  [x] Yes  [] No    Would you like a call back once the refill request has been completed: [] Yes [x] No    If the office needs to give you a call back, can they leave a voicemail: [] Yes [x] No    Shira Gould Rep   07/29/24 15:25 EDT

## 2024-08-06 ENCOUNTER — OFFICE VISIT (OUTPATIENT)
Dept: GASTROENTEROLOGY | Facility: CLINIC | Age: 64
End: 2024-08-06
Payer: MEDICAID

## 2024-08-06 ENCOUNTER — HOSPITAL ENCOUNTER (OUTPATIENT)
Dept: CT IMAGING | Facility: HOSPITAL | Age: 64
Discharge: HOME | End: 2024-08-06
Payer: MEDICAID

## 2024-08-06 ENCOUNTER — LAB (OUTPATIENT)
Dept: LAB | Facility: HOSPITAL | Age: 64
End: 2024-08-06
Payer: MEDICAID

## 2024-08-06 VITALS
OXYGEN SATURATION: 96 % | WEIGHT: 183 LBS | SYSTOLIC BLOOD PRESSURE: 142 MMHG | BODY MASS INDEX: 31.41 KG/M2 | HEART RATE: 68 BPM | DIASTOLIC BLOOD PRESSURE: 80 MMHG

## 2024-08-06 DIAGNOSIS — R10.10 UPPER ABDOMINAL PAIN: ICD-10-CM

## 2024-08-06 DIAGNOSIS — R11.0 NAUSEA: ICD-10-CM

## 2024-08-06 DIAGNOSIS — R10.10 UPPER ABDOMINAL PAIN: Primary | ICD-10-CM

## 2024-08-06 DIAGNOSIS — R13.19 ESOPHAGEAL DYSPHAGIA: ICD-10-CM

## 2024-08-06 DIAGNOSIS — R68.81 EARLY SATIETY: ICD-10-CM

## 2024-08-06 DIAGNOSIS — Z86.010 HISTORY OF COLON POLYPS: ICD-10-CM

## 2024-08-06 LAB
ALBUMIN SERPL-MCNC: 4.4 G/DL (ref 3.5–5.2)
ALBUMIN/GLOB SERPL: 1.3 G/DL
ALP SERPL-CCNC: 77 U/L (ref 39–117)
ALT SERPL W P-5'-P-CCNC: 11 U/L (ref 1–33)
ANION GAP SERPL CALCULATED.3IONS-SCNC: 9.2 MMOL/L (ref 5–15)
AST SERPL-CCNC: 25 U/L (ref 1–32)
BILIRUB SERPL-MCNC: 0.4 MG/DL (ref 0–1.2)
BUN SERPL-MCNC: 12 MG/DL (ref 8–23)
BUN/CREAT SERPL: 14 (ref 7–25)
CALCIUM SPEC-SCNC: 9.1 MG/DL (ref 8.6–10.5)
CHLORIDE SERPL-SCNC: 104 MMOL/L (ref 98–107)
CO2 SERPL-SCNC: 23.8 MMOL/L (ref 22–29)
CREAT SERPL-MCNC: 0.86 MG/DL (ref 0.57–1)
DEPRECATED RDW RBC AUTO: 41.1 FL (ref 37–54)
EGFRCR SERPLBLD CKD-EPI 2021: 76 ML/MIN/1.73
ERYTHROCYTE [DISTWIDTH] IN BLOOD BY AUTOMATED COUNT: 13.8 % (ref 12.3–15.4)
GLOBULIN UR ELPH-MCNC: 3.5 GM/DL
GLUCOSE SERPL-MCNC: 87 MG/DL (ref 65–99)
HCT VFR BLD AUTO: 40.8 % (ref 34–46.6)
HGB BLD-MCNC: 14 G/DL (ref 12–15.9)
LIPASE SERPL-CCNC: 53 U/L (ref 13–60)
MCH RBC QN AUTO: 28.5 PG (ref 26.6–33)
MCHC RBC AUTO-ENTMCNC: 34.3 G/DL (ref 31.5–35.7)
MCV RBC AUTO: 83.1 FL (ref 79–97)
PLATELET # BLD AUTO: 230 10*3/MM3 (ref 140–450)
PMV BLD AUTO: 10.7 FL (ref 6–12)
POTASSIUM SERPL-SCNC: 4 MMOL/L (ref 3.5–5.2)
PROT SERPL-MCNC: 7.9 G/DL (ref 6–8.5)
RBC # BLD AUTO: 4.91 10*6/MM3 (ref 3.77–5.28)
SODIUM SERPL-SCNC: 137 MMOL/L (ref 136–145)
T4 FREE SERPL-MCNC: 1.21 NG/DL (ref 0.92–1.68)
TSH SERPL DL<=0.05 MIU/L-ACNC: 2.27 UIU/ML (ref 0.27–4.2)
WBC NRBC COR # BLD AUTO: 5.26 10*3/MM3 (ref 3.4–10.8)

## 2024-08-06 PROCEDURE — G0483 DRUG TEST DEF 22+ CLASSES: HCPCS | Performed by: FAMILY MEDICINE

## 2024-08-06 PROCEDURE — 3079F DIAST BP 80-89 MM HG: CPT | Performed by: PHYSICIAN ASSISTANT

## 2024-08-06 PROCEDURE — 84443 ASSAY THYROID STIM HORMONE: CPT

## 2024-08-06 PROCEDURE — 99214 OFFICE O/P EST MOD 30 MIN: CPT | Performed by: PHYSICIAN ASSISTANT

## 2024-08-06 PROCEDURE — 25510000001 IOPAMIDOL 61 % SOLUTION: Performed by: PHYSICIAN ASSISTANT

## 2024-08-06 PROCEDURE — 82784 ASSAY IGA/IGD/IGG/IGM EACH: CPT

## 2024-08-06 PROCEDURE — 1159F MED LIST DOCD IN RCRD: CPT | Performed by: PHYSICIAN ASSISTANT

## 2024-08-06 PROCEDURE — 36415 COLL VENOUS BLD VENIPUNCTURE: CPT

## 2024-08-06 PROCEDURE — 1160F RVW MEDS BY RX/DR IN RCRD: CPT | Performed by: PHYSICIAN ASSISTANT

## 2024-08-06 PROCEDURE — 80053 COMPREHEN METABOLIC PANEL: CPT

## 2024-08-06 PROCEDURE — 85027 COMPLETE CBC AUTOMATED: CPT

## 2024-08-06 PROCEDURE — 0 DIATRIZOATE MEGLUMINE & SODIUM PER 1 ML: Performed by: PHYSICIAN ASSISTANT

## 2024-08-06 PROCEDURE — 74177 CT ABD & PELVIS W/CONTRAST: CPT

## 2024-08-06 PROCEDURE — 84439 ASSAY OF FREE THYROXINE: CPT

## 2024-08-06 PROCEDURE — 3077F SYST BP >= 140 MM HG: CPT | Performed by: PHYSICIAN ASSISTANT

## 2024-08-06 PROCEDURE — 83690 ASSAY OF LIPASE: CPT

## 2024-08-06 PROCEDURE — 86364 TISS TRNSGLTMNASE EA IG CLAS: CPT

## 2024-08-06 PROCEDURE — 80307 DRUG TEST PRSMV CHEM ANLYZR: CPT | Performed by: FAMILY MEDICINE

## 2024-08-06 RX ORDER — POLYETHYLENE GLYCOL 3350 17 G/17G
POWDER, FOR SOLUTION ORAL
Qty: 238 G | Refills: 0 | Status: SHIPPED | OUTPATIENT
Start: 2024-08-06

## 2024-08-06 RX ORDER — SODIUM CHLORIDE 9 MG/ML
30 INJECTION, SOLUTION INTRAVENOUS CONTINUOUS PRN
OUTPATIENT
Start: 2024-08-06

## 2024-08-06 RX ORDER — BISACODYL 5 MG/1
TABLET, DELAYED RELEASE ORAL
Qty: 4 TABLET | Refills: 0 | Status: SHIPPED | OUTPATIENT
Start: 2024-08-06 | End: 2024-08-09 | Stop reason: HOSPADM

## 2024-08-06 RX ORDER — SODIUM CHLORIDE 9 MG/ML
30 INJECTION, SOLUTION INTRAVENOUS CONTINUOUS PRN
Status: CANCELLED | OUTPATIENT
Start: 2024-08-06

## 2024-08-06 RX ADMIN — IOPAMIDOL 100 ML: 612 INJECTION, SOLUTION INTRAVENOUS at 17:49

## 2024-08-06 RX ADMIN — DIATRIZOATE MEGLUMINE AND DIATRIZOATE SODIUM 30 ML: 660; 100 LIQUID ORAL; RECTAL at 17:48

## 2024-08-06 NOTE — PROGRESS NOTES
Follow Up Note     Date: 2024   Patient Name: Katelynn Sung  MRN: [id number]  : 1960     Primary Care Provider: Lesvia Servin MD     Chief Complaint   Patient presents with    Abdominal Pain    Bloated    Nausea     History of present illness:   2024  Katelynn Sung is a 64 y.o. female who is here today for follow up regarding Abdominal Pain, Bloated, and Nausea.    She has not been seen in the GI office since 10/2021. She reports she has been in the bed for days due to severe abdominal pain. She has had severe symptoms now for 1 week. Reports bloating, early satiety with small amounts and upper abdominal pain which radiates into her mid back. She has not been eating much at all due to symptoms. Has nausea and fatigue associated.      Reports previous med that she was taking for constipation (thinks linzess) stopped working and now taking MOM only as needed, otherwise she feels that her constipation is under control now with mostly daily stools. No rectal bleeding.     At last visit in , she was scheduled to have EGD and colonoscopy but she did not keep those appts.     Has noticed gradually worsening dysphagia, points to mid chest and epigastric region, feels that foods get stuck. Takes dexilant 60 mg once daily for GERD. No significant reflux symptoms.     Interval History:  2021  She has been taking linzess 72 mcg once daily which has not been helping with constipation, has taken for months. She still only has a BM every 2-3 days. Does take Suboxone dialy. Has gained 10 lbs over the past couple of months. Takes protonix 40 mg BID but even with this, has severe reflux daily. She noticed her reflux became more severe after the weight gain. Also has bloating, early satiety. Had CTAP in 2021. She has not seen gyn in a while, had small uterus on CT scan. Reports dysphagia with foods only, EGD with dilatation did seem to help in the past. Her last colonoscopy was in , thinks  normal other than colon polyps. Last EGD was in 2020.     Subjective     Past Medical History:   Diagnosis Date    Adnexal mass     pelvic US 10/26/11 showed 2 cm left abdnexal cystic lesion, resolved on f/u 5/14/15    Anxiety     Arm fracture, left     Back ache     Humphreys's esophagus     Body piercing     Both ears    Class 1 obesity due to excess calories with serious comorbidity and body mass index (BMI) of 30.0 to 30.9 in adult 02/16/2018    Colon polyps     COPD (chronic obstructive pulmonary disease) 2008    Cor pulmonale     Degenerative arthritis of lumbar spine     Depression     Fibromyalgia     Finger fracture     GERD (gastroesophageal reflux disease)     Herpes simplex     Hypertension     Insomnia     Lumbar degenerative disc disease     Mild sleep apnea 01/16/2013    Sleep study 1/16/13 showing mild degree    Narcotic dependence     Osteoporosis     Palpitations     Pancreatitis     Pneumonia     Respiratory failure requiring intubation     Restless leg syndrome     Seasonal allergies     Sleep apnea     does not wear cpap    Wears dentures      Past Surgical History:   Procedure Laterality Date    COLONOSCOPY N/A 2008    Complete    ENDOSCOPY N/A 03/11/2020    Procedure: ESOPHAGOGASTRODUODENOSCOPY;  Surgeon: Charbel Ching MD;  Location: [facility] ENDOSCOPY;  Service: Gastroenterology;  Laterality: N/A;    PACEMAKER IMPLANTATION      March of 2024    TUBAL ABDOMINAL LIGATION  1991    UPPER GASTROINTESTINAL ENDOSCOPY N/A 2013     Family History   Problem Relation Age of Onset    Stroke Mother     Liver disease Mother         Chronic    Cirrhosis Father     Heart attack Father     Breast cancer Sister     Stroke Brother     Cancer Sister     Colon cancer Neg Hx      Social History     Socioeconomic History    Marital status:    Tobacco Use    Smoking status: Former     Current packs/day: 0.00     Average packs/day: 0.8 packs/day for 3.0 years (2.3 ttl pk-yrs)     Types: Cigarettes, Electronic  Cigarette     Start date:      Quit date: 2013     Years since quittin.6     Passive exposure: Never    Smokeless tobacco: Never    Tobacco comments:     2013 started using nicotine containing substance in combustion-free vaporization device. PT not longer uses vapor stick.   Vaping Use    Vaping status: Former    Substances: Nicotine   Substance and Sexual Activity    Alcohol use: No    Drug use: Not Currently     Types: Oxycodone     Comment: Patient is currently on suboxone    Sexual activity: Defer     Comment:      Current Outpatient Medications:     albuterol sulfate HFA (ProAir HFA) 108 (90 Base) MCG/ACT inhaler, Inhale 2 puffs Every 4 (Four) Hours As Needed for Wheezing., Disp: 18 g, Rfl: 5    Azelastine HCl 137 MCG/SPRAY solution, INSTILL 2 SPRAYS IN EACH NOSTRIL TWICE DAILY AS DIRECTED, Disp: 30 mL, Rfl: 10    buprenorphine (SUBUTEX) 8 MG sublingual tablet SL tablet, , Disp: , Rfl:     cetirizine (zyrTEC) 10 MG tablet, Take 1 tablet by mouth Daily. (Patient taking differently: Take 1 tablet by mouth As Needed. Pt states takes as needed), Disp: 30 tablet, Rfl: 0    clonazePAM (KlonoPIN) 0.5 MG tablet, TAKE 1 TABLET BY MOUTH 2 (TWO) TIMES A DAY AS NEEDED FOR ANXIETY., Disp: 45 tablet, Rfl: 2    cyclobenzaprine (FLEXERIL) 5 MG tablet, TAKE 1 TABLET BY MOUTH 3 (THREE) TIMES A DAY AS NEEDED FOR MUSCLE SPASMS., Disp: 90 tablet, Rfl: 2    dexlansoprazole (DEXILANT) 60 MG capsule, Take 1 capsule by mouth Daily., Disp: 30 capsule, Rfl: 5    fluticasone (FLONASE) 50 MCG/ACT nasal spray, INSTILL 2 SPRAYS IN EACH NOSTRIL ONCE DAILY AS DIRECTED, Disp: 16 g, Rfl: 1    Fluticasone Furoate-Vilanterol (Breo Ellipta) 200-25 MCG/ACT inhaler, Inhale 1 puff Daily., Disp: 28 each, Rfl: 11    ipratropium-albuterol (Combivent Respimat)  MCG/ACT inhaler, Inhale 1 puff 4 (Four) Times a Day As Needed for Wheezing., Disp: 4 g, Rfl: 11    lisinopril-hydrochlorothiazide (PRINZIDE,ZESTORETIC) 10-12.5 MG per  tablet, TAKE 1 TABLET BY MOUTH DAILY., Disp: 30 tablet, Rfl: 2    nystatin (MYCOSTATIN) 100,000 unit/mL suspension, SWISH AND SWALLOW 5 MLS BY MOUTH FOUR TIMES DAILY, Disp: 180 mL, Rfl: 0    pregabalin (LYRICA) 200 MG capsule, Take 1 capsule by mouth 2 (Two) Times a Day., Disp: 60 capsule, Rfl: 2    promethazine (PHENERGAN) 25 MG tablet, TAKE 1 TABLET BY MOUTH EVERY 8 (EIGHT) HOURS AS NEEDED FOR NAUSEA OR VOMITING., Disp: 90 tablet, Rfl: 0    bisacodyl (Dulcolax) 5 MG EC tablet, Follow instructions given at office, Disp: 4 tablet, Rfl: 0    polyethylene glycol (MIRALAX) 17 GM/SCOOP powder, Follow directions given at office, Disp: 238 g, Rfl: 0    Current Facility-Administered Medications:     methylPREDNISolone acetate (DEPO-medrol) injection 40 mg, 40 mg, Intramuscular, Once, Lesvia Servin MD    Allergies   Allergen Reactions    Nsaids GI Intolerance     The following portions of the patient's history were reviewed and updated as appropriate: allergies, current medications, past family history, past medical history, past social history, past surgical history and problem list.    Objective     Physical Exam  Vitals reviewed.   Constitutional:       General: She is not in acute distress.     Appearance: Normal appearance. She is well-developed. She is obese. She is not ill-appearing or diaphoretic.   HENT:      Head: Normocephalic and atraumatic.      Right Ear: External ear normal.      Left Ear: External ear normal.      Nose: Nose normal.   Eyes:      General: No scleral icterus.        Right eye: No discharge.         Left eye: No discharge.      Conjunctiva/sclera: Conjunctivae normal.   Neck:      Vascular: No JVD.   Cardiovascular:      Rate and Rhythm: Normal rate and regular rhythm.      Heart sounds: Normal heart sounds. No murmur heard.     No friction rub. No gallop.   Pulmonary:      Effort: Pulmonary effort is normal. No respiratory distress.      Breath sounds: Normal breath sounds. No wheezing or  "rales.   Chest:      Chest wall: No tenderness.   Abdominal:      General: Bowel sounds are normal. There is distension (bloated).      Palpations: Abdomen is soft. There is no mass.      Tenderness: There is abdominal tenderness (epigastric, moderate). There is no guarding.   Musculoskeletal:         General: No deformity. Normal range of motion.      Cervical back: Normal range of motion.   Skin:     General: Skin is warm and dry.      Findings: No erythema or rash.   Neurological:      Mental Status: She is alert and oriented to person, place, and time.      Coordination: Coordination normal.   Psychiatric:         Behavior: Behavior normal.         Thought Content: Thought content normal.         Judgment: Judgment normal.      Comments: Depressed affect, tearful       Vitals:    08/06/24 1444   BP: 142/80   Pulse: 68   SpO2: 96%   Weight: 83 kg (183 lb)     Results Review:   I reviewed the patient's new clinical results.    Labs 3/2024 CBC normal, liver enzymes normal.     CTAP with IV and PO contrast 9/2021:  The liver is fatty infiltrated.  An area of increased density in the anterior left lobe is likely perfusional anomaly.  The gallbladder is present.  The spleen is normal.  The uterus is small, not expected for age.  Diverticulosis without diverticulitis.  Moderate retained stool.  Impression: No CT evidence of acute intra-abdominal or intrapelvic abnormality.  Moderate retained stool.     Assessment / Plan      1. Upper abdominal pain  2. Nausea  3. Early satiety  4. Esophageal dysphagia  Over the past 1 week, has been having severe upper abdominal pain, bloating, nausea and early satiety. She had had similar symptoms in the past. She is worried she could have pancreatitis because pain radiating into her back. She feels that constipation is ok recently and having BMs regularly with otc medications PRN. In the past took Linzess but reports \"it stopped working\" so she stopped it. She has not been seen in " the GI office since 2021. No recent labs. Last abdominal imaging in 2021 showed constipation, had uterus abnormality and GYN consult recommended, she says she never went to that appt. Previously, she was scheduled to have EGD and colonoscopy but she did not keep those appts. Prescribed Subutex which she takes daily. Suspect IBS constipation plus opioid side effect etiology of her complaints, need to rule out pancreatitis, PUD and others.     Has noticed gradually worsening dysphagia, points to mid chest and epigastric region, feels that foods get stuck. Takes dexilant 60 mg once daily for GERD. No significant reflux symptoms.     EGD urgent, sched 8/9  Colonoscopy to be arranged  STAT CTAP with oral and IV contrast  Labs now  Report to ED with any severe or worsening complaints  Can continue phenergan PRN nausea  Continue PPI daily  Consider cutting back on opioids and tapering off, discuss with prescriber  Low FODMAP diet may be helful    - CT Abdomen Pelvis With Contrast; Future    - Comprehensive Metabolic Panel; Future  - CBC (No Diff); Future  - Lipase; Future  - Tissue Transglutaminase, IgA; Future  - IgA; Future  - TSH; Future  - T4, Free; Future    She will need an esophagogastroduodenoscopy with possible dilatation of the esophagus performed with monitored anesthesia care. The indications, technique, alternatives and potential risk and complications were discussed with the patient including but not limited to bleeding, bowel perforations, missing lesions and anesthetic complications. The patient understands and wishes to proceed with the procedure and has given their verbal consent. Written patient education information was given to the patient. She should follow up in the office after this procedure to discuss the results and further recommendations can be made at that time. The patient will call if they have further questions before procedure.  - Case Request    5. History of colon polyps  Has past  history of colon polyps, last colonoscopy in 2008, past due for colonoscopy, will arrange.     She will have a colonoscopy performed with monitored anesthesia care. The indications, technique, alternatives and potential risk and complications were discussed with the patient including but not limited to bleeding, bowel perforations, missing lesions and anesthetic complications. The patient understands and wishes to proceed with the procedure and has given their verbal consent. Written patient education information was given to the patient. She should follow up in the office after this procedure to discuss the results and further recommendations can be made at that time. The patient will call if they have further questions before procedure.  - Case Request  - polyethylene glycol (MIRALAX) 17 GM/SCOOP powder; Follow directions given at office  Dispense: 238 g; Refill: 0  - bisacodyl (Dulcolax) 5 MG EC tablet; Follow instructions given at office  Dispense: 4 tablet; Refill: 0    Follow Up:   Return for follow up after procedure to discuss results.    India Baldwin PA-C  [facility]  8/6/2024  17:12 EDT    Dictated Utilizing Dragon Dictation: Part of this note may be an electronic transcription/translation of spoken language to printed text using the Dragon Dictation System.

## 2024-08-07 ENCOUNTER — TELEPHONE (OUTPATIENT)
Dept: GASTROENTEROLOGY | Facility: CLINIC | Age: 64
End: 2024-08-07

## 2024-08-07 LAB — IGA1 MFR SER: 86 MG/DL (ref 70–400)

## 2024-08-07 NOTE — TELEPHONE ENCOUNTER
I called pt yesterday evening with normal lab results. Her CT scan did not show cause for her abdominal pain. On my review, she does have constipation. Should take miralax 1-2 doses daily. Plan to proceed with scheduled EGD and colonoscopy for further evaluation. Please let her know. Thanks.

## 2024-08-08 LAB — TTG IGA SER-ACNC: <2 U/ML (ref 0–3)

## 2024-08-09 ENCOUNTER — ANESTHESIA EVENT (OUTPATIENT)
Dept: GASTROENTEROLOGY | Facility: HOSPITAL | Age: 64
End: 2024-08-09
Payer: MEDICAID

## 2024-08-09 ENCOUNTER — ANESTHESIA (OUTPATIENT)
Dept: GASTROENTEROLOGY | Facility: HOSPITAL | Age: 64
End: 2024-08-09
Payer: MEDICAID

## 2024-08-09 ENCOUNTER — HOSPITAL ENCOUNTER (OUTPATIENT)
Facility: HOSPITAL | Age: 64
Setting detail: HOSPITAL OUTPATIENT SURGERY
Discharge: HOME | End: 2024-08-09
Attending: INTERNAL MEDICINE | Admitting: INTERNAL MEDICINE
Payer: MEDICAID

## 2024-08-09 VITALS
SYSTOLIC BLOOD PRESSURE: 136 MMHG | HEART RATE: 63 BPM | BODY MASS INDEX: 31.24 KG/M2 | HEIGHT: 64 IN | DIASTOLIC BLOOD PRESSURE: 71 MMHG | TEMPERATURE: 97.2 F | WEIGHT: 183 LBS | OXYGEN SATURATION: 96 % | RESPIRATION RATE: 16 BRPM

## 2024-08-09 DIAGNOSIS — R10.10 UPPER ABDOMINAL PAIN: ICD-10-CM

## 2024-08-09 DIAGNOSIS — R13.19 ESOPHAGEAL DYSPHAGIA: ICD-10-CM

## 2024-08-09 PROCEDURE — 25810000003 SODIUM CHLORIDE 0.9 % SOLUTION: Performed by: PHYSICIAN ASSISTANT

## 2024-08-09 PROCEDURE — 25010000002 ONDANSETRON PER 1 MG: Performed by: NURSE ANESTHETIST, CERTIFIED REGISTERED

## 2024-08-09 PROCEDURE — 43239 EGD BIOPSY SINGLE/MULTIPLE: CPT | Performed by: INTERNAL MEDICINE

## 2024-08-09 PROCEDURE — 25010000002 PROPOFOL 10 MG/ML EMULSION: Performed by: NURSE ANESTHETIST, CERTIFIED REGISTERED

## 2024-08-09 RX ORDER — ONDANSETRON 2 MG/ML
INJECTION INTRAMUSCULAR; INTRAVENOUS AS NEEDED
Status: DISCONTINUED | OUTPATIENT
Start: 2024-08-09 | End: 2024-08-09 | Stop reason: SURG

## 2024-08-09 RX ORDER — SODIUM CHLORIDE 9 MG/ML
30 INJECTION, SOLUTION INTRAVENOUS CONTINUOUS PRN
Status: DISCONTINUED | OUTPATIENT
Start: 2024-08-09 | End: 2024-08-09 | Stop reason: HOSPADM

## 2024-08-09 RX ORDER — PROPOFOL 10 MG/ML
VIAL (ML) INTRAVENOUS AS NEEDED
Status: DISCONTINUED | OUTPATIENT
Start: 2024-08-09 | End: 2024-08-09 | Stop reason: SURG

## 2024-08-09 RX ADMIN — LIDOCAINE HYDROCHLORIDE 50 MG: 20 INJECTION, SOLUTION INTRAVENOUS at 10:30

## 2024-08-09 RX ADMIN — PROPOFOL 100 MG: 10 INJECTION, EMULSION INTRAVENOUS at 10:30

## 2024-08-09 RX ADMIN — ONDANSETRON 4 MG: 2 INJECTION INTRAMUSCULAR; INTRAVENOUS at 10:25

## 2024-08-09 RX ADMIN — SODIUM CHLORIDE 30 ML/HR: 9 INJECTION, SOLUTION INTRAVENOUS at 09:31

## 2024-08-09 RX ADMIN — PROPOFOL 30 MG: 10 INJECTION, EMULSION INTRAVENOUS at 10:33

## 2024-08-09 NOTE — H&P
[facility]  HISTORY AND PHYSICAL    Patient Name: Katelynn Sung  : 1960  MRN: [id number]    Chief Complaint:   For EGHD    History Of Presenting Illness:    GERD nausea   Dysphagia    Past Medical History:   Diagnosis Date    Adnexal mass     pelvic US 10/26/11 showed 2 cm left abdnexal cystic lesion, resolved on f/u 5/14/15    Anxiety     Arm fracture, left     Back ache     Humphreys's esophagus     Body piercing     Both ears    Class 1 obesity due to excess calories with serious comorbidity and body mass index (BMI) of 30.0 to 30.9 in adult 2018    Colon polyps     COPD (chronic obstructive pulmonary disease)     Cor pulmonale     Degenerative arthritis of lumbar spine     Depression     Fibromyalgia     Finger fracture     GERD (gastroesophageal reflux disease)     Herpes simplex     Hypertension     Insomnia     Lumbar degenerative disc disease     Mild sleep apnea 2013    Sleep study 13 showing mild degree    Narcotic dependence     Osteoporosis     Palpitations     Pancreatitis     Pneumonia     Respiratory failure requiring intubation     Restless leg syndrome     Seasonal allergies     Sleep apnea     does not wear cpap    Wears dentures        Past Surgical History:   Procedure Laterality Date    COLONOSCOPY N/A     Complete    ENDOSCOPY N/A 2020    Procedure: ESOPHAGOGASTRODUODENOSCOPY;  Surgeon: Charbel Ching MD;  Location: [facility] ENDOSCOPY;  Service: Gastroenterology;  Laterality: N/A;    PACEMAKER IMPLANTATION      2024    TUBAL ABDOMINAL LIGATION      UPPER GASTROINTESTINAL ENDOSCOPY N/A        Social History     Socioeconomic History    Marital status:    Tobacco Use    Smoking status: Former     Current packs/day: 0.00     Average packs/day: 0.8 packs/day for 3.0 years (2.3 ttl pk-yrs)     Types: Cigarettes, Electronic Cigarette     Start date:      Quit date: 2013     Years since quittin.6     Passive  exposure: Never    Smokeless tobacco: Never    Tobacco comments:     2013 started using nicotine containing substance in combustion-free vaporization device. PT not longer uses vapor stick.   Vaping Use    Vaping status: Former    Substances: Nicotine   Substance and Sexual Activity    Alcohol use: No    Drug use: Not Currently     Types: Oxycodone     Comment: Patient is currently on suboxone    Sexual activity: Defer     Comment:        Family History   Problem Relation Age of Onset    Stroke Mother     Liver disease Mother         Chronic    Cirrhosis Father     Heart attack Father     Breast cancer Sister     Stroke Brother     Cancer Sister     Colon cancer Neg Hx        Prior to Admission Medications:  Facility-Administered Medications Prior to Admission   Medication Dose Route Frequency Provider Last Rate Last Admin    methylPREDNISolone acetate (DEPO-medrol) injection 40 mg  40 mg Intramuscular Once Lesvia Servin MD         Medications Prior to Admission   Medication Sig Dispense Refill Last Dose    albuterol sulfate HFA (ProAir HFA) 108 (90 Base) MCG/ACT inhaler Inhale 2 puffs Every 4 (Four) Hours As Needed for Wheezing. 18 g 5 Past Month    Azelastine HCl 137 MCG/SPRAY solution INSTILL 2 SPRAYS IN EACH NOSTRIL TWICE DAILY AS DIRECTED 30 mL 10 Past Month    bisacodyl (Dulcolax) 5 MG EC tablet Follow instructions given at office 4 tablet 0 8/8/2024    buprenorphine (SUBUTEX) 8 MG sublingual tablet SL tablet    8/8/2024    cetirizine (zyrTEC) 10 MG tablet Take 1 tablet by mouth Daily. (Patient taking differently: Take 1 tablet by mouth As Needed. Pt states takes as needed) 30 tablet 0 Past Month    clonazePAM (KlonoPIN) 0.5 MG tablet TAKE 1 TABLET BY MOUTH 2 (TWO) TIMES A DAY AS NEEDED FOR ANXIETY. 45 tablet 2 Past Week    cyclobenzaprine (FLEXERIL) 5 MG tablet TAKE 1 TABLET BY MOUTH 3 (THREE) TIMES A DAY AS NEEDED FOR MUSCLE SPASMS. 90 tablet 2 8/8/2024    fluticasone (FLONASE) 50 MCG/ACT nasal  spray INSTILL 2 SPRAYS IN EACH NOSTRIL ONCE DAILY AS DIRECTED 16 g 1 Past Month    Fluticasone Furoate-Vilanterol (Breo Ellipta) 200-25 MCG/ACT inhaler Inhale 1 puff Daily. 28 each 11 Past Month    ipratropium-albuterol (Combivent Respimat)  MCG/ACT inhaler Inhale 1 puff 4 (Four) Times a Day As Needed for Wheezing. 4 g 11 Past Month    lisinopril-hydrochlorothiazide (PRINZIDE,ZESTORETIC) 10-12.5 MG per tablet TAKE 1 TABLET BY MOUTH DAILY. 30 tablet 2 8/8/2024    nystatin (MYCOSTATIN) 100,000 unit/mL suspension SWISH AND SWALLOW 5 MLS BY MOUTH FOUR TIMES DAILY 180 mL 0 Past Month    pregabalin (LYRICA) 200 MG capsule Take 1 capsule by mouth 2 (Two) Times a Day. 60 capsule 2 Past Week    dexlansoprazole (DEXILANT) 60 MG capsule Take 1 capsule by mouth Daily. 30 capsule 5 More than a month    polyethylene glycol (MIRALAX) 17 GM/SCOOP powder Follow directions given at office 238 g 0     promethazine (PHENERGAN) 25 MG tablet TAKE 1 TABLET BY MOUTH EVERY 8 (EIGHT) HOURS AS NEEDED FOR NAUSEA OR VOMITING. 90 tablet 0 More than a month       Allergies:  Allergies   Allergen Reactions    Nsaids GI Intolerance        Vitals:      Review Of Systems:  Constitutional:  Negative for chills, fever, and unexpected weight change.  Respiratory:  Negative for cough, chest tightness, shortness of breath, and wheezing.  Cardiovascular:  Negative for chest pain, palpitations, and leg swelling.  Gastrointestinal:  Negative for abdominal distention, abdominal pain, nausea, vomiting.  Neurological:  Negative for weakness, numbness, and headaches.     Physical Exam:    General Appearance:  Alert, cooperative, in no acute distress.   Lungs:   Clear to auscultation, respirations regular, even and                 unlabored.   Heart:  Regular rhythm and normal rate.   Abdomen:   Normal bowel sounds, no masses, no organomegaly. Soft, nontender, nondistended   Neurologic: Alert and oriented x 3. Moves all four limbs equally       Assessment &  Plan     Assessment:  Principal Problem:    Upper abdominal pain  Active Problems:    Dysphagia      Plan: Esophagogastroduodenoscopy with possible biopsy, polypectomy, dilatation, endoscopic band ligation or control of bleeding  CPT CODE: 31155 (N/A)     Jeronimo Lowe MD  8/9/2024

## 2024-08-09 NOTE — ANESTHESIA POSTPROCEDURE EVALUATION
Patient: Katelynn Sung    Procedure Summary       Date: 08/09/24 Room / Location: [facility] ENDOSCOPY 2 / [facility] ENDOSCOPY    Anesthesia Start: 1021 Anesthesia Stop: 1042    Procedure: Esophagogastroduodenoscopy with biopsy and polypectomy (Esophagus) Diagnosis:       Upper abdominal pain      Esophageal dysphagia      (Upper abdominal pain [R10.10])      (Esophageal dysphagia [R13.19])    Surgeons: Jeronimo Lowe MD Provider: Audrey Kennedy CRNA    Anesthesia Type: MAC ASA Status: 3            Anesthesia Type: MAC    Vitals  Vitals Value Taken Time   /62 08/09/24 1043   Temp 97.2 °F (36.2 °C) 08/09/24 1043   Pulse 62 08/09/24 1043   Resp 16 08/09/24 1043   SpO2 97 % 08/09/24 1043           Post Anesthesia Care and Evaluation    Patient location during evaluation: PHASE II  Patient participation: complete - patient participated  Level of consciousness: awake and alert  Pain score: 0  Pain management: satisfactory to patient    Airway patency: patent  Anesthetic complications: No anesthetic complications  PONV Status: none  Cardiovascular status: acceptable and stable  Respiratory status: acceptable  Hydration status: acceptable    Comments: Vitals signs as noted in nursing documentation as per protocol.

## 2024-08-09 NOTE — ANESTHESIA PREPROCEDURE EVALUATION
Anesthesia Evaluation                  Airway   Mallampati: I  TM distance: >3 FB  Neck ROM: full  no difficulty expected  Dental - normal exam     Pulmonary - normal exam   (+) pneumonia , COPD,sleep apnea    ROS comment:  Cor pulmonale  Cardiovascular - normal exam    (+) hypertension      Neuro/Psych  (+) psychiatric history  GI/Hepatic/Renal/Endo    (+) obesity, morbid obesity, GERD, liver disease    Musculoskeletal     (+) neck pain  Abdominal    Substance History      OB/GYN          Other   arthritis,                 Anesthesia Plan    ASA 3     MAC     intravenous induction     Anesthetic plan, risks, benefits, and alternatives have been provided, discussed and informed consent has been obtained with: patient.    CODE STATUS:

## 2024-08-09 NOTE — DISCHARGE INSTRUCTIONS
- Discharge patient to home (ambulatory).   - Low fiber small meals  - Recommend controled reduction in opioid dose   - Continue present medications.   - Low dose PPI  - Await pathology results.   - Follow an antireflux regimen.   - Return to my office in 8 weeks.    Rest today  No pushing,pulling,tugging,heavy lifting, or strenuous activity   No major decision making,driving,or drinking alcoholic beverages for 24 hours due to the sedation you received  Always use good hand hygiene/washing technique  No driving on pain medication.To assist you in voiding:  Drink plenty of fluids  Listen to running water while attempting to void.    If you are unable to urinate and you have an uncomfortable urge to void or it has been   6 hours since you were discharged, return to the Emergency Room

## 2024-08-12 LAB — REF LAB TEST METHOD: NORMAL

## 2024-08-14 DIAGNOSIS — K86.1 CHRONIC PANCREATITIS, UNSPECIFIED PANCREATITIS TYPE: ICD-10-CM

## 2024-08-14 DIAGNOSIS — M79.7 FIBROMYALGIA: ICD-10-CM

## 2024-08-14 RX ORDER — AZELASTINE HYDROCHLORIDE 137 UG/1
SPRAY, METERED NASAL
Qty: 30 ML | Refills: 9 | Status: SHIPPED | OUTPATIENT
Start: 2024-08-14

## 2024-08-14 RX ORDER — PROMETHAZINE HYDROCHLORIDE 25 MG/1
25 TABLET ORAL EVERY 8 HOURS PRN
Qty: 90 TABLET | Refills: 0 | Status: SHIPPED | OUTPATIENT
Start: 2024-08-14

## 2024-08-14 NOTE — TELEPHONE ENCOUNTER
Rx Refill Note  Requested Prescriptions     Pending Prescriptions Disp Refills    promethazine (PHENERGAN) 25 MG tablet [Pharmacy Med Name: PROMETHAZINE 25MG] 90 tablet 0     Sig: TAKE 1 TABLET BY MOUTH EVERY 8 (EIGHT) HOURS AS NEEDED FOR NAUSEA OR VOMITING.    pregabalin (LYRICA) 200 MG capsule [Pharmacy Med Name: PREGABALIN 200MG] 60 capsule 1     Sig: TAKE 1 CAPSULE BY MOUTH 2 (TWO) TIMES A DAY.      Last office visit with prescribing clinician: 7/9/2024   Last telemedicine visit with prescribing clinician: Visit date not found   Next office visit with prescribing clinician: 8/19/2024     OK TO FILL?        Marine Arriaga MA  08/14/24, 12:52 EDT

## 2024-08-15 ENCOUNTER — TELEPHONE (OUTPATIENT)
Dept: GASTROENTEROLOGY | Facility: CLINIC | Age: 64
End: 2024-08-15
Payer: MEDICAID

## 2024-08-15 RX ORDER — PREGABALIN 200 MG/1
200 CAPSULE ORAL 2 TIMES DAILY
Qty: 60 CAPSULE | Refills: 0 | Status: SHIPPED | OUTPATIENT
Start: 2024-08-15

## 2024-08-15 NOTE — TELEPHONE ENCOUNTER
Hub staff attempted to follow warm transfer process and was unsuccessful     Caller: Katelynn Sung    Relationship to patient: Self    Best call back number:  [phone]    Patient is needing: PT ASKING FOR TEST RESULTS FROM HER PROCEDURE, PLEASE CALL AND ADVISE.             bed

## 2024-08-16 LAB — DRUGS UR: NORMAL

## 2024-08-19 ENCOUNTER — TELEPHONE (OUTPATIENT)
Dept: FAMILY MEDICINE CLINIC | Facility: CLINIC | Age: 64
End: 2024-08-19

## 2024-08-19 ENCOUNTER — OFFICE VISIT (OUTPATIENT)
Dept: FAMILY MEDICINE CLINIC | Facility: CLINIC | Age: 64
End: 2024-08-19
Payer: MEDICAID

## 2024-08-19 VITALS
BODY MASS INDEX: 31.07 KG/M2 | WEIGHT: 182 LBS | HEART RATE: 89 BPM | SYSTOLIC BLOOD PRESSURE: 120 MMHG | HEIGHT: 64 IN | OXYGEN SATURATION: 95 % | DIASTOLIC BLOOD PRESSURE: 88 MMHG

## 2024-08-19 DIAGNOSIS — Z95.0 STATUS CARDIAC PACEMAKER: ICD-10-CM

## 2024-08-19 DIAGNOSIS — M54.2 NECK PAIN: ICD-10-CM

## 2024-08-19 DIAGNOSIS — Z00.00 WELL ADULT EXAM: Primary | ICD-10-CM

## 2024-08-19 DIAGNOSIS — M79.7 FIBROMYALGIA: ICD-10-CM

## 2024-08-19 DIAGNOSIS — K21.9 GASTROESOPHAGEAL REFLUX DISEASE WITHOUT ESOPHAGITIS: ICD-10-CM

## 2024-08-19 DIAGNOSIS — Z13.820 SCREENING FOR OSTEOPOROSIS: ICD-10-CM

## 2024-08-19 DIAGNOSIS — R07.89 ATYPICAL CHEST PAIN: ICD-10-CM

## 2024-08-19 DIAGNOSIS — Z12.4 CERVICAL CANCER SCREENING: ICD-10-CM

## 2024-08-19 DIAGNOSIS — M62.838 CERVICAL PARASPINAL MUSCLE SPASM: ICD-10-CM

## 2024-08-19 DIAGNOSIS — Z12.31 ENCOUNTER FOR SCREENING MAMMOGRAM FOR MALIGNANT NEOPLASM OF BREAST: ICD-10-CM

## 2024-08-19 DIAGNOSIS — J42 CHRONIC BRONCHITIS, UNSPECIFIED CHRONIC BRONCHITIS TYPE: ICD-10-CM

## 2024-08-19 DIAGNOSIS — Z12.11 SCREEN FOR COLON CANCER: ICD-10-CM

## 2024-08-19 DIAGNOSIS — Z78.0 POSTMENOPAUSAL: ICD-10-CM

## 2024-08-19 DIAGNOSIS — I45.9 CARDIAC CONDUCTION DISORDER: ICD-10-CM

## 2024-08-19 DIAGNOSIS — F41.8 SITUATIONAL ANXIETY: ICD-10-CM

## 2024-08-19 PROBLEM — I08.1 RHEUMATIC DISORDERS OF BOTH MITRAL AND TRICUSPID VALVES: Status: ACTIVE | Noted: 2024-02-13

## 2024-08-19 PROBLEM — I48.91 UNSPECIFIED ATRIAL FIBRILLATION: Status: ACTIVE | Noted: 2024-02-12

## 2024-08-19 PROBLEM — M21.619 HALLUX VALGUS WITH BUNIONS: Status: ACTIVE | Noted: 2023-09-05

## 2024-08-19 PROBLEM — H40.003 PREGLAUCOMA, UNSPECIFIED, BILATERAL: Status: ACTIVE | Noted: 2024-03-06

## 2024-08-19 PROBLEM — I51.7 CARDIOMEGALY: Status: ACTIVE | Noted: 2024-02-12

## 2024-08-19 PROBLEM — M77.31 CALCANEAL SPUR, RIGHT FOOT: Status: ACTIVE | Noted: 2023-08-24

## 2024-08-19 PROBLEM — M21.612 BUNION OF LEFT FOOT: Status: ACTIVE | Noted: 2023-08-24

## 2024-08-19 PROBLEM — M24.571 CONTRACTURE, RIGHT ANKLE: Status: ACTIVE | Noted: 2023-09-05

## 2024-08-19 PROBLEM — M24.572 CONTRACTURE, LEFT ANKLE: Status: ACTIVE | Noted: 2023-09-05

## 2024-08-19 PROBLEM — M20.12 HALLUX VALGUS (ACQUIRED), LEFT FOOT: Status: ACTIVE | Noted: 2023-09-05

## 2024-08-19 PROBLEM — M20.10 HALLUX VALGUS WITH BUNIONS: Status: ACTIVE | Noted: 2023-09-05

## 2024-08-19 RX ORDER — CLONAZEPAM 0.5 MG/1
0.5 TABLET ORAL 2 TIMES DAILY PRN
Qty: 45 TABLET | Refills: 2 | Status: CANCELLED | OUTPATIENT
Start: 2024-08-19

## 2024-08-19 RX ORDER — LISINOPRIL/HYDROCHLOROTHIAZIDE 10-12.5 MG
1 TABLET ORAL DAILY
Qty: 30 TABLET | Refills: 11 | Status: SHIPPED | OUTPATIENT
Start: 2024-08-19

## 2024-08-19 RX ORDER — IPRATROPIUM BROMIDE AND ALBUTEROL 20; 100 UG/1; UG/1
1 SPRAY, METERED RESPIRATORY (INHALATION) 4 TIMES DAILY PRN
Qty: 4 G | Refills: 11 | Status: SHIPPED | OUTPATIENT
Start: 2024-08-19

## 2024-08-19 RX ORDER — CYCLOBENZAPRINE HCL 5 MG
5 TABLET ORAL 3 TIMES DAILY PRN
Qty: 90 TABLET | Refills: 2 | Status: SHIPPED | OUTPATIENT
Start: 2024-08-19

## 2024-08-19 RX ORDER — DEXLANSOPRAZOLE 60 MG/1
1 CAPSULE, DELAYED RELEASE ORAL DAILY
Qty: 30 CAPSULE | Refills: 5 | Status: SHIPPED | OUTPATIENT
Start: 2024-08-19

## 2024-08-19 NOTE — TELEPHONE ENCOUNTER
Klonopin should have refills. Was sent in in July with 2 additional refills. Remainder of requested refills sent in

## 2024-08-19 NOTE — PROGRESS NOTES
Subjective   Katelynn Sung is a 64 y.o. female.     History of Present Illness  Here for annual physical and f/u on chronic med problems  Reproductive History  A0  Pregnancy complications (HTN, DM): no  Sexual Activity: not currently  Contraception: n/a  Menses: n/a  STD hx: denies, she voices no concern regarding possible exposures.  Declined screening.  H/O abnormal pap: no  Cervical procedures: None    Social Hx  Household members: lives alone, daughter lives next door  Marital status:   Tobacco use: Quit smoking . No longer vaping  EtOH use: no  Illicit drug use: no, currently receiving MAT for substance use disorder    Lifestyle  Diet: varied but limited vegetable, excess calories  Exercise: no formal  Using seatbelt: yes  Mental Health: chronic anxiety, generally stable, no SI  Feels safe in home: yes    Screenings  Last pap: ?  Last mammogram: 10/2023  Colonoscopy: , f/u 10 years  DEXA: overdue  FLP: 1 year  BG/A1c: 1 year  Vitamin D: unsure  Last dental check up: has upper dentures, goes at least yearly  Last vision exam: less than 1 year ago  Immunizations UTD: no  Needs: Shingrix    She reports an episode of CP occurring yesterday. Retrosternal, sharp/pressure/tight, assoc'd with severe diffuse weakness, diaphoresis, nausea. No radiation. Lasted several minutes. Resolved on its own. No recurrence of pain since last episode. No CP currently.    Cardiac hx includes conduction DO, a fib, HTN, s/p pacemaker placement. Currently on zestoretic. No recent f/u with primary cardiology. Also with h/o Humphreys's esophagus, chronic pancreatitis, GERD. Keeping close f/u with GI.    Of note, was referred to Dr. Palomino  for updating of breast exam, cervical CA screening, etc. She did not keep apt. Still overdue for cervical CA screen.    The following portions of the patient's history were reviewed and updated as appropriate: allergies, current medications, past family history, past medical  history, past social history, past surgical history, and problem list.    Review of Systems   Constitutional:  Positive for diaphoresis and fatigue. Negative for fever and unexpected weight change.   HENT:  Positive for congestion and postnasal drip. Negative for ear pain, mouth sores, nosebleeds, rhinorrhea and sore throat.    Eyes:  Negative for visual disturbance.   Respiratory:  Positive for cough (intermittent, stable), shortness of breath (mild with exertion) and wheezing (intermittent).    Cardiovascular:  Positive for chest pain. Negative for palpitations and leg swelling.   Gastrointestinal:  Positive for abdominal pain (chronic, stable), constipation and nausea. Negative for blood in stool, rectal pain and vomiting.   Endocrine: Positive for heat intolerance. Negative for polydipsia and polyuria.   Genitourinary:  Negative for difficulty urinating, dysuria, hematuria, pelvic pain and vaginal bleeding.   Musculoskeletal:  Positive for arthralgias, back pain, myalgias, neck pain and neck stiffness.   Skin:  Negative for rash and wound.   Neurological:  Positive for dizziness, weakness (generalized) and headaches. Negative for tremors and syncope.   Hematological:  Negative for adenopathy. Does not bruise/bleed easily.   Psychiatric/Behavioral:  Positive for dysphoric mood. Negative for confusion and suicidal ideas. The patient is nervous/anxious.    Pt's previous ROS reviewed and updated as indicated.       Objective   Vitals:    08/19/24 1035   BP: 120/88   Pulse: 89   SpO2: 95%     Body mass index is 31.22 kg/m².      08/19/24  1035   Weight: 82.6 kg (182 lb)       Physical Exam  Vitals and nursing note reviewed.   Constitutional:       General: She is not in acute distress.     Appearance: She is well-groomed and overweight. She is not ill-appearing.   HENT:      Head: Atraumatic.      Mouth/Throat:      Mouth: Mucous membranes are moist.   Eyes:      General: No scleral icterus.     Conjunctiva/sclera:  Conjunctivae normal.   Neck:      Thyroid: No thyroid mass.   Cardiovascular:      Rate and Rhythm: Normal rate and regular rhythm.      Pulses: Normal pulses.      Heart sounds: Normal heart sounds.   Pulmonary:      Effort: Pulmonary effort is normal.      Breath sounds: Decreased breath sounds present. No wheezing, rhonchi or rales.   Abdominal:      General: Bowel sounds are decreased. There is no distension.      Palpations: Abdomen is soft. There is no mass.      Tenderness: There is no abdominal tenderness.   Musculoskeletal:      Right lower leg: No edema.      Left lower leg: No edema.   Lymphadenopathy:      Cervical: No cervical adenopathy.   Skin:     General: Skin is warm and dry.      Coloration: Skin is not jaundiced or pale.      Findings: No bruising or rash.   Neurological:      Mental Status: She is alert and oriented to person, place, and time.      Gait: Gait is intact.   Psychiatric:         Mood and Affect: Mood normal. Affect is flat.         Behavior: Behavior normal. Behavior is cooperative.         Cognition and Memory: Cognition normal.     Pt's previous physical exam reviewed and updated as indicated.        ECG 12 Lead    Date/Time: 8/19/2024 11:09 AM  Performed by: Lesvia Servin MD    Authorized by: Lesvia Servin MD  Comparison: compared with previous ECG from 3/18/2024  Similar to previous ECG  Rhythm: sinus rhythm  Ectopy comments: none  Rate: normal  BPM: 72  Conduction: non-specific intraventricular conduction delay  ST Segments: ST segments normal  T Waves: T waves normal  QRS axis: normal  Other findings: low voltage    Clinical impression: non-specific ECG  Comments:   CT int: 156 ms  QRS dur: 90 ms  QTc: 428 ms    Previous QTc:  410 - 3/18/2024  404 - 2/12/2024  418 - 4/24/2018        ECHO 2/13/24  LV systolic function wnl, no LVH  EF 56%  Mild RV dilation  Mild LA dilation  Mild MR  Mild TR    Lab Results   Component Value Date    TSH 2.270 08/06/2024     Lab Results    Component Value Date    HGBA1C 5.90 (H) 01/29/2024    HGBA1C 5.7 (H) 07/24/2023    HGBA1C 5.60 08/08/2022     Lab Results   Component Value Date    CREATININE 1.18 (H) 08/19/2024     Lab Results   Component Value Date    CHLPL 200 01/29/2024    TRIG 192 (H) 01/29/2024    HDL 43 01/29/2024     (H) 01/29/2024         Assessment & Plan   Diagnoses and all orders for this visit:    1. Well adult exam (Primary)    2. Atypical chest pain  -     Comprehensive Metabolic Panel  -     CBC & Differential  -     Lipase  -     CK-MB  -     CK  -     ECG 12 Lead  -     Ambulatory Referral to Cardiology    3. Encounter for screening mammogram for malignant neoplasm of breast  -     Mammo Screening Digital Tomosynthesis Bilateral With CAD; Future    4. Cardiac conduction disorder  -     Ambulatory Referral to Cardiology    5. Status cardiac pacemaker  -     Ambulatory Referral to Cardiology    6. Postmenopausal  -     DEXA Bone Density Axial; Future    7. Screening for osteoporosis  -     DEXA Bone Density Axial; Future    8. Cervical cancer screening  -     Ambulatory Referral to Gynecology    9. Screen for colon cancer  -     Ambulatory Referral For Screening Colonoscopy       Age appropriate preventive care reviewed including cancer screenings, safety measures, mental health concerns, supplements, prevention of CV disease and DM, etc. Handout provided.    Nutrition and activity goals reviewed including: mainly water to drink, limit white flour/processed sugar, higher lean protein, high fiber carbs, regular meals, working toward 150 mins cardio per week once cleared by cardiology.    Needs f/u with cardiology closer to home. Referral as above.    F/u in 3 months, sooner as needed/instructed.  I will contact patient regarding test results and provide instructions regarding any necessary changes in plan of care.  Patient was encouraged to keep me informed of any acute changes, lack of improvement, or any new concerning  symptoms.  Pt is aware of reasons to seek emergent care.  Patient voiced understanding of all instructions and denied further questions.    Please note that portions of this note may have been completed with a voice recognition program.

## 2024-08-20 LAB
ALBUMIN SERPL-MCNC: 4.5 G/DL (ref 3.5–5.2)
ALBUMIN/GLOB SERPL: 1.6 G/DL
ALP SERPL-CCNC: 75 U/L (ref 39–117)
ALT SERPL-CCNC: 16 U/L (ref 1–33)
AST SERPL-CCNC: 30 U/L (ref 1–32)
BASOPHILS # BLD AUTO: 0.03 10*3/MM3 (ref 0–0.2)
BASOPHILS NFR BLD AUTO: 0.6 % (ref 0–1.5)
BILIRUB SERPL-MCNC: 0.4 MG/DL (ref 0–1.2)
BUN SERPL-MCNC: 17 MG/DL (ref 8–23)
BUN/CREAT SERPL: 14.4 (ref 7–25)
CALCIUM SERPL-MCNC: 9 MG/DL (ref 8.6–10.5)
CHLORIDE SERPL-SCNC: 103 MMOL/L (ref 98–107)
CK MB SERPL-MCNC: 1.13 NG/ML
CK SERPL-CCNC: 70 U/L (ref 20–180)
CO2 SERPL-SCNC: 24.4 MMOL/L (ref 22–29)
CREAT SERPL-MCNC: 1.18 MG/DL (ref 0.57–1)
EGFRCR SERPLBLD CKD-EPI 2021: 52 ML/MIN/1.73
EOSINOPHIL # BLD AUTO: 0.01 10*3/MM3 (ref 0–0.4)
EOSINOPHIL NFR BLD AUTO: 0.2 % (ref 0.3–6.2)
ERYTHROCYTE [DISTWIDTH] IN BLOOD BY AUTOMATED COUNT: 13.9 % (ref 12.3–15.4)
GLOBULIN SER CALC-MCNC: 2.8 GM/DL
GLUCOSE SERPL-MCNC: 95 MG/DL (ref 65–99)
HCT VFR BLD AUTO: 41.8 % (ref 34–46.6)
HGB BLD-MCNC: 14.1 G/DL (ref 12–15.9)
IMM GRANULOCYTES # BLD AUTO: 0.01 10*3/MM3 (ref 0–0.05)
IMM GRANULOCYTES NFR BLD AUTO: 0.2 % (ref 0–0.5)
LIPASE SERPL-CCNC: 73 U/L (ref 13–60)
LYMPHOCYTES # BLD AUTO: 2.28 10*3/MM3 (ref 0.7–3.1)
LYMPHOCYTES NFR BLD AUTO: 47.3 % (ref 19.6–45.3)
MCH RBC QN AUTO: 28.4 PG (ref 26.6–33)
MCHC RBC AUTO-ENTMCNC: 33.7 G/DL (ref 31.5–35.7)
MCV RBC AUTO: 84.3 FL (ref 79–97)
MONOCYTES # BLD AUTO: 0.41 10*3/MM3 (ref 0.1–0.9)
MONOCYTES NFR BLD AUTO: 8.5 % (ref 5–12)
NEUTROPHILS # BLD AUTO: 2.08 10*3/MM3 (ref 1.7–7)
NEUTROPHILS NFR BLD AUTO: 43.2 % (ref 42.7–76)
NRBC BLD AUTO-RTO: 0 /100 WBC (ref 0–0.2)
PLATELET # BLD AUTO: 241 10*3/MM3 (ref 140–450)
POTASSIUM SERPL-SCNC: 4.4 MMOL/L (ref 3.5–5.2)
PROT SERPL-MCNC: 7.3 G/DL (ref 6–8.5)
RBC # BLD AUTO: 4.96 10*6/MM3 (ref 3.77–5.28)
SODIUM SERPL-SCNC: 138 MMOL/L (ref 136–145)
WBC # BLD AUTO: 4.82 10*3/MM3 (ref 3.4–10.8)

## 2024-08-22 ENCOUNTER — TELEPHONE (OUTPATIENT)
Dept: FAMILY MEDICINE CLINIC | Facility: CLINIC | Age: 64
End: 2024-08-22
Payer: MEDICAID

## 2024-08-22 NOTE — TELEPHONE ENCOUNTER
Caller: Katelynn Sung    Relationship: Self    Best call back number: [phone]     Caller requesting test results: LABS     What test was performed: LABS     When was the test performed: AUG 19    Where was the test performed: OFFICE     Additional notes: PLEASE CALL WITH RESULTS

## 2024-08-23 PROBLEM — Z79.890 POSTMENOPAUSAL HORMONE THERAPY: Status: RESOLVED | Noted: 2018-02-02 | Resolved: 2024-08-23

## 2024-08-27 ENCOUNTER — TELEPHONE (OUTPATIENT)
Dept: FAMILY MEDICINE CLINIC | Facility: CLINIC | Age: 64
End: 2024-08-27

## 2024-09-03 PROBLEM — E78.2 MIXED HYPERLIPIDEMIA: Status: ACTIVE | Noted: 2024-09-03

## 2024-09-03 PROBLEM — Z95.0 PACEMAKER: Status: ACTIVE | Noted: 2024-09-03

## 2024-09-12 DIAGNOSIS — M79.7 FIBROMYALGIA: ICD-10-CM

## 2024-09-12 NOTE — TELEPHONE ENCOUNTER
Rx Refill Note  Requested Prescriptions     Pending Prescriptions Disp Refills    pregabalin (LYRICA) 200 MG capsule 60 capsule 0     Sig: Take 1 capsule by mouth 2 (Two) Times a Day.      Last office visit with prescribing clinician: 8/19/2024   Last telemedicine visit with prescribing clinician: Visit date not found   Next office visit with prescribing clinician: 9/12/2024                         Would you like a call back once the refill request has been completed: [] Yes [] No    If the office needs to give you a call back, can they leave a voicemail: [] Yes [] No    Alexia Kidd CMA  09/12/24, 17:36 EDT

## 2024-09-12 NOTE — TELEPHONE ENCOUNTER
Caller: Katelynn Sung Richy    Relationship: Self    Best call back number: [phone]     Requested Prescriptions:   Requested Prescriptions     Pending Prescriptions Disp Refills    pregabalin (LYRICA) 200 MG capsule 60 capsule 0     Sig: Take 1 capsule by mouth 2 (Two) Times a Day.        Pharmacy where request should be sent: [facility] ([place]) [address], [address] - [phone] [facility] [phone]      Last office visit with prescribing clinician: 8/19/2024   Last telemedicine visit with prescribing clinician: Visit date not found   Next office visit with prescribing clinician: 11/20/2024     Additional details provided by patient: PATIENT WILL TAKE HER LAST DOSE TODAY. NEEDS BEFORE THE WEEKEND     Does the patient have less than a 3 day supply:  [x] Yes  [] No    Would you like a call back once the refill request has been completed: [x] Yes [] No    If the office needs to give you a call back, can they leave a voicemail: [x] Yes [] No    Shira Miranda Rep   09/12/24 12:04 EDT

## 2024-09-13 RX ORDER — PREGABALIN 200 MG/1
200 CAPSULE ORAL 2 TIMES DAILY
Qty: 60 CAPSULE | Refills: 1 | Status: SHIPPED | OUTPATIENT
Start: 2024-09-13

## 2024-09-13 RX ORDER — NALOXEGOL OXALATE 25 MG/1
TABLET, FILM COATED ORAL
Qty: 30 TABLET | Refills: 4 | Status: SHIPPED | OUTPATIENT
Start: 2024-09-13

## 2024-09-13 RX ORDER — PREGABALIN 200 MG/1
200 CAPSULE ORAL 2 TIMES DAILY
Qty: 60 CAPSULE | Refills: 0 | OUTPATIENT
Start: 2024-09-13

## 2024-09-16 DIAGNOSIS — F41.8 SITUATIONAL ANXIETY: ICD-10-CM

## 2024-09-17 DIAGNOSIS — F41.8 SITUATIONAL ANXIETY: ICD-10-CM

## 2024-09-17 RX ORDER — CLONAZEPAM 0.5 MG/1
0.5 TABLET ORAL 2 TIMES DAILY PRN
Qty: 45 TABLET | Refills: 2 | Status: CANCELLED | OUTPATIENT
Start: 2024-09-17

## 2024-09-17 RX ORDER — CLONAZEPAM 0.5 MG/1
0.5 TABLET ORAL 2 TIMES DAILY PRN
Qty: 45 TABLET | Refills: 1 | OUTPATIENT
Start: 2024-09-17

## 2024-09-18 ENCOUNTER — TELEPHONE (OUTPATIENT)
Dept: FAMILY MEDICINE CLINIC | Facility: CLINIC | Age: 64
End: 2024-09-18
Payer: MEDICAID

## 2024-09-18 DIAGNOSIS — F41.8 SITUATIONAL ANXIETY: ICD-10-CM

## 2024-09-19 RX ORDER — CLONAZEPAM 0.5 MG/1
0.5 TABLET ORAL 2 TIMES DAILY PRN
Qty: 45 TABLET | Refills: 2 | OUTPATIENT
Start: 2024-09-19

## 2024-09-23 ENCOUNTER — OFFICE VISIT (OUTPATIENT)
Dept: GASTROENTEROLOGY | Facility: CLINIC | Age: 64
End: 2024-09-23
Payer: MEDICAID

## 2024-09-23 VITALS
HEART RATE: 61 BPM | WEIGHT: 184 LBS | SYSTOLIC BLOOD PRESSURE: 132 MMHG | BODY MASS INDEX: 31.58 KG/M2 | DIASTOLIC BLOOD PRESSURE: 86 MMHG | OXYGEN SATURATION: 95 %

## 2024-09-23 DIAGNOSIS — K31.84 GASTROPARESIS: ICD-10-CM

## 2024-09-23 DIAGNOSIS — R68.81 EARLY SATIETY: ICD-10-CM

## 2024-09-23 DIAGNOSIS — T40.2X5A THERAPEUTIC OPIOID INDUCED CONSTIPATION: ICD-10-CM

## 2024-09-23 DIAGNOSIS — R13.19 ESOPHAGEAL DYSPHAGIA: Primary | ICD-10-CM

## 2024-09-23 DIAGNOSIS — K59.03 THERAPEUTIC OPIOID INDUCED CONSTIPATION: ICD-10-CM

## 2024-09-23 DIAGNOSIS — Z86.010 HISTORY OF COLON POLYPS: ICD-10-CM

## 2024-09-23 PROCEDURE — 3075F SYST BP GE 130 - 139MM HG: CPT | Performed by: PHYSICIAN ASSISTANT

## 2024-09-23 PROCEDURE — 1160F RVW MEDS BY RX/DR IN RCRD: CPT | Performed by: PHYSICIAN ASSISTANT

## 2024-09-23 PROCEDURE — 1159F MED LIST DOCD IN RCRD: CPT | Performed by: PHYSICIAN ASSISTANT

## 2024-09-23 PROCEDURE — 99214 OFFICE O/P EST MOD 30 MIN: CPT | Performed by: PHYSICIAN ASSISTANT

## 2024-09-23 PROCEDURE — 3079F DIAST BP 80-89 MM HG: CPT | Performed by: PHYSICIAN ASSISTANT

## 2024-09-23 RX ORDER — SODIUM CHLORIDE 9 MG/ML
30 INJECTION, SOLUTION INTRAVENOUS CONTINUOUS PRN
OUTPATIENT
Start: 2024-09-23

## 2024-09-23 RX ORDER — BISACODYL 5 MG/1
TABLET, DELAYED RELEASE ORAL
Qty: 4 TABLET | Refills: 0 | Status: SHIPPED | OUTPATIENT
Start: 2024-09-23

## 2024-10-11 ENCOUNTER — OFFICE VISIT (OUTPATIENT)
Dept: FAMILY MEDICINE CLINIC | Facility: CLINIC | Age: 64
End: 2024-10-11
Payer: MEDICAID

## 2024-10-11 VITALS
HEIGHT: 64 IN | WEIGHT: 184 LBS | BODY MASS INDEX: 31.41 KG/M2 | DIASTOLIC BLOOD PRESSURE: 65 MMHG | SYSTOLIC BLOOD PRESSURE: 100 MMHG

## 2024-10-11 DIAGNOSIS — J06.9 VIRAL URI WITH COUGH: Primary | ICD-10-CM

## 2024-10-11 DIAGNOSIS — R05.1 ACUTE COUGH: ICD-10-CM

## 2024-10-11 LAB
EXPIRATION DATE: 0
EXPIRATION DATE: NORMAL
FLUAV AG UPPER RESP QL IA.RAPID: NOT DETECTED
FLUBV AG UPPER RESP QL IA.RAPID: NOT DETECTED
INTERNAL CONTROL: NORMAL
INTERNAL CONTROL: NORMAL
Lab: 0
Lab: NORMAL
RSV AG SPEC QL: NEGATIVE
SARS-COV-2 AG UPPER RESP QL IA.RAPID: NOT DETECTED

## 2024-10-11 PROCEDURE — 3074F SYST BP LT 130 MM HG: CPT | Performed by: NURSE PRACTITIONER

## 2024-10-11 PROCEDURE — 87807 RSV ASSAY W/OPTIC: CPT | Performed by: NURSE PRACTITIONER

## 2024-10-11 PROCEDURE — 1160F RVW MEDS BY RX/DR IN RCRD: CPT | Performed by: NURSE PRACTITIONER

## 2024-10-11 PROCEDURE — 96372 THER/PROPH/DIAG INJ SC/IM: CPT | Performed by: NURSE PRACTITIONER

## 2024-10-11 PROCEDURE — 3078F DIAST BP <80 MM HG: CPT | Performed by: NURSE PRACTITIONER

## 2024-10-11 PROCEDURE — 99213 OFFICE O/P EST LOW 20 MIN: CPT | Performed by: NURSE PRACTITIONER

## 2024-10-11 PROCEDURE — 1159F MED LIST DOCD IN RCRD: CPT | Performed by: NURSE PRACTITIONER

## 2024-10-11 PROCEDURE — 1126F AMNT PAIN NOTED NONE PRSNT: CPT | Performed by: NURSE PRACTITIONER

## 2024-10-11 PROCEDURE — 87428 SARSCOV & INF VIR A&B AG IA: CPT | Performed by: NURSE PRACTITIONER

## 2024-10-11 RX ORDER — DEXTROMETHORPHAN HYDROBROMIDE AND PROMETHAZINE HYDROCHLORIDE 15; 6.25 MG/5ML; MG/5ML
5 SYRUP ORAL 4 TIMES DAILY PRN
Qty: 120 ML | Refills: 0 | Status: SHIPPED | OUTPATIENT
Start: 2024-10-11

## 2024-10-11 RX ORDER — DEXAMETHASONE SODIUM PHOSPHATE 10 MG/ML
5 INJECTION INTRAMUSCULAR; INTRAVENOUS ONCE
Status: COMPLETED | OUTPATIENT
Start: 2024-10-11 | End: 2024-10-11

## 2024-10-11 RX ADMIN — DEXAMETHASONE SODIUM PHOSPHATE 5 MG: 10 INJECTION INTRAMUSCULAR; INTRAVENOUS at 17:28

## 2024-10-11 NOTE — PROGRESS NOTES
Subjective     Chief Complaint:    Chief Complaint   Patient presents with    Cough    Back Pain    Generalized Body Aches    Shortness of Breath       History of Present Illness:   Cough since yesterday, back hurting from coughing, cough is productive at times with dark to clear sputum, some nasal congestion, no sore throat, + ear pain bilaterally, some wheezing   Mild copd, using albuterol prn with mild improvement, thinks she is using breo  No fever  + body aches  No GI symptoms   Has used astelin as well   Her PCP gives her a steroid shot when she fells like this in the past and has done well and would that again     Review of Systems  As above      I have reviewed and/or updated the patient's past medical, surgical, family, social history and problem list as appropriate.     Medications:    Current Outpatient Medications:     albuterol sulfate HFA (ProAir HFA) 108 (90 Base) MCG/ACT inhaler, Inhale 2 puffs Every 4 (Four) Hours As Needed for Wheezing., Disp: 18 g, Rfl: 5    Azelastine HCl 137 MCG/SPRAY solution, INSTILL 2 SPRAYS IN EACH NOSTRIL TWICE DAILY AS DIRECTED, Disp: 30 mL, Rfl: 9    bisacodyl (Dulcolax) 5 MG EC tablet, Follow instructions given at office, Disp: 4 tablet, Rfl: 0    buprenorphine (SUBUTEX) 8 MG sublingual tablet SL tablet, , Disp: , Rfl:     cetirizine (zyrTEC) 10 MG tablet, Take 1 tablet by mouth Daily. (Patient taking differently: Take 1 tablet by mouth As Needed. Pt states takes as needed), Disp: 30 tablet, Rfl: 0    cyclobenzaprine (FLEXERIL) 5 MG tablet, Take 1 tablet by mouth 3 (Three) Times a Day As Needed for Muscle Spasms., Disp: 90 tablet, Rfl: 2    dexlansoprazole (DEXILANT) 60 MG capsule, Take 1 capsule by mouth Daily., Disp: 30 capsule, Rfl: 5    fluticasone (FLONASE) 50 MCG/ACT nasal spray, INSTILL 2 SPRAYS IN EACH NOSTRIL ONCE DAILY AS DIRECTED, Disp: 16 g, Rfl: 1    Fluticasone Furoate-Vilanterol (Breo Ellipta) 200-25 MCG/ACT inhaler, Inhale 1 puff Daily., Disp: 28  "each, Rfl: 11    ipratropium-albuterol (Combivent Respimat)  MCG/ACT inhaler, Inhale 1 puff 4 (Four) Times a Day As Needed for Wheezing., Disp: 4 g, Rfl: 11    lisinopril-hydrochlorothiazide (PRINZIDE,ZESTORETIC) 10-12.5 MG per tablet, Take 1 tablet by mouth Daily., Disp: 30 tablet, Rfl: 11    nystatin (MYCOSTATIN) 100,000 unit/mL suspension, SWISH AND SWALLOW 5 MLS BY MOUTH FOUR TIMES DAILY, Disp: 180 mL, Rfl: 0    polyethylene glycol (GoLYTELY) 236 g solution, Follow instructions given at office, Disp: 4000 mL, Rfl: 0    pregabalin (LYRICA) 200 MG capsule, Take 1 capsule by mouth 2 (Two) Times a Day., Disp: 60 capsule, Rfl: 1    promethazine (PHENERGAN) 25 MG tablet, TAKE 1 TABLET BY MOUTH EVERY 8 (EIGHT) HOURS AS NEEDED FOR NAUSEA OR VOMITING., Disp: 90 tablet, Rfl: 0    clonazePAM (KlonoPIN) 0.5 MG tablet, TAKE 1 TABLET BY MOUTH 2 (TWO) TIMES A DAY AS NEEDED FOR ANXIETY., Disp: 45 tablet, Rfl: 2    promethazine-dextromethorphan (PROMETHAZINE-DM) 6.25-15 MG/5ML syrup, Take 5 mL by mouth 4 (Four) Times a Day As Needed for Cough., Disp: 120 mL, Rfl: 0    Current Facility-Administered Medications:     dexAMETHasone (DECADRON) injection 5 mg, 5 mg, Intramuscular, Once, Mireya Cline APRN    methylPREDNISolone acetate (DEPO-medrol) injection 40 mg, 40 mg, Intramuscular, Once, Lsevia Servin MD    Allergies:  Allergies   Allergen Reactions    Nsaids GI Intolerance       Objective     Vital Signs:   Vitals:    10/11/24 1500   BP: 100/65   Weight: 83.5 kg (184 lb)   Height: 162.6 cm (64\")     Body mass index is 31.58 kg/m².    Physical Exam:    Physical Exam  Constitutional:       Appearance: Normal appearance. She is well-developed.   HENT:      Head: Normocephalic and atraumatic.      Right Ear: Ear canal and external ear normal. A middle ear effusion is present.      Left Ear: Ear canal and external ear normal. A middle ear effusion is present.      Nose: Congestion present.      Mouth/Throat:      " Pharynx: Uvula midline. Posterior oropharyngeal erythema present.   Eyes:      Pupils: Pupils are equal, round, and reactive to light.   Cardiovascular:      Rate and Rhythm: Normal rate and regular rhythm.      Heart sounds: Normal heart sounds. No murmur heard.     No friction rub. No gallop.   Pulmonary:      Effort: Pulmonary effort is normal.      Breath sounds: Normal breath sounds.      Comments: Barking cough  Abdominal:      General: Bowel sounds are normal.      Palpations: Abdomen is soft.      Tenderness: There is no abdominal tenderness.   Musculoskeletal:      Cervical back: Neck supple.   Lymphadenopathy:      Head:      Right side of head: No submental, submandibular, tonsillar, preauricular or posterior auricular adenopathy.      Left side of head: No submental, submandibular, tonsillar, preauricular or posterior auricular adenopathy.      Cervical: No cervical adenopathy.   Skin:     General: Skin is warm and dry.   Neurological:      General: No focal deficit present.      Mental Status: She is alert and oriented to person, place, and time.   Psychiatric:         Mood and Affect: Mood normal.         Behavior: Behavior normal.         Assessment / Plan     Assessment/Plan:   Problem List Items Addressed This Visit    None  Visit Diagnoses       Viral URI with cough    -  Primary    Relevant Medications    promethazine-dextromethorphan (PROMETHAZINE-DM) 6.25-15 MG/5ML syrup    dexAMETHasone (DECADRON) injection 5 mg (Start on 10/11/2024  3:15 PM)    Acute cough        Relevant Orders    POCT SARS-CoV-2 Antigen MICHELLE + Flu          - symptoms consistent with viral illness  - meds as above  - supportive care discussed  - RTC if no improvement  - covid/flu/rsv negative    Discussed plan of care in detail with pt today; pt verb understanding and agrees.    Follow up:  As needed    Electronically signed by JESSICA López   10/11/2024 15:08 EDT      Please note that portions of this note were  completed with a voice recognition program.

## 2024-10-16 DIAGNOSIS — K86.1 CHRONIC PANCREATITIS, UNSPECIFIED PANCREATITIS TYPE: ICD-10-CM

## 2024-10-16 RX ORDER — PROMETHAZINE HYDROCHLORIDE 25 MG/1
25 TABLET ORAL EVERY 8 HOURS PRN
Qty: 90 TABLET | Refills: 0 | Status: SHIPPED | OUTPATIENT
Start: 2024-10-16

## 2024-10-17 ENCOUNTER — OFFICE VISIT (OUTPATIENT)
Dept: FAMILY MEDICINE CLINIC | Facility: CLINIC | Age: 64
End: 2024-10-17
Payer: MEDICAID

## 2024-10-17 ENCOUNTER — TELEPHONE (OUTPATIENT)
Dept: GASTROENTEROLOGY | Facility: CLINIC | Age: 64
End: 2024-10-17
Payer: MEDICAID

## 2024-10-17 VITALS
DIASTOLIC BLOOD PRESSURE: 84 MMHG | HEART RATE: 93 BPM | WEIGHT: 187 LBS | BODY MASS INDEX: 31.92 KG/M2 | HEIGHT: 64 IN | TEMPERATURE: 97.9 F | RESPIRATION RATE: 16 BRPM | SYSTOLIC BLOOD PRESSURE: 126 MMHG | OXYGEN SATURATION: 98 %

## 2024-10-17 DIAGNOSIS — J22 LOWER RESPIRATORY TRACT INFECTION: Primary | ICD-10-CM

## 2024-10-17 DIAGNOSIS — F41.8 SITUATIONAL ANXIETY: ICD-10-CM

## 2024-10-17 PROCEDURE — 3079F DIAST BP 80-89 MM HG: CPT | Performed by: NURSE PRACTITIONER

## 2024-10-17 PROCEDURE — 99214 OFFICE O/P EST MOD 30 MIN: CPT | Performed by: NURSE PRACTITIONER

## 2024-10-17 PROCEDURE — 1126F AMNT PAIN NOTED NONE PRSNT: CPT | Performed by: NURSE PRACTITIONER

## 2024-10-17 PROCEDURE — 3074F SYST BP LT 130 MM HG: CPT | Performed by: NURSE PRACTITIONER

## 2024-10-17 RX ORDER — DEXTROMETHORPHAN HYDROBROMIDE AND PROMETHAZINE HYDROCHLORIDE 15; 6.25 MG/5ML; MG/5ML
5 SYRUP ORAL 4 TIMES DAILY PRN
Qty: 240 ML | Refills: 0 | Status: SHIPPED | OUTPATIENT
Start: 2024-10-17

## 2024-10-17 RX ORDER — CLONAZEPAM 0.5 MG/1
0.5 TABLET ORAL 2 TIMES DAILY PRN
Qty: 45 TABLET | Refills: 0 | Status: SHIPPED | OUTPATIENT
Start: 2024-10-17

## 2024-10-17 RX ORDER — PREDNISONE 10 MG/1
10 TABLET ORAL DAILY
Qty: 5 TABLET | Refills: 0 | Status: SHIPPED | OUTPATIENT
Start: 2024-10-17 | End: 2024-10-21 | Stop reason: HOSPADM

## 2024-10-17 RX ORDER — CEFDINIR 300 MG/1
300 CAPSULE ORAL 2 TIMES DAILY
Qty: 14 CAPSULE | Refills: 0 | Status: SHIPPED | OUTPATIENT
Start: 2024-10-17 | End: 2024-10-21 | Stop reason: HOSPADM

## 2024-10-17 RX ORDER — AZITHROMYCIN 250 MG/1
TABLET, FILM COATED ORAL
Qty: 6 TABLET | Refills: 0 | Status: SHIPPED | OUTPATIENT
Start: 2024-10-17 | End: 2024-10-21 | Stop reason: HOSPADM

## 2024-10-17 NOTE — PROGRESS NOTES
"                      Established Patient        Chief Complaint:   Chief Complaint   Patient presents with    URI     PT IS HERE WITH CONGESTION AND WHEEZING AND SOA.         History of Present Illness:    Katelynn Sung is a 64 y.o. female who presents today with cough and SOB    Cough for past 2 days. Productive with green sputum. SOB for 1 day. Had similar cough last week that got better then came back 2 days ago. Has been taking albuterol inhaler and tylenol at home with mild relief in symptoms. Has been sleeping in recliner for last 3 nights due to SOB    Subjective     The following portions of the patient's history were reviewed and updated as appropriate: allergies, current medications, past family history, past medical history, past social history, past surgical history and problem list.    ALLERGIES  Allergies   Allergen Reactions    Nsaids GI Intolerance       ROS  Review of Systems   Constitutional:  Negative for chills and fever.   HENT:  Positive for congestion and sinus pressure.    Respiratory:  Positive for cough and shortness of breath.    Cardiovascular:  Negative for chest pain and palpitations.   Gastrointestinal:  Positive for nausea. Negative for diarrhea and vomiting.   Musculoskeletal:  Positive for myalgias.   Neurological:  Negative for dizziness and light-headedness.   Psychiatric/Behavioral:  Positive for sleep disturbance.          Objective     Vital Signs:   /84   Pulse 93   Temp 97.9 °F (36.6 °C) (Axillary)   Resp 16   Ht 162.6 cm (64\")   Wt 84.8 kg (187 lb)   LMP  (LMP Unknown)   SpO2 98%   BMI 32.10 kg/m²                Physical Exam   Physical Exam  Cardiovascular:      Rate and Rhythm: Normal rate and regular rhythm.      Heart sounds: Normal heart sounds.   Pulmonary:      Effort: Pulmonary effort is normal.      Breath sounds: Rhonchi present.   Skin:     General: Skin is warm and dry.   Neurological:      Mental Status: She is alert and oriented to person, " place, and time. Mental status is at baseline.   Psychiatric:         Mood and Affect: Mood normal.         Behavior: Behavior normal.         Assessment and Plan      Assessment/Plan:   Diagnoses and all orders for this visit:    1. Lower respiratory tract infection (Primary)  -     promethazine-dextromethorphan (PROMETHAZINE-DM) 6.25-15 MG/5ML syrup; Take 5 mL by mouth 4 (Four) Times a Day As Needed for Cough.  Dispense: 240 mL; Refill: 0  -     azithromycin (Zithromax Z-Bolivar) 250 MG tablet; Take 2 tablets by mouth on day 1, then 1 tablet daily on days 2-5  Dispense: 6 tablet; Refill: 0  -     predniSONE (DELTASONE) 10 MG tablet; Take 1 tablet by mouth Daily for 5 days.  Dispense: 5 tablet; Refill: 0  -     cefdinir (OMNICEF) 300 MG capsule; Take 1 capsule by mouth 2 (Two) Times a Day for 7 days.  Dispense: 14 capsule; Refill: 0    2. Situational anxiety  -     clonazePAM (KlonoPIN) 0.5 MG tablet; Take 1 tablet by mouth 2 (Two) Times a Day As Needed for Anxiety.  Dispense: 45 tablet; Refill: 0    Risks, benefits, and potential side effects of current/new medications reviewed with patient.  Patient voiced understanding and wished to proceed with treatment.    May alternate Acetaminophen and Ibuprofen every 4-6 hours as needed for pain, fever > 101.    Patient was encouraged to keep me informed of any acute changes, lack of improvement, or any new concerning symptoms.      Discussion Summary:  Discussed plan of care in detail with pt today; pt verb understanding and agrees.      I have reviewed and updated all copied forward information, as appropriate.  I attest to the accuracy and relevance of any unchanged information.      Follow up:  No follow-ups on file.     Patient Education:  There are no Patient Instructions on file for this visit.    JESSICA Yung  10/17/24  13:15 EDT          Please note that portions of this note may have been completed with a voice recognition program.

## 2024-10-17 NOTE — TELEPHONE ENCOUNTER
Patient is scheduled for colonoscopy and upper endoscopy with Dr. Lowe on 10/18/24. She called today and states she has bronchitis. She has been rescheduled to 11/6/24 at 10 am

## 2024-10-19 ENCOUNTER — APPOINTMENT (OUTPATIENT)
Dept: CT IMAGING | Facility: HOSPITAL | Age: 64
End: 2024-10-19
Payer: MEDICAID

## 2024-10-19 ENCOUNTER — APPOINTMENT (OUTPATIENT)
Dept: GENERAL RADIOLOGY | Facility: HOSPITAL | Age: 64
End: 2024-10-19
Payer: MEDICAID

## 2024-10-19 ENCOUNTER — HOSPITAL ENCOUNTER (OUTPATIENT)
Facility: HOSPITAL | Age: 64
Setting detail: OBSERVATION
Discharge: HOME | End: 2024-10-21
Attending: EMERGENCY MEDICINE | Admitting: INTERNAL MEDICINE
Payer: MEDICAID

## 2024-10-19 DIAGNOSIS — R06.00 ACUTE DYSPNEA: ICD-10-CM

## 2024-10-19 DIAGNOSIS — I26.99 OTHER ACUTE PULMONARY EMBOLISM, UNSPECIFIED WHETHER ACUTE COR PULMONALE PRESENT: Primary | ICD-10-CM

## 2024-10-19 DIAGNOSIS — B34.8 RHINOVIRUS: ICD-10-CM

## 2024-10-19 DIAGNOSIS — R05.1 ACUTE COUGH: ICD-10-CM

## 2024-10-19 LAB
ALBUMIN SERPL-MCNC: 4.2 G/DL (ref 3.5–5.2)
ALBUMIN/GLOB SERPL: 1.2 G/DL
ALP SERPL-CCNC: 113 U/L (ref 39–117)
ALT SERPL W P-5'-P-CCNC: 31 U/L (ref 1–33)
ANION GAP SERPL CALCULATED.3IONS-SCNC: 11.1 MMOL/L (ref 5–15)
AST SERPL-CCNC: 64 U/L (ref 1–32)
B PARAPERT DNA SPEC QL NAA+PROBE: NOT DETECTED
B PERT DNA SPEC QL NAA+PROBE: NOT DETECTED
BASOPHILS # BLD AUTO: 0.06 10*3/MM3 (ref 0–0.2)
BASOPHILS NFR BLD AUTO: 0.8 % (ref 0–1.5)
BILIRUB SERPL-MCNC: 0.3 MG/DL (ref 0–1.2)
BUN SERPL-MCNC: 7 MG/DL (ref 8–23)
BUN/CREAT SERPL: 8 (ref 7–25)
C PNEUM DNA NPH QL NAA+NON-PROBE: NOT DETECTED
CALCIUM SPEC-SCNC: 8.9 MG/DL (ref 8.6–10.5)
CHLORIDE SERPL-SCNC: 106 MMOL/L (ref 98–107)
CO2 SERPL-SCNC: 21.9 MMOL/L (ref 22–29)
CREAT SERPL-MCNC: 0.87 MG/DL (ref 0.57–1)
DEPRECATED RDW RBC AUTO: 41.7 FL (ref 37–54)
EGFRCR SERPLBLD CKD-EPI 2021: 75 ML/MIN/1.73
EOSINOPHIL # BLD AUTO: 0.02 10*3/MM3 (ref 0–0.4)
EOSINOPHIL NFR BLD AUTO: 0.3 % (ref 0.3–6.2)
ERYTHROCYTE [DISTWIDTH] IN BLOOD BY AUTOMATED COUNT: 13.5 % (ref 12.3–15.4)
FLUAV SUBTYP SPEC NAA+PROBE: NOT DETECTED
FLUBV RNA ISLT QL NAA+PROBE: NOT DETECTED
GLOBULIN UR ELPH-MCNC: 3.5 GM/DL
GLUCOSE SERPL-MCNC: 182 MG/DL (ref 65–99)
HADV DNA SPEC NAA+PROBE: NOT DETECTED
HCOV 229E RNA SPEC QL NAA+PROBE: NOT DETECTED
HCOV HKU1 RNA SPEC QL NAA+PROBE: NOT DETECTED
HCOV NL63 RNA SPEC QL NAA+PROBE: NOT DETECTED
HCOV OC43 RNA SPEC QL NAA+PROBE: NOT DETECTED
HCT VFR BLD AUTO: 40.9 % (ref 34–46.6)
HGB BLD-MCNC: 13.8 G/DL (ref 12–15.9)
HMPV RNA NPH QL NAA+NON-PROBE: NOT DETECTED
HOLD SPECIMEN: NORMAL
HPIV1 RNA ISLT QL NAA+PROBE: NOT DETECTED
HPIV2 RNA SPEC QL NAA+PROBE: NOT DETECTED
HPIV3 RNA NPH QL NAA+PROBE: NOT DETECTED
HPIV4 P GENE NPH QL NAA+PROBE: NOT DETECTED
IMM GRANULOCYTES # BLD AUTO: 0.1 10*3/MM3 (ref 0–0.05)
IMM GRANULOCYTES NFR BLD AUTO: 1.3 % (ref 0–0.5)
LYMPHOCYTES # BLD AUTO: 2.24 10*3/MM3 (ref 0.7–3.1)
LYMPHOCYTES NFR BLD AUTO: 29.9 % (ref 19.6–45.3)
M PNEUMO IGG SER IA-ACNC: NOT DETECTED
MCH RBC QN AUTO: 28.6 PG (ref 26.6–33)
MCHC RBC AUTO-ENTMCNC: 33.7 G/DL (ref 31.5–35.7)
MCV RBC AUTO: 84.9 FL (ref 79–97)
MONOCYTES # BLD AUTO: 0.39 10*3/MM3 (ref 0.1–0.9)
MONOCYTES NFR BLD AUTO: 5.2 % (ref 5–12)
NEUTROPHILS NFR BLD AUTO: 4.67 10*3/MM3 (ref 1.7–7)
NEUTROPHILS NFR BLD AUTO: 62.5 % (ref 42.7–76)
NRBC BLD AUTO-RTO: 0 /100 WBC (ref 0–0.2)
NT-PROBNP SERPL-MCNC: <36 PG/ML (ref 0–900)
PLATELET # BLD AUTO: 243 10*3/MM3 (ref 140–450)
PMV BLD AUTO: 10.7 FL (ref 6–12)
POTASSIUM SERPL-SCNC: 4.2 MMOL/L (ref 3.5–5.2)
PROT SERPL-MCNC: 7.7 G/DL (ref 6–8.5)
RBC # BLD AUTO: 4.82 10*6/MM3 (ref 3.77–5.28)
RHINOVIRUS RNA SPEC NAA+PROBE: DETECTED
RSV RNA NPH QL NAA+NON-PROBE: NOT DETECTED
SARS-COV-2 RNA NPH QL NAA+NON-PROBE: NOT DETECTED
SODIUM SERPL-SCNC: 139 MMOL/L (ref 136–145)
TROPONIN T SERPL HS-MCNC: <6 NG/L
WBC NRBC COR # BLD AUTO: 7.48 10*3/MM3 (ref 3.4–10.8)
WHOLE BLOOD HOLD COAG: NORMAL
WHOLE BLOOD HOLD SPECIMEN: NORMAL

## 2024-10-19 PROCEDURE — 99285 EMERGENCY DEPT VISIT HI MDM: CPT

## 2024-10-19 PROCEDURE — 25510000001 IOPAMIDOL 61 % SOLUTION: Performed by: EMERGENCY MEDICINE

## 2024-10-19 PROCEDURE — 94640 AIRWAY INHALATION TREATMENT: CPT

## 2024-10-19 PROCEDURE — 94799 UNLISTED PULMONARY SVC/PX: CPT

## 2024-10-19 PROCEDURE — G0378 HOSPITAL OBSERVATION PER HR: HCPCS

## 2024-10-19 PROCEDURE — 96374 THER/PROPH/DIAG INJ IV PUSH: CPT

## 2024-10-19 PROCEDURE — 96372 THER/PROPH/DIAG INJ SC/IM: CPT

## 2024-10-19 PROCEDURE — 71275 CT ANGIOGRAPHY CHEST: CPT

## 2024-10-19 PROCEDURE — 99291 CRITICAL CARE FIRST HOUR: CPT

## 2024-10-19 PROCEDURE — 84484 ASSAY OF TROPONIN QUANT: CPT | Performed by: EMERGENCY MEDICINE

## 2024-10-19 PROCEDURE — 94760 N-INVAS EAR/PLS OXIMETRY 1: CPT

## 2024-10-19 PROCEDURE — 93005 ELECTROCARDIOGRAM TRACING: CPT | Performed by: EMERGENCY MEDICINE

## 2024-10-19 PROCEDURE — 83880 ASSAY OF NATRIURETIC PEPTIDE: CPT | Performed by: EMERGENCY MEDICINE

## 2024-10-19 PROCEDURE — 83036 HEMOGLOBIN GLYCOSYLATED A1C: CPT | Performed by: STUDENT IN AN ORGANIZED HEALTH CARE EDUCATION/TRAINING PROGRAM

## 2024-10-19 PROCEDURE — 0202U NFCT DS 22 TRGT SARS-COV-2: CPT | Performed by: EMERGENCY MEDICINE

## 2024-10-19 PROCEDURE — 71045 X-RAY EXAM CHEST 1 VIEW: CPT

## 2024-10-19 PROCEDURE — 25010000002 METHYLPREDNISOLONE PER 40 MG: Performed by: EMERGENCY MEDICINE

## 2024-10-19 PROCEDURE — 25010000002 ENOXAPARIN PER 10 MG: Performed by: EMERGENCY MEDICINE

## 2024-10-19 PROCEDURE — 80053 COMPREHEN METABOLIC PANEL: CPT | Performed by: EMERGENCY MEDICINE

## 2024-10-19 PROCEDURE — 85025 COMPLETE CBC W/AUTO DIFF WBC: CPT | Performed by: EMERGENCY MEDICINE

## 2024-10-19 RX ORDER — SODIUM CHLORIDE 0.9 % (FLUSH) 0.9 %
10 SYRINGE (ML) INJECTION EVERY 12 HOURS SCHEDULED
Status: DISCONTINUED | OUTPATIENT
Start: 2024-10-19 | End: 2024-10-21 | Stop reason: HOSPADM

## 2024-10-19 RX ORDER — IPRATROPIUM BROMIDE AND ALBUTEROL SULFATE 2.5; .5 MG/3ML; MG/3ML
3 SOLUTION RESPIRATORY (INHALATION) ONCE
Status: COMPLETED | OUTPATIENT
Start: 2024-10-19 | End: 2024-10-19

## 2024-10-19 RX ORDER — SODIUM CHLORIDE 0.9 % (FLUSH) 0.9 %
10 SYRINGE (ML) INJECTION AS NEEDED
Status: DISCONTINUED | OUTPATIENT
Start: 2024-10-19 | End: 2024-10-21 | Stop reason: HOSPADM

## 2024-10-19 RX ORDER — CODEINE PHOSPHATE AND GUAIFENESIN 10; 100 MG/5ML; MG/5ML
5 SOLUTION ORAL ONCE
Status: COMPLETED | OUTPATIENT
Start: 2024-10-19 | End: 2024-10-19

## 2024-10-19 RX ORDER — BENZONATATE 100 MG/1
100 CAPSULE ORAL ONCE
Status: DISCONTINUED | OUTPATIENT
Start: 2024-10-19 | End: 2024-10-21 | Stop reason: HOSPADM

## 2024-10-19 RX ORDER — ENOXAPARIN SODIUM 100 MG/ML
1 INJECTION SUBCUTANEOUS ONCE
Status: COMPLETED | OUTPATIENT
Start: 2024-10-19 | End: 2024-10-19

## 2024-10-19 RX ORDER — METHYLPREDNISOLONE SODIUM SUCCINATE 40 MG/ML
80 INJECTION, POWDER, LYOPHILIZED, FOR SOLUTION INTRAMUSCULAR; INTRAVENOUS ONCE
Status: COMPLETED | OUTPATIENT
Start: 2024-10-19 | End: 2024-10-19

## 2024-10-19 RX ORDER — SODIUM CHLORIDE 9 MG/ML
40 INJECTION, SOLUTION INTRAVENOUS AS NEEDED
Status: DISCONTINUED | OUTPATIENT
Start: 2024-10-19 | End: 2024-10-21 | Stop reason: HOSPADM

## 2024-10-19 RX ORDER — ENOXAPARIN SODIUM 100 MG/ML
1 INJECTION SUBCUTANEOUS EVERY 12 HOURS
Status: DISCONTINUED | OUTPATIENT
Start: 2024-10-20 | End: 2024-10-20

## 2024-10-19 RX ORDER — IOPAMIDOL 612 MG/ML
100 INJECTION, SOLUTION INTRAVASCULAR
Status: COMPLETED | OUTPATIENT
Start: 2024-10-19 | End: 2024-10-19

## 2024-10-19 RX ORDER — IPRATROPIUM BROMIDE AND ALBUTEROL SULFATE 2.5; .5 MG/3ML; MG/3ML
3 SOLUTION RESPIRATORY (INHALATION) EVERY 4 HOURS PRN
Status: DISCONTINUED | OUTPATIENT
Start: 2024-10-19 | End: 2024-10-21 | Stop reason: HOSPADM

## 2024-10-19 RX ORDER — NITROGLYCERIN 0.4 MG/1
0.4 TABLET SUBLINGUAL
Status: DISCONTINUED | OUTPATIENT
Start: 2024-10-19 | End: 2024-10-21 | Stop reason: HOSPADM

## 2024-10-19 RX ORDER — PROMETHAZINE HYDROCHLORIDE AND CODEINE PHOSPHATE 6.25; 1 MG/5ML; MG/5ML
5 SYRUP ORAL ONCE
Status: DISCONTINUED | OUTPATIENT
Start: 2024-10-19 | End: 2024-10-19

## 2024-10-19 RX ORDER — BENZONATATE 100 MG/1
100 CAPSULE ORAL ONCE
Status: COMPLETED | OUTPATIENT
Start: 2024-10-19 | End: 2024-10-19

## 2024-10-19 RX ORDER — ONDANSETRON 2 MG/ML
4 INJECTION INTRAMUSCULAR; INTRAVENOUS EVERY 6 HOURS PRN
Status: DISCONTINUED | OUTPATIENT
Start: 2024-10-19 | End: 2024-10-21 | Stop reason: HOSPADM

## 2024-10-19 RX ADMIN — GUAIFENESIN AND CODEINE PHOSPHATE 5 ML: 100; 10 SOLUTION ORAL at 22:16

## 2024-10-19 RX ADMIN — Medication 10 ML: at 23:00

## 2024-10-19 RX ADMIN — ENOXAPARIN SODIUM 80 MG: 100 INJECTION SUBCUTANEOUS at 21:36

## 2024-10-19 RX ADMIN — BENZONATATE 100 MG: 100 CAPSULE ORAL at 18:04

## 2024-10-19 RX ADMIN — IOPAMIDOL 80 ML: 612 INJECTION, SOLUTION INTRAVENOUS at 19:57

## 2024-10-19 RX ADMIN — METHYLPREDNISOLONE SODIUM SUCCINATE 80 MG: 40 INJECTION, POWDER, FOR SOLUTION INTRAMUSCULAR; INTRAVENOUS at 17:45

## 2024-10-19 RX ADMIN — IPRATROPIUM BROMIDE AND ALBUTEROL SULFATE 3 ML: 2.5; .5 SOLUTION RESPIRATORY (INHALATION) at 17:41

## 2024-10-19 NOTE — ED PROVIDER NOTES
[facility] 3  Emergency Department Encounter  Emergency Medicine Physician Note     Pt Name:Katelynn Sung  MRN: [id number]  Birthdate 1960  Date of evaluation: 10/19/2024  PCP:  Lesvia Servin MD  Note Started: 5:19 PM EDT      CHIEF COMPLAINT       Chief Complaint   Patient presents with    Shortness of Breath       HISTORY OF PRESENT ILLNESS  (Location/Symptom, Timing/Onset, Context/Setting, Quality, Duration, Modifying Factors, Severity.)      Katelynn Sung is a 64 y.o. female who presents with severe shortness of breath.  Patient describes it as squeezing her chest.  Patient describes severe cough over the last couple days.  Patient had been taking amoxicillin he was started on prednisone.  Patient states that she has been taking a Phenergan-based cough medicine that sometimes works.    PAST MEDICAL / SURGICAL / SOCIAL / FAMILY HISTORY     Past Medical History:   Diagnosis Date    Adnexal mass     pelvic US 10/26/11 showed 2 cm left abdnexal cystic lesion, resolved on f/u 5/14/15    Anxiety     Arm fracture, left     Humphreys's esophagus     Body piercing     Both ears    Class 1 obesity due to excess calories with serious comorbidity and body mass index (BMI) of 30.0 to 30.9 in adult 02/16/2018    Colon polyps     COPD (chronic obstructive pulmonary disease) 2008    Cor pulmonale     Degenerative arthritis of lumbar spine     Depression     Fibromyalgia     Finger fracture     GERD (gastroesophageal reflux disease)     Herpes simplex     Hypertension     Insomnia     Lumbar degenerative disc disease     Mild sleep apnea 01/16/2013    Sleep study 1/16/13 showing mild degree    Narcotic dependence     Osteoporosis     Pancreatitis     Pneumonia     Respiratory failure requiring intubation     double pneumonia    Restless leg syndrome     Seasonal allergies     Sinus node dysfunction     Sleep apnea     does not wear cpap    Trouble swallowing     Wears dentures      No  additional pertinent       Past Surgical History:   Procedure Laterality Date    COLONOSCOPY N/A     Complete    ENDOSCOPY N/A 2020    Procedure: ESOPHAGOGASTRODUODENOSCOPY;  Surgeon: Charbel Ching MD;  Location: [facility] ENDOSCOPY;  Service: Gastroenterology;  Laterality: N/A;    ENDOSCOPY N/A 2024    Procedure: Esophagogastroduodenoscopy with biopsy and polypectomy;  Surgeon: Jeronimo Lowe MD;  Location: [facility] ENDOSCOPY;  Service: Gastroenterology;  Laterality: N/A;    PACEMAKER IMPLANTATION      2024    TUBAL ABDOMINAL LIGATION      UPPER GASTROINTESTINAL ENDOSCOPY N/A      No additional pertinent       Social History     Socioeconomic History    Marital status:    Tobacco Use    Smoking status: Former     Current packs/day: 0.00     Average packs/day: 0.8 packs/day for 3.0 years (2.3 ttl pk-yrs)     Types: Cigarettes, Electronic Cigarette     Start date:      Quit date:      Years since quittin.8     Passive exposure: Never    Smokeless tobacco: Never    Tobacco comments:     2013 started using nicotine containing substance in combustion-free vaporization device. PT not longer uses vapor stick.   Vaping Use    Vaping status: Former   Substance and Sexual Activity    Alcohol use: No    Drug use: Not Currently     Types: Oxycodone     Comment: Patient is currently on subutex    Sexual activity: Not Currently       Family History   Problem Relation Age of Onset    Stroke Mother     Liver disease Mother         Chronic    Cirrhosis Father     Heart attack Father     Breast cancer Sister     Stroke Brother     Cancer Sister     Colon cancer Neg Hx        Allergies:  Nsaids    Home Medications:  Prior to Admission medications    Medication Sig Start Date End Date Taking? Authorizing Provider   albuterol sulfate HFA (ProAir HFA) 108 (90 Base) MCG/ACT inhaler Inhale 2 puffs Every 4 (Four) Hours As Needed for Wheezing. 22   Lesvia Servin MD    Azelastine HCl 137 MCG/SPRAY solution INSTILL 2 SPRAYS IN EACH NOSTRIL TWICE DAILY AS DIRECTED 8/14/24   Lesvia Servin MD   azithromycin (Zithromax Z-Bolivar) 250 MG tablet Take 2 tablets by mouth on day 1, then 1 tablet daily on days 2-5 10/17/24   Svetlana Barrera APRN   bisacodyl (Dulcolax) 5 MG EC tablet Follow instructions given at office 9/23/24   India Baldwin PA-C   buprenorphine (SUBUTEX) 8 MG sublingual tablet SL tablet Place 1 tablet under the tongue Daily. 7/24/23   Provider, MD Elena   cefdinir (OMNICEF) 300 MG capsule Take 1 capsule by mouth 2 (Two) Times a Day for 7 days. 10/17/24 10/24/24  Svetlana Barrera APRN   cetirizine (zyrTEC) 10 MG tablet Take 1 tablet by mouth Daily.  Patient taking differently: Take 1 tablet by mouth As Needed. Pt states takes as needed 9/13/22   Lesvia Servin MD   clonazePAM (KlonoPIN) 0.5 MG tablet Take 1 tablet by mouth 2 (Two) Times a Day As Needed for Anxiety. 10/17/24   Svetlana Barrera APRN   cyclobenzaprine (FLEXERIL) 5 MG tablet Take 1 tablet by mouth 3 (Three) Times a Day As Needed for Muscle Spasms. 8/19/24   Lesvia Servin MD   dexlansoprazole (DEXILANT) 60 MG capsule Take 1 capsule by mouth Daily. 8/19/24   Lesvia Servin MD   fluticasone (FLONASE) 50 MCG/ACT nasal spray INSTILL 2 SPRAYS IN EACH NOSTRIL ONCE DAILY AS DIRECTED 7/21/23   Lesvia Servin MD   Fluticasone Furoate-Vilanterol (Breo Ellipta) 200-25 MCG/ACT inhaler Inhale 1 puff Daily. 5/15/24   Lesvia Servin MD   ipratropium-albuterol (Combivent Respimat)  MCG/ACT inhaler Inhale 1 puff 4 (Four) Times a Day As Needed for Wheezing. 8/19/24   Lesvia Servin MD   lisinopril-hydrochlorothiazide (PRINZIDE,ZESTORETIC) 10-12.5 MG per tablet Take 1 tablet by mouth Daily. 8/19/24   Lesvia Servin MD   nystatin (MYCOSTATIN) 100,000 unit/mL suspension SWISH AND SWALLOW 5 MLS BY MOUTH FOUR TIMES DAILY  Patient taking differently: Take  by mouth As Needed. SWISH AND SWALLOW 5 MLS  "BY MOUTH FOUR TIMES DAILY 6/26/24   Lesvia Servin MD   polyethylene glycol (GoLYTELY) 236 g solution Follow instructions given at office 9/23/24   India Baldwin PA-C   predniSONE (DELTASONE) 10 MG tablet Take 1 tablet by mouth Daily for 5 days. 10/17/24 10/22/24  Svetlana Barrera APRN   pregabalin (LYRICA) 200 MG capsule Take 1 capsule by mouth 2 (Two) Times a Day. 9/13/24   Lesvia Servin MD   promethazine (PHENERGAN) 25 MG tablet TAKE 1 TABLET BY MOUTH EVERY 8 (EIGHT) HOURS AS NEEDED FOR NAUSEA OR VOMITING. 10/16/24   Lesvia Servin MD   promethazine-dextromethorphan (PROMETHAZINE-DM) 6.25-15 MG/5ML syrup Take 5 mL by mouth 4 (Four) Times a Day As Needed for Cough. 10/17/24   Svetlana Barrera APRN         REVIEW OF SYSTEMS       Review of Systems   Constitutional:  Positive for diaphoresis. Negative for fever.   Respiratory:  Positive for cough, chest tightness and shortness of breath.    Cardiovascular:  Negative for chest pain.   Gastrointestinal:  Negative for abdominal pain, nausea and vomiting.   Endocrine: Negative for polyuria.   Genitourinary:  Negative for difficulty urinating and frequency.   Neurological:  Positive for light-headedness.       PHYSICAL EXAM      INITIAL VITALS:   /70   Pulse 94   Temp 98.4 °F (36.9 °C) (Oral)   Resp 14   Ht 162.6 cm (64.02\")   Wt 84.8 kg (186 lb 15.2 oz)   LMP  (LMP Unknown)   SpO2 94%   BMI 32.07 kg/m²     Physical Exam  Constitutional:       General: She is in acute distress.      Appearance: Normal appearance. She is ill-appearing.   HENT:      Head: Normocephalic and atraumatic.      Mouth/Throat:      Mouth: Mucous membranes are moist.      Pharynx: Oropharynx is clear.   Cardiovascular:      Rate and Rhythm: Regular rhythm. Tachycardia present.   Pulmonary:      Effort: Pulmonary effort is normal.      Breath sounds: Decreased breath sounds present. No wheezing or rhonchi.   Abdominal:      General: Abdomen is flat. There is no " distension.      Palpations: Abdomen is soft.      Tenderness: There is no abdominal tenderness. There is no guarding.   Musculoskeletal:         General: Normal range of motion.   Skin:     General: Skin is warm and dry.   Neurological:      General: No focal deficit present.      Mental Status: She is alert and oriented to person, place, and time.   Psychiatric:         Mood and Affect: Mood is anxious.         Behavior: Behavior normal.           DDX/DIAGNOSTIC RESULTS / EMERGENCY DEPARTMENT COURSE / MDM     Differential Diagnosis included but not limited: ACS, viral illness including COVID, COPD exacerbation versus CHF exacerbation, pulmonary embolism    Diagnoses Considered but Do Not Suspect: No concern for active or acute STEMI on evaluation EKG, low concern for ACS.    Decision Rules/Scores utilized: N/A     Tests considered but not ordered and why:  N/A     MIPS: N/A     Code Status Discussion:  Not Discussed    Additional Patient Education Provided: None     Medical Decision Making    Medical Decision Making  61-year-old female that is been having shortness of breath, describes strangulation like shortness of breath around her chest.  Patient describes severe cough, unable to catch her breath.  Patient describes it as severe in nature, has recently been treated for pneumonia, was on azithromycin and prednisone with no improvement.  Patient came in here with severe cough, respiratory stress, and hypoxic.  Patient on nasal cannula low 90s at 2 L.  Patient does not have oxygen at home.  Patient positive for human rhinovirus CT scan demonstrated possible pulmonary embolisms, not the perfect study with contrast, patient given 1 mg/kg of Lovenox for anticoagulation.  With patient's O2 requirement and concerns for pulmonary embolism patient admitted for further management care.     Problems Addressed:  Acute cough: complicated acute illness or injury  Acute dyspnea: complicated acute illness or injury  Other  acute pulmonary embolism, unspecified whether acute cor pulmonale present: complicated acute illness or injury  Rhinovirus: complicated acute illness or injury    Amount and/or Complexity of Data Reviewed  External Data Reviewed: labs and notes.  Labs: ordered.  Radiology: ordered and independent interpretation performed.     Details: Personally evaluated the chest x-ray, no signs of large pneumonia noted.  ECG/medicine tests: ordered and independent interpretation performed.  Discussion of management or test interpretation with external provider(s): Hospitalist for admission    Risk  OTC drugs.  Prescription drug management.  Decision regarding hospitalization.        See ED COURSE for additional MDM statements    EKG    EKG Interpretation    Evaluated and interpreted by emergency department physician    Rhythm: Normal Sinus Rhythm  Rate: 105  Axis: Elisa  Ectopy: none  Conduction: Normal  ST Segments: Normal  T Waves: Nonspecific T wave flattening in multiple leads  Q Waves: Q waves in aVR V1    Clinical Impression: Normal Sinus Rhythm    Anupam Magdaleno DO      All EKG's are interpreted by the Emergency Department Physician who either signs or Co-signs this chart in the absence of a cardiologist.    Additional Scores                   EMERGENCY DEPARTMENT COURSE:    ED Course as of 10/19/24 2235   Sat Oct 19, 2024   2102 SpO2(!): 89 %  Patient noted to have episodes of hypoxia.  Placed on nasal cannula at this time. [CR]      ED Course User Index  [CR] Anupam Magdaleno DO       PROCEDURES:  None Performed   Procedures    DATA FOR LAB AND RADIOLOGY TESTS ORDERED BELOW ARE REVIEWED BY THE ED CLINICIAN:    RADIOLOGY: All x-rays, CT, MRI, and formal ultrasound images (except ED bedside ultrasound) are read by the radiologist, see reports below, unless otherwise noted in MDM or here.  Reports below are reviewed by myself.  CT Angiogram Chest Pulmonary Embolism   Final Result   1. No Limited bolus  of contrast and motion artifact limits this   examination. There is no central pulmonary embolism. There are   questionable emboli in the descending right pulmonary artery. There is   no right heart strain.   2. No aneurysm or dissection.   3. Coronary artery calcification.   4. Bibasilar atelectasis there are no effusions. There is no edema.       CTDI: 6.47 mGy   DLP:191.23 mGy.cm       This report was signed and finalized on 10/19/2024 8:45 PM by Ashish Lanza MD.          XR Chest 1 View   Final Result   Underinflation with no edema or infiltrate. There are coarse   interstitial changes.       This report was signed and finalized on 10/19/2024 6:19 PM by Ashish Lanza MD.              LABS: Lab orders shown below, the results are reviewed by myself, and all abnormals are listed below.  Labs Reviewed   RESPIRATORY PANEL PCR W/ COVID-19 (SARS-COV-2), NP SWAB IN UTM/VTP, 2 HR TAT - Abnormal; Notable for the following components:       Result Value    Human Rhinovirus/Enterovirus Detected (*)     All other components within normal limits    Narrative:     In the setting of a positive respiratory panel with a viral infection PLUS a negative procalcitonin without other underlying concern for bacterial infection, consider observing off antibiotics or discontinuation of antibiotics and continue supportive care. If the respiratory panel is positive for atypical bacterial infection (Bordetella pertussis, Chlamydophila pneumoniae, or Mycoplasma pneumoniae), consider antibiotic de-escalation to target atypical bacterial infection.   COMPREHENSIVE METABOLIC PANEL - Abnormal; Notable for the following components:    Glucose 182 (*)     BUN 7 (*)     CO2 21.9 (*)     AST (SGOT) 64 (*)     All other components within normal limits    Narrative:     GFR Normal >60  Chronic Kidney Disease <60  Kidney Failure <15     CBC WITH AUTO DIFFERENTIAL - Abnormal; Notable for the following components:    Immature Grans % 1.3 (*)      Immature Grans, Absolute 0.10 (*)     All other components within normal limits   BNP (IN-HOUSE) - Normal    Narrative:     This assay is used as an aid in the diagnosis of individuals suspected of having heart failure. It can be used as an aid in the diagnosis of acute decompensated heart failure (ADHF) in patients presenting with signs and symptoms of ADHF to the emergency department (ED). In addition, NT-proBNP of <300 pg/mL indicates ADHF is not likely.    Age Range Result Interpretation  NT-proBNP Concentration (pg/mL:      <50             Positive            >450                   Gray                 300-450                    Negative             <300    50-75           Positive            >900                  Gray                300-900                  Negative            <300      >75             Positive            >1800                  Gray                300-1800                  Negative            <300   SINGLE HS TROPONIN T - Normal    Narrative:     High Sensitive Troponin T Reference Range:  <14.0 ng/L- Negative Female for AMI  <22.0 ng/L- Negative Male for AMI  >=14 - Abnormal Female indicating possible myocardial injury.  >=22 - Abnormal Male indicating possible myocardial injury.   Clinicians would have to utilize clinical acumen, EKG, Troponin, and serial changes to determine if it is an Acute Myocardial Infarction or myocardial injury due to an underlying chronic condition.        RAINBOW DRAW    Narrative:     The following orders were created for panel order [place] Draw.  Procedure                               Abnormality         Status                     ---------                               -----------         ------                     Green Top (Gel)[[id number]]                                  Final result               Lavender Top[[id number]]                                     Final result               Gold Top - SST[[id number]]                                   Final result                Diaz Top[[id number]]                                         Final result               Light Blue Top[[id number]]                                   Final result                 Please view results for these tests on the individual orders.   BLOOD GAS, VENOUS   CBC AND DIFFERENTIAL    Narrative:     The following orders were created for panel order CBC & Differential.  Procedure                               Abnormality         Status                     ---------                               -----------         ------                     CBC Auto Differential[[id number]]        Abnormal            Final result                 Please view results for these tests on the individual orders.   GREEN TOP   LAVENDER TOP   GOLD TOP - SST   GRAY TOP   LIGHT BLUE TOP       Vitals Reviewed:    Vitals:    10/19/24 1946 10/19/24 2100 10/19/24 2130 10/19/24 2225   BP: 149/80 126/68 117/70    Pulse: 96 93 94    Resp:       Temp:       TempSrc:       SpO2: 92% 92% 93% 94%   Weight:       Height:           MEDICATIONS GIVEN TO PATIENT THIS ENCOUNTER:  Medications   sodium chloride 0.9 % flush 10 mL (has no administration in time range)   benzonatate (TESSALON) capsule 100 mg (100 mg Oral Not Given 10/19/24 2100)   ipratropium-albuterol (DUO-NEB) nebulizer solution 3 mL (3 mL Nebulization Given 10/19/24 1741)   methylPREDNISolone sodium succinate (SOLU-Medrol) injection 80 mg (80 mg Intravenous Given 10/19/24 1745)   benzonatate (TESSALON) capsule 100 mg (100 mg Oral Given 10/19/24 1804)   iopamidol (ISOVUE-300) 61 % injection 100 mL (80 mL Intravenous Given 10/19/24 1957)   Enoxaparin Sodium (LOVENOX) syringe 80 mg (80 mg Subcutaneous Given 10/19/24 2136)   guaiFENesin-codeine (ROMILAR-AC) syrup 5 mL (5 mL Oral Given 10/19/24 2216)       CONSULTS:  None    CRITICAL CARE:  There was significant risk of life threatening deterioration of patient's condition requiring my direct management. Critical care time 35 minutes,  excluding any documented procedures.    FINAL IMPRESSION      1. Other acute pulmonary embolism, unspecified whether acute cor pulmonale present    2. Acute dyspnea    3. Rhinovirus    4. Acute cough          DISPOSITION / PLAN     ED Disposition       ED Disposition   Decision to Admit    Condition   --    Comment   Level of Care: Telemetry [5]   Diagnosis: Pulmonary embolism [006305]   Admitting Physician: KERLEY, BRIAN JOSEPH [[id number]]   Attending Physician: KERLEY, BRIAN JOSEPH [[id number]]                 PATIENT REFERRED TO:  No follow-up provider specified.    DISCHARGE MEDICATIONS:     Medication List        ASK your doctor about these medications      albuterol sulfate  (90 Base) MCG/ACT inhaler  Commonly known as: ProAir HFA  Inhale 2 puffs Every 4 (Four) Hours As Needed for Wheezing.     Azelastine HCl 137 MCG/SPRAY solution  INSTILL 2 SPRAYS IN EACH NOSTRIL TWICE DAILY AS DIRECTED     azithromycin 250 MG tablet  Commonly known as: Zithromax Z-Bolivar  Take 2 tablets by mouth on day 1, then 1 tablet daily on days 2-5     bisacodyl 5 MG EC tablet  Commonly known as: Dulcolax  Follow instructions given at office     buprenorphine 8 MG sublingual tablet SL tablet  Commonly known as: SUBUTEX     cefdinir 300 MG capsule  Commonly known as: OMNICEF  Take 1 capsule by mouth 2 (Two) Times a Day for 7 days.     cetirizine 10 MG tablet  Commonly known as: zyrTEC  Take 1 tablet by mouth Daily.     clonazePAM 0.5 MG tablet  Commonly known as: KlonoPIN  Take 1 tablet by mouth 2 (Two) Times a Day As Needed for Anxiety.     Combivent Respimat  MCG/ACT inhaler  Generic drug: ipratropium-albuterol  Inhale 1 puff 4 (Four) Times a Day As Needed for Wheezing.     cyclobenzaprine 5 MG tablet  Commonly known as: FLEXERIL  Take 1 tablet by mouth 3 (Three) Times a Day As Needed for Muscle Spasms.     dexlansoprazole 60 MG capsule  Commonly known as: DEXILANT  Take 1 capsule by mouth Daily.     fluticasone 50 MCG/ACT nasal  spray  Commonly known as: FLONASE  INSTILL 2 SPRAYS IN EACH NOSTRIL ONCE DAILY AS DIRECTED     Fluticasone Furoate-Vilanterol 200-25 MCG/ACT inhaler  Commonly known as: Breo Ellipta  Inhale 1 puff Daily.     lisinopril-hydrochlorothiazide 10-12.5 MG per tablet  Commonly known as: PRINZIDE,ZESTORETIC  Take 1 tablet by mouth Daily.     nystatin 100,000 unit/mL suspension  Commonly known as: MYCOSTATIN  SWISH AND SWALLOW 5 MLS BY MOUTH FOUR TIMES DAILY     polyethylene glycol 236 g solution  Commonly known as: GoLYTELY  Follow instructions given at office     predniSONE 10 MG tablet  Commonly known as: DELTASONE  Take 1 tablet by mouth Daily for 5 days.     pregabalin 200 MG capsule  Commonly known as: LYRICA  Take 1 capsule by mouth 2 (Two) Times a Day.     promethazine 25 MG tablet  Commonly known as: PHENERGAN  TAKE 1 TABLET BY MOUTH EVERY 8 (EIGHT) HOURS AS NEEDED FOR NAUSEA OR VOMITING.     promethazine-dextromethorphan 6.25-15 MG/5ML syrup  Commonly known as: PROMETHAZINE-DM  Take 5 mL by mouth 4 (Four) Times a Day As Needed for Cough.              Electronically signed by Anupam Magdaleno DO, 10/19/24, 5:19 PM EDT.    Emergency Medicine Physician  [facility]  (Please note that portions of thisnote were completed with a voice recognition program.  Efforts were made to edit the dictations but occasionally words are mis-transcribed.)         Anupam Magdaleno DO  10/19/24 [id number]

## 2024-10-20 ENCOUNTER — APPOINTMENT (OUTPATIENT)
Dept: ULTRASOUND IMAGING | Facility: HOSPITAL | Age: 64
End: 2024-10-20
Payer: MEDICAID

## 2024-10-20 PROBLEM — B34.8 RHINOVIRUS: Status: ACTIVE | Noted: 2024-10-20

## 2024-10-20 LAB
ANION GAP SERPL CALCULATED.3IONS-SCNC: 12.8 MMOL/L (ref 5–15)
BASOPHILS # BLD AUTO: 0.04 10*3/MM3 (ref 0–0.2)
BASOPHILS NFR BLD AUTO: 0.4 % (ref 0–1.5)
BUN SERPL-MCNC: 8 MG/DL (ref 8–23)
BUN/CREAT SERPL: 9.8 (ref 7–25)
CALCIUM SPEC-SCNC: 9.5 MG/DL (ref 8.6–10.5)
CHLORIDE SERPL-SCNC: 103 MMOL/L (ref 98–107)
CO2 SERPL-SCNC: 22.2 MMOL/L (ref 22–29)
CREAT SERPL-MCNC: 0.82 MG/DL (ref 0.57–1)
DEPRECATED RDW RBC AUTO: 41.2 FL (ref 37–54)
EGFRCR SERPLBLD CKD-EPI 2021: 80.5 ML/MIN/1.73
EOSINOPHIL # BLD AUTO: 0 10*3/MM3 (ref 0–0.4)
EOSINOPHIL NFR BLD AUTO: 0 % (ref 0.3–6.2)
ERYTHROCYTE [DISTWIDTH] IN BLOOD BY AUTOMATED COUNT: 13.4 % (ref 12.3–15.4)
GLUCOSE BLDC GLUCOMTR-MCNC: 129 MG/DL (ref 70–130)
GLUCOSE BLDC GLUCOMTR-MCNC: 153 MG/DL (ref 70–130)
GLUCOSE BLDC GLUCOMTR-MCNC: 173 MG/DL (ref 70–130)
GLUCOSE BLDC GLUCOMTR-MCNC: 193 MG/DL (ref 70–130)
GLUCOSE SERPL-MCNC: 159 MG/DL (ref 65–99)
HBA1C MFR BLD: 5.7 % (ref 4.8–5.6)
HCT VFR BLD AUTO: 43 % (ref 34–46.6)
HGB BLD-MCNC: 14.3 G/DL (ref 12–15.9)
IMM GRANULOCYTES # BLD AUTO: 0.37 10*3/MM3 (ref 0–0.05)
IMM GRANULOCYTES NFR BLD AUTO: 3.4 % (ref 0–0.5)
LYMPHOCYTES # BLD AUTO: 2.22 10*3/MM3 (ref 0.7–3.1)
LYMPHOCYTES NFR BLD AUTO: 20.1 % (ref 19.6–45.3)
MCH RBC QN AUTO: 28.5 PG (ref 26.6–33)
MCHC RBC AUTO-ENTMCNC: 33.3 G/DL (ref 31.5–35.7)
MCV RBC AUTO: 85.7 FL (ref 79–97)
MONOCYTES # BLD AUTO: 0.38 10*3/MM3 (ref 0.1–0.9)
MONOCYTES NFR BLD AUTO: 3.4 % (ref 5–12)
NEUTROPHILS NFR BLD AUTO: 72.7 % (ref 42.7–76)
NEUTROPHILS NFR BLD AUTO: 8.01 10*3/MM3 (ref 1.7–7)
NRBC BLD AUTO-RTO: 0 /100 WBC (ref 0–0.2)
PLATELET # BLD AUTO: 277 10*3/MM3 (ref 140–450)
PMV BLD AUTO: 11.1 FL (ref 6–12)
POTASSIUM SERPL-SCNC: 4.1 MMOL/L (ref 3.5–5.2)
RBC # BLD AUTO: 5.02 10*6/MM3 (ref 3.77–5.28)
SODIUM SERPL-SCNC: 138 MMOL/L (ref 136–145)
WBC NRBC COR # BLD AUTO: 11.02 10*3/MM3 (ref 3.4–10.8)

## 2024-10-20 PROCEDURE — 85025 COMPLETE CBC W/AUTO DIFF WBC: CPT | Performed by: STUDENT IN AN ORGANIZED HEALTH CARE EDUCATION/TRAINING PROGRAM

## 2024-10-20 PROCEDURE — 82948 REAGENT STRIP/BLOOD GLUCOSE: CPT

## 2024-10-20 PROCEDURE — 63710000001 INSULIN LISPRO (HUMAN) PER 5 UNITS: Performed by: STUDENT IN AN ORGANIZED HEALTH CARE EDUCATION/TRAINING PROGRAM

## 2024-10-20 PROCEDURE — G0378 HOSPITAL OBSERVATION PER HR: HCPCS

## 2024-10-20 PROCEDURE — 82948 REAGENT STRIP/BLOOD GLUCOSE: CPT | Performed by: STUDENT IN AN ORGANIZED HEALTH CARE EDUCATION/TRAINING PROGRAM

## 2024-10-20 PROCEDURE — 94799 UNLISTED PULMONARY SVC/PX: CPT

## 2024-10-20 PROCEDURE — 94761 N-INVAS EAR/PLS OXIMETRY MLT: CPT

## 2024-10-20 PROCEDURE — 94618 PULMONARY STRESS TESTING: CPT

## 2024-10-20 PROCEDURE — 93970 EXTREMITY STUDY: CPT

## 2024-10-20 PROCEDURE — 25010000002 ONDANSETRON PER 1 MG: Performed by: STUDENT IN AN ORGANIZED HEALTH CARE EDUCATION/TRAINING PROGRAM

## 2024-10-20 PROCEDURE — 96375 TX/PRO/DX INJ NEW DRUG ADDON: CPT

## 2024-10-20 PROCEDURE — 99223 1ST HOSP IP/OBS HIGH 75: CPT | Performed by: STUDENT IN AN ORGANIZED HEALTH CARE EDUCATION/TRAINING PROGRAM

## 2024-10-20 PROCEDURE — 80048 BASIC METABOLIC PNL TOTAL CA: CPT | Performed by: STUDENT IN AN ORGANIZED HEALTH CARE EDUCATION/TRAINING PROGRAM

## 2024-10-20 RX ORDER — IPRATROPIUM BROMIDE AND ALBUTEROL SULFATE 2.5; .5 MG/3ML; MG/3ML
3 SOLUTION RESPIRATORY (INHALATION) EVERY 4 HOURS PRN
Status: DISCONTINUED | OUTPATIENT
Start: 2024-10-20 | End: 2024-10-21 | Stop reason: HOSPADM

## 2024-10-20 RX ORDER — INSULIN LISPRO 100 [IU]/ML
2-9 INJECTION, SOLUTION INTRAVENOUS; SUBCUTANEOUS
Status: DISCONTINUED | OUTPATIENT
Start: 2024-10-20 | End: 2024-10-21 | Stop reason: HOSPADM

## 2024-10-20 RX ORDER — NICOTINE POLACRILEX 4 MG
15 LOZENGE BUCCAL
Status: DISCONTINUED | OUTPATIENT
Start: 2024-10-20 | End: 2024-10-21 | Stop reason: HOSPADM

## 2024-10-20 RX ORDER — DEXTROSE MONOHYDRATE 25 G/50ML
25 INJECTION, SOLUTION INTRAVENOUS
Status: DISCONTINUED | OUTPATIENT
Start: 2024-10-20 | End: 2024-10-21 | Stop reason: HOSPADM

## 2024-10-20 RX ORDER — CLONAZEPAM 0.5 MG/1
0.5 TABLET ORAL 2 TIMES DAILY PRN
Status: DISCONTINUED | OUTPATIENT
Start: 2024-10-20 | End: 2024-10-21 | Stop reason: HOSPADM

## 2024-10-20 RX ORDER — BUDESONIDE AND FORMOTEROL FUMARATE DIHYDRATE 160; 4.5 UG/1; UG/1
2 AEROSOL RESPIRATORY (INHALATION)
Status: DISCONTINUED | OUTPATIENT
Start: 2024-10-20 | End: 2024-10-21 | Stop reason: HOSPADM

## 2024-10-20 RX ORDER — ALBUTEROL SULFATE 0.83 MG/ML
2.5 SOLUTION RESPIRATORY (INHALATION) EVERY 6 HOURS PRN
Status: DISCONTINUED | OUTPATIENT
Start: 2024-10-20 | End: 2024-10-21 | Stop reason: HOSPADM

## 2024-10-20 RX ORDER — PROMETHAZINE HYDROCHLORIDE 12.5 MG/1
25 TABLET ORAL EVERY 8 HOURS PRN
Status: DISCONTINUED | OUTPATIENT
Start: 2024-10-20 | End: 2024-10-21 | Stop reason: HOSPADM

## 2024-10-20 RX ORDER — BUPRENORPHINE 2 MG/1
8 TABLET SUBLINGUAL DAILY
Status: DISCONTINUED | OUTPATIENT
Start: 2024-10-20 | End: 2024-10-21 | Stop reason: HOSPADM

## 2024-10-20 RX ADMIN — PREGABALIN 200 MG: 75 CAPSULE ORAL at 08:54

## 2024-10-20 RX ADMIN — ONDANSETRON 4 MG: 2 INJECTION INTRAMUSCULAR; INTRAVENOUS at 08:49

## 2024-10-20 RX ADMIN — APIXABAN 10 MG: 5 TABLET, FILM COATED ORAL at 21:49

## 2024-10-20 RX ADMIN — INSULIN LISPRO 2 UNITS: 100 INJECTION, SOLUTION INTRAVENOUS; SUBCUTANEOUS at 08:53

## 2024-10-20 RX ADMIN — INSULIN LISPRO 2 UNITS: 100 INJECTION, SOLUTION INTRAVENOUS; SUBCUTANEOUS at 12:35

## 2024-10-20 RX ADMIN — BUDESONIDE AND FORMOTEROL FUMARATE DIHYDRATE 2 PUFF: 160; 4.5 AEROSOL RESPIRATORY (INHALATION) at 07:39

## 2024-10-20 RX ADMIN — Medication 10 ML: at 21:49

## 2024-10-20 RX ADMIN — Medication 10 ML: at 08:55

## 2024-10-20 RX ADMIN — INSULIN LISPRO 2 UNITS: 100 INJECTION, SOLUTION INTRAVENOUS; SUBCUTANEOUS at 21:48

## 2024-10-20 RX ADMIN — PREGABALIN 200 MG: 75 CAPSULE ORAL at 21:48

## 2024-10-20 RX ADMIN — APIXABAN 10 MG: 5 TABLET, FILM COATED ORAL at 08:59

## 2024-10-20 RX ADMIN — BUPRENORPHINE HCL 8 MG: 2 TABLET SUBLINGUAL at 08:54

## 2024-10-20 NOTE — PAYOR COMM NOTE
"OBSERVATION NOTIFICATION    To:  [employer]  From: Andrea Barth RN  Phone: [phone]  Fax: [phone]  NPI: [id number]  TIN: [id number]      Helen Wolf (64 y.o. Female)       Date of Birth   1960    Social Security Number       Address   [address] [place] [address]    Home Phone   [phone]    MRN   [id number]       Congregation   Buddhist    Marital Status                               Admission Date   10/19/24    Admission Type   Emergency    Admitting Provider   Kerley, Brian Joseph, DO    Attending Provider   Reid Portillo DO    Department, Room/Bed   [facility] TELEMETRY 3, 306/1       Discharge Date       Discharge Disposition       Discharge Destination                                 Attending Provider: Reid Portilol DO    Allergies: Nsaids    Isolation: Contact Drop   Infection: Rhinovirus  (10/19/24)   Code Status: CPR    Ht: 162.6 cm (64.02\")   Wt: 86.4 kg (190 lb 7.6 oz)    Admission Cmt: None   Principal Problem: Pulmonary embolism [I26.99]                   Active Insurance as of 10/19/2024       Primary Coverage       Payor Plan Insurance Group Employer/Plan Group    [place] [id number]       Payor Plan Address Payor Plan Phone Number Payor Plan Fax Number Effective Dates    [employer] [address] [address] [phone]  2021 - None Entered    [facility] [id number]         Subscriber Name Subscriber Birth Date Member ID       HELEN WOLF 1960 [id number]                     Emergency Contacts        (Rel.) Home Phone Work Phone Mobile Phone    JAYESH,CLOTILDE (Sister) [phone] -- [phone]    ROGER DUNAWAY (Daughter) -- -- [phone]                 History & Physical        Kerley, Brian Joseph, DO at 10/20/24 0108            [facility] HOSPITALIST   HISTORY AND PHYSICAL      Name:  Helen Wolf   Age:  64 y.o.  Sex:  female  :  1960  MRN:  [id number] " "  Visit Number:  [id number]  Admission Date:  10/19/2024  Date Of Service:  10/20/24  Primary Care Physician:  Lesvia Servin MD    Chief Complaint:     Shortness of air, chest pain    History Of Presenting Illness:      Katelynn Sung is a 64-year-old woman with past medical history of chronic back pain on Subutex, GERD, hypertension, pacemaker placed February 2024, COPD, constipation, degenerative arthritis spine, anxiety depression, restless leg syndrome, obesity BMI 32, fatty liver, insomnia, type 2 diabetes, chronic pancreatitis, hypertension, polyarthropathy, vitamin D deficiency, fibromyalgia, Humphreys's esophagus, hyperlipidemia.  Presented to [facility] ED on 10/19/2024 with concern for shortness of air, \"squeezing her chest\", severe nonproductive cough.  Says she was started on amoxicillin and prednisone without improvement.  Denied fevers or chills.    ED summary: Afebrile, tachycardic, other vital signs stable on 2 L.  EKG sinus tachycardia rate 105, nonspecific ST-T wave changes.  proBNP not elevated.  Blood glucose 192.  CBC unremarkable.  PCR respiratory panel rhinovirus positive.  CT angio chest proximal emboli in the descending right pulmonary artery no right heart strain, does show coronary artery classifications, bibasilar atelectasis with no effusions, no central PE.  She was provided Tessalon Perle, therapeutic Lovenox, DuoNeb, Solu-Medrol, Phenergan.    Review Of Systems:    All systems were reviewed and negative except as mentioned in history of presenting illness, assessment and plan.    Past Medical History: Patient  has a past medical history of Adnexal mass, Anxiety, Arm fracture, left, Humphreys's esophagus, Body piercing, Class 1 obesity due to excess calories with serious comorbidity and body mass index (BMI) of 30.0 to 30.9 in adult (02/16/2018), Colon polyps, COPD (chronic obstructive pulmonary disease) (2008), Cor pulmonale, Degenerative arthritis of lumbar spine, Depression, Fibromyalgia, " Finger fracture, GERD (gastroesophageal reflux disease), Herpes simplex, Hypertension, Insomnia, Lumbar degenerative disc disease, Mild sleep apnea (01/16/2013), Narcotic dependence, Osteoporosis, Pancreatitis, Pneumonia, Respiratory failure requiring intubation, Restless leg syndrome, Seasonal allergies, Sinus node dysfunction, Sleep apnea, Trouble swallowing, and Wears dentures.    Past Surgical History: Patient  has a past surgical history that includes Colonoscopy (N/A, 2008); Upper gastrointestinal endoscopy (N/A, 2013); Tubal ligation (1991); Esophagogastroduodenoscopy (N/A, 03/11/2020); Pacemaker Implantation; and Esophagogastroduodenoscopy (N/A, 8/9/2024).    Social History: Patient  reports that she quit smoking about 11 years ago. Her smoking use included cigarettes and electronic cigarette. She started smoking about 14 years ago. She has a 2.3 pack-year smoking history. She has never been exposed to tobacco smoke. She has never used smokeless tobacco. She reports that she does not currently use drugs after having used the following drugs: Oxycodone. She reports that she does not drink alcohol.    Family History:  Patient's family history has been reviewed and found to be noncontributory.     Allergies:      Nsaids    Home Medications:    Prior to Admission Medications       Prescriptions Last Dose Informant Patient Reported? Taking?    albuterol sulfate HFA (ProAir HFA) 108 (90 Base) MCG/ACT inhaler 10/19/2024  No Yes    Inhale 2 puffs Every 4 (Four) Hours As Needed for Wheezing.    Azelastine HCl 137 MCG/SPRAY solution 10/19/2024  No Yes    INSTILL 2 SPRAYS IN EACH NOSTRIL TWICE DAILY AS DIRECTED    azithromycin (Zithromax Z-Bolivar) 250 MG tablet 10/19/2024  No Yes    Take 2 tablets by mouth on day 1, then 1 tablet daily on days 2-5    buprenorphine (SUBUTEX) 8 MG sublingual tablet SL tablet 10/19/2024  Yes Yes    Place 1 tablet under the tongue Daily.    cefdinir (OMNICEF) 300 MG capsule 10/19/2024  No Yes     Take 1 capsule by mouth 2 (Two) Times a Day for 7 days.    cetirizine (zyrTEC) 10 MG tablet 10/19/2024  No Yes    Take 1 tablet by mouth Daily.    Patient taking differently:  Take 1 tablet by mouth As Needed. Pt states takes as needed    clonazePAM (KlonoPIN) 0.5 MG tablet 10/19/2024  No Yes    Take 1 tablet by mouth 2 (Two) Times a Day As Needed for Anxiety.    cyclobenzaprine (FLEXERIL) 5 MG tablet 10/18/2024  No Yes    Take 1 tablet by mouth 3 (Three) Times a Day As Needed for Muscle Spasms.    dexlansoprazole (DEXILANT) 60 MG capsule 10/19/2024  No Yes    Take 1 capsule by mouth Daily.    fluticasone (FLONASE) 50 MCG/ACT nasal spray Past Week  No Yes    INSTILL 2 SPRAYS IN EACH NOSTRIL ONCE DAILY AS DIRECTED    Fluticasone Furoate-Vilanterol (Breo Ellipta) 200-25 MCG/ACT inhaler Past Week  No Yes    Inhale 1 puff Daily.    ipratropium-albuterol (Combivent Respimat)  MCG/ACT inhaler 10/19/2024  No Yes    Inhale 1 puff 4 (Four) Times a Day As Needed for Wheezing.    lisinopril-hydrochlorothiazide (PRINZIDE,ZESTORETIC) 10-12.5 MG per tablet 10/19/2024  No Yes    Take 1 tablet by mouth Daily.    nystatin (MYCOSTATIN) 100,000 unit/mL suspension Past Week  No Yes    SWISH AND SWALLOW 5 MLS BY MOUTH FOUR TIMES DAILY    Patient taking differently:  Take  by mouth As Needed. SWISH AND SWALLOW 5 MLS BY MOUTH FOUR TIMES DAILY    predniSONE (DELTASONE) 10 MG tablet 10/19/2024  No Yes    Take 1 tablet by mouth Daily for 5 days.    pregabalin (LYRICA) 200 MG capsule 10/19/2024  No Yes    Take 1 capsule by mouth 2 (Two) Times a Day.    promethazine (PHENERGAN) 25 MG tablet 10/19/2024  No Yes    TAKE 1 TABLET BY MOUTH EVERY 8 (EIGHT) HOURS AS NEEDED FOR NAUSEA OR VOMITING.    promethazine-dextromethorphan (PROMETHAZINE-DM) 6.25-15 MG/5ML syrup 10/19/2024  No Yes    Take 5 mL by mouth 4 (Four) Times a Day As Needed for Cough.    methylPREDNISolone acetate (DEPO-medrol) injection 40 mg   No No          ED  Medications:    Medications   sodium chloride 0.9 % flush 10 mL (has no administration in time range)   benzonatate (TESSALON) capsule 100 mg (100 mg Oral Not Given 10/19/24 2100)   sodium chloride 0.9 % flush 10 mL (10 mL Intravenous Given 10/19/24 2300)   sodium chloride 0.9 % flush 10 mL (has no administration in time range)   sodium chloride 0.9 % infusion 40 mL (has no administration in time range)   ondansetron (ZOFRAN) injection 4 mg (has no administration in time range)   nitroglycerin (NITROSTAT) SL tablet 0.4 mg (has no administration in time range)   Potassium Replacement - Follow Nurse / BPA Driven Protocol (has no administration in time range)   Magnesium Standard Dose Replacement - Follow Nurse / BPA Driven Protocol (has no administration in time range)   Phosphorus Replacement - Follow Nurse / BPA Driven Protocol (has no administration in time range)   Calcium Replacement - Follow Nurse / BPA Driven Protocol (has no administration in time range)   Pharmacy to Dose enoxaparin (LOVENOX) (has no administration in time range)   ipratropium-albuterol (DUO-NEB) nebulizer solution 3 mL (has no administration in time range)   Enoxaparin Sodium (LOVENOX) syringe 90 mg (has no administration in time range)   ipratropium-albuterol (DUO-NEB) nebulizer solution 3 mL (3 mL Nebulization Given 10/19/24 1741)   methylPREDNISolone sodium succinate (SOLU-Medrol) injection 80 mg (80 mg Intravenous Given 10/19/24 1745)   benzonatate (TESSALON) capsule 100 mg (100 mg Oral Given 10/19/24 1804)   iopamidol (ISOVUE-300) 61 % injection 100 mL (80 mL Intravenous Given 10/19/24 1957)   Enoxaparin Sodium (LOVENOX) syringe 80 mg (80 mg Subcutaneous Given 10/19/24 2136)   guaiFENesin-codeine (ROMILAR-AC) syrup 5 mL (5 mL Oral Given 10/19/24 2216)     Vital Signs:  Temp:  [97.5 °F (36.4 °C)-98.4 °F (36.9 °C)] 97.5 °F (36.4 °C)  Heart Rate:  [] 83  Resp:  [14-18] 18  BP: (117-149)/(68-82) 132/69        10/19/24  5154  "10/19/24  2241   Weight: 84.8 kg (186 lb 15.2 oz) 86.4 kg (190 lb 7.6 oz)     Body mass index is 32.68 kg/m².    Physical Exam:     Most recent vital Signs: /69 (BP Location: Right arm, Patient Position: Sitting)   Pulse 83   Temp 97.5 °F (36.4 °C) (Oral)   Resp 18   Ht 162.6 cm (64.02\")   Wt 86.4 kg (190 lb 7.6 oz)   LMP  (LMP Unknown)   SpO2 93%   BMI 32.68 kg/m²     Physical Exam  Constitutional:       General: She is not in acute distress.     Appearance: She is ill-appearing. She is not toxic-appearing.   HENT:      Mouth/Throat:      Mouth: Mucous membranes are moist.   Eyes:      Extraocular Movements: Extraocular movements intact.   Cardiovascular:      Rate and Rhythm: Normal rate and regular rhythm.      Pulses: Normal pulses.      Heart sounds: Normal heart sounds.   Pulmonary:      Effort: Pulmonary effort is normal.      Comments: Moderately diminished all fields  Abdominal:      Palpations: Abdomen is soft.      Tenderness: There is no abdominal tenderness.   Musculoskeletal:      Right lower leg: No edema.      Left lower leg: No edema.   Neurological:      General: No focal deficit present.      Mental Status: She is alert and oriented to person, place, and time.   Psychiatric:         Mood and Affect: Mood normal.         Thought Content: Thought content normal.         Laboratory data:    I have reviewed the labs done in the emergency room.    Results from last 7 days   Lab Units 10/19/24  1716   SODIUM mmol/L 139   POTASSIUM mmol/L 4.2   CHLORIDE mmol/L 106   CO2 mmol/L 21.9*   BUN mg/dL 7*   CREATININE mg/dL 0.87   CALCIUM mg/dL 8.9   BILIRUBIN mg/dL 0.3   ALK PHOS U/L 113   ALT (SGPT) U/L 31   AST (SGOT) U/L 64*   GLUCOSE mg/dL 182*     Results from last 7 days   Lab Units 10/19/24  1716   WBC 10*3/mm3 7.48   HEMOGLOBIN g/dL 13.8   HEMATOCRIT % 40.9   PLATELETS 10*3/mm3 243         Results from last 7 days   Lab Units 10/19/24  1716   HSTROP T ng/L <6     Results from last 7 days " "  Lab Units 10/19/24  1716   PROBNP pg/mL <36.0                       Invalid input(s): \"USDES\", \"NITRITITE\", \"BACT\", \"EP\"    Pain Management Panel           No data to display                EKG:      EKG sinus tachycardia rate 105, nonspecific ST-T wave changes..    Radiology:    CT Angiogram Chest Pulmonary Embolism    Result Date: 10/19/2024  PROCEDURE: CT ANGIOGRAM CHEST PULMONARY EMBOLISM-  HISTORY: Shortness of breath  COMPARISON: None.  TECHNIQUE: The patient was injected with  IV contrast. Axial images were obtained through the chest in a CTA/ PE protocol. 3 D Reconstruction images were also performed. This study was performed with techniques to keep radiation doses as low as reasonably achievable, (ALARA). Individualized dose reduction techniques using automated exposure control or adjustment of mA and/or kV according to the patient size were employed.  FINDINGS: There is respiratory motion artifact. The aortic contour is normal. There is no aneurysm or dissection. Multivessel coronary artery calcifications are noted. The pulmonary artery bolus is limited. There is no central pulmonary embolism. However, I cannot exclude emboli in the descending right lower lobe pulmonary artery. There are no effusions. There is no adenopathy. There is mild emphysema. There is bibasilar atelectasis with probable left base fibrosis. Imaging of the upper abdomen demonstrates debris in the stomach from a recent meal. No other abnormalities identified.      1. No Limited bolus of contrast and motion artifact limits this examination. There is no central pulmonary embolism. There are questionable emboli in the descending right pulmonary artery. There is no right heart strain. 2. No aneurysm or dissection. 3. Coronary artery calcification. 4. Bibasilar atelectasis there are no effusions. There is no edema.  CTDI: 6.47 mGy DLP:191.23 mGy.cm  This report was signed and finalized on 10/19/2024 8:45 PM by Ashish Lanza MD.      XR " Chest 1 View    Result Date: 10/19/2024  PORTABLE CHEST  HISTORY: Shortness of breath and cough for 2 to 3 days  COMPARISON: 3/18/2024  FINDINGS: A portable radiograph the chest demonstrates a 2-lead left pacemaker. The heart is normal in size. There is decreased lung volume. There is evidence of prior granulomatous disease. There is left base discoid atelectasis and/or fibrosis. There are increased interstitial markings      Underinflation with no edema or infiltrate. There are coarse interstitial changes.  This report was signed and finalized on 10/19/2024 6:19 PM by Ashish Lanza MD.       Assessment/Plan:    Observation general floor admission 10/19/2024 with hypoxia secondary to pulmonary embolism and rhinovirus.    At time of admission resting in bed, appears comfortably ill.  No respiratory distress.    Family updated at bedside.    Hypoxia  Pulmonary embolism, suspected  Rhinovirus  Supplemental oxygen as needed keep saturation above 90%.  Therapeutic Lovenox.    Chronic: chronic back pain on Subutex, GERD, hypertension, pacemaker placed February 2024, COPD, constipation, degenerative arthritis spine, anxiety depression, restless leg syndrome, obesity BMI 32, fatty liver, insomnia, type 2 diabetes, chronic pancreatitis, hypertension, polyarthropathy, vitamin D deficiency, fibromyalgia, Humphreys's esophagus, hyperlipidemia.  Subcutaneous insulin protocol.  Continue other home medications    Risk Assessment: High  DVT Prophylaxis: Therapeutic Lovenox  Code Status: Full code  Diet: Cardiac/diabetic    Advance Care Planning  ACP discussion was held with the patient during this visit. Patient does not have an advance directive, information provided.           Brian Joseph Kerley, DO  10/20/24  01:08 EDT    Dictated utilizing Dragon dictation.    Electronically signed by Kerley, Brian Joseph, DO at 10/20/24 1216       Vital Signs (last 2 days)       Date/Time Temp Temp src Pulse Resp BP Patient Position SpO2     10/20/24 0739 -- -- -- 16 -- -- --    10/20/24 0705 98.3 (36.8) Oral 82 14 111/66 Lying 94    10/20/24 0300 98 (36.7) Oral -- 18 125/75 Lying --    10/19/24 2241 97.5 (36.4) Oral 83 18 132/69 Sitting 93    10/19/24 22:25:02 -- -- -- -- -- -- 94    10/19/24 2130 -- -- 94 -- 117/70 -- 93    10/19/24 2100 -- -- 93 -- 126/68 -- 92    10/19/24 1946 -- -- 96 -- 149/80 -- 92    10/19/24 1930 -- -- 94 -- 140/81 -- 94    10/19/24 1916 -- -- 96 -- 136/76 -- 89    10/19/24 1845 -- -- -- -- 132/82 -- --    10/19/24 1830 -- -- 97 -- 121/69 -- 91    10/19/24 1815 -- -- 98 -- 124/73 -- 90    10/19/24 1800 -- -- 100 -- 130/70 -- 95    10/19/24 1741 -- -- -- 14 -- -- --    10/19/24 1717 98.4 (36.9) Oral -- -- -- -- --    10/19/24 1713 -- -- 105 16 -- -- 94          Facility-Administered Medications as of 10/20/2024   Medication Dose Route Frequency Provider Last Rate Last Admin    albuterol (PROVENTIL) nebulizer solution 0.083% 2.5 mg/3mL  2.5 mg Nebulization Q6H PRN Kerley, Brian Joseph, DO        [COMPLETED] benzonatate (TESSALON) capsule 100 mg  100 mg Oral Once Anupam Magdaleno DO   100 mg at 10/19/24 1804    benzonatate (TESSALON) capsule 100 mg  100 mg Oral Once Kerley, Brian Joseph, DO        budesonide-formoterol (SYMBICORT) 160-4.5 MCG/ACT inhaler 2 puff  2 puff Inhalation BID - RT Kerley, Brian Joseph, DO   2 puff at 10/20/24 0739    buprenorphine (SUBUTEX) SL tablet 8 mg  8 mg Sublingual Daily Kerley, Brian Joseph, DO        Calcium Replacement - Follow Nurse / BPA Driven Protocol   Does not apply PRN Kerley, Brian Joseph, DO        clonazePAM (KlonoPIN) tablet 0.5 mg  0.5 mg Oral BID PRN Kerley, Brian Joseph, DO        dextrose (D50W) (25 g/50 mL) IV injection 25 g  25 g Intravenous Q15 Min PRN Kerley, Brian Joseph, DO        dextrose (GLUTOSE) oral gel 15 g  15 g Oral Q15 Min PRN Kerley, Brian Joseph, DO        [COMPLETED] Enoxaparin Sodium (LOVENOX) syringe 80 mg  1 mg/kg Subcutaneous Once Ruettinger,  Anupam LIGHT DO   80 mg at 10/19/24 2136    Enoxaparin Sodium (LOVENOX) syringe 90 mg  1 mg/kg Subcutaneous Q12H Kerley, Brian Joseph, DO        glucagon (GLUCAGEN) injection 1 mg  1 mg Intramuscular Q15 Min PRN Kerley, Brian Joseph, DO        [COMPLETED] guaiFENesin-codeine (ROMILAR-AC) syrup 5 mL  5 mL Oral Once Anpuam Magdaleno DO   5 mL at 10/19/24 2216    Insulin Lispro (humaLOG) injection 2-9 Units  2-9 Units Subcutaneous 4x Daily AC & at Bedtime Kerley, Brian Joseph, DO        [COMPLETED] iopamidol (ISOVUE-300) 61 % injection 100 mL  100 mL Intravenous Once in imaging Anupam Magdaleno DO   80 mL at 10/19/24 1957    [COMPLETED] ipratropium-albuterol (DUO-NEB) nebulizer solution 3 mL  3 mL Nebulization Once Anupam Magdaleno DO   3 mL at 10/19/24 1741    ipratropium-albuterol (DUO-NEB) nebulizer solution 3 mL  3 mL Nebulization Q4H PRN Kerley, Brian Joseph, DO        ipratropium-albuterol (DUO-NEB) nebulizer solution 3 mL  3 mL Nebulization Q4H PRN Kerley, Brian Joseph, DO        Magnesium Standard Dose Replacement - Follow Nurse / BPA Driven Protocol   Does not apply PRN Kerley, Brian Joseph, DO        [COMPLETED] methylPREDNISolone sodium succinate (SOLU-Medrol) injection 80 mg  80 mg Intravenous Once Anupam Magdaleno DO   80 mg at 10/19/24 1745    nitroglycerin (NITROSTAT) SL tablet 0.4 mg  0.4 mg Sublingual Q5 Min PRN Kerley, Brian Joseph, DO        ondansetron (ZOFRAN) injection 4 mg  4 mg Intravenous Q6H PRN Kerley, Brian Joseph, DO        Pharmacy to Dose enoxaparin (LOVENOX)   Does not apply Continuous PRN Kerley, Brian Joseph, DO        Phosphorus Replacement - Follow Nurse / BPA Driven Protocol   Does not apply PRN Kerley, Brian Joseph, DO        Potassium Replacement - Follow Nurse / BPA Driven Protocol   Does not apply PRN Kerley, Brian Joseph, DO        pregabalin (LYRICA) capsule 200 mg  200 mg Oral BID Kerley, Brian Joseph,         promethazine  (PHENERGAN) tablet 25 mg  25 mg Oral Q8H PRN Kerley, Brian Joseph, DO        sodium chloride 0.9 % flush 10 mL  10 mL Intravenous PRN Kerley, Brian Joseph, DO        sodium chloride 0.9 % flush 10 mL  10 mL Intravenous Q12H Kerley, Brian Joseph, DO   10 mL at 10/19/24 2300    sodium chloride 0.9 % flush 10 mL  10 mL Intravenous PRN Kerley, Brian Joseph, DO        sodium chloride 0.9 % infusion 40 mL  40 mL Intravenous PRN Kerley, Brian Joseph, DO         Lab Results (last 48 hours)       Procedure Component Value Units Date/Time    POC Glucose 4x Daily Before Meals & at Bedtime [[id number]]  (Abnormal) Collected: 10/20/24 0749    Specimen: Blood Updated: 10/20/24 0757     Glucose 193 mg/dL      Comment: Serial Number: [id number]:  [id number]       Hemoglobin A1c [[id number]]  (Abnormal) Collected: 10/19/24 1716    Specimen: Blood Updated: 10/20/24 0129     Hemoglobin A1C 5.70 %     Narrative:      Hemoglobin A1C Ranges:    Increased Risk for Diabetes  5.7% to 6.4%  Diabetes                     >= 6.5%  Diabetic Goal                < 7.0%    Respiratory Panel PCR w/COVID-19(SARS-CoV-2) LARS/ERNIE/LLUVIA/PAD/COR/NGOZI In-House, NP Swab in UTM/VTM, 2 HR TAT - Swab, Nasopharynx [[id number]]  (Abnormal) Collected: 10/19/24 1926    Specimen: Swab from Nasopharynx Updated: 10/19/24 2018     ADENOVIRUS, PCR Not Detected     Coronavirus 229E Not Detected     Coronavirus HKU1 Not Detected     Coronavirus NL63 Not Detected     Coronavirus OC43 Not Detected     COVID19 Not Detected     Human Metapneumovirus Not Detected     Human Rhinovirus/Enterovirus Detected     Influenza A PCR Not Detected     Influenza B PCR Not Detected     Parainfluenza Virus 1 Not Detected     Parainfluenza Virus 2 Not Detected     Parainfluenza Virus 3 Not Detected     Parainfluenza Virus 4 Not Detected     RSV, PCR Not Detected     Bordetella pertussis pcr Not Detected     Bordetella parapertussis PCR Not Detected     Chlamydophila pneumoniae PCR Not  Detected     Mycoplasma pneumo by PCR Not Detected    Narrative:      In the setting of a positive respiratory panel with a viral infection PLUS a negative procalcitonin without other underlying concern for bacterial infection, consider observing off antibiotics or discontinuation of antibiotics and continue supportive care. If the respiratory panel is positive for atypical bacterial infection (Bordetella pertussis, Chlamydophila pneumoniae, or Mycoplasma pneumoniae), consider antibiotic de-escalation to target atypical bacterial infection.    Comprehensive Metabolic Panel [[id number]]  (Abnormal) Collected: 10/19/24 1716    Specimen: Blood Updated: 10/19/24 1752     Glucose 182 mg/dL      Comment: Glucose >180, Hemoglobin A1C recommended.        BUN 7 mg/dL      Creatinine 0.87 mg/dL      Sodium 139 mmol/L      Potassium 4.2 mmol/L      Chloride 106 mmol/L      CO2 21.9 mmol/L      Calcium 8.9 mg/dL      Total Protein 7.7 g/dL      Albumin 4.2 g/dL      ALT (SGPT) 31 U/L      AST (SGOT) 64 U/L      Alkaline Phosphatase 113 U/L      Total Bilirubin 0.3 mg/dL      Globulin 3.5 gm/dL      A/G Ratio 1.2 g/dL      BUN/Creatinine Ratio 8.0     Anion Gap 11.1 mmol/L      eGFR 75.0 mL/min/1.73     Narrative:      GFR Normal >60  Chronic Kidney Disease <60  Kidney Failure <15      BNP [[id number]]  (Normal) Collected: 10/19/24 1716    Specimen: Blood Updated: 10/19/24 1746     proBNP <36.0 pg/mL     Narrative:      This assay is used as an aid in the diagnosis of individuals suspected of having heart failure. It can be used as an aid in the diagnosis of acute decompensated heart failure (ADHF) in patients presenting with signs and symptoms of ADHF to the emergency department (ED). In addition, NT-proBNP of <300 pg/mL indicates ADHF is not likely.    Age Range Result Interpretation  NT-proBNP Concentration (pg/mL:      <50             Positive            >450                   Gray                 300-450                     Negative             <300    50-75           Positive            >900                  Gray                300-900                  Negative            <300      >75             Positive            >1800                  Gray                300-1800                  Negative            <300    Single High Sensitivity Troponin T [[id number]]  (Normal) Collected: 10/19/24 1716    Specimen: Blood Updated: 10/19/24 1742     HS Troponin T <6 ng/L     Narrative:      High Sensitive Troponin T Reference Range:  <14.0 ng/L- Negative Female for AMI  <22.0 ng/L- Negative Male for AMI  >=14 - Abnormal Female indicating possible myocardial injury.  >=22 - Abnormal Male indicating possible myocardial injury.   Clinicians would have to utilize clinical acumen, EKG, Troponin, and serial changes to determine if it is an Acute Myocardial Infarction or myocardial injury due to an underlying chronic condition.         [facility] Draw [[id number]] Collected: 10/19/24 1716    Specimen: Blood Updated: 10/19/24 1731    Narrative:      The following orders were created for panel order [facility] Draw.  Procedure                               Abnormality         Status                     ---------                               -----------         ------                     Green Top (Gel)[[id number]]                                  Final result               Lavender Top[[id number]]                                     Final result               Gold Top - SST[[id number]]                                   Final result               Diaz Top[[id number]]                                         Final result               Light Blue Top[[id number]]                                   Final result                 Please view results for these tests on the individual orders.    Green Top (Gel) [[id number]] Collected: 10/19/24 1716    Specimen: Blood Updated: 10/19/24 1731     Extra Tube Hold for add-ons.     Comment: Auto resulted.       Lavender Top  [[id number]] Collected: 10/19/24 1716    Specimen: Blood Updated: 10/19/24 1731     Extra Tube hold for add-on     Comment: Auto resulted       Gold Top - SST [[id number]] Collected: 10/19/24 1716    Specimen: Blood Updated: 10/19/24 1731     Extra Tube Hold for add-ons.     Comment: Auto resulted.       Gray Top [[id number]] Collected: 10/19/24 1716    Specimen: Blood Updated: 10/19/24 1731     Extra Tube Hold for add-ons.     Comment: Auto resulted.       Light Blue Top [[id number]] Collected: 10/19/24 1716    Specimen: Blood Updated: 10/19/24 1731     Extra Tube Hold for add-ons.     Comment: Auto resulted       CBC & Differential [[id number]]  (Abnormal) Collected: 10/19/24 1716    Specimen: Blood Updated: 10/19/24 1725    Narrative:      The following orders were created for panel order CBC & Differential.  Procedure                               Abnormality         Status                     ---------                               -----------         ------                     CBC Auto Differential[[id number]]        Abnormal            Final result                 Please view results for these tests on the individual orders.    CBC Auto Differential [[id number]]  (Abnormal) Collected: 10/19/24 1716    Specimen: Blood Updated: 10/19/24 1725     WBC 7.48 10*3/mm3      RBC 4.82 10*6/mm3      Hemoglobin 13.8 g/dL      Hematocrit 40.9 %      MCV 84.9 fL      MCH 28.6 pg      MCHC 33.7 g/dL      RDW 13.5 %      RDW-SD 41.7 fl      MPV 10.7 fL      Platelets 243 10*3/mm3      Neutrophil % 62.5 %      Lymphocyte % 29.9 %      Monocyte % 5.2 %      Eosinophil % 0.3 %      Basophil % 0.8 %      Immature Grans % 1.3 %      Neutrophils, Absolute 4.67 10*3/mm3      Lymphocytes, Absolute 2.24 10*3/mm3      Monocytes, Absolute 0.39 10*3/mm3      Eosinophils, Absolute 0.02 10*3/mm3      Basophils, Absolute 0.06 10*3/mm3      Immature Grans, Absolute 0.10 10*3/mm3      nRBC 0.0 /100 WBC           Imaging Results (Last 48 Hours)        Procedure Component Value Units Date/Time    CT Angiogram Chest Pulmonary Embolism [[id number]] Collected: 10/19/24 2037     Updated: 10/19/24 2047    Narrative:      PROCEDURE: CT ANGIOGRAM CHEST PULMONARY EMBOLISM-     HISTORY: Shortness of breath     COMPARISON: None.     TECHNIQUE: The patient was injected with  IV contrast. Axial images were  obtained through the chest in a CTA/ PE protocol. 3 D Reconstruction  images were also performed. This study was performed with techniques to  keep radiation doses as low as reasonably achievable, (ALARA).  Individualized dose reduction techniques using automated exposure  control or adjustment of mA and/or kV according to the patient size were  employed.     FINDINGS: There is respiratory motion artifact. The aortic contour is  normal. There is no aneurysm or dissection. Multivessel coronary artery  calcifications are noted. The pulmonary artery bolus is limited. There  is no central pulmonary embolism. However, I cannot exclude emboli in  the descending right lower lobe pulmonary artery. There are no  effusions. There is no adenopathy. There is mild emphysema. There is  bibasilar atelectasis with probable left base fibrosis. Imaging of the  upper abdomen demonstrates debris in the stomach from a recent meal. No  other abnormalities identified.       Impression:      1. No Limited bolus of contrast and motion artifact limits this  examination. There is no central pulmonary embolism. There are  questionable emboli in the descending right pulmonary artery. There is  no right heart strain.  2. No aneurysm or dissection.  3. Coronary artery calcification.  4. Bibasilar atelectasis there are no effusions. There is no edema.     CTDI: 6.47 mGy  DLP:191.23 mGy.cm     This report was signed and finalized on 10/19/2024 8:45 PM by Ashish Lanza MD.       XR Chest 1 View [[id number]] Collected: 10/19/24 1816     Updated: 10/19/24 1821    Narrative:      PORTABLE CHEST      HISTORY: Shortness of breath and cough for 2 to 3 days     COMPARISON: 3/18/2024     FINDINGS: A portable radiograph the chest demonstrates a 2-lead left  pacemaker. The heart is normal in size. There is decreased lung volume.  There is evidence of prior granulomatous disease. There is left base  discoid atelectasis and/or fibrosis. There are increased interstitial  markings       Impression:      Underinflation with no edema or infiltrate. There are coarse  interstitial changes.     This report was signed and finalized on 10/19/2024 6:19 PM by Ashish Lanza MD.             Orders (last 48 hrs)        Start     Ordered    10/20/24 1000  Enoxaparin Sodium (LOVENOX) syringe 90 mg  Every 12 Hours         10/19/24 2301    10/20/24 0900  buprenorphine (SUBUTEX) SL tablet 8 mg  Daily         10/20/24 0117    10/20/24 0900  pregabalin (LYRICA) capsule 200 mg  2 Times Daily         10/20/24 0117    10/20/24 0800  Oral Care  2 Times Daily       10/19/24 2259    10/20/24 0730  Insulin Lispro (humaLOG) injection 2-9 Units  4 Times Daily Before Meals & Nightly         10/20/24 0112    10/20/24 0700  POC Glucose 4x Daily Before Meals & at Bedtime  4 Times Daily Before Meals & at Bedtime      Comments: Complete no more than 45 minutes prior to patient eating      10/20/24 0112    10/20/24 0600  Basic Metabolic Panel  Daily       10/19/24 2259    10/20/24 0600  CBC Auto Differential  Daily       10/19/24 2259    10/20/24 0215  budesonide-formoterol (SYMBICORT) 160-4.5 MCG/ACT inhaler 2 puff  2 Times Daily - RT         10/20/24 0117    10/20/24 0117  promethazine (PHENERGAN) tablet 25 mg  Every 8 Hours PRN         10/20/24 0117    10/20/24 0116  ipratropium-albuterol (DUO-NEB) nebulizer solution 3 mL  Every 4 Hours PRN         10/20/24 0117    10/20/24 0116  clonazePAM (KlonoPIN) tablet 0.5 mg  2 Times Daily PRN         10/20/24 0117    10/20/24 0116  albuterol (PROVENTIL) nebulizer solution 0.083% 2.5 mg/3mL  Every 6 Hours PRN          10/20/24 0117    10/20/24 0116  Code Status and Medical Interventions: CPR (Attempt to Resuscitate); Full Support  Continuous         10/20/24 0115    10/20/24 0116  Diet: Cardiac, Diabetic; Healthy Heart (2-3 Na+); Consistent Carbohydrate; Fluid Consistency: Thin (IDDSI 0)  Diet Effective Now         10/20/24 0115    10/20/24 0113  Hemoglobin A1c  Once         10/20/24 0112    10/20/24 0112  dextrose (GLUTOSE) oral gel 15 g  Every 15 Minutes PRN         10/20/24 0112    10/20/24 0112  dextrose (D50W) (25 g/50 mL) IV injection 25 g  Every 15 Minutes PRN         10/20/24 0112    10/20/24 0112  glucagon (GLUCAGEN) injection 1 mg  Every 15 Minutes PRN         10/20/24 0112    10/20/24 0000  Vital Signs  Every 4 Hours       10/19/24 2259    10/19/24 2345  sodium chloride 0.9 % flush 10 mL  Every 12 Hours Scheduled         10/19/24 2259    10/19/24 2305  Diet: Cardiac; Healthy Heart (2-3 Na+); Fluid Consistency: Thin (IDDSI 0)  Diet Effective Now,   Status:  Canceled         10/19/24 2304    10/19/24 2300  Intake & Output  Every Shift       10/19/24 2259    10/19/24 2300  Weigh Patient  Once         10/19/24 2259    10/19/24 2300  Insert Peripheral IV  Once         10/19/24 2259    10/19/24 2300  Saline Lock & Maintain IV Access  Continuous,   Status:  Canceled         10/19/24 2259    10/19/24 2300  VTE Prophylaxis Not Indicated: Contraindicated; Other; Therapeutic Lovenox  Once         10/19/24 2259    10/19/24 2300  Continuous Cardiac Monitoring  Continuous        Comments: Follow Standing Orders As Outlined in Process Instructions (Open Order Report to View Full Instructions)    10/19/24 2259    10/19/24 2300  Maintain IV Access  Continuous         10/19/24 2259    10/19/24 2300  Telemetry - Place Orders & Notify Provider of Results When Patient Experiences Acute Chest Pain, Dysrhythmia or Respiratory Distress  Continuous        Comments: Open Order Report to View Parameters Requiring Provider Notification     10/19/24 2259    10/19/24 2300  May Be Off Telemetry for Tests  Continuous         10/19/24 2259    10/19/24 2300  Notify Provider (With Default Parameters)  Continuous        Comments: Open Order Report to View Parameters Requiring Provider Notification    10/19/24 2259    10/19/24 2300  Inpatient Case Management  Consult  Once        Provider:  (Not yet assigned)    10/19/24 2259    10/19/24 2300  OT Consult: Eval & Treat ADL Performance Below Baseline  Once         10/19/24 2259    10/19/24 2300  PT Consult: Eval & Treat Discharge Placement Assessment  Once         10/19/24 2259    10/19/24 2300  Bowel Regimen Not Indicated  Once         10/19/24 2259    10/19/24 2259  nitroglycerin (NITROSTAT) SL tablet 0.4 mg  Every 5 Minutes PRN         10/19/24 2259    10/19/24 2259  Potassium Replacement - Follow Nurse / BPA Driven Protocol  As Needed         10/19/24 2259    10/19/24 2259  Magnesium Standard Dose Replacement - Follow Nurse / BPA Driven Protocol  As Needed         10/19/24 2259    10/19/24 2259  Phosphorus Replacement - Follow Nurse / BPA Driven Protocol  As Needed         10/19/24 2259    10/19/24 2259  Calcium Replacement - Follow Nurse / BPA Driven Protocol  As Needed         10/19/24 2259    10/19/24 2259  Pharmacy to Dose enoxaparin (LOVENOX)  Continuous PRN         10/19/24 2259    10/19/24 2259  ipratropium-albuterol (DUO-NEB) nebulizer solution 3 mL  Every 4 Hours PRN         10/19/24 2259    10/19/24 2259  sodium chloride 0.9 % flush 10 mL  As Needed         10/19/24 2259    10/19/24 2259  sodium chloride 0.9 % infusion 40 mL  As Needed         10/19/24 2259    10/19/24 2259  ondansetron (ZOFRAN) injection 4 mg  Every 6 Hours PRN         10/19/24 2259    10/19/24 2206  guaiFENesin-codeine (ROMILAR-AC) syrup 5 mL  Once         10/19/24 2205    10/19/24 2153  promethazine-codeine (PHENERGAN with CODEINE) 6.25-10 MG/5ML syrup 5 mL  Once,   Status:  Discontinued         10/19/24 4280     10/19/24 2146  Initiate Observation Status  Once         10/19/24 2147    10/19/24 2138  Enoxaparin Sodium (LOVENOX) syringe 80 mg  Once         10/19/24 2122    10/19/24 2100  benzonatate (TESSALON) capsule 100 mg  Once         10/19/24 2044    10/19/24 2013  iopamidol (ISOVUE-300) 61 % injection 100 mL  Once in Imaging         10/19/24 1957    10/19/24 1908  Respiratory Panel PCR w/COVID-19(SARS-CoV-2) [facility]/LLUVIA/[facility]/NGOZI In-House, NP Swab in UTM/VTM, 2 HR TAT - Swab, Nasopharynx  Once         10/19/24 1908    10/19/24 1908  CT Angiogram Chest Pulmonary Embolism  1 Time Imaging         10/19/24 1908    10/19/24 1809  benzonatate (TESSALON) capsule 100 mg  Once         10/19/24 1753    10/19/24 1754  methylPREDNISolone sodium succinate (SOLU-Medrol) injection 80 mg  Once         10/19/24 1738    10/19/24 1754  Blood Gas, Venous -With Co-Ox Panel: Yes  Once         10/19/24 1753    10/19/24 1748  ipratropium-albuterol (DUO-NEB) nebulizer solution 3 mL  Once         10/19/24 1732    10/19/24 1722  XR Chest 1 View  1 Time Imaging         10/19/24 1715    10/19/24 1716  NPO Diet NPO Type: Strict NPO  Diet Effective Now,   Status:  Canceled         10/19/24 1715    10/19/24 1716  Undress & Gown  Once         10/19/24 1715    10/19/24 1716  Cardiac Monitoring  Continuous,   Status:  Canceled        Comments: Follow Standing Orders As Outlined in Process Instructions (Open Order Report to View Full Instructions)    10/19/24 1715    10/19/24 1716  Continuous Pulse Oximetry  Continuous         10/19/24 1715    10/19/24 1716  Vital Signs  Per Hospital Policy         10/19/24 1715    10/19/24 1716  ECG 12 Lead ED Triage Standing Order; SOA  Once         10/19/24 1715    10/19/24 1716  XR Chest 2 View  1 Time Imaging,   Status:  Canceled         10/19/24 1715    10/19/24 1716  Insert Peripheral IV  Once         10/19/24 1715    10/19/24 1716  [place] Draw  Once         10/19/24 1715    10/19/24 1716  CBC & Differential   Once         10/19/24 1715    10/19/24 1716  Comprehensive Metabolic Panel  Once         10/19/24 1715    10/19/24 1716  BNP  Once         10/19/24 1715    10/19/24 1716  Single High Sensitivity Troponin T  Once         10/19/24 1715    10/19/24 1716  Green Top (Gel)  PROCEDURE ONCE         10/19/24 1716    10/19/24 1716  Lavender Top  PROCEDURE ONCE         10/19/24 1716    10/19/24 1716  Gold Top - SST  PROCEDURE ONCE         10/19/24 1716    10/19/24 1716  Diaz Top  PROCEDURE ONCE         10/19/24 1716    10/19/24 1716  Light Blue Top  PROCEDURE ONCE         10/19/24 1716    10/19/24 1716  CBC Auto Differential  PROCEDURE ONCE         10/19/24 1716    10/19/24 1715  sodium chloride 0.9 % flush 10 mL  As Needed         10/19/24 1715    10/19/24 0000  Telemetry Scan  Once         10/19/24 0000    Unscheduled  Oxygen Therapy- Nasal Cannula; Titrate 1-6 LPM Per SpO2; 90 - 95%  Continuous PRN       10/19/24 1715    Unscheduled  Up With Assistance  As Needed       10/19/24 2259    Unscheduled  Follow Hypoglycemia Standing Orders For Blood Glucose <70 & Notify Provider of Treatment  As Needed      Comments: Follow Hypoglycemia Orders As Outlined in Process Instructions (Open Order Report to View Full Instructions)  Notify Provider Any Time Hypoglycemia Treatment is Administered    10/20/24 0112                  Physician Progress Notes (last 48 hours)  Notes from 10/18/24 0836 through 10/20/24 0836   No notes of this type exist for this encounter.       Consult Notes (last 48 hours)  Notes from 10/18/24 0836 through 10/20/24 0836   No notes of this type exist for this encounter.

## 2024-10-20 NOTE — CASE MANAGEMENT/SOCIAL WORK
Discharge Planning Assessment   Emigdio     Patient Name: Katelynn Sung  MRN: [id number]  Today's Date: 10/20/2024    Admit Date: 10/19/2024    Plan: Home with Family   Discharge Needs Assessment       Row Name 10/20/24 1232       Living Environment    People in Home child(ramona), adult    Current Living Arrangements apartment    Potentially Unsafe Housing Conditions none    In the past 12 months has the electric, gas, oil, or water company threatened to shut off services in your home? No    Primary Care Provided by self    Provides Primary Care For no one    Family Caregiver if Needed child(ramona), adult    Quality of Family Relationships involved    Able to Return to Prior Arrangements yes       Resource/Environmental Concerns    Transportation Concerns none       Transportation Needs    In the past 12 months, has lack of transportation kept you from medical appointments or from getting medications? no    In the past 12 months, has lack of transportation kept you from meetings, work, or from getting things needed for daily living? No       Food Insecurity    Within the past 12 months, you worried that your food would run out before you got the money to buy more. Never true    Within the past 12 months, the food you bought just didn't last and you didn't have money to get more. Never true       Transition Planning    Patient/Family Anticipates Transition to home with family    Patient/Family Anticipated Services at Transition none    Transportation Anticipated family or friend will provide       Discharge Needs Assessment    Readmission Within the Last 30 Days no previous admission in last 30 days    Equipment Currently Used at Home none    Concerns to be Addressed no discharge needs identified    Do you want help finding or keeping work or a job? I do not need or want help    Do you want help with school or training? For example, starting or completing job training or getting a high school diploma, GED or  equivalent No    Anticipated Changes Related to Illness none                   Discharge Plan       Row Name 10/20/24 1234       Plan    Plan Home with Family    Patient/Family in Agreement with Plan yes    Plan Comments Spoke to pt regarding discharge plans updated pt address she just moved lives with her daughter ,Independent with ADLS [facility] Declining Meds to bed At this time plan is to return  home denies any needs                  Continued Care and Services - Admitted Since 10/19/2024    No active coordination exists for this encounter.          Demographic Summary       Row Name 10/20/24 1231       General Information    Admission Type observation    Arrived From emergency department    Referral Source admission list    Reason for Consult discharge planning    Preferred Language English                   Functional Status       Row Name 10/20/24 1232       Functional Status    Usual Activity Tolerance moderate       Physical Activity    On average, how many days per week do you engage in moderate to strenuous exercise (like a brisk walk)? 0 days    On average, how many minutes do you engage in exercise at this level? 0 min    Number of minutes of exercise per week 0       Functional Status, IADL    Meal Preparation independent    Housekeeping independent    Laundry independent    Shopping independent    If for any reason you need help with day-to-day activities such as bathing, preparing meals, shopping, managing finances, etc., do you get the help you need? I don't need any help                   Psychosocial    No documentation.                  Abuse/Neglect    No documentation.                  Legal    No documentation.                  Substance Abuse    No documentation.                  Patient Forms    No documentation.                     Lizz Altman RN

## 2024-10-20 NOTE — H&P
"  [facility]      Name:  Katelynn Sung   Age:  64 y.o.  Sex:  female  :  1960  MRN:  [id number]   Visit Number:  [id number]  Admission Date:  10/19/2024  Date Of Service:  10/20/24  Primary Care Physician:  Lesvia Servin MD    Chief Complaint:     Shortness of air, chest pain    History Of Presenting Illness:      Katelynn Sung is a 64-year-old woman with past medical history of chronic back pain on Subutex, GERD, hypertension, pacemaker placed 2024, COPD, constipation, degenerative arthritis spine, anxiety depression, restless leg syndrome, obesity BMI 32, fatty liver, insomnia, type 2 diabetes, chronic pancreatitis, hypertension, polyarthropathy, vitamin D deficiency, fibromyalgia, Humphreys's esophagus, hyperlipidemia.  Presented to [facility] ED on 10/19/2024 with concern for shortness of air, \"squeezing her chest\", severe nonproductive cough.  Says she was started on amoxicillin and prednisone without improvement.  Denied fevers or chills.    ED summary: Afebrile, tachycardic, other vital signs stable on 2 L.  EKG sinus tachycardia rate 105, nonspecific ST-T wave changes.  proBNP not elevated.  Blood glucose 192.  CBC unremarkable.  PCR respiratory panel rhinovirus positive.  CT angio chest proximal emboli in the descending right pulmonary artery no right heart strain, does show coronary artery classifications, bibasilar atelectasis with no effusions, no central PE.  She was provided Tessalon Perle, therapeutic Lovenox, DuoNeb, Solu-Medrol, Phenergan.    Review Of Systems:    All systems were reviewed and negative except as mentioned in history of presenting illness, assessment and plan.    Past Medical History: Patient  has a past medical history of Adnexal mass, Anxiety, Arm fracture, left, Humphreys's esophagus, Body piercing, Class 1 obesity due to excess calories with serious comorbidity and body mass index (BMI) of 30.0 to 30.9 in adult " (02/16/2018), Colon polyps, COPD (chronic obstructive pulmonary disease) (2008), Cor pulmonale, Degenerative arthritis of lumbar spine, Depression, Fibromyalgia, Finger fracture, GERD (gastroesophageal reflux disease), Herpes simplex, Hypertension, Insomnia, Lumbar degenerative disc disease, Mild sleep apnea (01/16/2013), Narcotic dependence, Osteoporosis, Pancreatitis, Pneumonia, Respiratory failure requiring intubation, Restless leg syndrome, Seasonal allergies, Sinus node dysfunction, Sleep apnea, Trouble swallowing, and Wears dentures.    Past Surgical History: Patient  has a past surgical history that includes Colonoscopy (N/A, 2008); Upper gastrointestinal endoscopy (N/A, 2013); Tubal ligation (1991); Esophagogastroduodenoscopy (N/A, 03/11/2020); Pacemaker Implantation; and Esophagogastroduodenoscopy (N/A, 8/9/2024).    Social History: Patient  reports that she quit smoking about 11 years ago. Her smoking use included cigarettes and electronic cigarette. She started smoking about 14 years ago. She has a 2.3 pack-year smoking history. She has never been exposed to tobacco smoke. She has never used smokeless tobacco. She reports that she does not currently use drugs after having used the following drugs: Oxycodone. She reports that she does not drink alcohol.    Family History:  Patient's family history has been reviewed and found to be noncontributory.     Allergies:      Nsaids    Home Medications:    Prior to Admission Medications       Prescriptions Last Dose Informant Patient Reported? Taking?    albuterol sulfate HFA (ProAir HFA) 108 (90 Base) MCG/ACT inhaler 10/19/2024  No Yes    Inhale 2 puffs Every 4 (Four) Hours As Needed for Wheezing.    Azelastine HCl 137 MCG/SPRAY solution 10/19/2024  No Yes    INSTILL 2 SPRAYS IN EACH NOSTRIL TWICE DAILY AS DIRECTED    azithromycin (Zithromax Z-Bolivar) 250 MG tablet 10/19/2024  No Yes    Take 2 tablets by mouth on day 1, then 1 tablet daily on days 2-5     buprenorphine (SUBUTEX) 8 MG sublingual tablet SL tablet 10/19/2024  Yes Yes    Place 1 tablet under the tongue Daily.    cefdinir (OMNICEF) 300 MG capsule 10/19/2024  No Yes    Take 1 capsule by mouth 2 (Two) Times a Day for 7 days.    cetirizine (zyrTEC) 10 MG tablet 10/19/2024  No Yes    Take 1 tablet by mouth Daily.    Patient taking differently:  Take 1 tablet by mouth As Needed. Pt states takes as needed    clonazePAM (KlonoPIN) 0.5 MG tablet 10/19/2024  No Yes    Take 1 tablet by mouth 2 (Two) Times a Day As Needed for Anxiety.    cyclobenzaprine (FLEXERIL) 5 MG tablet 10/18/2024  No Yes    Take 1 tablet by mouth 3 (Three) Times a Day As Needed for Muscle Spasms.    dexlansoprazole (DEXILANT) 60 MG capsule 10/19/2024  No Yes    Take 1 capsule by mouth Daily.    fluticasone (FLONASE) 50 MCG/ACT nasal spray Past Week  No Yes    INSTILL 2 SPRAYS IN EACH NOSTRIL ONCE DAILY AS DIRECTED    Fluticasone Furoate-Vilanterol (Breo Ellipta) 200-25 MCG/ACT inhaler Past Week  No Yes    Inhale 1 puff Daily.    ipratropium-albuterol (Combivent Respimat)  MCG/ACT inhaler 10/19/2024  No Yes    Inhale 1 puff 4 (Four) Times a Day As Needed for Wheezing.    lisinopril-hydrochlorothiazide (PRINZIDE,ZESTORETIC) 10-12.5 MG per tablet 10/19/2024  No Yes    Take 1 tablet by mouth Daily.    nystatin (MYCOSTATIN) 100,000 unit/mL suspension Past Week  No Yes    SWISH AND SWALLOW 5 MLS BY MOUTH FOUR TIMES DAILY    Patient taking differently:  Take  by mouth As Needed. SWISH AND SWALLOW 5 MLS BY MOUTH FOUR TIMES DAILY    predniSONE (DELTASONE) 10 MG tablet 10/19/2024  No Yes    Take 1 tablet by mouth Daily for 5 days.    pregabalin (LYRICA) 200 MG capsule 10/19/2024  No Yes    Take 1 capsule by mouth 2 (Two) Times a Day.    promethazine (PHENERGAN) 25 MG tablet 10/19/2024  No Yes    TAKE 1 TABLET BY MOUTH EVERY 8 (EIGHT) HOURS AS NEEDED FOR NAUSEA OR VOMITING.    promethazine-dextromethorphan (PROMETHAZINE-DM) 6.25-15 MG/5ML syrup  10/19/2024  No Yes    Take 5 mL by mouth 4 (Four) Times a Day As Needed for Cough.    methylPREDNISolone acetate (DEPO-medrol) injection 40 mg   No No          ED Medications:    Medications   sodium chloride 0.9 % flush 10 mL (has no administration in time range)   benzonatate (TESSALON) capsule 100 mg (100 mg Oral Not Given 10/19/24 2100)   sodium chloride 0.9 % flush 10 mL (10 mL Intravenous Given 10/19/24 2300)   sodium chloride 0.9 % flush 10 mL (has no administration in time range)   sodium chloride 0.9 % infusion 40 mL (has no administration in time range)   ondansetron (ZOFRAN) injection 4 mg (has no administration in time range)   nitroglycerin (NITROSTAT) SL tablet 0.4 mg (has no administration in time range)   Potassium Replacement - Follow Nurse / BPA Driven Protocol (has no administration in time range)   Magnesium Standard Dose Replacement - Follow Nurse / BPA Driven Protocol (has no administration in time range)   Phosphorus Replacement - Follow Nurse / BPA Driven Protocol (has no administration in time range)   Calcium Replacement - Follow Nurse / BPA Driven Protocol (has no administration in time range)   Pharmacy to Dose enoxaparin (LOVENOX) (has no administration in time range)   ipratropium-albuterol (DUO-NEB) nebulizer solution 3 mL (has no administration in time range)   Enoxaparin Sodium (LOVENOX) syringe 90 mg (has no administration in time range)   ipratropium-albuterol (DUO-NEB) nebulizer solution 3 mL (3 mL Nebulization Given 10/19/24 1741)   methylPREDNISolone sodium succinate (SOLU-Medrol) injection 80 mg (80 mg Intravenous Given 10/19/24 1745)   benzonatate (TESSALON) capsule 100 mg (100 mg Oral Given 10/19/24 1804)   iopamidol (ISOVUE-300) 61 % injection 100 mL (80 mL Intravenous Given 10/19/24 1957)   Enoxaparin Sodium (LOVENOX) syringe 80 mg (80 mg Subcutaneous Given 10/19/24 2136)   guaiFENesin-codeine (ROMILAR-AC) syrup 5 mL (5 mL Oral Given 10/19/24 2216)     Vital Signs:  Temp:   "[97.5 °F (36.4 °C)-98.4 °F (36.9 °C)] 97.5 °F (36.4 °C)  Heart Rate:  [] 83  Resp:  [14-18] 18  BP: (117-149)/(68-82) 132/69        10/19/24  1711 10/19/24  2241   Weight: 84.8 kg (186 lb 15.2 oz) 86.4 kg (190 lb 7.6 oz)     Body mass index is 32.68 kg/m².    Physical Exam:     Most recent vital Signs: /69 (BP Location: Right arm, Patient Position: Sitting)   Pulse 83   Temp 97.5 °F (36.4 °C) (Oral)   Resp 18   Ht 162.6 cm (64.02\")   Wt 86.4 kg (190 lb 7.6 oz)   LMP  (LMP Unknown)   SpO2 93%   BMI 32.68 kg/m²     Physical Exam  Constitutional:       General: She is not in acute distress.     Appearance: She is ill-appearing. She is not toxic-appearing.   HENT:      Mouth/Throat:      Mouth: Mucous membranes are moist.   Eyes:      Extraocular Movements: Extraocular movements intact.   Cardiovascular:      Rate and Rhythm: Normal rate and regular rhythm.      Pulses: Normal pulses.      Heart sounds: Normal heart sounds.   Pulmonary:      Effort: Pulmonary effort is normal.      Comments: Moderately diminished all fields  Abdominal:      Palpations: Abdomen is soft.      Tenderness: There is no abdominal tenderness.   Musculoskeletal:      Right lower leg: No edema.      Left lower leg: No edema.   Neurological:      General: No focal deficit present.      Mental Status: She is alert and oriented to person, place, and time.   Psychiatric:         Mood and Affect: Mood normal.         Thought Content: Thought content normal.         Laboratory data:    I have reviewed the labs done in the emergency room.    Results from last 7 days   Lab Units 10/19/24  1716   SODIUM mmol/L 139   POTASSIUM mmol/L 4.2   CHLORIDE mmol/L 106   CO2 mmol/L 21.9*   BUN mg/dL 7*   CREATININE mg/dL 0.87   CALCIUM mg/dL 8.9   BILIRUBIN mg/dL 0.3   ALK PHOS U/L 113   ALT (SGPT) U/L 31   AST (SGOT) U/L 64*   GLUCOSE mg/dL 182*     Results from last 7 days   Lab Units 10/19/24  1716   WBC 10*3/mm3 7.48   HEMOGLOBIN g/dL 13.8 " "  HEMATOCRIT % 40.9   PLATELETS 10*3/mm3 243         Results from last 7 days   Lab Units 10/19/24  1716   HSTROP T ng/L <6     Results from last 7 days   Lab Units 10/19/24  1716   PROBNP pg/mL <36.0                       Invalid input(s): \"USDES\", \"NITRITITE\", \"BACT\", \"EP\"    Pain Management Panel           No data to display                EKG:      EKG sinus tachycardia rate 105, nonspecific ST-T wave changes..    Radiology:    CT Angiogram Chest Pulmonary Embolism    Result Date: 10/19/2024  PROCEDURE: CT ANGIOGRAM CHEST PULMONARY EMBOLISM-  HISTORY: Shortness of breath  COMPARISON: None.  TECHNIQUE: The patient was injected with  IV contrast. Axial images were obtained through the chest in a CTA/ PE protocol. 3 D Reconstruction images were also performed. This study was performed with techniques to keep radiation doses as low as reasonably achievable, (ALARA). Individualized dose reduction techniques using automated exposure control or adjustment of mA and/or kV according to the patient size were employed.  FINDINGS: There is respiratory motion artifact. The aortic contour is normal. There is no aneurysm or dissection. Multivessel coronary artery calcifications are noted. The pulmonary artery bolus is limited. There is no central pulmonary embolism. However, I cannot exclude emboli in the descending right lower lobe pulmonary artery. There are no effusions. There is no adenopathy. There is mild emphysema. There is bibasilar atelectasis with probable left base fibrosis. Imaging of the upper abdomen demonstrates debris in the stomach from a recent meal. No other abnormalities identified.      1. No Limited bolus of contrast and motion artifact limits this examination. There is no central pulmonary embolism. There are questionable emboli in the descending right pulmonary artery. There is no right heart strain. 2. No aneurysm or dissection. 3. Coronary artery calcification. 4. Bibasilar atelectasis there are no " effusions. There is no edema.  CTDI: 6.47 mGy DLP:191.23 mGy.cm  This report was signed and finalized on 10/19/2024 8:45 PM by Ashish Lanza MD.      XR Chest 1 View    Result Date: 10/19/2024  PORTABLE CHEST  HISTORY: Shortness of breath and cough for 2 to 3 days  COMPARISON: 3/18/2024  FINDINGS: A portable radiograph the chest demonstrates a 2-lead left pacemaker. The heart is normal in size. There is decreased lung volume. There is evidence of prior granulomatous disease. There is left base discoid atelectasis and/or fibrosis. There are increased interstitial markings      Underinflation with no edema or infiltrate. There are coarse interstitial changes.  This report was signed and finalized on 10/19/2024 6:19 PM by Ashish Lanza MD.       Assessment/Plan:    Observation general floor admission 10/19/2024 with hypoxia secondary to pulmonary embolism and rhinovirus.    At time of admission resting in bed, appears comfortably ill.  No respiratory distress.    Family updated at bedside.    Hypoxia  Pulmonary embolism, suspected  Rhinovirus  Supplemental oxygen as needed keep saturation above 90%.  Therapeutic Lovenox.    Chronic: chronic back pain on Subutex, GERD, hypertension, pacemaker placed February 2024, COPD, constipation, degenerative arthritis spine, anxiety depression, restless leg syndrome, obesity BMI 32, fatty liver, insomnia, type 2 diabetes, chronic pancreatitis, hypertension, polyarthropathy, vitamin D deficiency, fibromyalgia, Humphreys's esophagus, hyperlipidemia.  Subcutaneous insulin protocol.  Continue other home medications    Risk Assessment: High  DVT Prophylaxis: Therapeutic Lovenox  Code Status: Full code  Diet: Cardiac/diabetic    Advance Care Planning   ACP discussion was held with the patient during this visit. Patient does not have an advance directive, information provided.           Brian Joseph Kerley, DO  10/20/24  01:08 EDT    Dictated utilizing Dragon dictation.

## 2024-10-20 NOTE — PLAN OF CARE
Goal Outcome Evaluation:  Plan of Care Reviewed With: patient        Progress: improving  Outcome Evaluation: Pt admitted this shift. Plan of care ongoing.

## 2024-10-20 NOTE — PROGRESS NOTES
Exercise Oximetry    Patient Name:Katelynn Sung   MRN: [id number]   Date: 10/20/24             ROOM AIR BASELINE   SpO2% 96   Heart Rate 97   Blood Pressure      EXERCISE ON ROOM AIR SpO2% EXERCISE ON O2 @ LPM SpO2%   1 MINUTE 96 1 MINUTE    2 MINUTES 95 2 MINUTES    3 MINUTES 94 3 MINUTES    4 MINUTES 91 4 MINUTES    5 MINUTES 9190 5 MINUTES    6 MINUTES  6 MINUTES               Distance Walked  100ft Distance Walked   Dyspnea (Tino Scale)  4 Dyspnea (Tino Scale)   Fatigue (Tino Scale)  2 Fatigue (Tino Scale)   SpO2% Post Exercise  90 SpO2% Post Exercise   HR Post Exercise  106 HR Post Exercise   Time to Recovery  1 Time to Recovery     Comments: pt does not require O2 on exertion.

## 2024-10-21 ENCOUNTER — APPOINTMENT (OUTPATIENT)
Dept: CT IMAGING | Facility: HOSPITAL | Age: 64
End: 2024-10-21
Payer: MEDICAID

## 2024-10-21 ENCOUNTER — TELEPHONE (OUTPATIENT)
Dept: FAMILY MEDICINE CLINIC | Facility: CLINIC | Age: 64
End: 2024-10-21
Payer: MEDICAID

## 2024-10-21 ENCOUNTER — READMISSION MANAGEMENT (OUTPATIENT)
Dept: CALL CENTER | Facility: HOSPITAL | Age: 64
End: 2024-10-21
Payer: MEDICAID

## 2024-10-21 ENCOUNTER — HOSPITAL ENCOUNTER (OUTPATIENT)
Facility: HOSPITAL | Age: 64
Setting detail: OBSERVATION
Discharge: HOME | End: 2024-10-24
Attending: EMERGENCY MEDICINE | Admitting: STUDENT IN AN ORGANIZED HEALTH CARE EDUCATION/TRAINING PROGRAM
Payer: MEDICAID

## 2024-10-21 VITALS
HEIGHT: 64 IN | HEART RATE: 69 BPM | WEIGHT: 190.48 LBS | RESPIRATION RATE: 18 BRPM | OXYGEN SATURATION: 93 % | TEMPERATURE: 97.7 F | SYSTOLIC BLOOD PRESSURE: 125 MMHG | BODY MASS INDEX: 32.52 KG/M2 | DIASTOLIC BLOOD PRESSURE: 72 MMHG

## 2024-10-21 DIAGNOSIS — K92.1 GASTROINTESTINAL HEMORRHAGE WITH MELENA: ICD-10-CM

## 2024-10-21 DIAGNOSIS — I26.99 ACUTE PULMONARY EMBOLISM WITHOUT ACUTE COR PULMONALE, UNSPECIFIED PULMONARY EMBOLISM TYPE: ICD-10-CM

## 2024-10-21 DIAGNOSIS — K92.2 GASTROINTESTINAL HEMORRHAGE, UNSPECIFIED GASTROINTESTINAL HEMORRHAGE TYPE: Primary | ICD-10-CM

## 2024-10-21 LAB
ABO GROUP BLD: NORMAL
ABO GROUP BLD: NORMAL
ADV 40+41 DNA STL QL NAA+NON-PROBE: NOT DETECTED
ALBUMIN SERPL-MCNC: 4.4 G/DL (ref 3.5–5.2)
ALBUMIN/GLOB SERPL: 1.3 G/DL
ALP SERPL-CCNC: 90 U/L (ref 39–117)
ALT SERPL W P-5'-P-CCNC: 24 U/L (ref 1–33)
ANION GAP SERPL CALCULATED.3IONS-SCNC: 13.2 MMOL/L (ref 5–15)
ANION GAP SERPL CALCULATED.3IONS-SCNC: 9.4 MMOL/L (ref 5–15)
AST SERPL-CCNC: 25 U/L (ref 1–32)
ASTRO TYP 1-8 RNA STL QL NAA+NON-PROBE: NOT DETECTED
BACTERIA UR QL AUTO: ABNORMAL /HPF
BASOPHILS # BLD AUTO: 0.06 10*3/MM3 (ref 0–0.2)
BASOPHILS # BLD AUTO: 0.07 10*3/MM3 (ref 0–0.2)
BASOPHILS NFR BLD AUTO: 0.6 % (ref 0–1.5)
BASOPHILS NFR BLD AUTO: 0.7 % (ref 0–1.5)
BILIRUB SERPL-MCNC: 0.2 MG/DL (ref 0–1.2)
BILIRUB UR QL STRIP: NEGATIVE
BLD GP AB SCN SERPL QL: NEGATIVE
BUN SERPL-MCNC: 12 MG/DL (ref 8–23)
BUN SERPL-MCNC: 15 MG/DL (ref 8–23)
BUN/CREAT SERPL: 13.2 (ref 7–25)
BUN/CREAT SERPL: 16.7 (ref 7–25)
C CAYETANENSIS DNA STL QL NAA+NON-PROBE: NOT DETECTED
C COLI+JEJ+UPSA DNA STL QL NAA+NON-PROBE: NOT DETECTED
C DIFF GDH + TOXINS A+B STL QL IA.RAPID: NEGATIVE
C DIFF GDH + TOXINS A+B STL QL IA.RAPID: NEGATIVE
CALCIUM SPEC-SCNC: 8.7 MG/DL (ref 8.6–10.5)
CALCIUM SPEC-SCNC: 9.3 MG/DL (ref 8.6–10.5)
CHLORIDE SERPL-SCNC: 105 MMOL/L (ref 98–107)
CHLORIDE SERPL-SCNC: 98 MMOL/L (ref 98–107)
CLARITY UR: CLEAR
CO2 SERPL-SCNC: 23.8 MMOL/L (ref 22–29)
CO2 SERPL-SCNC: 24.6 MMOL/L (ref 22–29)
COLOR UR: YELLOW
CREAT SERPL-MCNC: 0.9 MG/DL (ref 0.57–1)
CREAT SERPL-MCNC: 0.91 MG/DL (ref 0.57–1)
CRYPTOSP DNA STL QL NAA+NON-PROBE: NOT DETECTED
DEPRECATED RDW RBC AUTO: 40.6 FL (ref 37–54)
DEPRECATED RDW RBC AUTO: 42.5 FL (ref 37–54)
E HISTOLYT DNA STL QL NAA+NON-PROBE: NOT DETECTED
EAEC PAA PLAS AGGR+AATA ST NAA+NON-PRB: NOT DETECTED
EC STX1+STX2 GENES STL QL NAA+NON-PROBE: NOT DETECTED
EGFRCR SERPLBLD CKD-EPI 2021: 71 ML/MIN/1.73
EGFRCR SERPLBLD CKD-EPI 2021: 72 ML/MIN/1.73
EOSINOPHIL # BLD AUTO: 0.01 10*3/MM3 (ref 0–0.4)
EOSINOPHIL # BLD AUTO: 0.08 10*3/MM3 (ref 0–0.4)
EOSINOPHIL NFR BLD AUTO: 0.1 % (ref 0.3–6.2)
EOSINOPHIL NFR BLD AUTO: 0.8 % (ref 0.3–6.2)
EPEC EAE GENE STL QL NAA+NON-PROBE: NOT DETECTED
ERYTHROCYTE [DISTWIDTH] IN BLOOD BY AUTOMATED COUNT: 13.3 % (ref 12.3–15.4)
ERYTHROCYTE [DISTWIDTH] IN BLOOD BY AUTOMATED COUNT: 13.6 % (ref 12.3–15.4)
ETEC LTA+ST1A+ST1B TOX ST NAA+NON-PROBE: NOT DETECTED
G LAMBLIA DNA STL QL NAA+NON-PROBE: NOT DETECTED
GLOBULIN UR ELPH-MCNC: 3.4 GM/DL
GLUCOSE BLDC GLUCOMTR-MCNC: 148 MG/DL (ref 70–130)
GLUCOSE BLDC GLUCOMTR-MCNC: 91 MG/DL (ref 70–130)
GLUCOSE SERPL-MCNC: 100 MG/DL (ref 65–99)
GLUCOSE SERPL-MCNC: 175 MG/DL (ref 65–99)
GLUCOSE UR STRIP-MCNC: NEGATIVE MG/DL
HCT VFR BLD AUTO: 39 % (ref 34–46.6)
HCT VFR BLD AUTO: 39.9 % (ref 34–46.6)
HEMOCCULT STL QL: POSITIVE
HGB BLD-MCNC: 13.1 G/DL (ref 12–15.9)
HGB BLD-MCNC: 13.6 G/DL (ref 12–15.9)
HGB UR QL STRIP.AUTO: NEGATIVE
HOLD SPECIMEN: NORMAL
HOLD SPECIMEN: NORMAL
HYALINE CASTS UR QL AUTO: ABNORMAL /LPF
IMM GRANULOCYTES # BLD AUTO: 0.16 10*3/MM3 (ref 0–0.05)
IMM GRANULOCYTES # BLD AUTO: 0.22 10*3/MM3 (ref 0–0.05)
IMM GRANULOCYTES NFR BLD AUTO: 1.6 % (ref 0–0.5)
IMM GRANULOCYTES NFR BLD AUTO: 2.3 % (ref 0–0.5)
KETONES UR QL STRIP: NEGATIVE
LEUKOCYTE ESTERASE UR QL STRIP.AUTO: ABNORMAL
LIPASE SERPL-CCNC: 92 U/L (ref 13–60)
LYMPHOCYTES # BLD AUTO: 1.92 10*3/MM3 (ref 0.7–3.1)
LYMPHOCYTES # BLD AUTO: 3.79 10*3/MM3 (ref 0.7–3.1)
LYMPHOCYTES NFR BLD AUTO: 19.8 % (ref 19.6–45.3)
LYMPHOCYTES NFR BLD AUTO: 38.2 % (ref 19.6–45.3)
MCH RBC QN AUTO: 28.6 PG (ref 26.6–33)
MCH RBC QN AUTO: 28.7 PG (ref 26.6–33)
MCHC RBC AUTO-ENTMCNC: 33.6 G/DL (ref 31.5–35.7)
MCHC RBC AUTO-ENTMCNC: 34.1 G/DL (ref 31.5–35.7)
MCV RBC AUTO: 83.8 FL (ref 79–97)
MCV RBC AUTO: 85.5 FL (ref 79–97)
MONOCYTES # BLD AUTO: 0.56 10*3/MM3 (ref 0.1–0.9)
MONOCYTES # BLD AUTO: 0.62 10*3/MM3 (ref 0.1–0.9)
MONOCYTES NFR BLD AUTO: 5.8 % (ref 5–12)
MONOCYTES NFR BLD AUTO: 6.2 % (ref 5–12)
MUCOUS THREADS URNS QL MICRO: ABNORMAL /HPF
NEUTROPHILS NFR BLD AUTO: 5.21 10*3/MM3 (ref 1.7–7)
NEUTROPHILS NFR BLD AUTO: 52.5 % (ref 42.7–76)
NEUTROPHILS NFR BLD AUTO: 6.93 10*3/MM3 (ref 1.7–7)
NEUTROPHILS NFR BLD AUTO: 71.4 % (ref 42.7–76)
NITRITE UR QL STRIP: NEGATIVE
NOROVIRUS GI+II RNA STL QL NAA+NON-PROBE: NOT DETECTED
NRBC BLD AUTO-RTO: 0 /100 WBC (ref 0–0.2)
NRBC BLD AUTO-RTO: 0 /100 WBC (ref 0–0.2)
P SHIGELLOIDES DNA STL QL NAA+NON-PROBE: NOT DETECTED
PH UR STRIP.AUTO: 5.5 [PH] (ref 5–8)
PLATELET # BLD AUTO: 171 10*3/MM3 (ref 140–450)
PLATELET # BLD AUTO: 282 10*3/MM3 (ref 140–450)
PMV BLD AUTO: 10.9 FL (ref 6–12)
PMV BLD AUTO: 12.3 FL (ref 6–12)
POTASSIUM SERPL-SCNC: 4.1 MMOL/L (ref 3.5–5.2)
POTASSIUM SERPL-SCNC: 4.4 MMOL/L (ref 3.5–5.2)
PROT SERPL-MCNC: 7.8 G/DL (ref 6–8.5)
PROT UR QL STRIP: NEGATIVE
RBC # BLD AUTO: 4.56 10*6/MM3 (ref 3.77–5.28)
RBC # BLD AUTO: 4.76 10*6/MM3 (ref 3.77–5.28)
RBC # UR STRIP: ABNORMAL /HPF
RBC MORPH BLD: NORMAL
REF LAB TEST METHOD: ABNORMAL
RH BLD: POSITIVE
RH BLD: POSITIVE
RVA RNA STL QL NAA+NON-PROBE: NOT DETECTED
S ENT+BONG DNA STL QL NAA+NON-PROBE: NOT DETECTED
SAPO I+II+IV+V RNA STL QL NAA+NON-PROBE: NOT DETECTED
SHIGELLA SP+EIEC IPAH ST NAA+NON-PROBE: NOT DETECTED
SMALL PLATELETS BLD QL SMEAR: ADEQUATE
SODIUM SERPL-SCNC: 135 MMOL/L (ref 136–145)
SODIUM SERPL-SCNC: 139 MMOL/L (ref 136–145)
SP GR UR STRIP: 1.01 (ref 1–1.03)
SQUAMOUS #/AREA URNS HPF: ABNORMAL /HPF
T&S EXPIRATION DATE: NORMAL
TROPONIN T SERPL HS-MCNC: <6 NG/L
UROBILINOGEN UR QL STRIP: ABNORMAL
V CHOL+PARA+VUL DNA STL QL NAA+NON-PROBE: NOT DETECTED
V CHOLERAE DNA STL QL NAA+NON-PROBE: NOT DETECTED
WBC # UR STRIP: ABNORMAL /HPF
WBC MORPH BLD: NORMAL
WBC NRBC COR # BLD AUTO: 9.7 10*3/MM3 (ref 3.4–10.8)
WBC NRBC COR # BLD AUTO: 9.93 10*3/MM3 (ref 3.4–10.8)
WHOLE BLOOD HOLD COAG: NORMAL
WHOLE BLOOD HOLD SPECIMEN: NORMAL
Y ENTEROCOL DNA STL QL NAA+NON-PROBE: NOT DETECTED

## 2024-10-21 PROCEDURE — G0378 HOSPITAL OBSERVATION PER HR: HCPCS

## 2024-10-21 PROCEDURE — 96374 THER/PROPH/DIAG INJ IV PUSH: CPT

## 2024-10-21 PROCEDURE — 82948 REAGENT STRIP/BLOOD GLUCOSE: CPT | Performed by: INTERNAL MEDICINE

## 2024-10-21 PROCEDURE — 99285 EMERGENCY DEPT VISIT HI MDM: CPT

## 2024-10-21 PROCEDURE — 86900 BLOOD TYPING SEROLOGIC ABO: CPT

## 2024-10-21 PROCEDURE — 86901 BLOOD TYPING SEROLOGIC RH(D): CPT

## 2024-10-21 PROCEDURE — 96361 HYDRATE IV INFUSION ADD-ON: CPT

## 2024-10-21 PROCEDURE — 25010000002 MORPHINE PER 10 MG: Performed by: EMERGENCY MEDICINE

## 2024-10-21 PROCEDURE — 36415 COLL VENOUS BLD VENIPUNCTURE: CPT

## 2024-10-21 PROCEDURE — 87507 IADNA-DNA/RNA PROBE TQ 12-25: CPT

## 2024-10-21 PROCEDURE — 81001 URINALYSIS AUTO W/SCOPE: CPT | Performed by: EMERGENCY MEDICINE

## 2024-10-21 PROCEDURE — 87449 NOS EACH ORGANISM AG IA: CPT

## 2024-10-21 PROCEDURE — 71275 CT ANGIOGRAPHY CHEST: CPT

## 2024-10-21 PROCEDURE — 87324 CLOSTRIDIUM AG IA: CPT

## 2024-10-21 PROCEDURE — 85025 COMPLETE CBC W/AUTO DIFF WBC: CPT | Performed by: EMERGENCY MEDICINE

## 2024-10-21 PROCEDURE — 82948 REAGENT STRIP/BLOOD GLUCOSE: CPT | Performed by: STUDENT IN AN ORGANIZED HEALTH CARE EDUCATION/TRAINING PROGRAM

## 2024-10-21 PROCEDURE — 86850 RBC ANTIBODY SCREEN: CPT

## 2024-10-21 PROCEDURE — 85007 BL SMEAR W/DIFF WBC COUNT: CPT | Performed by: STUDENT IN AN ORGANIZED HEALTH CARE EDUCATION/TRAINING PROGRAM

## 2024-10-21 PROCEDURE — 25010000002 ONDANSETRON PER 1 MG: Performed by: EMERGENCY MEDICINE

## 2024-10-21 PROCEDURE — 93005 ELECTROCARDIOGRAM TRACING: CPT

## 2024-10-21 PROCEDURE — 84484 ASSAY OF TROPONIN QUANT: CPT

## 2024-10-21 PROCEDURE — 99214 OFFICE O/P EST MOD 30 MIN: CPT | Performed by: INTERNAL MEDICINE

## 2024-10-21 PROCEDURE — 80053 COMPREHEN METABOLIC PANEL: CPT | Performed by: STUDENT IN AN ORGANIZED HEALTH CARE EDUCATION/TRAINING PROGRAM

## 2024-10-21 PROCEDURE — 25810000003 SODIUM CHLORIDE 0.9 % SOLUTION

## 2024-10-21 PROCEDURE — 99239 HOSP IP/OBS DSCHRG MGMT >30: CPT | Performed by: INTERNAL MEDICINE

## 2024-10-21 PROCEDURE — 74178 CT ABD&PLV WO CNTR FLWD CNTR: CPT

## 2024-10-21 PROCEDURE — 25510000001 IOPAMIDOL 61 % SOLUTION: Performed by: EMERGENCY MEDICINE

## 2024-10-21 PROCEDURE — 82272 OCCULT BLD FECES 1-3 TESTS: CPT

## 2024-10-21 PROCEDURE — 96375 TX/PRO/DX INJ NEW DRUG ADDON: CPT

## 2024-10-21 PROCEDURE — 96376 TX/PRO/DX INJ SAME DRUG ADON: CPT

## 2024-10-21 PROCEDURE — 83690 ASSAY OF LIPASE: CPT | Performed by: EMERGENCY MEDICINE

## 2024-10-21 PROCEDURE — 94799 UNLISTED PULMONARY SVC/PX: CPT

## 2024-10-21 PROCEDURE — 85025 COMPLETE CBC W/AUTO DIFF WBC: CPT | Performed by: STUDENT IN AN ORGANIZED HEALTH CARE EDUCATION/TRAINING PROGRAM

## 2024-10-21 PROCEDURE — 94664 DEMO&/EVAL PT USE INHALER: CPT

## 2024-10-21 RX ORDER — ONDANSETRON 2 MG/ML
4 INJECTION INTRAMUSCULAR; INTRAVENOUS ONCE
Status: COMPLETED | OUTPATIENT
Start: 2024-10-21 | End: 2024-10-21

## 2024-10-21 RX ORDER — PANTOPRAZOLE SODIUM 40 MG/10ML
40 INJECTION, POWDER, LYOPHILIZED, FOR SOLUTION INTRAVENOUS ONCE
Status: COMPLETED | OUTPATIENT
Start: 2024-10-21 | End: 2024-10-21

## 2024-10-21 RX ORDER — IOPAMIDOL 612 MG/ML
100 INJECTION, SOLUTION INTRAVASCULAR
Status: COMPLETED | OUTPATIENT
Start: 2024-10-21 | End: 2024-10-21

## 2024-10-21 RX ORDER — SODIUM CHLORIDE 0.9 % (FLUSH) 0.9 %
10 SYRINGE (ML) INJECTION AS NEEDED
Status: DISCONTINUED | OUTPATIENT
Start: 2024-10-21 | End: 2024-10-24 | Stop reason: HOSPADM

## 2024-10-21 RX ORDER — ONDANSETRON 2 MG/ML
4 INJECTION INTRAMUSCULAR; INTRAVENOUS EVERY 6 HOURS PRN
Status: DISCONTINUED | OUTPATIENT
Start: 2024-10-21 | End: 2024-10-24 | Stop reason: HOSPADM

## 2024-10-21 RX ORDER — SODIUM CHLORIDE 9 MG/ML
40 INJECTION, SOLUTION INTRAVENOUS AS NEEDED
Status: DISCONTINUED | OUTPATIENT
Start: 2024-10-21 | End: 2024-10-24 | Stop reason: HOSPADM

## 2024-10-21 RX ORDER — SODIUM CHLORIDE 0.9 % (FLUSH) 0.9 %
10 SYRINGE (ML) INJECTION EVERY 12 HOURS SCHEDULED
Status: DISCONTINUED | OUTPATIENT
Start: 2024-10-21 | End: 2024-10-24 | Stop reason: HOSPADM

## 2024-10-21 RX ORDER — LOPERAMIDE HCL 2 MG
4 CAPSULE ORAL 4 TIMES DAILY PRN
Status: DISCONTINUED | OUTPATIENT
Start: 2024-10-21 | End: 2024-10-21 | Stop reason: HOSPADM

## 2024-10-21 RX ORDER — NITROGLYCERIN 0.4 MG/1
0.4 TABLET SUBLINGUAL
Status: DISCONTINUED | OUTPATIENT
Start: 2024-10-21 | End: 2024-10-24 | Stop reason: HOSPADM

## 2024-10-21 RX ORDER — PANTOPRAZOLE SODIUM 40 MG/10ML
80 INJECTION, POWDER, LYOPHILIZED, FOR SOLUTION INTRAVENOUS ONCE
Status: COMPLETED | OUTPATIENT
Start: 2024-10-21 | End: 2024-10-21

## 2024-10-21 RX ADMIN — PREGABALIN 200 MG: 75 CAPSULE ORAL at 08:21

## 2024-10-21 RX ADMIN — Medication 10 ML: at 08:31

## 2024-10-21 RX ADMIN — IOPAMIDOL 100 ML: 612 INJECTION, SOLUTION INTRAVENOUS at 20:41

## 2024-10-21 RX ADMIN — MORPHINE SULFATE 4 MG: 4 INJECTION, SOLUTION INTRAMUSCULAR; INTRAVENOUS at 20:21

## 2024-10-21 RX ADMIN — Medication 10 ML: at 22:43

## 2024-10-21 RX ADMIN — APIXABAN 10 MG: 5 TABLET, FILM COATED ORAL at 08:21

## 2024-10-21 RX ADMIN — CLONAZEPAM 0.5 MG: 0.5 TABLET ORAL at 08:30

## 2024-10-21 RX ADMIN — PANTOPRAZOLE SODIUM 80 MG: 40 INJECTION, POWDER, FOR SOLUTION INTRAVENOUS at 20:15

## 2024-10-21 RX ADMIN — BUDESONIDE AND FORMOTEROL FUMARATE DIHYDRATE 2 PUFF: 160; 4.5 AEROSOL RESPIRATORY (INHALATION) at 07:23

## 2024-10-21 RX ADMIN — ONDANSETRON 4 MG: 2 INJECTION INTRAMUSCULAR; INTRAVENOUS at 20:18

## 2024-10-21 RX ADMIN — LOPERAMIDE HYDROCHLORIDE 4 MG: 2 CAPSULE ORAL at 03:21

## 2024-10-21 RX ADMIN — PANTOPRAZOLE SODIUM 40 MG: 40 INJECTION, POWDER, FOR SOLUTION INTRAVENOUS at 22:43

## 2024-10-21 RX ADMIN — SODIUM CHLORIDE 500 ML: 9 INJECTION, SOLUTION INTRAVENOUS at 20:13

## 2024-10-21 RX ADMIN — BUPRENORPHINE HCL 8 MG: 2 TABLET SUBLINGUAL at 08:21

## 2024-10-21 NOTE — DISCHARGE SUMMARY
"    [facility]      Name:  Katelynn Sung   Age:  64 y.o.  Sex:  female  :  1960  MRN:  [id number]   Visit Number:  [id number]    Admission Date:  10/19/2024  Date of Discharge:  10/21/2024  Primary Care Physician:  Lesvia Servin MD      Discharge Diagnoses:     Acute hypoxic respiratory insufficiency  Suspected pulmonary embolism  Rhinovirus    Problem List:     Active Hospital Problems    Diagnosis  POA    **Pulmonary embolism [I26.99]  Yes    Rhinovirus [B34.8]  Yes      Resolved Hospital Problems   No resolved problems to display.     Presenting Problem:    Chief Complaint   Patient presents with    Shortness of Breath      Consults:     Consulting Physician(s)                     None              Procedures Performed:        History of presenting illness/Hospital Course:    Katelynn Sung is a 64-year-old woman with past medical history of chronic back pain on Subutex, GERD, hypertension, pacemaker placed 2024, COPD, constipation, degenerative arthritis spine, anxiety depression, restless leg syndrome, obesity BMI 32, fatty liver, insomnia, type 2 diabetes, chronic pancreatitis, hypertension, polyarthropathy, vitamin D deficiency, fibromyalgia, Humphreys's esophagus, hyperlipidemia.  Presented to [facility] ED on 10/19/2024 with concern for shortness of air, \"squeezing her chest\", severe nonproductive cough.  Says she was started on amoxicillin and prednisone without improvement.  Denied fevers or chills.     ED summary: Afebrile, tachycardic, other vital signs stable on 2 L.  EKG sinus tachycardia rate 105, nonspecific ST-T wave changes.  proBNP not elevated.  Blood glucose 192.  CBC unremarkable.  PCR respiratory panel rhinovirus positive.  CT angio chest proximal emboli in the descending right pulmonary artery no right heart strain, does show coronary artery classifications, bibasilar atelectasis with no effusions, no central PE.  She was provided " Tessalon Perle, therapeutic Lovenox, DuoNeb, Solu-Medrol, Phenergan.    Hypoxia  Pulmonary embolism, suspected  Rhinovirus  Supplemental oxygen as needed keep saturation above 90%.  Patient stable on room air at discharge.  Walking oximetry test revealed she did not require supplemental oxygen at discharge.  Therapeutic Lovenox started upon admission.  Transition to oral Eliquis at discharge.  Supportive care for rhinovirus    Follow-up with primary physician within 1 week.  Strict return instructions given    Vital Signs:    Temp:  [97.6 °F (36.4 °C)-98.3 °F (36.8 °C)] 97.6 °F (36.4 °C)  Heart Rate:  [64-83] 69  Resp:  [16] 16  BP: (106-111)/(55-70) 111/60    Physical Exam:    General Appearance:  Alert and cooperative.    Head:  Atraumatic and normocephalic.   Eyes: Conjunctivae and sclerae normal, no icterus. No pallor.   Ears:  Ears with no abnormalities noted.   Throat: No oral lesions, no thrush, oral mucosa moist.   Neck: Supple, trachea midline, no thyromegaly.       Lungs:   Breath sounds heard bilaterally equally.  No crackles or wheezing.    Heart:  Normal S1 and S2, no murmur, No JVD.   Abdomen:   Normal bowel sounds, Soft, nontender, nondistended, no rebound tenderness.   Extremities: Supple, no edema, no cyanosis, no clubbing.   Pulses: Pulses palpable bilaterally.   Skin: No bleeding or rash.   Neurologic: Alert and oriented x 3.      Pertinent Lab Results:     Results from last 7 days   Lab Units 10/21/24  0542 10/20/24  0831 10/19/24  1716   SODIUM mmol/L 139 138 139   POTASSIUM mmol/L 4.1 4.1 4.2   CHLORIDE mmol/L 105 103 106   CO2 mmol/L 24.6 22.2 21.9*   BUN mg/dL 15 8 7*   CREATININE mg/dL 0.90 0.82 0.87   CALCIUM mg/dL 8.7 9.5 8.9   BILIRUBIN mg/dL  --   --  0.3   ALK PHOS U/L  --   --  113   ALT (SGPT) U/L  --   --  31   AST (SGOT) U/L  --   --  64*   GLUCOSE mg/dL 100* 159* 182*     Results from last 7 days   Lab Units 10/21/24  0542 10/20/24  0831 10/19/24  1716   WBC 10*3/mm3 9.93 11.02*  7.48   HEMOGLOBIN g/dL 13.1 14.3 13.8   HEMATOCRIT % 39.0 43.0 40.9   PLATELETS 10*3/mm3 171 277 243         Results from last 7 days   Lab Units 10/19/24  1716   HSTROP T ng/L <6     Results from last 7 days   Lab Units 10/19/24  1716   PROBNP pg/mL <36.0                       Pertinent Radiology Results:    Imaging Results (All)       Procedure Component Value Units Date/Time    US Venous Doppler Lower Extremity Bilateral (duplex) [[id number]] Collected: 10/20/24 1859     Updated: 10/20/24 1903    Narrative:      BILATERAL LOWER EXTREMITY VENOUS DUPLEX DOPPLER EXAMINATION     HISTORY: DVT?; I26.99-Other pulmonary embolism without acute cor  pulmonale; R06.00-Dyspnea, unspecified; B34.8-Other viral infections of  unspecified site; R05.1-Acute cough     COMPARISON:   None     PROCEDURE: Multiple transverse and longitudinal scans were performed of  both femoropopliteal deep venous system, with augmentation and  compression maneuvers.     FINDINGS: There is normal phasic flow noted in the visualized deep  venous system. No intraluminal increased echogenicity is noted to  suggest thrombus. There is normal compression and augmentation of the  venous structures. No abnormal venous collaterals are seen.       Impression:      No evidence of left or right lower extremity deep venous  thrombosis.                 This report was signed and finalized on 10/20/2024 7:01 PM by Ashish Lanza MD.       CT Angiogram Chest Pulmonary Embolism [[id number]] Collected: 10/19/24 2037     Updated: 10/19/24 2047    Narrative:      PROCEDURE: CT ANGIOGRAM CHEST PULMONARY EMBOLISM-     HISTORY: Shortness of breath     COMPARISON: None.     TECHNIQUE: The patient was injected with  IV contrast. Axial images were  obtained through the chest in a CTA/ PE protocol. 3 D Reconstruction  images were also performed. This study was performed with techniques to  keep radiation doses as low as reasonably achievable, (ALARA).  Individualized dose  reduction techniques using automated exposure  control or adjustment of mA and/or kV according to the patient size were  employed.     FINDINGS: There is respiratory motion artifact. The aortic contour is  normal. There is no aneurysm or dissection. Multivessel coronary artery  calcifications are noted. The pulmonary artery bolus is limited. There  is no central pulmonary embolism. However, I cannot exclude emboli in  the descending right lower lobe pulmonary artery. There are no  effusions. There is no adenopathy. There is mild emphysema. There is  bibasilar atelectasis with probable left base fibrosis. Imaging of the  upper abdomen demonstrates debris in the stomach from a recent meal. No  other abnormalities identified.       Impression:      1. No Limited bolus of contrast and motion artifact limits this  examination. There is no central pulmonary embolism. There are  questionable emboli in the descending right pulmonary artery. There is  no right heart strain.  2. No aneurysm or dissection.  3. Coronary artery calcification.  4. Bibasilar atelectasis there are no effusions. There is no edema.     CTDI: 6.47 mGy  DLP:191.23 mGy.cm     This report was signed and finalized on 10/19/2024 8:45 PM by Ashish Lanza MD.       XR Chest 1 View [[id number]] Collected: 10/19/24 1816     Updated: 10/19/24 1821    Narrative:      PORTABLE CHEST     HISTORY: Shortness of breath and cough for 2 to 3 days     COMPARISON: 3/18/2024     FINDINGS: A portable radiograph the chest demonstrates a 2-lead left  pacemaker. The heart is normal in size. There is decreased lung volume.  There is evidence of prior granulomatous disease. There is left base  discoid atelectasis and/or fibrosis. There are increased interstitial  markings       Impression:      Underinflation with no edema or infiltrate. There are coarse  interstitial changes.     This report was signed and finalized on 10/19/2024 6:19 PM by Ashish Lanza MD.                Echo:    Results for orders placed in visit on 08/01/18    SCANNED - ECHOCARDIOGRAM    Condition on Discharge:      Stable.    Code status during the hospital stay:    Code Status and Medical Interventions: CPR (Attempt to Resuscitate); Full Support   Ordered at: 10/20/24 0115     Code Status (Patient has no pulse and is not breathing):    CPR (Attempt to Resuscitate)     Medical Interventions (Patient has pulse or is breathing):    Full Support     Discharge Disposition:    Home or Self Care    Discharge Medications:       Discharge Medications        New Medications        Instructions Start Date   apixaban 5 MG tablet tablet  Commonly known as: ELIQUIS   Take 2 tablets by mouth Every 12 (Twelve) Hours for 6 days, THEN 1 tablet Every 12 (Twelve) Hours for 30 days. Indications: DVT/PE (active thrombosis)   Start Date: October 21, 2024            Changes to Medications        Instructions Start Date   nystatin 100,000 unit/mL suspension  Commonly known as: MYCOSTATIN  What changed: See the new instructions.   SWISH AND SWALLOW 5 MLS BY MOUTH FOUR TIMES DAILY             Continue These Medications        Instructions Start Date   albuterol sulfate  (90 Base) MCG/ACT inhaler  Commonly known as: ProAir HFA   2 puffs, Inhalation, Every 4 Hours PRN      Azelastine HCl 137 MCG/SPRAY solution   INSTILL 2 SPRAYS IN EACH NOSTRIL TWICE DAILY AS DIRECTED      buprenorphine 8 MG sublingual tablet SL tablet  Commonly known as: SUBUTEX   Place 1 tablet under the tongue Daily.      clonazePAM 0.5 MG tablet  Commonly known as: KlonoPIN   0.5 mg, Oral, 2 Times Daily PRN      Combivent Respimat  MCG/ACT inhaler  Generic drug: ipratropium-albuterol   1 puff, Inhalation, 4 Times Daily PRN      cyclobenzaprine 5 MG tablet  Commonly known as: FLEXERIL   5 mg, Oral, 3 Times Daily PRN      dexlansoprazole 60 MG capsule  Commonly known as: DEXILANT   60 mg, Oral, Daily      fluticasone 50 MCG/ACT nasal spray  Commonly  known as: FLONASE   INSTILL 2 SPRAYS IN EACH NOSTRIL ONCE DAILY AS DIRECTED      Fluticasone Furoate-Vilanterol 200-25 MCG/ACT inhaler  Commonly known as: Breo Ellipta   1 puff, Inhalation, Daily - RT      lisinopril-hydrochlorothiazide 10-12.5 MG per tablet  Commonly known as: PRINZIDE,ZESTORETIC   1 tablet, Oral, Daily      pregabalin 200 MG capsule  Commonly known as: LYRICA   200 mg, Oral, 2 Times Daily      promethazine 25 MG tablet  Commonly known as: PHENERGAN   25 mg, Oral, Every 8 Hours PRN      promethazine-dextromethorphan 6.25-15 MG/5ML syrup  Commonly known as: PROMETHAZINE-DM   5 mL, Oral, 4 Times Daily PRN             Stop These Medications      azithromycin 250 MG tablet  Commonly known as: Zithromax Z-Oblivar     cefdinir 300 MG capsule  Commonly known as: OMNICEF     cetirizine 10 MG tablet  Commonly known as: zyrTEC     predniSONE 10 MG tablet  Commonly known as: DELTASONE            Discharge Diet:     Diet Instructions       Diet: Cardiac Diets, Diabetic Diets; Healthy Heart (2-3 Na+); Regular (IDDSI 7); Thin (IDDSI 0); Consistent Carbohydrate      Discharge Diet:  Cardiac Diets  Diabetic Diets       Cardiac Diet: Healthy Heart (2-3 Na+)    Texture: Regular (IDDSI 7)    Fluid Consistency: Thin (IDDSI 0)    Diabetic Diet: Consistent Carbohydrate          Activity at Discharge:     Activity Instructions       Activity as Tolerated            Follow-up Appointments:    Additional Instructions for the Follow-ups that You Need to Schedule       Discharge Follow-up with PCP   As directed       Currently Documented PCP:    Lesvia Servin MD    PCP Phone Number:    [phone]     Follow Up Details: 1 week               Follow-up Information       Lesvia Servin MD .    Specialties: Family Medicine, Emergency Medicine  Why: 1 week  Contact information:  [address] [place] [address] [phone]                           Future Appointments   Date Time Provider Department Center   11/5/2024  2:10  PM Kelly Palomino MD [facility]   11/20/2024  1:45 PM Lesvia Servin MD [facility]     Test Results Pending at Discharge:           Reid Portillo DO  10/21/24  08:06 EDT    Time: I spent >30 minutes on this discharge activity which included: face-to-face encounter with the patient, reviewing the data in the system, coordination of the care with the nursing staff as well as consultants, documentation, and entering orders.     Dictated utilizing Dragon dictation.

## 2024-10-21 NOTE — TELEPHONE ENCOUNTER
Caller: Katelynn Sung    Relationship to patient: Self    Best call back number: [phone]    Chief complaint: BLOODY DIARRHEA    Patient directed to call 911 or go to their nearest emergency room.     Patient verbalized understanding: [x] Yes  [] No  If no, why?    Additional notes:PATIENT WAS RELEASED FROM HOSPITAL TODAY FOR HAVING BLOOD CLOT IN LUNG. WHILE PATIENT WAS IN HOSPITAL SHE HAD DIARRHEA. PATIENT WAS GIVEN IMMODIUM AND IT STOPPED. SINCE PATIENT HAS BEEN HOME THE DIARRHEA STARTED AGAIN AND IT HAS BEEN BLOODY AND SEEMS TO INCREASE IN BLOOD ADVISED PATIENT TO GO BACK TO HOSPITAL SINCE THAT WAS STIPULATED ON HER DISCHARGE SUMMARY AS SOMETHING TO LOOK FOR. PATIENT AGREED.

## 2024-10-21 NOTE — CASE MANAGEMENT/SOCIAL WORK
Case Management Discharge Note                Selected Continued Care - Discharged on 10/21/2024 Admission date: 10/19/2024 - Discharge disposition: Home or Self Care      Destination    No services have been selected for the patient.                Durable Medical Equipment    No services have been selected for the patient.                Dialysis/Infusion    No services have been selected for the patient.                Home Medical Care    No services have been selected for the patient.                Therapy    No services have been selected for the patient.                Community Resources    No services have been selected for the patient.                Community & DME    No services have been selected for the patient.                    Transportation Services  Private: Car    Final Discharge Disposition Code: 01 - home or self-care

## 2024-10-21 NOTE — PLAN OF CARE
Goal Outcome Evaluation:  Plan of Care Reviewed With: patient        Progress: no change       Problem: Adult Inpatient Plan of Care  Goal: Plan of Care Review  Outcome: Progressing  Flowsheets (Taken 10/21/2024 0606)  Progress: no change  Plan of Care Reviewed With: patient  Goal: Patient-Specific Goal (Individualized)  Outcome: Progressing  Goal: Absence of Hospital-Acquired Illness or Injury  Outcome: Progressing  Intervention: Identify and Manage Fall Risk  Recent Flowsheet Documentation  Taken 10/21/2024 0400 by Rowena Cash LPN  Safety Promotion/Fall Prevention: safety round/check completed  Taken 10/21/2024 0200 by Rowena Cash LPN  Safety Promotion/Fall Prevention:   activity supervised   clutter free environment maintained   assistive device/personal items within reach   fall prevention program maintained   nonskid shoes/slippers when out of bed   room organization consistent   safety round/check completed  Taken 10/21/2024 0000 by Tincher, Rowena, LPN  Safety Promotion/Fall Prevention:   activity supervised   assistive device/personal items within reach   clutter free environment maintained   fall prevention program maintained   nonskid shoes/slippers when out of bed   room organization consistent   safety round/check completed  Taken 10/20/2024 2200 by Tincher, Rowena, LPN  Safety Promotion/Fall Prevention:   activity supervised   assistive device/personal items within reach   clutter free environment maintained   fall prevention program maintained   nonskid shoes/slippers when out of bed   room organization consistent   safety round/check completed  Taken 10/20/2024 2005 by Tincher, Rowena, LPN  Safety Promotion/Fall Prevention:   activity supervised   assistive device/personal items within reach   clutter free environment maintained   fall prevention program maintained   nonskid shoes/slippers when out of bed   room organization consistent   safety round/check  completed  Intervention: Prevent Skin Injury  Recent Flowsheet Documentation  Taken 10/21/2024 0400 by Rowena Cash LPN  Body Position: supine  Taken 10/21/2024 0200 by Rowena Cash LPN  Body Position:   side-lying   right  Taken 10/21/2024 0000 by Rowena Cash LPN  Body Position: position changed independently  Taken 10/20/2024 2200 by Rowena Cash LPN  Body Position: sitting up in bed  Taken 10/20/2024 2005 by Rowena Cash LPN  Body Position: position changed independently  Skin Protection:   incontinence pads utilized   transparent dressing maintained  Intervention: Prevent and Manage VTE (Venous Thromboembolism) Risk  Recent Flowsheet Documentation  Taken 10/20/2024 2005 by Rowena Cash LPN  VTE Prevention/Management:   bilateral   SCDs (sequential compression devices) off  Intervention: Prevent Infection  Recent Flowsheet Documentation  Taken 10/20/2024 2200 by Rowena Cash LPN  Infection Prevention:   environmental surveillance performed   rest/sleep promoted   single patient room provided  Taken 10/20/2024 2005 by Rowena Cash LPN  Infection Prevention:   environmental surveillance performed   hand hygiene promoted   rest/sleep promoted   single patient room provided  Goal: Optimal Comfort and Wellbeing  Outcome: Progressing  Intervention: Provide Person-Centered Care  Recent Flowsheet Documentation  Taken 10/20/2024 2005 by Rowena Cash LPN  Trust Relationship/Rapport:   care explained   choices provided   emotional support provided  Goal: Readiness for Transition of Care  Outcome: Progressing     Problem: Comorbidity Management  Goal: Maintenance of COPD Symptom Control  Outcome: Progressing     Problem: Skin Injury Risk Increased  Goal: Skin Health and Integrity  Outcome: Progressing  Intervention: Optimize Skin Protection  Recent Flowsheet Documentation  Taken 10/21/2024 0400 by Rowena Cash LPN  Activity Management: activity  encouraged  Head of Bed (HOB) Positioning: HOB elevated  Taken 10/21/2024 0200 by Rowena Cash LPN  Activity Management: activity encouraged  Head of Bed (HOB) Positioning: HOB elevated  Taken 10/21/2024 0000 by Rowena Cash LPN  Activity Management: activity encouraged  Head of Bed (HOB) Positioning: HOB elevated  Taken 10/20/2024 2200 by Rowena Cash LPN  Activity Management: activity encouraged  Head of Bed (HOB) Positioning: HOB elevated  Taken 10/20/2024 2005 by Rowena Cash LPN  Activity Management: activity encouraged  Pressure Reduction Techniques:   frequent weight shift encouraged   weight shift assistance provided  Head of Bed (HOB) Positioning: HOB elevated  Pressure Reduction Devices: positioning supports utilized  Skin Protection:   incontinence pads utilized   transparent dressing maintained     Problem: Fall Injury Risk  Goal: Absence of Fall and Fall-Related Injury  Outcome: Progressing  Intervention: Promote Injury-Free Environment  Recent Flowsheet Documentation  Taken 10/21/2024 0400 by Rowena Cash LPN  Safety Promotion/Fall Prevention: safety round/check completed  Taken 10/21/2024 0200 by Rowena Cash LPN  Safety Promotion/Fall Prevention:   activity supervised   clutter free environment maintained   assistive device/personal items within reach   fall prevention program maintained   nonskid shoes/slippers when out of bed   room organization consistent   safety round/check completed  Taken 10/21/2024 0000 by Tincher, Rowena, LPN  Safety Promotion/Fall Prevention:   activity supervised   assistive device/personal items within reach   clutter free environment maintained   fall prevention program maintained   nonskid shoes/slippers when out of bed   room organization consistent   safety round/check completed  Taken 10/20/2024 2200 by Tincher, Rowena, LPN  Safety Promotion/Fall Prevention:   activity supervised   assistive device/personal items within  reach   clutter free environment maintained   fall prevention program maintained   nonskid shoes/slippers when out of bed   room organization consistent   safety round/check completed  Taken 10/20/2024 2005 by Tincher, Rowena, LPN  Safety Promotion/Fall Prevention:   activity supervised   assistive device/personal items within reach   clutter free environment maintained   fall prevention program maintained   nonskid shoes/slippers when out of bed   room organization consistent   safety round/check completed

## 2024-10-21 NOTE — OUTREACH NOTE
Prep Survey      Flowsheet Row Responses   [facility] patient discharged from? [place]   Is LACE score < 7 ? Yes   Eligibility [facility]   Date of Admission 10/19/24   Date of Discharge 10/21/24   Discharge Disposition Home or Self Care   Discharge diagnosis Pulmonary embolism   Does the patient have one of the following disease processes/diagnoses(primary or secondary)? Other   Does the patient have Home health ordered? No   Is there a DME ordered? No   Prep survey completed? Yes            Susan ECHEVERRIA - Registered Nurse

## 2024-10-21 NOTE — TELEPHONE ENCOUNTER
PATIENT SAID SHE WAS GIVEN ANTIBIOTICS WHEN SHE WAS SEEN ON 10/17 BY RODOLFO. SHE ASKED IF SHE CAN GET SOME YEAST INFECTION PILLS.

## 2024-10-22 ENCOUNTER — TRANSITIONAL CARE MANAGEMENT TELEPHONE ENCOUNTER (OUTPATIENT)
Dept: CALL CENTER | Facility: HOSPITAL | Age: 64
End: 2024-10-22
Payer: MEDICAID

## 2024-10-22 ENCOUNTER — ANESTHESIA (OUTPATIENT)
Dept: GASTROENTEROLOGY | Facility: HOSPITAL | Age: 64
End: 2024-10-22
Payer: MEDICAID

## 2024-10-22 ENCOUNTER — ANESTHESIA EVENT (OUTPATIENT)
Dept: GASTROENTEROLOGY | Facility: HOSPITAL | Age: 64
End: 2024-10-22
Payer: MEDICAID

## 2024-10-22 LAB
ANION GAP SERPL CALCULATED.3IONS-SCNC: 10.9 MMOL/L (ref 5–15)
BASOPHILS # BLD AUTO: 0.05 10*3/MM3 (ref 0–0.2)
BASOPHILS NFR BLD AUTO: 0.6 % (ref 0–1.5)
BUN SERPL-MCNC: 12 MG/DL (ref 8–23)
BUN/CREAT SERPL: 14.8 (ref 7–25)
CALCIUM SPEC-SCNC: 9 MG/DL (ref 8.6–10.5)
CHLORIDE SERPL-SCNC: 104 MMOL/L (ref 98–107)
CO2 SERPL-SCNC: 25.1 MMOL/L (ref 22–29)
CREAT SERPL-MCNC: 0.81 MG/DL (ref 0.57–1)
DEPRECATED RDW RBC AUTO: 41.3 FL (ref 37–54)
EGFRCR SERPLBLD CKD-EPI 2021: 81.7 ML/MIN/1.73
EOSINOPHIL # BLD AUTO: 0.05 10*3/MM3 (ref 0–0.4)
EOSINOPHIL NFR BLD AUTO: 0.6 % (ref 0.3–6.2)
ERYTHROCYTE [DISTWIDTH] IN BLOOD BY AUTOMATED COUNT: 13.3 % (ref 12.3–15.4)
GLUCOSE BLDC GLUCOMTR-MCNC: 100 MG/DL (ref 70–130)
GLUCOSE BLDC GLUCOMTR-MCNC: 100 MG/DL (ref 70–130)
GLUCOSE BLDC GLUCOMTR-MCNC: 93 MG/DL (ref 70–130)
GLUCOSE SERPL-MCNC: 92 MG/DL (ref 65–99)
HCT VFR BLD AUTO: 37 % (ref 34–46.6)
HGB BLD-MCNC: 12.3 G/DL (ref 12–15.9)
IMM GRANULOCYTES # BLD AUTO: 0.14 10*3/MM3 (ref 0–0.05)
IMM GRANULOCYTES NFR BLD AUTO: 1.7 % (ref 0–0.5)
LYMPHOCYTES # BLD AUTO: 3.14 10*3/MM3 (ref 0.7–3.1)
LYMPHOCYTES NFR BLD AUTO: 37.9 % (ref 19.6–45.3)
MCH RBC QN AUTO: 28.1 PG (ref 26.6–33)
MCHC RBC AUTO-ENTMCNC: 33.2 G/DL (ref 31.5–35.7)
MCV RBC AUTO: 84.7 FL (ref 79–97)
MONOCYTES # BLD AUTO: 0.68 10*3/MM3 (ref 0.1–0.9)
MONOCYTES NFR BLD AUTO: 8.2 % (ref 5–12)
NEUTROPHILS NFR BLD AUTO: 4.22 10*3/MM3 (ref 1.7–7)
NEUTROPHILS NFR BLD AUTO: 51 % (ref 42.7–76)
NRBC BLD AUTO-RTO: 0 /100 WBC (ref 0–0.2)
PLATELET # BLD AUTO: 256 10*3/MM3 (ref 140–450)
PMV BLD AUTO: 10.4 FL (ref 6–12)
POTASSIUM SERPL-SCNC: 4.1 MMOL/L (ref 3.5–5.2)
RBC # BLD AUTO: 4.37 10*6/MM3 (ref 3.77–5.28)
SODIUM SERPL-SCNC: 140 MMOL/L (ref 136–145)
WBC NRBC COR # BLD AUTO: 8.28 10*3/MM3 (ref 3.4–10.8)

## 2024-10-22 PROCEDURE — 85025 COMPLETE CBC W/AUTO DIFF WBC: CPT | Performed by: STUDENT IN AN ORGANIZED HEALTH CARE EDUCATION/TRAINING PROGRAM

## 2024-10-22 PROCEDURE — 82948 REAGENT STRIP/BLOOD GLUCOSE: CPT | Performed by: STUDENT IN AN ORGANIZED HEALTH CARE EDUCATION/TRAINING PROGRAM

## 2024-10-22 PROCEDURE — 25010000002 MORPHINE PER 10 MG: Performed by: INTERNAL MEDICINE

## 2024-10-22 PROCEDURE — G0378 HOSPITAL OBSERVATION PER HR: HCPCS

## 2024-10-22 PROCEDURE — 25010000002 PROPOFOL 200 MG/20ML EMULSION: Performed by: NURSE ANESTHETIST, CERTIFIED REGISTERED

## 2024-10-22 PROCEDURE — 43235 EGD DIAGNOSTIC BRUSH WASH: CPT | Performed by: INTERNAL MEDICINE

## 2024-10-22 PROCEDURE — 96376 TX/PRO/DX INJ SAME DRUG ADON: CPT

## 2024-10-22 PROCEDURE — 82948 REAGENT STRIP/BLOOD GLUCOSE: CPT

## 2024-10-22 PROCEDURE — 99223 1ST HOSP IP/OBS HIGH 75: CPT | Performed by: STUDENT IN AN ORGANIZED HEALTH CARE EDUCATION/TRAINING PROGRAM

## 2024-10-22 PROCEDURE — 80048 BASIC METABOLIC PNL TOTAL CA: CPT | Performed by: STUDENT IN AN ORGANIZED HEALTH CARE EDUCATION/TRAINING PROGRAM

## 2024-10-22 PROCEDURE — 96367 TX/PROPH/DG ADDL SEQ IV INF: CPT

## 2024-10-22 RX ORDER — LIDOCAINE HCL/PF 100 MG/5ML
SYRINGE (ML) INJECTION AS NEEDED
Status: DISCONTINUED | OUTPATIENT
Start: 2024-10-22 | End: 2024-10-22 | Stop reason: SURG

## 2024-10-22 RX ORDER — INSULIN LISPRO 100 [IU]/ML
2-9 INJECTION, SOLUTION INTRAVENOUS; SUBCUTANEOUS
Status: DISCONTINUED | OUTPATIENT
Start: 2024-10-22 | End: 2024-10-24 | Stop reason: HOSPADM

## 2024-10-22 RX ORDER — PROPOFOL 10 MG/ML
INJECTION, EMULSION INTRAVENOUS AS NEEDED
Status: DISCONTINUED | OUTPATIENT
Start: 2024-10-22 | End: 2024-10-22 | Stop reason: SURG

## 2024-10-22 RX ORDER — MORPHINE SULFATE 2 MG/ML
2 INJECTION, SOLUTION INTRAMUSCULAR; INTRAVENOUS EVERY 4 HOURS PRN
Status: DISCONTINUED | OUTPATIENT
Start: 2024-10-22 | End: 2024-10-24 | Stop reason: HOSPADM

## 2024-10-22 RX ORDER — NICOTINE POLACRILEX 4 MG
15 LOZENGE BUCCAL
Status: DISCONTINUED | OUTPATIENT
Start: 2024-10-22 | End: 2024-10-24 | Stop reason: HOSPADM

## 2024-10-22 RX ORDER — DEXTROSE MONOHYDRATE 25 G/50ML
25 INJECTION, SOLUTION INTRAVENOUS
Status: DISCONTINUED | OUTPATIENT
Start: 2024-10-22 | End: 2024-10-24 | Stop reason: HOSPADM

## 2024-10-22 RX ORDER — BISACODYL 5 MG/1
20 TABLET, DELAYED RELEASE ORAL ONCE
Status: COMPLETED | OUTPATIENT
Start: 2024-10-22 | End: 2024-10-22

## 2024-10-22 RX ADMIN — MORPHINE SULFATE 2 MG: 2 INJECTION, SOLUTION INTRAMUSCULAR; INTRAVENOUS at 10:05

## 2024-10-22 RX ADMIN — Medication 10 ML: at 10:03

## 2024-10-22 RX ADMIN — Medication 40 MG: at 14:32

## 2024-10-22 RX ADMIN — PROPOFOL 100 MG: 10 INJECTION, EMULSION INTRAVENOUS at 14:32

## 2024-10-22 RX ADMIN — BISACODYL 20 MG: 5 TABLET, COATED ORAL at 17:44

## 2024-10-22 RX ADMIN — POLYETHYLENE GLYCOL 3350, SODIUM SULFATE ANHYDROUS, SODIUM BICARBONATE, SODIUM CHLORIDE, POTASSIUM CHLORIDE 4000 ML: 236; 22.74; 6.74; 5.86; 2.97 POWDER, FOR SOLUTION ORAL at 17:44

## 2024-10-22 RX ADMIN — Medication 10 ML: at 21:02

## 2024-10-22 RX ADMIN — MORPHINE SULFATE 2 MG: 2 INJECTION, SOLUTION INTRAMUSCULAR; INTRAVENOUS at 23:24

## 2024-10-22 RX ADMIN — MORPHINE SULFATE 2 MG: 2 INJECTION, SOLUTION INTRAMUSCULAR; INTRAVENOUS at 12:55

## 2024-10-22 NOTE — PAYOR COMM NOTE
"TO:HUMANA MK  FROM:GLENYS CHAPMAN, RN PHONE [phone]  OBSERVATION NOTIFICATION   TAX ID [id number] [facility] [id number]    Helen Wolf (64 y.o. Female)       Date of Birth   1960    Social Security Number       Address   [address] [facility] [address]    Home Phone   [phone]    MRN   [id number]       Adventism   Latter-day    Marital Status                               Admission Date   10/21/24    Admission Type   Emergency    Admitting Provider   Kerley, Brian Joseph, DO    Attending Provider   Sreedhar Lyn MD    Department, Room/Bed   [facility] EMERGENCY DEPARTMENT,        Discharge Date       Discharge Disposition       Discharge Destination                                 Attending Provider: Sreedhar Lyn MD    Allergies: Nsaids    Isolation: Droplet   Infection: Rhinovirus  (10/19/24)   Code Status: CPR    Ht: 162.6 cm (64\")   Wt: 85.3 kg (188 lb)    Admission Cmt: None   Principal Problem: GI bleed [K92.2]                   Active Insurance as of 10/21/2024       Primary Coverage       Payor Plan Insurance Group Employer/Plan Group    [place] [id number]       Payor Plan Address Payor Plan Phone Number Payor Plan Fax Number Effective Dates    [employer]ICAL [address] [address] [phone]  2021 - None Entered    [facility] [id number]         Subscriber Name Subscriber Birth Date Member ID       HELEN WOLF 1960 [id number]                     Emergency Contacts        (Rel.) Home Phone Work Phone Mobile Phone    CLOTILDE MCCONNELL (Sister) [phone] -- [phone]    ROGER DUNAWAY (Daughter) -- -- [phone]                 History & Physical        Kerley, Brian Joseph, DO at 10/22/24 0111            [facility] HOSPITALIST   HISTORY AND PHYSICAL      Name:  Helen Wolf   Age:  64 y.o.  Sex:  female  :  1960  MRN:  [id number]   Visit Number:  " [id number]  Admission Date:  10/21/2024  Date Of Service:  10/22/24  Primary Care Physician:  Lesvia Servin MD    Chief Complaint:     Dark stools    History Of Presenting Illness:      Katelynn Sung is a 64-year-old woman with past medical history of chronic back pain on Subutex, GERD, hypertension, pacemaker placed February 2024, COPD, constipation, degenerative arthritis spine, anxiety depression, restless leg syndrome, obesity BMI 32, fatty liver, insomnia, type 2 diabetes, chronic pancreatitis, hypertension, polyarthropathy, vitamin D deficiency, fibromyalgia, Humphreys's esophagus, hyperlipidemia.  Recent hospitalization for pulmonary embolism with rhinovirus, discharged 10/21/2024 with Eliquis.  Presented to [facility] ED later same day with concerns for multiple episodes of black stool.  Bright red blood.  Does report extreme fatigue and weak overall.  Denies vomiting, nausea, hematemesis.    ED summary: Afebrile, vital signs stable room air.  EKG sinus rhythm, nonspecific ST-T wave changes.  Troponin not elevated.  Blood glucose 175.  Lipase 92.  Hemoglobin 13.6.  Trace leukocytes on UA.  C. difficile negative.  GI panel pending.  Fecal occult blood positive.  CT N/pelvis diffuse fatty infiltration of the liver, periampullary duodenal diverticulum, no evidence of acute abnormality.  CT angio chest redemonstration of right lower lobe pulmonary artery filling defects consistent with pulmonary emboli.  She was provided morphine, Zofran, Protonix, 500 mL NS bolus.    Review Of Systems:    All systems were reviewed and negative except as mentioned in history of presenting illness, assessment and plan.    Past Medical History: Patient  has a past medical history of Adnexal mass, Anxiety, Arm fracture, left, Humphreys's esophagus, Body piercing, Class 1 obesity due to excess calories with serious comorbidity and body mass index (BMI) of 30.0 to 30.9 in adult (02/16/2018), Colon polyps, COPD (chronic obstructive pulmonary  disease) (2008), Cor pulmonale, Degenerative arthritis of lumbar spine, Depression, Fibromyalgia, Finger fracture, GERD (gastroesophageal reflux disease), Herpes simplex, Hypertension, Insomnia, Lumbar degenerative disc disease, Mild sleep apnea (01/16/2013), Narcotic dependence, Osteoporosis, Pancreatitis, Pneumonia, Respiratory failure requiring intubation, Restless leg syndrome, Seasonal allergies, Sinus node dysfunction, Sleep apnea, Trouble swallowing, and Wears dentures.    Past Surgical History: Patient  has a past surgical history that includes Colonoscopy (N/A, 2008); Upper gastrointestinal endoscopy (N/A, 2013); Tubal ligation (1991); Esophagogastroduodenoscopy (N/A, 03/11/2020); Pacemaker Implantation; and Esophagogastroduodenoscopy (N/A, 8/9/2024).    Social History: Patient  reports that she quit smoking about 11 years ago. Her smoking use included cigarettes and electronic cigarette. She started smoking about 14 years ago. She has a 2.3 pack-year smoking history. She has never been exposed to tobacco smoke. She has never used smokeless tobacco. She reports that she does not currently use drugs after having used the following drugs: Oxycodone. She reports that she does not drink alcohol.    Family History:  Patient's family history has been reviewed and found to be noncontributory.     Allergies:      Nsaids    Home Medications:    Prior to Admission Medications       Prescriptions Last Dose Informant Patient Reported? Taking?    albuterol sulfate HFA (ProAir HFA) 108 (90 Base) MCG/ACT inhaler   No No    Inhale 2 puffs Every 4 (Four) Hours As Needed for Wheezing.    Apixaban Starter Pack tablet therapy pack   No No    Take 2 tablets by mouth Every 12 (Twelve) Hours for 6 days, THEN 1 tablet Every 12 (Twelve) Hours. Indications: DVT/PE (active thrombosis)    Azelastine HCl 137 MCG/SPRAY solution   No No    INSTILL 2 SPRAYS IN EACH NOSTRIL TWICE DAILY AS DIRECTED    buprenorphine (SUBUTEX) 8 MG sublingual  tablet SL tablet   Yes No    Place 1 tablet under the tongue Daily.    clonazePAM (KlonoPIN) 0.5 MG tablet   No No    Take 1 tablet by mouth 2 (Two) Times a Day As Needed for Anxiety.    cyclobenzaprine (FLEXERIL) 5 MG tablet   No No    Take 1 tablet by mouth 3 (Three) Times a Day As Needed for Muscle Spasms.    dexlansoprazole (DEXILANT) 60 MG capsule   No No    Take 1 capsule by mouth Daily.    fluticasone (FLONASE) 50 MCG/ACT nasal spray   No No    INSTILL 2 SPRAYS IN EACH NOSTRIL ONCE DAILY AS DIRECTED    Fluticasone Furoate-Vilanterol (Breo Ellipta) 200-25 MCG/ACT inhaler   No No    Inhale 1 puff Daily.    ipratropium-albuterol (Combivent Respimat)  MCG/ACT inhaler   No No    Inhale 1 puff 4 (Four) Times a Day As Needed for Wheezing.    lisinopril-hydrochlorothiazide (PRINZIDE,ZESTORETIC) 10-12.5 MG per tablet   No No    Take 1 tablet by mouth Daily.    methylPREDNISolone acetate (DEPO-medrol) injection 40 mg   No No    nystatin (MYCOSTATIN) 100,000 unit/mL suspension   No No    SWISH AND SWALLOW 5 MLS BY MOUTH FOUR TIMES DAILY    Patient taking differently:  Take  by mouth As Needed. SWISH AND SWALLOW 5 MLS BY MOUTH FOUR TIMES DAILY    pregabalin (LYRICA) 200 MG capsule   No No    Take 1 capsule by mouth 2 (Two) Times a Day.    promethazine (PHENERGAN) 25 MG tablet   No No    TAKE 1 TABLET BY MOUTH EVERY 8 (EIGHT) HOURS AS NEEDED FOR NAUSEA OR VOMITING.    promethazine-dextromethorphan (PROMETHAZINE-DM) 6.25-15 MG/5ML syrup   No No    Take 5 mL by mouth 4 (Four) Times a Day As Needed for Cough.          ED Medications:    Medications   sodium chloride 0.9 % flush 10 mL (has no administration in time range)   sodium chloride 0.9 % flush 10 mL (10 mL Intravenous Given 10/21/24 1036)   sodium chloride 0.9 % flush 10 mL (has no administration in time range)   sodium chloride 0.9 % infusion 40 mL (has no administration in time range)   ondansetron (ZOFRAN) injection 4 mg (has no administration in time range)  "  nitroglycerin (NITROSTAT) SL tablet 0.4 mg (has no administration in time range)   Potassium Replacement - Follow Nurse / BPA Driven Protocol (has no administration in time range)   Magnesium Standard Dose Replacement - Follow Nurse / BPA Driven Protocol (has no administration in time range)   Phosphorus Replacement - Follow Nurse / BPA Driven Protocol (has no administration in time range)   Calcium Replacement - Follow Nurse / BPA Driven Protocol (has no administration in time range)   pantoprazole (PROTONIX) injection 80 mg (80 mg Intravenous Given 10/21/24 2015)   sodium chloride 0.9 % bolus 500 mL (0 mL Intravenous Stopped 10/21/24 2113)   morphine injection 4 mg (4 mg Intravenous Given 10/21/24 2021)   ondansetron (ZOFRAN) injection 4 mg (4 mg Intravenous Given 10/21/24 2018)   iopamidol (ISOVUE-300) 61 % injection 100 mL (100 mL Intravenous Given 10/21/24 2041)   pantoprazole (PROTONIX) injection 40 mg (40 mg Intravenous Given 10/21/24 2243)     Vital Signs:  Temp:  [97.6 °F (36.4 °C)-98.6 °F (37 °C)] 98.6 °F (37 °C)  Heart Rate:  [64-92] 67  Resp:  [14-18] 14  BP: (102-146)/(60-82) 102/71        10/21/24  1636   Weight: 85.3 kg (188 lb)     Body mass index is 32.27 kg/m².    Physical Exam:     Most recent vital Signs: /71   Pulse 67   Temp 98.6 °F (37 °C) (Oral)   Resp 14   Ht 162.6 cm (64\")   Wt 85.3 kg (188 lb)   LMP  (LMP Unknown)   SpO2 92%   BMI 32.27 kg/m²     Physical Exam  Constitutional:       General: She is not in acute distress.     Appearance: She is obese. She is ill-appearing. She is not toxic-appearing.   HENT:      Mouth/Throat:      Mouth: Mucous membranes are moist.   Eyes:      Extraocular Movements: Extraocular movements intact.   Cardiovascular:      Rate and Rhythm: Normal rate and regular rhythm.      Pulses: Normal pulses.      Heart sounds: Normal heart sounds.   Pulmonary:      Effort: Pulmonary effort is normal.      Breath sounds: Normal breath sounds.   Abdominal: "      Palpations: Abdomen is soft.      Tenderness: There is no abdominal tenderness.   Musculoskeletal:      Right lower leg: No edema.      Left lower leg: No edema.   Skin:     General: Skin is warm.   Neurological:      General: No focal deficit present.      Mental Status: She is alert and oriented to person, place, and time.   Psychiatric:         Mood and Affect: Mood normal.         Thought Content: Thought content normal.         Laboratory data:    I have reviewed the labs done in the emergency room.    Results from last 7 days   Lab Units 10/21/24  1717 10/21/24  0542 10/20/24  0831 10/19/24  1716   SODIUM mmol/L 135* 139 138 139   POTASSIUM mmol/L 4.4 4.1 4.1 4.2   CHLORIDE mmol/L 98 105 103 106   CO2 mmol/L 23.8 24.6 22.2 21.9*   BUN mg/dL 12 15 8 7*   CREATININE mg/dL 0.91 0.90 0.82 0.87   CALCIUM mg/dL 9.3 8.7 9.5 8.9   BILIRUBIN mg/dL 0.2  --   --  0.3   ALK PHOS U/L 90  --   --  113   ALT (SGPT) U/L 24  --   --  31   AST (SGOT) U/L 25  --   --  64*   GLUCOSE mg/dL 175* 100* 159* 182*     Results from last 7 days   Lab Units 10/21/24  1717 10/21/24  0542 10/20/24  0831   WBC 10*3/mm3 9.70 9.93 11.02*   HEMOGLOBIN g/dL 13.6 13.1 14.3   HEMATOCRIT % 39.9 39.0 43.0   PLATELETS 10*3/mm3 282 171 277         Results from last 7 days   Lab Units 10/21/24  1717 10/19/24  1716   HSTROP T ng/L <6 <6     Results from last 7 days   Lab Units 10/19/24  1716   PROBNP pg/mL <36.0         Results from last 7 days   Lab Units 10/21/24  1717   LIPASE U/L 92*         Results from last 7 days   Lab Units 10/21/24  2057   COLOR UA  Yellow   GLUCOSE UA  Negative   KETONES UA  Negative   BLOOD UA  Negative   LEUKOCYTES UA  Trace*   PH, URINE  5.5   BILIRUBIN UA  Negative   UROBILINOGEN UA  0.2 E.U./dL   RBC UA /HPF None Seen   WBC UA /HPF 0-2       Pain Management Panel           No data to display                EKG:      EKG sinus rhythm, nonspecific ST-T wave changes    Radiology:    CT Angiogram Chest Pulmonary  Embolism    Result Date: 10/21/2024  PE PROTOCOL CHEST CT:  HISTORY:  Shortness of breath .  COMPARISON: Chest CT from 10-.  The patient was injected with  IV contrast . Axial images were obtained through the chest in a PE protocol. 3 D reconstruction images were also performed. Individualized dose reduction techniques using automated exposure control or adjustment of the mA and/or kV according to patient size were employed.  FINDINGS: Left upper anterior chest wall pacemaker is present. Mediastinal vasculature is well opacified. Small filling defects are identified within right lower lobe pulmonary artery branch vessels. These findings are well seen on images 41 through 44 of series 5. The appearance is similar to that seen previously.  On the lung window images, there is some minimal bilateral groundglass opacity present, similar to previous.  Limited images through the upper abdomen demonstrate mild fatty infiltration of the liver.         1. Redemonstration of right lower lobe pulmonary artery filling defects, consistent with pulmonary emboli. Findings are similar to that seen previously.  2. Mild bilateral ground glass opacities, probably related to mild pneumonitis. Findings similar to previous.      This report was signed and finalized on 10/21/2024 9:14 PM by Dami Zarate MD.      CT Abdomen Pelvis With & Without Contrast    Result Date: 10/21/2024  CT SCAN OF THE ABDOMEN AND PELVIS WITH AND WITHOUT CONTRAST  COMPARISON: Abdomen and pelvic CT scan from 8-6-2024  HISTORY: Abdominal pain. Black stools. Evaluate for GI bleed.  PROCEDURE: The patient was injected with 100 mL of Omnipaque-300. Axial images were obtained from the lung bases to the pubic symphysis by computed tomography. Individualized dose reduction techniques using automated exposure control or adjustment of the mA and/or kV according to patient size were employed.  FINDINGS:  ABDOMEN: On the preinfusion images, there is no evidence of  kidney stone.  There is mild diffuse fatty infiltration of the liver. Gallbladder is present. Spleen, pancreas, adrenals, and kidneys appear unremarkable. Note is made of a periampullary duodenal diverticulum.  PELVIS: The appendix appears unremarkable. Uterus is present and lies eccentric to the right. Urinary bladder is incompletely distended. There is no evidence of pelvic mass or inflammation.       1. Diffuse fatty infiltration of the liver.  2. Periampullary duodenal diverticulum.  3. No definite evidence of acute abnormality.    CTDI: 9.76 mGy DLP:1073.55 mGy.cm   This report was signed and finalized on 10/21/2024 9:11 PM by Dami Zarate MD.      US Venous Doppler Lower Extremity Bilateral (duplex)    Result Date: 10/20/2024  BILATERAL LOWER EXTREMITY VENOUS DUPLEX DOPPLER EXAMINATION  HISTORY: DVT?; I26.99-Other pulmonary embolism without acute cor pulmonale; R06.00-Dyspnea, unspecified; B34.8-Other viral infections of unspecified site; R05.1-Acute cough  COMPARISON:   None  PROCEDURE: Multiple transverse and longitudinal scans were performed of both femoropopliteal deep venous system, with augmentation and compression maneuvers.  FINDINGS: There is normal phasic flow noted in the visualized deep venous system. No intraluminal increased echogenicity is noted to suggest thrombus. There is normal compression and augmentation of the venous structures. No abnormal venous collaterals are seen.      No evidence of left or right lower extremity deep venous thrombosis.      This report was signed and finalized on 10/20/2024 7:01 PM by Ashish Lanza MD.      CT Angiogram Chest Pulmonary Embolism    Result Date: 10/19/2024  PROCEDURE: CT ANGIOGRAM CHEST PULMONARY EMBOLISM-  HISTORY: Shortness of breath  COMPARISON: None.  TECHNIQUE: The patient was injected with  IV contrast. Axial images were obtained through the chest in a CTA/ PE protocol. 3 D Reconstruction images were also performed. This study was performed with  techniques to keep radiation doses as low as reasonably achievable, (ALARA). Individualized dose reduction techniques using automated exposure control or adjustment of mA and/or kV according to the patient size were employed.  FINDINGS: There is respiratory motion artifact. The aortic contour is normal. There is no aneurysm or dissection. Multivessel coronary artery calcifications are noted. The pulmonary artery bolus is limited. There is no central pulmonary embolism. However, I cannot exclude emboli in the descending right lower lobe pulmonary artery. There are no effusions. There is no adenopathy. There is mild emphysema. There is bibasilar atelectasis with probable left base fibrosis. Imaging of the upper abdomen demonstrates debris in the stomach from a recent meal. No other abnormalities identified.      1. No Limited bolus of contrast and motion artifact limits this examination. There is no central pulmonary embolism. There are questionable emboli in the descending right pulmonary artery. There is no right heart strain. 2. No aneurysm or dissection. 3. Coronary artery calcification. 4. Bibasilar atelectasis there are no effusions. There is no edema.  CTDI: 6.47 mGy DLP:191.23 mGy.cm  This report was signed and finalized on 10/19/2024 8:45 PM by Ashish Lanza MD.      XR Chest 1 View    Result Date: 10/19/2024  PORTABLE CHEST  HISTORY: Shortness of breath and cough for 2 to 3 days  COMPARISON: 3/18/2024  FINDINGS: A portable radiograph the chest demonstrates a 2-lead left pacemaker. The heart is normal in size. There is decreased lung volume. There is evidence of prior granulomatous disease. There is left base discoid atelectasis and/or fibrosis. There are increased interstitial markings      Underinflation with no edema or infiltrate. There are coarse interstitial changes.  This report was signed and finalized on 10/19/2024 6:19 PM by Ashish Lanza MD.       Assessment/Plan:    Observation general for admission  10/21/2024 with melena after recently starting Eliquis for pulmonary embolism.    At time of encounter resting in bed, appears comfortably ill.  Pleasantly conversational.    Melena  Hold Eliquis.  Gastroenterology consultation, recommendations appreciated.  Protonix.  NPO.    Chronic: chronic back pain on Subutex, GERD, hypertension, pacemaker placed February 2024, COPD, constipation, degenerative arthritis spine, anxiety depression, restless leg syndrome, obesity BMI 32, fatty liver, insomnia, type 2 diabetes, chronic pancreatitis, hypertension, polyarthropathy, vitamin D deficiency, fibromyalgia, Humphreys's esophagus, hyperlipidemia.  Recent hospitalization for pulmonary embolism with rhinovirus, discharged 10/21/2024 with Eliquis.    Subcutaneous insulin protocol.  Continue other home medications.    Risk Assessment: High  DVT Prophylaxis: SCDs  Code Status: Full code  Diet: N.p.o.    Advance Care Planning  ACP discussion was held with the patient during this visit. Patient does not have an advance directive, information provided.           Brian Joseph Kerley, DO  10/22/24  01:12 EDT    Dictated utilizing Dragon dictation.    Electronically signed by Kerley, Brian Joseph, DO at 10/22/24 0521          Emergency Department Notes        Camilo Metcalf PA-C at 10/21/24 1558          Subjective  History of Present Illness:    This is a 64-year-old female, history of colon polyps, fibromyalgia, depression, GERD, pneumonia, respiratory failure requiring division, gastritis, status post recent admission to this facility for suspected pulmonary embolism with hypoxic respiratory failure.  Patient reports that she was discharged home today from the hospital, while in the hospital she did have some diarrhea, given Imodium today which did help but while at home she developed periumbilical abdominal pain, and multiple episodes of black stool.  Patient notes last EGD approximately 1 month ago where they found some polyps.   She presents back today after being discharged for evaluation of black stool, was told to come back to the ER if she had any of the symptoms after discharge.  She was given Lovenox in the hospital and transition to [facility].  Denies any chest pain, or any significant shortness of breath.  Has been compliant with her anticoagulation.  Denies any known fever.  No urinary symptoms.  She does report extreme fatigue and feels weak overall.  No vomiting.  No nausea.  No hematemesis.      Nurses Notes reviewed and agree, including vitals, allergies, social history and prior medical history.     REVIEW OF SYSTEMS: All systems reviewed and not pertinent unless noted.  Review of Systems   Constitutional:  Positive for fatigue. Negative for fever.   Respiratory:  Negative for shortness of breath.    Cardiovascular:  Negative for chest pain.   Gastrointestinal:  Positive for abdominal pain, blood in stool and diarrhea. Negative for nausea and vomiting.   Genitourinary:  Negative for dysuria.   All other systems reviewed and are negative.      Past Medical History:   Diagnosis Date    Adnexal mass     pelvic US 10/26/11 showed 2 cm left abdnexal cystic lesion, resolved on f/u 5/14/15    Anxiety     Arm fracture, left     Humphreys's esophagus     Body piercing     Both ears    Class 1 obesity due to excess calories with serious comorbidity and body mass index (BMI) of 30.0 to 30.9 in adult 02/16/2018    Colon polyps     COPD (chronic obstructive pulmonary disease) 2008    Cor pulmonale     Degenerative arthritis of lumbar spine     Depression     Fibromyalgia     Finger fracture     GERD (gastroesophageal reflux disease)     Herpes simplex     Hypertension     Insomnia     Lumbar degenerative disc disease     Mild sleep apnea 01/16/2013    Sleep study 1/16/13 showing mild degree    Narcotic dependence     Osteoporosis     Pancreatitis     Pneumonia     Respiratory failure requiring intubation     double pneumonia    Restless leg  "syndrome     Seasonal allergies     Sinus node dysfunction     Sleep apnea     does not wear cpap    Trouble swallowing     Wears dentures        Allergies:    Nsaids      Past Surgical History:   Procedure Laterality Date    COLONOSCOPY N/A     Complete    ENDOSCOPY N/A 2020    Procedure: ESOPHAGOGASTRODUODENOSCOPY;  Surgeon: Charbel Ching MD;  Location: [facility] ENDOSCOPY;  Service: Gastroenterology;  Laterality: N/A;    ENDOSCOPY N/A 2024    Procedure: Esophagogastroduodenoscopy with biopsy and polypectomy;  Surgeon: Jeronimo Lowe MD;  Location: [facility] ENDOSCOPY;  Service: Gastroenterology;  Laterality: N/A;    PACEMAKER IMPLANTATION      2024    TUBAL ABDOMINAL LIGATION      UPPER GASTROINTESTINAL ENDOSCOPY N/A          Social History     Socioeconomic History    Marital status:    Tobacco Use    Smoking status: Former     Current packs/day: 0.00     Average packs/day: 0.8 packs/day for 3.0 years (2.3 ttl pk-yrs)     Types: Cigarettes, Electronic Cigarette     Start date:      Quit date:      Years since quittin.8     Passive exposure: Never    Smokeless tobacco: Never    Tobacco comments:     2013 started using nicotine containing substance in combustion-free vaporization device. PT not longer uses vapor stick.   Vaping Use    Vaping status: Former   Substance and Sexual Activity    Alcohol use: No    Drug use: Not Currently     Types: Oxycodone     Comment: Patient is currently on subutex    Sexual activity: Not Currently         Family History   Problem Relation Age of Onset    Stroke Mother     Liver disease Mother         Chronic    Cirrhosis Father     Heart attack Father     Breast cancer Sister     Stroke Brother     Cancer Sister     Colon cancer Neg Hx        Objective  Physical Exam:  /64   Pulse 80   Temp 98.6 °F (37 °C) (Oral)   Resp 14   Ht 162.6 cm (64\")   Wt 85.3 kg (188 lb)   LMP  (LMP Unknown)   SpO2 91%   BMI 32.27 " kg/m²      Physical Exam  Vitals and nursing note reviewed.   Constitutional:       General: She is not in acute distress.     Appearance: She is not toxic-appearing or diaphoretic.      Comments: Chronically ill-appearing   HENT:      Head: Normocephalic and atraumatic.      Mouth/Throat:      Mouth: Mucous membranes are moist.      Pharynx: Oropharynx is clear.   Eyes:      Extraocular Movements: Extraocular movements intact.   Cardiovascular:      Rate and Rhythm: Normal rate and regular rhythm.      Heart sounds: Normal heart sounds.   Pulmonary:      Effort: Pulmonary effort is normal. No respiratory distress.      Breath sounds: Normal breath sounds.   Abdominal:      Palpations: Abdomen is soft.      Tenderness: There is abdominal tenderness in the epigastric area and periumbilical area. There is no guarding or rebound.   Skin:     General: Skin is warm and dry.      Capillary Refill: Capillary refill takes less than 2 seconds.   Neurological:      General: No focal deficit present.      Mental Status: She is alert and oriented to person, place, and time.   Psychiatric:         Mood and Affect: Mood normal.         Behavior: Behavior normal.             Procedures    ED Course:    ED Course as of 10/21/24 2206   Mon Oct 21, 2024   2134 Fecal Occult Blood(!): Positive [JR]      ED Course User Index  [JR] Camilo Metcalf PA-C       Lab Results (last 24 hours)       Procedure Component Value Units Date/Time    Basic Metabolic Panel [[id number]]  (Abnormal) Collected: 10/21/24 0542    Specimen: Blood Updated: 10/21/24 0657     Glucose 100 mg/dL      BUN 15 mg/dL      Creatinine 0.90 mg/dL      Sodium 139 mmol/L      Potassium 4.1 mmol/L      Chloride 105 mmol/L      CO2 24.6 mmol/L      Calcium 8.7 mg/dL      BUN/Creatinine Ratio 16.7     Anion Gap 9.4 mmol/L      eGFR 72.0 mL/min/1.73     Narrative:      GFR Normal >60  Chronic Kidney Disease <60  Kidney Failure <15      CBC Auto Differential [[id number]]   (Abnormal) Collected: 10/21/24 0542    Specimen: Blood Updated: 10/21/24 0739     WBC 9.93 10*3/mm3      RBC 4.56 10*6/mm3      Hemoglobin 13.1 g/dL      Hematocrit 39.0 %      MCV 85.5 fL      MCH 28.7 pg      MCHC 33.6 g/dL      RDW 13.6 %      RDW-SD 42.5 fl      MPV 12.3 fL      Platelets 171 10*3/mm3      Neutrophil % 52.5 %      Lymphocyte % 38.2 %      Monocyte % 6.2 %      Eosinophil % 0.8 %      Basophil % 0.7 %      Immature Grans % 1.6 %      Neutrophils, Absolute 5.21 10*3/mm3      Lymphocytes, Absolute 3.79 10*3/mm3      Monocytes, Absolute 0.62 10*3/mm3      Eosinophils, Absolute 0.08 10*3/mm3      Basophils, Absolute 0.07 10*3/mm3      Immature Grans, Absolute 0.16 10*3/mm3      nRBC 0.0 /100 WBC     Scan Slide [[id number]] Collected: 10/21/24 0542    Specimen: Blood Updated: 10/21/24 0739     RBC Morphology Normal     WBC Morphology Normal     Platelet Estimate Adequate    POC Glucose 4x Daily Before Meals & at Bedtime [[id number]]  (Normal) Collected: 10/21/24 0739    Specimen: Blood Updated: 10/21/24 0742     Glucose 91 mg/dL      Comment: Serial Number: [id number]:  [id number]       CBC & Differential [[id number]]  (Abnormal) Collected: 10/21/24 1717    Specimen: Blood Updated: 10/21/24 1749    Narrative:      The following orders were created for panel order CBC & Differential.  Procedure                               Abnormality         Status                     ---------                               -----------         ------                     CBC Auto Differential[[id number]]        Abnormal            Final result                 Please view results for these tests on the individual orders.    Comprehensive Metabolic Panel [[id number]]  (Abnormal) Collected: 10/21/24 1717    Specimen: Blood Updated: 10/21/24 1808     Glucose 175 mg/dL      BUN 12 mg/dL      Creatinine 0.91 mg/dL      Sodium 135 mmol/L      Potassium 4.4 mmol/L      Chloride 98 mmol/L      CO2 23.8 mmol/L      Calcium 9.3  mg/dL      Total Protein 7.8 g/dL      Albumin 4.4 g/dL      ALT (SGPT) 24 U/L      AST (SGOT) 25 U/L      Alkaline Phosphatase 90 U/L      Total Bilirubin 0.2 mg/dL      Globulin 3.4 gm/dL      A/G Ratio 1.3 g/dL      BUN/Creatinine Ratio 13.2     Anion Gap 13.2 mmol/L      eGFR 71.0 mL/min/1.73     Narrative:      GFR Normal >60  Chronic Kidney Disease <60  Kidney Failure <15      Lipase [[id number]]  (Abnormal) Collected: 10/21/24 1717    Specimen: Blood Updated: 10/21/24 1808     Lipase 92 U/L     CBC Auto Differential [[id number]]  (Abnormal) Collected: 10/21/24 1717    Specimen: Blood Updated: 10/21/24 1749     WBC 9.70 10*3/mm3      RBC 4.76 10*6/mm3      Hemoglobin 13.6 g/dL      Hematocrit 39.9 %      MCV 83.8 fL      MCH 28.6 pg      MCHC 34.1 g/dL      RDW 13.3 %      RDW-SD 40.6 fl      MPV 10.9 fL      Platelets 282 10*3/mm3      Neutrophil % 71.4 %      Lymphocyte % 19.8 %      Monocyte % 5.8 %      Eosinophil % 0.1 %      Basophil % 0.6 %      Immature Grans % 2.3 %      Neutrophils, Absolute 6.93 10*3/mm3      Lymphocytes, Absolute 1.92 10*3/mm3      Monocytes, Absolute 0.56 10*3/mm3      Eosinophils, Absolute 0.01 10*3/mm3      Basophils, Absolute 0.06 10*3/mm3      Immature Grans, Absolute 0.22 10*3/mm3      nRBC 0.0 /100 WBC     Single High Sensitivity Troponin T [[id number]]  (Normal) Collected: 10/21/24 1717    Specimen: Blood Updated: 10/21/24 1831     HS Troponin T <6 ng/L     Narrative:      High Sensitive Troponin T Reference Range:  <14.0 ng/L- Negative Female for AMI  <22.0 ng/L- Negative Male for AMI  >=14 - Abnormal Female indicating possible myocardial injury.  >=22 - Abnormal Male indicating possible myocardial injury.   Clinicians would have to utilize clinical acumen, EKG, Troponin, and serial changes to determine if it is an Acute Myocardial Infarction or myocardial injury due to an underlying chronic condition.         Urinalysis With Microscopic If Indicated (No Culture) - Urine,  Clean Catch [[id number]]  (Abnormal) Collected: 10/21/24 2057    Specimen: Urine, Clean Catch Updated: 10/21/24 2117     Color, UA Yellow     Appearance, UA Clear     pH, UA 5.5     Specific Gravity, UA 1.012     Glucose, UA Negative     Ketones, UA Negative     Bilirubin, UA Negative     Blood, UA Negative     Protein, UA Negative     Leuk Esterase, UA Trace     Nitrite, UA Negative     Urobilinogen, UA 0.2 E.U./dL    Urinalysis, Microscopic Only - Urine, Clean Catch [[id number]]  (Abnormal) Collected: 10/21/24 2057    Specimen: Urine, Clean Catch Updated: 10/21/24 2118     RBC, UA None Seen /HPF      WBC, UA 0-2 /HPF      Bacteria, UA 1+ /HPF      Squamous Epithelial Cells, UA 0-2 /HPF      Hyaline Casts, UA None Seen /LPF      Mucus, UA Trace /HPF      Methodology Manual Light Microscopy    Occult Blood X 1, Stool - Stool, Per Rectum [[id number]]  (Abnormal) Collected: 10/21/24 2105    Specimen: Stool from Per Rectum Updated: 10/21/24 2128     Fecal Occult Blood Positive    Clostridioides difficile Toxin - Stool, Per Rectum [[id number]]  (Normal) Collected: 10/21/24 2105    Specimen: Stool from Per Rectum Updated: 10/21/24 2151    Narrative:      The following orders were created for panel order Clostridioides difficile Toxin - Stool, Per Rectum.  Procedure                               Abnormality         Status                     ---------                               -----------         ------                     Clostridioides difficile...[[id number]]  Normal              Final result                 Please view results for these tests on the individual orders.    Gastrointestinal Panel, PCR - Stool, Per Rectum [[id number]] Collected: 10/21/24 2105    Specimen: Stool from Per Rectum Updated: 10/21/24 2110    Clostridioides difficile EIA - Stool, Per Rectum [[id number]]  (Normal) Collected: 10/21/24 2105    Specimen: Stool from Per Rectum Updated: 10/21/24 2151     C Diff GDH Ag Negative     C.diff Toxin Ag  Negative    Narrative:      The result indicates the absence of toxigenic C.difficile from stool specimen.    POC Glucose Once [[id number]]  (Abnormal) Collected: 10/21/24 2145    Specimen: Blood Updated: 10/21/24 2146     Glucose 148 mg/dL      Comment: Serial Number: [id number]:  [id number]                CT Angiogram Chest Pulmonary Embolism    Result Date: 10/21/2024  PE PROTOCOL CHEST CT:  HISTORY:  Shortness of breath .  COMPARISON: Chest CT from 10-.  The patient was injected with  IV contrast . Axial images were obtained through the chest in a PE protocol. 3 D reconstruction images were also performed. Individualized dose reduction techniques using automated exposure control or adjustment of the mA and/or kV according to patient size were employed.  FINDINGS: Left upper anterior chest wall pacemaker is present. Mediastinal vasculature is well opacified. Small filling defects are identified within right lower lobe pulmonary artery branch vessels. These findings are well seen on images 41 through 44 of series 5. The appearance is similar to that seen previously.  On the lung window images, there is some minimal bilateral groundglass opacity present, similar to previous.  Limited images through the upper abdomen demonstrate mild fatty infiltration of the liver.       Impression:   1. Redemonstration of right lower lobe pulmonary artery filling defects, consistent with pulmonary emboli. Findings are similar to that seen previously.  2. Mild bilateral ground glass opacities, probably related to mild pneumonitis. Findings similar to previous.      This report was signed and finalized on 10/21/2024 9:14 PM by Dami Zarate MD.      CT Abdomen Pelvis With & Without Contrast    Result Date: 10/21/2024  CT SCAN OF THE ABDOMEN AND PELVIS WITH AND WITHOUT CONTRAST  COMPARISON: Abdomen and pelvic CT scan from 8-6-2024  HISTORY: Abdominal pain. Black stools. Evaluate for GI bleed.  PROCEDURE: The patient was  injected with 100 mL of Omnipaque-300. Axial images were obtained from the lung bases to the pubic symphysis by computed tomography. Individualized dose reduction techniques using automated exposure control or adjustment of the mA and/or kV according to patient size were employed.  FINDINGS:  ABDOMEN: On the preinfusion images, there is no evidence of kidney stone.  There is mild diffuse fatty infiltration of the liver. Gallbladder is present. Spleen, pancreas, adrenals, and kidneys appear unremarkable. Note is made of a periampullary duodenal diverticulum.  PELVIS: The appendix appears unremarkable. Uterus is present and lies eccentric to the right. Urinary bladder is incompletely distended. There is no evidence of pelvic mass or inflammation.      Impression:  1. Diffuse fatty infiltration of the liver.  2. Periampullary duodenal diverticulum.  3. No definite evidence of acute abnormality.    CTDI: 9.76 mGy DLP:1073.55 mGy.cm   This report was signed and finalized on 10/21/2024 9:11 PM by Dami Zarate MD.      US Venous Doppler Lower Extremity Bilateral (duplex)    Result Date: 10/20/2024  BILATERAL LOWER EXTREMITY VENOUS DUPLEX DOPPLER EXAMINATION  HISTORY: DVT?; I26.99-Other pulmonary embolism without acute cor pulmonale; R06.00-Dyspnea, unspecified; B34.8-Other viral infections of unspecified site; R05.1-Acute cough  COMPARISON:   None  PROCEDURE: Multiple transverse and longitudinal scans were performed of both femoropopliteal deep venous system, with augmentation and compression maneuvers.  FINDINGS: There is normal phasic flow noted in the visualized deep venous system. No intraluminal increased echogenicity is noted to suggest thrombus. There is normal compression and augmentation of the venous structures. No abnormal venous collaterals are seen.      Impression: No evidence of left or right lower extremity deep venous thrombosis.      This report was signed and finalized on 10/20/2024 7:01 PM by Ashish  MD Myla.          [facility]      Initial impression of presenting illness: This is a 64 old female presenting back to the emergency department today after recently being discharged from this hospital for suspected pulmonary embolism and being transition to oral anticoagulation for evaluation of possible GI bleed with black or bloody stool and abdominal pain.    DDX: includes but is not limited to: GI bleed, electrolyte imbalance, gastritis, peptic ulcer disease, pulmonary embolism, pneumonia, electrolyte imbalance, arrhythmia, colitis, gastroenteritis, diverticulitis, others    Patient arrives hemodynamically stable afebrile nontachycardic nontachypneic nonhypoxic on room air with vitals interpreted by myself.     Pertinent features from physical exam: Patient appears to have global weakness but no focal deficit, appears pale in regards to her skin color, alert and oriented x 4.  Cardiac auscultation regular and rhythm lungs clear, abdomen soft, she does have tenderness to palpation of the periumbilical region.  Rectal exam performed with chaperone at bedside, there is dark appearing stool at the rectum entrance.    Initial diagnostic plan: CBC CMP lipase fecal occult type and screen troponin C. difficile panel GI panel EKG, CT abdomen pelvis with and without and CT chest PE, fecal occult is positive, glucose 148, C. difficile is negative.  Urinalysis with trace leukocytes 1+ bacteria not infectious pattern.  GI panel pending at time of admission.  CBC with a stable H&H, normal platelets.  CMP is unremarkable.  Type and screen with an ABO type AB, Rh+.  CT chest PE per radiology interpretation and abdomen pelvis with and without contrast IMPRESSION:        1. Redemonstration of right lower lobe pulmonary artery filling defects,  consistent with pulmonary emboli. Findings are similar to that seen  previously.     2. Mild bilateral ground glass opacities, probably related to mild  pneumonitis. Findings similar to  previous.    IMPRESSION:     1. Diffuse fatty infiltration of the liver.     2. Periampullary duodenal diverticulum.     3. No definite evidence of acute abnormality.    Results from initial plan were reviewed and interpreted by me revealing CBC with a stable H&H, CMP is relatively unremarkable lipase is minimally elevated at 92.    Diagnostic information from other sources: Prior record and admission review    Interventions / Re-evaluation: Morphine, Zofran, Protonix 80 mg and 500 cc of normal saline    Results/clinical rationale were discussed with patient at bedside.  Patient agreeable to admission for further workup.    Consultations/Discussion of results with other physicians: Discussed with ED attending physician.  Discussed with on-call gastroenterology, recommended discontinuing anticoagulation for now, plan for EGD in the morning per Dr. Lowe, I then spoke with hospitalist, Dr. Kerley who was agreeable to admit the patient.    Disposition plan: Admit.  -----    Final diagnoses:   Gastrointestinal hemorrhage, unspecified gastrointestinal hemorrhage type   Acute pulmonary embolism without acute cor pulmonale, unspecified pulmonary embolism type          Camilo Metcalf PA-C  10/21/24 2206       Camilo Metcalf PA-C  10/21/24 2206      Electronically signed by Camilo Metcalf PA-C at 10/21/24 2206       Vital Signs (last day)       Date/Time Temp Temp src Pulse Resp BP Patient Position SpO2    10/22/24 0600 -- -- 63 -- 132/71 -- 92    10/22/24 0401 -- -- 63 -- 126/68 -- 93    10/22/24 0200 -- -- 64 -- 119/74 -- 93    10/22/24 0001 -- -- 67 -- 102/71 -- 92    10/21/24 2230 -- -- 75 -- 130/68 -- 91    10/21/24 2200 -- -- 80 -- 137/74 -- 95    10/21/24 2130 -- -- 80 -- 121/64 -- 91    10/21/24 2013 -- -- -- -- 144/80 -- 96    10/21/24 1636 98.6 (37) Oral 92 14 146/78 Sitting 93          Current Facility-Administered Medications   Medication Dose Route Frequency Provider Last Rate Last Admin    Calcium  Replacement - Follow Nurse / BPA Driven Protocol   Does not apply PRN Kerley, Brian Joseph,         dextrose (D50W) (25 g/50 mL) IV injection 25 g  25 g Intravenous Q15 Min PRN Kerley, Brian Joseph, DO        dextrose (GLUTOSE) oral gel 15 g  15 g Oral Q15 Min PRN Kerley, Brian Joseph, DO        glucagon (GLUCAGEN) injection 1 mg  1 mg Intramuscular Q15 Min PRN Kerley, Brian Joseph, DO        Insulin Lispro (humaLOG) injection 2-9 Units  2-9 Units Subcutaneous 4x Daily AC & at Bedtime Kerley, Brian Joseph, DO        Magnesium Standard Dose Replacement - Follow Nurse / BPA Driven Protocol   Does not apply PRN Kerley, Brian Joseph, DO        methylPREDNISolone acetate (DEPO-medrol) injection 40 mg  40 mg Intramuscular Once Lesvia Servin MD        nitroglycerin (NITROSTAT) SL tablet 0.4 mg  0.4 mg Sublingual Q5 Min PRN Kerley, Brian Joseph, DO        ondansetron (ZOFRAN) injection 4 mg  4 mg Intravenous Q6H PRN Kerley, Brian Joseph, DO        Phosphorus Replacement - Follow Nurse / BPA Driven Protocol   Does not apply PRN Kerley, Brian Joseph, DO        Potassium Replacement - Follow Nurse / BPA Driven Protocol   Does not apply PRN Kerley, Brian Joseph,         sodium chloride 0.9 % flush 10 mL  10 mL Intravenous PRN Kerley, Brian Joseph,         sodium chloride 0.9 % flush 10 mL  10 mL Intravenous Q12H Kerley, Brian Joseph, DO   10 mL at 10/21/24 2243    sodium chloride 0.9 % flush 10 mL  10 mL Intravenous PRN Kerley, Brian Joseph,         sodium chloride 0.9 % infusion 40 mL  40 mL Intravenous PRN Kerley, Brian Joseph, DO         Current Outpatient Medications   Medication Sig Dispense Refill    albuterol sulfate HFA (ProAir HFA) 108 (90 Base) MCG/ACT inhaler Inhale 2 puffs Every 4 (Four) Hours As Needed for Wheezing. 18 g 5    Apixaban Starter Pack tablet therapy pack Take 2 tablets by mouth Every 12 (Twelve) Hours for 6 days, THEN 1 tablet Every 12 (Twelve) Hours. Indications: DVT/PE (active thrombosis)  74 tablet 0    Azelastine HCl 137 MCG/SPRAY solution INSTILL 2 SPRAYS IN EACH NOSTRIL TWICE DAILY AS DIRECTED 30 mL 9    buprenorphine (SUBUTEX) 8 MG sublingual tablet SL tablet Place 1 tablet under the tongue Daily.      clonazePAM (KlonoPIN) 0.5 MG tablet Take 1 tablet by mouth 2 (Two) Times a Day As Needed for Anxiety. 45 tablet 0    cyclobenzaprine (FLEXERIL) 5 MG tablet Take 1 tablet by mouth 3 (Three) Times a Day As Needed for Muscle Spasms. 90 tablet 2    dexlansoprazole (DEXILANT) 60 MG capsule Take 1 capsule by mouth Daily. 30 capsule 5    fluticasone (FLONASE) 50 MCG/ACT nasal spray INSTILL 2 SPRAYS IN EACH NOSTRIL ONCE DAILY AS DIRECTED 16 g 1    Fluticasone Furoate-Vilanterol (Breo Ellipta) 200-25 MCG/ACT inhaler Inhale 1 puff Daily. 28 each 11    ipratropium-albuterol (Combivent Respimat)  MCG/ACT inhaler Inhale 1 puff 4 (Four) Times a Day As Needed for Wheezing. 4 g 11    lisinopril-hydrochlorothiazide (PRINZIDE,ZESTORETIC) 10-12.5 MG per tablet Take 1 tablet by mouth Daily. 30 tablet 11    nystatin (MYCOSTATIN) 100,000 unit/mL suspension SWISH AND SWALLOW 5 MLS BY MOUTH FOUR TIMES DAILY (Patient taking differently: Take  by mouth As Needed. SWISH AND SWALLOW 5 MLS BY MOUTH FOUR TIMES DAILY) 180 mL 0    pregabalin (LYRICA) 200 MG capsule Take 1 capsule by mouth 2 (Two) Times a Day. 60 capsule 1    promethazine (PHENERGAN) 25 MG tablet TAKE 1 TABLET BY MOUTH EVERY 8 (EIGHT) HOURS AS NEEDED FOR NAUSEA OR VOMITING. 90 tablet 0    promethazine-dextromethorphan (PROMETHAZINE-DM) 6.25-15 MG/5ML syrup Take 5 mL by mouth 4 (Four) Times a Day As Needed for Cough. 240 mL 0     Lab Results (last 24 hours)       Procedure Component Value Units Date/Time    POC Glucose 4x Daily Before Meals & at Bedtime [[id number]]  (Normal) Collected: 10/22/24 0823    Specimen: Blood Updated: 10/22/24 0825     Glucose 100 mg/dL      Comment: Serial Number: [id number]:  [facility]       Basic Metabolic Panel [[id number]]   (Normal) Collected: 10/22/24 0701    Specimen: Blood Updated: 10/22/24 0741     Glucose 92 mg/dL      BUN 12 mg/dL      Creatinine 0.81 mg/dL      Sodium 140 mmol/L      Potassium 4.1 mmol/L      Chloride 104 mmol/L      CO2 25.1 mmol/L      Calcium 9.0 mg/dL      BUN/Creatinine Ratio 14.8     Anion Gap 10.9 mmol/L      eGFR 81.7 mL/min/1.73     Narrative:      GFR Normal >60  Chronic Kidney Disease <60  Kidney Failure <15      CBC Auto Differential [[id number]]  (Abnormal) Collected: 10/22/24 0701    Specimen: Blood Updated: 10/22/24 0719     WBC 8.28 10*3/mm3      RBC 4.37 10*6/mm3      Hemoglobin 12.3 g/dL      Hematocrit 37.0 %      MCV 84.7 fL      MCH 28.1 pg      MCHC 33.2 g/dL      RDW 13.3 %      RDW-SD 41.3 fl      MPV 10.4 fL      Platelets 256 10*3/mm3      Neutrophil % 51.0 %      Lymphocyte % 37.9 %      Monocyte % 8.2 %      Eosinophil % 0.6 %      Basophil % 0.6 %      Immature Grans % 1.7 %      Neutrophils, Absolute 4.22 10*3/mm3      Lymphocytes, Absolute 3.14 10*3/mm3      Monocytes, Absolute 0.68 10*3/mm3      Eosinophils, Absolute 0.05 10*3/mm3      Basophils, Absolute 0.05 10*3/mm3      Immature Grans, Absolute 0.14 10*3/mm3      nRBC 0.0 /100 WBC     Gastrointestinal Panel, PCR - Stool, Per Rectum [[id number]]  (Normal) Collected: 10/21/24 2105    Specimen: Stool from Per Rectum Updated: 10/21/24 2233     Campylobacter Not Detected     Plesiomonas shigelloides Not Detected     Salmonella Not Detected     Vibrio Not Detected     Vibrio cholerae Not Detected     Yersinia enterocolitica Not Detected     Enteroaggregative E. coli (EAEC) Not Detected     Enteropathogenic E. coli (EPEC) Not Detected     Enterotoxigenic E. coli (ETEC) lt/st Not Detected     Shiga-like toxin-producing E. coli (STEC) stx1/stx2 Not Detected     Shigella/Enteroinvasive E. coli (EIEC) Not Detected     Cryptosporidium Not Detected     Cyclospora cayetanensis Not Detected     Entamoeba histolytica Not Detected     Giardia  lamblia Not Detected     Adenovirus F40/41 Not Detected     Astrovirus Not Detected     Norovirus GI/GII Not Detected     Rotavirus A Not Detected     Sapovirus (I, II, IV or V) Not Detected    Clostridioides difficile Toxin - Stool, Per Rectum [[id number]]  (Normal) Collected: 10/21/24 2105    Specimen: Stool from Per Rectum Updated: 10/21/24 2151    Narrative:      The following orders were created for panel order Clostridioides difficile Toxin - Stool, Per Rectum.  Procedure                               Abnormality         Status                     ---------                               -----------         ------                     Clostridioides difficile...[[id number]]  Normal              Final result                 Please view results for these tests on the individual orders.    Clostridioides difficile EIA - Stool, Per Rectum [[id number]]  (Normal) Collected: 10/21/24 2105    Specimen: Stool from Per Rectum Updated: 10/21/24 2151     C Diff GDH Ag Negative     C.diff Toxin Ag Negative    Narrative:      The result indicates the absence of toxigenic C.difficile from stool specimen.    POC Glucose Once [[id number]]  (Abnormal) Collected: 10/21/24 2145    Specimen: Blood Updated: 10/21/24 2146     Glucose 148 mg/dL      Comment: Serial Number: [id number]:  [id number]       Occult Blood X 1, Stool - Stool, Per Rectum [[id number]]  (Abnormal) Collected: 10/21/24 2105    Specimen: Stool from Per Rectum Updated: 10/21/24 2128     Fecal Occult Blood Positive    Urinalysis, Microscopic Only - Urine, Clean Catch [[id number]]  (Abnormal) Collected: 10/21/24 2057    Specimen: Urine, Clean Catch Updated: 10/21/24 2118     RBC, UA None Seen /HPF      WBC, UA 0-2 /HPF      Bacteria, UA 1+ /HPF      Squamous Epithelial Cells, UA 0-2 /HPF      Hyaline Casts, UA None Seen /LPF      Mucus, UA Trace /HPF      Methodology Manual Light Microscopy    Urinalysis With Microscopic If Indicated (No Culture) - Urine, Clean Catch  [[id number]]  (Abnormal) Collected: 10/21/24 2057    Specimen: Urine, Clean Catch Updated: 10/21/24 2117     Color, UA Yellow     Appearance, UA Clear     pH, UA 5.5     Specific Gravity, UA 1.012     Glucose, UA Negative     Ketones, UA Negative     Bilirubin, UA Negative     Blood, UA Negative     Protein, UA Negative     Leuk Esterase, UA Trace     Nitrite, UA Negative     Urobilinogen, UA 0.2 E.U./dL    Single High Sensitivity Troponin T [[id number]]  (Normal) Collected: 10/21/24 1717    Specimen: Blood Updated: 10/21/24 1831     HS Troponin T <6 ng/L     Narrative:      High Sensitive Troponin T Reference Range:  <14.0 ng/L- Negative Female for AMI  <22.0 ng/L- Negative Male for AMI  >=14 - Abnormal Female indicating possible myocardial injury.  >=22 - Abnormal Male indicating possible myocardial injury.   Clinicians would have to utilize clinical acumen, EKG, Troponin, and serial changes to determine if it is an Acute Myocardial Infarction or myocardial injury due to an underlying chronic condition.         Comprehensive Metabolic Panel [[id number]]  (Abnormal) Collected: 10/21/24 1717    Specimen: Blood Updated: 10/21/24 1808     Glucose 175 mg/dL      BUN 12 mg/dL      Creatinine 0.91 mg/dL      Sodium 135 mmol/L      Potassium 4.4 mmol/L      Chloride 98 mmol/L      CO2 23.8 mmol/L      Calcium 9.3 mg/dL      Total Protein 7.8 g/dL      Albumin 4.4 g/dL      ALT (SGPT) 24 U/L      AST (SGOT) 25 U/L      Alkaline Phosphatase 90 U/L      Total Bilirubin 0.2 mg/dL      Globulin 3.4 gm/dL      A/G Ratio 1.3 g/dL      BUN/Creatinine Ratio 13.2     Anion Gap 13.2 mmol/L      eGFR 71.0 mL/min/1.73     Narrative:      GFR Normal >60  Chronic Kidney Disease <60  Kidney Failure <15      Lipase [[id number]]  (Abnormal) Collected: 10/21/24 1717    Specimen: Blood Updated: 10/21/24 1808     Lipase 92 U/L     CBC & Differential [[id number]]  (Abnormal) Collected: 10/21/24 1717    Specimen: Blood Updated: 10/21/24 2118     Narrative:      The following orders were created for panel order CBC & Differential.  Procedure                               Abnormality         Status                     ---------                               -----------         ------                     CBC Auto Differential[[id number]]        Abnormal            Final result                 Please view results for these tests on the individual orders.    CBC Auto Differential [[id number]]  (Abnormal) Collected: 10/21/24 1717    Specimen: Blood Updated: 10/21/24 1749     WBC 9.70 10*3/mm3      RBC 4.76 10*6/mm3      Hemoglobin 13.6 g/dL      Hematocrit 39.9 %      MCV 83.8 fL      MCH 28.6 pg      MCHC 34.1 g/dL      RDW 13.3 %      RDW-SD 40.6 fl      MPV 10.9 fL      Platelets 282 10*3/mm3      Neutrophil % 71.4 %      Lymphocyte % 19.8 %      Monocyte % 5.8 %      Eosinophil % 0.1 %      Basophil % 0.6 %      Immature Grans % 2.3 %      Neutrophils, Absolute 6.93 10*3/mm3      Lymphocytes, Absolute 1.92 10*3/mm3      Monocytes, Absolute 0.56 10*3/mm3      Eosinophils, Absolute 0.01 10*3/mm3      Basophils, Absolute 0.06 10*3/mm3      Immature Grans, Absolute 0.22 10*3/mm3      nRBC 0.0 /100 WBC     [facility] [[id number]] Collected: 10/21/24 1717    Specimen: Blood Updated: 10/21/24 1746    Narrative:      The following orders were created for panel order [facility].  Procedure                               Abnormality         Status                     ---------                               -----------         ------                     Green Top (Gel)[[id number]]                                  Final result               Lavender Top[[id number]]                                     Final result               Gold Top - SST[[id number]]                                   Final result               Light Blue Top[[id number]]                                   Final result                 Please view results for these tests on the individual orders.    Green Top  (Gel) [[id number]] Collected: 10/21/24 1717    Specimen: Blood Updated: 10/21/24 1746     Extra Tube Hold for add-ons.     Comment: Auto resulted.       Lavender Top [[id number]] Collected: 10/21/24 1717    Specimen: Blood Updated: 10/21/24 1746     Extra Tube hold for add-on     Comment: Auto resulted       Gold Top - SST [[id number]] Collected: 10/21/24 1717    Specimen: Blood Updated: 10/21/24 1746     Extra Tube Hold for add-ons.     Comment: Auto resulted.       Light Blue Top [[id number]] Collected: 10/21/24 1717    Specimen: Blood Updated: 10/21/24 1746     Extra Tube Hold for add-ons.     Comment: Auto resulted             Imaging Results (Last 24 Hours)       Procedure Component Value Units Date/Time    CT Angiogram Chest Pulmonary Embolism [[id number]] Collected: 10/21/24 2112     Updated: 10/21/24 2116    Narrative:      PE PROTOCOL CHEST CT:     HISTORY:  Shortness of breath .     COMPARISON: Chest CT from 10-.     The patient was injected with  IV contrast . Axial images were obtained  through the chest in a PE protocol. 3 D reconstruction images were also  performed. Individualized dose reduction techniques using automated  exposure control or adjustment of the mA and/or kV according to patient  size were employed.     FINDINGS: Left upper anterior chest wall pacemaker is present.  Mediastinal vasculature is well opacified. Small filling defects are  identified within right lower lobe pulmonary artery branch vessels.  These findings are well seen on images 41 through 44 of series 5. The  appearance is similar to that seen previously.     On the lung window images, there is some minimal bilateral groundglass  opacity present, similar to previous.     Limited images through the upper abdomen demonstrate mild fatty  infiltration of the liver.          Impression:            1. Redemonstration of right lower lobe pulmonary artery filling defects,  consistent with pulmonary emboli. Findings are similar to  that seen  previously.     2. Mild bilateral ground glass opacities, probably related to mild  pneumonitis. Findings similar to previous.                 This report was signed and finalized on 10/21/2024 9:14 PM by Dami Zarate MD.       CT Abdomen Pelvis With & Without Contrast [[id number]] Collected: 10/21/24 2109     Updated: 10/21/24 2113    Narrative:      CT SCAN OF THE ABDOMEN AND PELVIS WITH AND WITHOUT CONTRAST     COMPARISON: Abdomen and pelvic CT scan from 8-6-2024     HISTORY: Abdominal pain. Black stools. Evaluate for GI bleed.     PROCEDURE: The patient was injected with 100 mL of Omnipaque-300. Axial  images were obtained from the lung bases to the pubic symphysis by  computed tomography. Individualized dose reduction techniques using  automated exposure control or adjustment of the mA and/or kV according  to patient size were employed.     FINDINGS:     ABDOMEN: On the preinfusion images, there is no evidence of kidney  stone.     There is mild diffuse fatty infiltration of the liver. Gallbladder is  present. Spleen, pancreas, adrenals, and kidneys appear unremarkable.  Note is made of a periampullary duodenal diverticulum.     PELVIS: The appendix appears unremarkable. Uterus is present and lies  eccentric to the right. Urinary bladder is incompletely distended. There  is no evidence of pelvic mass or inflammation.       Impression:         1. Diffuse fatty infiltration of the liver.     2. Periampullary duodenal diverticulum.     3. No definite evidence of acute abnormality.           CTDI: 9.76 mGy  DLP:1073.55 mGy.cm        This report was signed and finalized on 10/21/2024 9:11 PM by Dami Zarate MD.             Physician Progress Notes (last 24 hours)  Notes from 10/21/24 0841 through 10/22/24 0841   No notes of this type exist for this encounter.       Consult Notes (last 24 hours)  Notes from 10/21/24 0841 through 10/22/24 0841   No notes of this type exist for this encounter.

## 2024-10-22 NOTE — H&P (VIEW-ONLY)
In Patient Consult      Date of Consultation: 2024  Patient Name: Katelynn Sung  MRN: [id number]  : 1960     Referring provider: Melissa Torrez MD    Primary care provider:  Lesvia Servin MD    Reason for consultation: GI bleeding, suspected melena    History of Present Illness: This is a 64-year-old female patient with a prior history of hypertension, hyperlipidemia, cardiac arrhythmia status post PPM, COPD, anxiety depression, diabetes mellitus, stroke.  His constipation and gastroparesis, recently diagnosed PE presents to emergency room yesterday on 10/21/2024 with complaints of passing 3 dark bowel movements.    Patient was hospitalized recently with a rhinovirus infection and PE and was discharged yesterday on 10/21/2024 with Eliquis.  After she went home in the evening she had a 3 bowel movement which was darkish and she thought she has a blood in the stool and came to emergency room.  She also had a left lower abdominal cramps.  She denies any seeing red blood or clots.  No prior history of any GI bleeding.  She had an EGD done recently in 2024 which revealed healed ulcer scar in the stomach otherwise unremarkable.  She was supposed to be getting her colonoscopy soon as OP.  She has a significant issue with constipation while on a Subutex.      In the emergency room she had a lab work done which revealed normal CMP except borderline glucose of 175 and sodium of 135.  Her CBC revealed a normal hemoglobin of 13.6.  Stool C. difficile was negative.  There was a suspicion of GI bleed and stool occult blood test was positive for which GI was consulted    Subjective     Past Medical History:   Diagnosis Date    Adnexal mass     pelvic US 10/26/11 showed 2 cm left abdnexal cystic lesion, resolved on f/u 5/14/15    Anxiety     Arm fracture, left     Humphreys's esophagus     Body piercing     Both ears    Class 1 obesity due to excess calories with serious comorbidity and  body mass index (BMI) of 30.0 to 30.9 in adult 2018    Colon polyps     COPD (chronic obstructive pulmonary disease)     Cor pulmonale     Degenerative arthritis of lumbar spine     Depression     Fibromyalgia     Finger fracture     GERD (gastroesophageal reflux disease)     Herpes simplex     Hypertension     Insomnia     Lumbar degenerative disc disease     Mild sleep apnea 2013    Sleep study 13 showing mild degree    Narcotic dependence     Osteoporosis     Pancreatitis     Pneumonia     Respiratory failure requiring intubation     double pneumonia    Restless leg syndrome     Seasonal allergies     Sinus node dysfunction     Sleep apnea     does not wear cpap    Trouble swallowing     Wears dentures        Past Surgical History:   Procedure Laterality Date    COLONOSCOPY N/A     Complete    ENDOSCOPY N/A 2020    Procedure: ESOPHAGOGASTRODUODENOSCOPY;  Surgeon: Charbel Ching MD;  Location: [facility] ENDOSCOPY;  Service: Gastroenterology;  Laterality: N/A;    ENDOSCOPY N/A 2024    Procedure: Esophagogastroduodenoscopy with biopsy and polypectomy;  Surgeon: Jeronimo Lowe MD;  Location: [facility] ENDOSCOPY;  Service: Gastroenterology;  Laterality: N/A;    PACEMAKER IMPLANTATION      2024    TUBAL ABDOMINAL LIGATION      UPPER GASTROINTESTINAL ENDOSCOPY N/A        Family History   Problem Relation Age of Onset    Stroke Mother     Liver disease Mother         Chronic    Cirrhosis Father     Heart attack Father     Breast cancer Sister     Stroke Brother     Cancer Sister     Colon cancer Neg Hx        Social History     Socioeconomic History    Marital status:    Tobacco Use    Smoking status: Former     Current packs/day: 0.00     Average packs/day: 0.8 packs/day for 3.0 years (2.3 ttl pk-yrs)     Types: Cigarettes, Electronic Cigarette     Start date:      Quit date:      Years since quittin.8     Passive exposure: Never    Smokeless  tobacco: Never    Tobacco comments:     2013 started using nicotine containing substance in combustion-free vaporization device. PT not longer uses vapor stick.   Vaping Use    Vaping status: Former   Substance and Sexual Activity    Alcohol use: No    Drug use: Not Currently     Types: Oxycodone     Comment: Patient is currently on subutex    Sexual activity: Not Currently         Current Facility-Administered Medications:     methylPREDNISolone acetate (DEPO-medrol) injection 40 mg, 40 mg, Intramuscular, Once, Lesvia Servin MD    sodium chloride 0.9 % flush 10 mL, 10 mL, Intravenous, PRN, Melissa Torrez MD    Current Outpatient Medications:     albuterol sulfate HFA (ProAir HFA) 108 (90 Base) MCG/ACT inhaler, Inhale 2 puffs Every 4 (Four) Hours As Needed for Wheezing., Disp: 18 g, Rfl: 5    Apixaban Starter Pack tablet therapy pack, Take 2 tablets by mouth Every 12 (Twelve) Hours for 6 days, THEN 1 tablet Every 12 (Twelve) Hours. Indications: DVT/PE (active thrombosis), Disp: 74 tablet, Rfl: 0    Azelastine HCl 137 MCG/SPRAY solution, INSTILL 2 SPRAYS IN EACH NOSTRIL TWICE DAILY AS DIRECTED, Disp: 30 mL, Rfl: 9    buprenorphine (SUBUTEX) 8 MG sublingual tablet SL tablet, Place 1 tablet under the tongue Daily., Disp: , Rfl:     clonazePAM (KlonoPIN) 0.5 MG tablet, Take 1 tablet by mouth 2 (Two) Times a Day As Needed for Anxiety., Disp: 45 tablet, Rfl: 0    cyclobenzaprine (FLEXERIL) 5 MG tablet, Take 1 tablet by mouth 3 (Three) Times a Day As Needed for Muscle Spasms., Disp: 90 tablet, Rfl: 2    dexlansoprazole (DEXILANT) 60 MG capsule, Take 1 capsule by mouth Daily., Disp: 30 capsule, Rfl: 5    fluticasone (FLONASE) 50 MCG/ACT nasal spray, INSTILL 2 SPRAYS IN EACH NOSTRIL ONCE DAILY AS DIRECTED, Disp: 16 g, Rfl: 1    Fluticasone Furoate-Vilanterol (Breo Ellipta) 200-25 MCG/ACT inhaler, Inhale 1 puff Daily., Disp: 28 each, Rfl: 11    ipratropium-albuterol (Combivent Respimat)  MCG/ACT inhaler,  "Inhale 1 puff 4 (Four) Times a Day As Needed for Wheezing., Disp: 4 g, Rfl: 11    lisinopril-hydrochlorothiazide (PRINZIDE,ZESTORETIC) 10-12.5 MG per tablet, Take 1 tablet by mouth Daily., Disp: 30 tablet, Rfl: 11    nystatin (MYCOSTATIN) 100,000 unit/mL suspension, SWISH AND SWALLOW 5 MLS BY MOUTH FOUR TIMES DAILY (Patient taking differently: Take  by mouth As Needed. SWISH AND SWALLOW 5 MLS BY MOUTH FOUR TIMES DAILY), Disp: 180 mL, Rfl: 0    pregabalin (LYRICA) 200 MG capsule, Take 1 capsule by mouth 2 (Two) Times a Day., Disp: 60 capsule, Rfl: 1    promethazine (PHENERGAN) 25 MG tablet, TAKE 1 TABLET BY MOUTH EVERY 8 (EIGHT) HOURS AS NEEDED FOR NAUSEA OR VOMITING., Disp: 90 tablet, Rfl: 0    promethazine-dextromethorphan (PROMETHAZINE-DM) 6.25-15 MG/5ML syrup, Take 5 mL by mouth 4 (Four) Times a Day As Needed for Cough., Disp: 240 mL, Rfl: 0    Allergies   Allergen Reactions    Nsaids GI Intolerance       Review of Systems   Constitutional:  Negative for appetite change, fatigue, fever and unexpected weight loss.   HENT:  Negative for trouble swallowing.    Gastrointestinal:  Positive for abdominal pain and blood in stool. Negative for abdominal distention, anal bleeding, constipation, diarrhea, nausea, rectal pain, vomiting, GERD and indigestion.        Dark stool         The following portions of the patient's history were reviewed and updated as appropriate: allergies, current medications, past family history, past medical history, past social history, past surgical history and problem list.    Objective     Vitals:    10/21/24 1636 10/21/24 2013   BP: 146/78 144/80   BP Location: Left arm    Patient Position: Sitting    Pulse: 92    Resp: 14    Temp: 98.6 °F (37 °C)    TempSrc: Oral    SpO2: 93% 96%   Weight: 85.3 kg (188 lb)    Height: 162.6 cm (64\")        Physical Exam  Constitutional:       Appearance: She is obese.   HENT:      Head: Normocephalic and atraumatic.   Eyes:      Conjunctiva/sclera: " Conjunctivae normal.   Abdominal:      General: Abdomen is flat. There is no distension.      Palpations: There is no mass.      Tenderness: There is no abdominal tenderness. There is no guarding or rebound.      Hernia: No hernia is present.      Comments: Rectal examination reveals a liquid yellowish colored stool in the rectum   Musculoskeletal:      Cervical back: Normal range of motion and neck supple.   Neurological:      Mental Status: She is alert.         Results from last 7 days   Lab Units 10/21/24  1717 10/21/24  0542 10/20/24  0831 10/19/24  1716   SODIUM mmol/L 135* 139 138 139   POTASSIUM mmol/L 4.4 4.1 4.1 4.2   CHLORIDE mmol/L 98 105 103 106   CO2 mmol/L 23.8 24.6 22.2 21.9*   BUN mg/dL 12 15 8 7*   CREATININE mg/dL 0.91 0.90 0.82 0.87   CALCIUM mg/dL 9.3 8.7 9.5 8.9   ALBUMIN g/dL 4.4  --   --  4.2   BILIRUBIN mg/dL 0.2  --   --  0.3   ALK PHOS U/L 90  --   --  113   ALT (SGPT) U/L 24  --   --  31   AST (SGOT) U/L 25  --   --  64*   GLUCOSE mg/dL 175* 100* 159* 182*   WBC 10*3/mm3 9.70 9.93 11.02* 7.48   HEMOGLOBIN g/dL 13.6 13.1 14.3 13.8   PLATELETS 10*3/mm3 282 171 277 243       Imaging Results (Last 24 Hours)       Procedure Component Value Units Date/Time    CT Angiogram Chest Pulmonary Embolism [[id number]] Collected: 10/21/24 2112     Updated: 10/21/24 2116    Narrative:      PE PROTOCOL CHEST CT:     HISTORY:  Shortness of breath .     COMPARISON: Chest CT from 10-.     The patient was injected with  IV contrast . Axial images were obtained  through the chest in a PE protocol. 3 D reconstruction images were also  performed. Individualized dose reduction techniques using automated  exposure control or adjustment of the mA and/or kV according to patient  size were employed.     FINDINGS: Left upper anterior chest wall pacemaker is present.  Mediastinal vasculature is well opacified. Small filling defects are  identified within right lower lobe pulmonary artery branch vessels.  These  findings are well seen on images 41 through 44 of series 5. The  appearance is similar to that seen previously.     On the lung window images, there is some minimal bilateral groundglass  opacity present, similar to previous.     Limited images through the upper abdomen demonstrate mild fatty  infiltration of the liver.          Impression:            1. Redemonstration of right lower lobe pulmonary artery filling defects,  consistent with pulmonary emboli. Findings are similar to that seen  previously.     2. Mild bilateral ground glass opacities, probably related to mild  pneumonitis. Findings similar to previous.                 This report was signed and finalized on 10/21/2024 9:14 PM by Dami Zarate MD.       CT Abdomen Pelvis With & Without Contrast [[id number]] Collected: 10/21/24 2109     Updated: 10/21/24 2113    Narrative:      CT SCAN OF THE ABDOMEN AND PELVIS WITH AND WITHOUT CONTRAST     COMPARISON: Abdomen and pelvic CT scan from 8-6-2024     HISTORY: Abdominal pain. Black stools. Evaluate for GI bleed.     PROCEDURE: The patient was injected with 100 mL of Omnipaque-300. Axial  images were obtained from the lung bases to the pubic symphysis by  computed tomography. Individualized dose reduction techniques using  automated exposure control or adjustment of the mA and/or kV according  to patient size were employed.     FINDINGS:     ABDOMEN: On the preinfusion images, there is no evidence of kidney  stone.     There is mild diffuse fatty infiltration of the liver. Gallbladder is  present. Spleen, pancreas, adrenals, and kidneys appear unremarkable.  Note is made of a periampullary duodenal diverticulum.     PELVIS: The appendix appears unremarkable. Uterus is present and lies  eccentric to the right. Urinary bladder is incompletely distended. There  is no evidence of pelvic mass or inflammation.       Impression:         1. Diffuse fatty infiltration of the liver.     2. Periampullary duodenal  diverticulum.     3. No definite evidence of acute abnormality.           CTDI: 9.76 mGy  DLP:1073.55 mGy.cm        This report was signed and finalized on 10/21/2024 9:11 PM by Dami Zarate MD.               EGD 8/9/2024  - Normal oropharynx. - Z-line regular, 35 cm from the incisors. - No gross lesions in the entire esophagus. - No endoscopic esophageal abnormality to explain patient's dysphagia. Biopsied. - Bilious gastric fluid with food debris. - Healed ulcer Scar in the posterior wall of the stomach. - Erythematous mucosa in the posterior wall of the stomach, antrum and prepyloric region of the stomach. Biopsied. - Normal duodenal bulb, first portion of the duodenum, second portion of the duodenum and third portion of the duodenum. Biopsied. - A few gastric polyps. One resected and retrieved. - Likely has underlying IBS complicated by opioid use with opioid induced gastroparesis.   Pathology DIAGNOSIS:  A.     ANTRUM AND BODY, BIOPSY:  Reactive gastropathy  No Helicobacter pylori like organisms identified on H&E stained slide  No intestinal metaplasia or dysplasia identified  B.     DUODENUM, BIOPSY:  Small bowel mucosa with no significant pathologic abnormality  C.     STOMACH, FUNDUS, POLYP:  Body type gastric mucosa with no significant pathologic abnormality  D.     ESOPHAGUS, BIOPSY, RANDOM:  Squamous mucosa with mild reactive/reflux related changes  No increased intraepithelial eosinophils, intestinal metaplasia     Assessment / Plan      Assessment/Recommendations:   Suspected melena  Anticoagulation use  Stool occult blood test positive    Patient was recently diagnosed with a PE and started on Eliquis was discharged yesterday on a 10/21/2024.  She had 3 bowel movement which looked for her more darkish and she thought it was blood mixed and hence she came to emergency room for evaluation.  Findings not convincing of any overt GI bleeding.  As her stool occult blood test was positive GI was  consulted    Her hemoglobin is stable without signs of overt bleeding.  As she was started on Eliquis and also has a prior history of healed ulcer scars in the stomach, upper GI bleeding need to be ruled out.  She is also due for her colonoscopy and if EGD negative we will schedule her for colonoscopy.  Rectal examination done today reveals only yellow-colored liquid stool in the rectum without any signs of melena.    I have discussed the risk and benefits involved and patient is agreeable to proceed with the EGD.    Keep n.p.o.  IV fluid hydration  Protonix IV twice daily  Hold Eliquis for now  Scheduled for an urgent EGD this morning  If EGD negative we will schedule for colonoscopy on 10/23/2024    History of opioid-induced gastroparesis  History of opioid-induced constipation  History of COPD/      Thank you very much for letting me participate in the care of this patient.  Please do not hesitate to call me if you have any questions.    Jeronimo Lowe MD  [facility]  10/21/2024  21:38 EDT    Please note that portions of this note may have been completed with a voice recognition program.

## 2024-10-22 NOTE — CASE MANAGEMENT/SOCIAL WORK
Discharge Planning Assessment   Emigdio     Patient Name: Katelynn Sung  MRN: [id number]  Today's Date: 10/22/2024    Admit Date: 10/21/2024    Plan: The patient is awake and able to answer questions.  Her daughter is at bedside and she consents for her to be involved in her DC plans.  She is a current patient of Dr. Servin and gets her medications from [facility].  She elects to enroll in Meds to Bed. She does not use DME.  She denies the need for DME or services at DC.  At the time of DC the patient plans to return to the home she shares with her daughter.  Questions and concerns were addressed at the time of this conversation.  WIll provide additional resources and information upon patient request.   Discharge Needs Assessment       Row Name 10/22/24 1137       Living Environment    People in Home child(ramona), adult    Name(s) of People in Home Carol Rutherford    Current Living Arrangements home    Duration at Residence 1 month    Potentially Unsafe Housing Conditions none    In the past 12 months has the electric, gas, oil, or water company threatened to shut off services in your home? No    Primary Care Provided by self    Provides Primary Care For no one    Family Caregiver if Needed none    Quality of Family Relationships helpful;involved    Able to Return to Prior Arrangements yes       Resource/Environmental Concerns    Resource/Environmental Concerns none    Transportation Concerns none       Transportation Needs    In the past 12 months, has lack of transportation kept you from medical appointments or from getting medications? no    In the past 12 months, has lack of transportation kept you from meetings, work, or from getting things needed for daily living? No       Food Insecurity    Within the past 12 months, you worried that your food would run out before you got the money to buy more. Never true    Within the past 12 months, the food you bought just didn't last and you didn't have money to get  more. Never true       Transition Planning    Patient/Family Anticipates Transition to home with family    Patient/Family Anticipated Services at Transition none    Transportation Anticipated family or friend will provide       Discharge Needs Assessment    Readmission Within the Last 30 Days no previous admission in last 30 days    Equipment Currently Used at Home none    Concerns to be Addressed denies needs/concerns at this time    Do you want help finding or keeping work or a job? I do not need or want help    Do you want help with school or training? For example, starting or completing job training or getting a high school diploma, GED or equivalent No    Anticipated Changes Related to Illness none    Equipment Needed After Discharge none    Provided Post Acute Provider List? N/A    N/A Provider List Comment Patient plans to return home; no new needs at this time    Provided Post Acute Provider Quality & Resource List? N/A    N/A Quality & Resource List Comment Patient plans to return home; no new needs at this time    Offered/Gave Vendor List no                   Discharge Plan       Row Name 10/22/24 1138       Plan    Plan The patient is awake and able to answer questions.  Her daughter is at bedside and she consents for her to be involved in her DC plans.  She is a current patient of Dr. Servin and gets her medications from [facility].  She elects to enroll in Meds to Bed. She does not use DME.  She denies the need for DME or services at DC.  At the time of DC the patient plans to return to the home she shares with her daughter.  Questions and concerns were addressed at the time of this conversation.  WIll provide additional resources and information upon patient request.    Patient/Family in Agreement with Plan yes    Provided Post Acute Provider List? N/A    N/A Provider List Comment Patient plans to return home; no new needs at this time    Provided Post Acute Provider Quality & Resource List? N/A    N/A  Quality & Resource List Comment Patient plans to return home; no new needs at this time    Plan Comments Patient denies needs at this time    Final Discharge Disposition Code 01 - home or self-care    Final Note Plans to DC home with daughter                  Continued Care and Services - Admitted Since 10/21/2024    No active coordination exists for this encounter.          Demographic Summary       Row Name 10/22/24 1136       General Information    Admission Type observation    Arrived From emergency department    Referral Source admission list    Reason for Consult discharge planning    Preferred Language English       Contact Information    Permission Granted to Share Info With ;family/designee                   Functional Status       Row Name 10/22/24 1136       Functional Status    Usual Activity Tolerance good    Current Activity Tolerance moderate       Physical Activity    On average, how many days per week do you engage in moderate to strenuous exercise (like a brisk walk)? 0 days    On average, how many minutes do you engage in exercise at this level? 0 min    Number of minutes of exercise per week 0       Assessment of Health Literacy    How often do you have someone help you read hospital materials? Never    How often do you have problems learning about your medical condition because of difficulty understanding written information? Never    How often do you have a problem understanding what is told to you about your medical condition? Never    How confident are you filling out medical forms by yourself? Extremely    Health Literacy Excellent       Functional Status, IADL    Medications independent    Meal Preparation independent    Housekeeping independent    Laundry independent    Shopping independent    If for any reason you need help with day-to-day activities such as bathing, preparing meals, shopping, managing finances, etc., do you get the help you need? I get all the help I need        Mental Status    General Appearance WDL WDL       Mental Status Summary    Recent Changes in Mental Status/Cognitive Functioning no changes                   Psychosocial       Row Name 10/22/24 1137       Values/Beliefs    Spiritual, Cultural Beliefs, Presybeterian Practices, Values that Affect Care no       Behavior WDL    Behavior WDL WDL       Emotion Mood WDL    Emotion/Mood/Affect WDL WDL       Speech WDL    Speech WDL WDL       Perceptual State WDL    Perceptual State WDL WDL       Thought Process WDL    Thought Process WDL WDL       Intellectual Performance WDL    Intellectual Performance WDL WDL                   Abuse/Neglect       Row Name 10/22/24 1137       Personal Safety    Feels Unsafe at Home or Work/School no    Feels Threatened by Someone no    Does Anyone Try to Keep You From Having Contact with Others or Doing Things Outside Your Home? no    Physical Signs of Abuse Present no                   Legal       Row Name 10/22/24 1137       Financial Resource Strain    How hard is it for you to pay for the very basics like food, housing, medical care, and heating? Not hard                   Substance Abuse       Row Name 10/22/24 1137       Substance Use    Substance Use Status never used                   Patient Forms    No documentation.                     Jerilyn Bolaños RN

## 2024-10-22 NOTE — ANESTHESIA PREPROCEDURE EVALUATION
Anesthesia Evaluation     Patient summary reviewed and Nursing notes reviewed   NPO Solid Status: > 8 hours  NPO Liquid Status: > 8 hours           Airway   Mallampati: II  TM distance: >3 FB  Neck ROM: full  no difficulty expected  Dental    (+) upper dentures and lower dentures    Pulmonary - normal exam   (+) pneumonia , pulmonary embolism, a smoker Former, COPD,sleep apnea (no cpap)    ROS comment:  Cor pulmonale  Cardiovascular - normal exam  Exercise tolerance: good (4-7 METS)    ECG reviewed    (+) hypertension, dysrhythmias, hyperlipidemia    ROS comment: 10/21/24 EKG- sinus rhythm      Neuro/Psych  (+) psychiatric history Anxiety  GI/Hepatic/Renal/Endo    (+) obesity, GERD, GI bleeding , liver disease fatty liver disease  (-) morbid obesity    Musculoskeletal     (+) neck pain  Abdominal    Substance History   (+) drug use (narcotic dependence)     OB/GYN negative ob/gyn ROS         Other   arthritis,     ROS/Med Hx Other: ER visit and admission 10/19 for suspected PE   CT IMPRESSION:  1. No Limited bolus of contrast and motion artifact limits this  examination. There is no central pulmonary embolism. There are  questionable emboli in the descending right pulmonary artery. There is  no right heart strain.  2. No aneurysm or dissection.  3. Coronary artery calcification.  4. Bibasilar atelectasis there are no effusions. There is no edema.     Discharged 10/21 and returned same day to ER with complaints of abdominal pain and black stool              Anesthesia Plan    ASA 3 - emergent     MAC     (Risks and benefits discussed including risk of aspiration, recall and dental damage. All patient questions answered.    Will continue with plan of care.)  intravenous induction     Anesthetic plan, risks, benefits, and alternatives have been provided, discussed and informed consent has been obtained with: patient.  Pre-procedure education provided    CODE STATUS:    Code Status (Patient has no pulse and is not  breathing): CPR (Attempt to Resuscitate)  Medical Interventions (Patient has pulse or is breathing): Full Support

## 2024-10-22 NOTE — PROGRESS NOTES
[facility]   FOLLOW UP NOTE    Name:  Katelynn Sung   Age:  64 y.o.  Sex:  female  :  1960  MRN:  [id number]   Visit Number:  [id number]  Admission Date:  10/21/2024  Date Of Service:  10/22/24  Primary Care Physician:  Lesvia Servin MD    Patient was seen and examined this afternoon. Pertinent laboratory and radiology data were reviewed.    Patient is currently sitting up on the bed.  She has been able to walk to the bathroom without any difficulty.  She was recently started on Eliquis for pulmonary embolism and came back within a day due to melena.    She has not required any blood transfusions.  She did undergo upper GI endoscopy by Dr. Lowe today and was noted to have LA grade a reflux esophagitis, erythematous mucosa in the stomach without any evidence of upper GI bleeding.  Dr. Lowe has scheduled her for colonoscopy tomorrow.    Vital signs:    Vital Signs (last 24 hours)         10/21 0700  10/22 0659 10/22 0700  10/22 1614   Most Recent      Temp (°F)   98.6    97.7 -  97.8     97.8 (36.6) 10/22 1526    Heart Rate 63 -  92    60 -  64     60 10/22 1526    Resp   14    14 -  16     16 10/22 1526    /71 -  146/78    124/64 -  144/79     144/79 10/22 1526    SpO2 (%) 91 -  96    92 -  98     96 10/22 152     Discussed with nursing staff and multidisciplinary team.    Sreedhar Lyn MD  10/22/24  16:14 EDT    Dictated utilizing Dragon dictation.

## 2024-10-22 NOTE — H&P
[facility]      Name:  Katelynn Sung   Age:  64 y.o.  Sex:  female  :  1960  MRN:  [id number]   Visit Number:  [id number]  Admission Date:  10/21/2024  Date Of Service:  10/22/24  Primary Care Physician:  Lesvia Servin MD    Chief Complaint:     Dark stools    History Of Presenting Illness:      Katelynn Sung is a 64-year-old woman with past medical history of chronic back pain on Subutex, GERD, hypertension, pacemaker placed 2024, COPD, constipation, degenerative arthritis spine, anxiety depression, restless leg syndrome, obesity BMI 32, fatty liver, insomnia, type 2 diabetes, chronic pancreatitis, hypertension, polyarthropathy, vitamin D deficiency, fibromyalgia, Humphreys's esophagus, hyperlipidemia.  Recent hospitalization for pulmonary embolism with rhinovirus, discharged 10/21/2024 with Eliquis.  Presented to [facility] ED later same day with concerns for multiple episodes of black stool.  Bright red blood.  Does report extreme fatigue and weak overall.  Denies vomiting, nausea, hematemesis.    ED summary: Afebrile, vital signs stable room air.  EKG sinus rhythm, nonspecific ST-T wave changes.  Troponin not elevated.  Blood glucose 175.  Lipase 92.  Hemoglobin 13.6.  Trace leukocytes on UA.  C. difficile negative.  GI panel pending.  Fecal occult blood positive.  CT N/pelvis diffuse fatty infiltration of the liver, periampullary duodenal diverticulum, no evidence of acute abnormality.  CT angio chest redemonstration of right lower lobe pulmonary artery filling defects consistent with pulmonary emboli.  She was provided morphine, Zofran, Protonix, 500 mL NS bolus.    Review Of Systems:    All systems were reviewed and negative except as mentioned in history of presenting illness, assessment and plan.    Past Medical History: Patient  has a past medical history of Adnexal mass, Anxiety, Arm fracture, left, Humphreys's esophagus, Body piercing, Class  1 obesity due to excess calories with serious comorbidity and body mass index (BMI) of 30.0 to 30.9 in adult (02/16/2018), Colon polyps, COPD (chronic obstructive pulmonary disease) (2008), Cor pulmonale, Degenerative arthritis of lumbar spine, Depression, Fibromyalgia, Finger fracture, GERD (gastroesophageal reflux disease), Herpes simplex, Hypertension, Insomnia, Lumbar degenerative disc disease, Mild sleep apnea (01/16/2013), Narcotic dependence, Osteoporosis, Pancreatitis, Pneumonia, Respiratory failure requiring intubation, Restless leg syndrome, Seasonal allergies, Sinus node dysfunction, Sleep apnea, Trouble swallowing, and Wears dentures.    Past Surgical History: Patient  has a past surgical history that includes Colonoscopy (N/A, 2008); Upper gastrointestinal endoscopy (N/A, 2013); Tubal ligation (1991); Esophagogastroduodenoscopy (N/A, 03/11/2020); Pacemaker Implantation; and Esophagogastroduodenoscopy (N/A, 8/9/2024).    Social History: Patient  reports that she quit smoking about 11 years ago. Her smoking use included cigarettes and electronic cigarette. She started smoking about 14 years ago. She has a 2.3 pack-year smoking history. She has never been exposed to tobacco smoke. She has never used smokeless tobacco. She reports that she does not currently use drugs after having used the following drugs: Oxycodone. She reports that she does not drink alcohol.    Family History:  Patient's family history has been reviewed and found to be noncontributory.     Allergies:      Nsaids    Home Medications:    Prior to Admission Medications       Prescriptions Last Dose Informant Patient Reported? Taking?    albuterol sulfate HFA (ProAir HFA) 108 (90 Base) MCG/ACT inhaler   No No    Inhale 2 puffs Every 4 (Four) Hours As Needed for Wheezing.    Apixaban Starter Pack tablet therapy pack   No No    Take 2 tablets by mouth Every 12 (Twelve) Hours for 6 days, THEN 1 tablet Every 12 (Twelve) Hours. Indications:  DVT/PE (active thrombosis)    Azelastine HCl 137 MCG/SPRAY solution   No No    INSTILL 2 SPRAYS IN EACH NOSTRIL TWICE DAILY AS DIRECTED    buprenorphine (SUBUTEX) 8 MG sublingual tablet SL tablet   Yes No    Place 1 tablet under the tongue Daily.    clonazePAM (KlonoPIN) 0.5 MG tablet   No No    Take 1 tablet by mouth 2 (Two) Times a Day As Needed for Anxiety.    cyclobenzaprine (FLEXERIL) 5 MG tablet   No No    Take 1 tablet by mouth 3 (Three) Times a Day As Needed for Muscle Spasms.    dexlansoprazole (DEXILANT) 60 MG capsule   No No    Take 1 capsule by mouth Daily.    fluticasone (FLONASE) 50 MCG/ACT nasal spray   No No    INSTILL 2 SPRAYS IN EACH NOSTRIL ONCE DAILY AS DIRECTED    Fluticasone Furoate-Vilanterol (Breo Ellipta) 200-25 MCG/ACT inhaler   No No    Inhale 1 puff Daily.    ipratropium-albuterol (Combivent Respimat)  MCG/ACT inhaler   No No    Inhale 1 puff 4 (Four) Times a Day As Needed for Wheezing.    lisinopril-hydrochlorothiazide (PRINZIDE,ZESTORETIC) 10-12.5 MG per tablet   No No    Take 1 tablet by mouth Daily.    methylPREDNISolone acetate (DEPO-medrol) injection 40 mg   No No    nystatin (MYCOSTATIN) 100,000 unit/mL suspension   No No    SWISH AND SWALLOW 5 MLS BY MOUTH FOUR TIMES DAILY    Patient taking differently:  Take  by mouth As Needed. SWISH AND SWALLOW 5 MLS BY MOUTH FOUR TIMES DAILY    pregabalin (LYRICA) 200 MG capsule   No No    Take 1 capsule by mouth 2 (Two) Times a Day.    promethazine (PHENERGAN) 25 MG tablet   No No    TAKE 1 TABLET BY MOUTH EVERY 8 (EIGHT) HOURS AS NEEDED FOR NAUSEA OR VOMITING.    promethazine-dextromethorphan (PROMETHAZINE-DM) 6.25-15 MG/5ML syrup   No No    Take 5 mL by mouth 4 (Four) Times a Day As Needed for Cough.          ED Medications:    Medications   sodium chloride 0.9 % flush 10 mL (has no administration in time range)   sodium chloride 0.9 % flush 10 mL (10 mL Intravenous Given 10/21/24 2766)   sodium chloride 0.9 % flush 10 mL (has no  "administration in time range)   sodium chloride 0.9 % infusion 40 mL (has no administration in time range)   ondansetron (ZOFRAN) injection 4 mg (has no administration in time range)   nitroglycerin (NITROSTAT) SL tablet 0.4 mg (has no administration in time range)   Potassium Replacement - Follow Nurse / BPA Driven Protocol (has no administration in time range)   Magnesium Standard Dose Replacement - Follow Nurse / BPA Driven Protocol (has no administration in time range)   Phosphorus Replacement - Follow Nurse / BPA Driven Protocol (has no administration in time range)   Calcium Replacement - Follow Nurse / BPA Driven Protocol (has no administration in time range)   pantoprazole (PROTONIX) injection 80 mg (80 mg Intravenous Given 10/21/24 2015)   sodium chloride 0.9 % bolus 500 mL (0 mL Intravenous Stopped 10/21/24 2113)   morphine injection 4 mg (4 mg Intravenous Given 10/21/24 2021)   ondansetron (ZOFRAN) injection 4 mg (4 mg Intravenous Given 10/21/24 2018)   iopamidol (ISOVUE-300) 61 % injection 100 mL (100 mL Intravenous Given 10/21/24 2041)   pantoprazole (PROTONIX) injection 40 mg (40 mg Intravenous Given 10/21/24 2243)     Vital Signs:  Temp:  [97.6 °F (36.4 °C)-98.6 °F (37 °C)] 98.6 °F (37 °C)  Heart Rate:  [64-92] 67  Resp:  [14-18] 14  BP: (102-146)/(60-82) 102/71        10/21/24  1636   Weight: 85.3 kg (188 lb)     Body mass index is 32.27 kg/m².    Physical Exam:     Most recent vital Signs: /71   Pulse 67   Temp 98.6 °F (37 °C) (Oral)   Resp 14   Ht 162.6 cm (64\")   Wt 85.3 kg (188 lb)   LMP  (LMP Unknown)   SpO2 92%   BMI 32.27 kg/m²     Physical Exam  Constitutional:       General: She is not in acute distress.     Appearance: She is obese. She is ill-appearing. She is not toxic-appearing.   HENT:      Mouth/Throat:      Mouth: Mucous membranes are moist.   Eyes:      Extraocular Movements: Extraocular movements intact.   Cardiovascular:      Rate and Rhythm: Normal rate and regular " rhythm.      Pulses: Normal pulses.      Heart sounds: Normal heart sounds.   Pulmonary:      Effort: Pulmonary effort is normal.      Breath sounds: Normal breath sounds.   Abdominal:      Palpations: Abdomen is soft.      Tenderness: There is no abdominal tenderness.   Musculoskeletal:      Right lower leg: No edema.      Left lower leg: No edema.   Skin:     General: Skin is warm.   Neurological:      General: No focal deficit present.      Mental Status: She is alert and oriented to person, place, and time.   Psychiatric:         Mood and Affect: Mood normal.         Thought Content: Thought content normal.         Laboratory data:    I have reviewed the labs done in the emergency room.    Results from last 7 days   Lab Units 10/21/24  1717 10/21/24  0542 10/20/24  0831 10/19/24  1716   SODIUM mmol/L 135* 139 138 139   POTASSIUM mmol/L 4.4 4.1 4.1 4.2   CHLORIDE mmol/L 98 105 103 106   CO2 mmol/L 23.8 24.6 22.2 21.9*   BUN mg/dL 12 15 8 7*   CREATININE mg/dL 0.91 0.90 0.82 0.87   CALCIUM mg/dL 9.3 8.7 9.5 8.9   BILIRUBIN mg/dL 0.2  --   --  0.3   ALK PHOS U/L 90  --   --  113   ALT (SGPT) U/L 24  --   --  31   AST (SGOT) U/L 25  --   --  64*   GLUCOSE mg/dL 175* 100* 159* 182*     Results from last 7 days   Lab Units 10/21/24  1717 10/21/24  0542 10/20/24  0831   WBC 10*3/mm3 9.70 9.93 11.02*   HEMOGLOBIN g/dL 13.6 13.1 14.3   HEMATOCRIT % 39.9 39.0 43.0   PLATELETS 10*3/mm3 282 171 277         Results from last 7 days   Lab Units 10/21/24  1717 10/19/24  1716   HSTROP T ng/L <6 <6     Results from last 7 days   Lab Units 10/19/24  1716   PROBNP pg/mL <36.0         Results from last 7 days   Lab Units 10/21/24  1717   LIPASE U/L 92*         Results from last 7 days   Lab Units 10/21/24  2057   COLOR UA  Yellow   GLUCOSE UA  Negative   KETONES UA  Negative   BLOOD UA  Negative   LEUKOCYTES UA  Trace*   PH, URINE  5.5   BILIRUBIN UA  Negative   UROBILINOGEN UA  0.2 E.U./dL   RBC UA /HPF None Seen   WBC UA /HPF 0-2        Pain Management Panel           No data to display                EKG:      EKG sinus rhythm, nonspecific ST-T wave changes    Radiology:    CT Angiogram Chest Pulmonary Embolism    Result Date: 10/21/2024  PE PROTOCOL CHEST CT:  HISTORY:  Shortness of breath .  COMPARISON: Chest CT from 10-.  The patient was injected with  IV contrast . Axial images were obtained through the chest in a PE protocol. 3 D reconstruction images were also performed. Individualized dose reduction techniques using automated exposure control or adjustment of the mA and/or kV according to patient size were employed.  FINDINGS: Left upper anterior chest wall pacemaker is present. Mediastinal vasculature is well opacified. Small filling defects are identified within right lower lobe pulmonary artery branch vessels. These findings are well seen on images 41 through 44 of series 5. The appearance is similar to that seen previously.  On the lung window images, there is some minimal bilateral groundglass opacity present, similar to previous.  Limited images through the upper abdomen demonstrate mild fatty infiltration of the liver.         1. Redemonstration of right lower lobe pulmonary artery filling defects, consistent with pulmonary emboli. Findings are similar to that seen previously.  2. Mild bilateral ground glass opacities, probably related to mild pneumonitis. Findings similar to previous.      This report was signed and finalized on 10/21/2024 9:14 PM by Dami Zarate MD.      CT Abdomen Pelvis With & Without Contrast    Result Date: 10/21/2024  CT SCAN OF THE ABDOMEN AND PELVIS WITH AND WITHOUT CONTRAST  COMPARISON: Abdomen and pelvic CT scan from 8-6-2024  HISTORY: Abdominal pain. Black stools. Evaluate for GI bleed.  PROCEDURE: The patient was injected with 100 mL of Omnipaque-300. Axial images were obtained from the lung bases to the pubic symphysis by computed tomography. Individualized dose reduction techniques using  automated exposure control or adjustment of the mA and/or kV according to patient size were employed.  FINDINGS:  ABDOMEN: On the preinfusion images, there is no evidence of kidney stone.  There is mild diffuse fatty infiltration of the liver. Gallbladder is present. Spleen, pancreas, adrenals, and kidneys appear unremarkable. Note is made of a periampullary duodenal diverticulum.  PELVIS: The appendix appears unremarkable. Uterus is present and lies eccentric to the right. Urinary bladder is incompletely distended. There is no evidence of pelvic mass or inflammation.       1. Diffuse fatty infiltration of the liver.  2. Periampullary duodenal diverticulum.  3. No definite evidence of acute abnormality.    CTDI: 9.76 mGy DLP:1073.55 mGy.cm   This report was signed and finalized on 10/21/2024 9:11 PM by Dami Zarate MD.      US Venous Doppler Lower Extremity Bilateral (duplex)    Result Date: 10/20/2024  BILATERAL LOWER EXTREMITY VENOUS DUPLEX DOPPLER EXAMINATION  HISTORY: DVT?; I26.99-Other pulmonary embolism without acute cor pulmonale; R06.00-Dyspnea, unspecified; B34.8-Other viral infections of unspecified site; R05.1-Acute cough  COMPARISON:   None  PROCEDURE: Multiple transverse and longitudinal scans were performed of both femoropopliteal deep venous system, with augmentation and compression maneuvers.  FINDINGS: There is normal phasic flow noted in the visualized deep venous system. No intraluminal increased echogenicity is noted to suggest thrombus. There is normal compression and augmentation of the venous structures. No abnormal venous collaterals are seen.      No evidence of left or right lower extremity deep venous thrombosis.      This report was signed and finalized on 10/20/2024 7:01 PM by Ashish Lanza MD.      CT Angiogram Chest Pulmonary Embolism    Result Date: 10/19/2024  PROCEDURE: CT ANGIOGRAM CHEST PULMONARY EMBOLISM-  HISTORY: Shortness of breath  COMPARISON: None.  TECHNIQUE: The patient  was injected with  IV contrast. Axial images were obtained through the chest in a CTA/ PE protocol. 3 D Reconstruction images were also performed. This study was performed with techniques to keep radiation doses as low as reasonably achievable, (ALARA). Individualized dose reduction techniques using automated exposure control or adjustment of mA and/or kV according to the patient size were employed.  FINDINGS: There is respiratory motion artifact. The aortic contour is normal. There is no aneurysm or dissection. Multivessel coronary artery calcifications are noted. The pulmonary artery bolus is limited. There is no central pulmonary embolism. However, I cannot exclude emboli in the descending right lower lobe pulmonary artery. There are no effusions. There is no adenopathy. There is mild emphysema. There is bibasilar atelectasis with probable left base fibrosis. Imaging of the upper abdomen demonstrates debris in the stomach from a recent meal. No other abnormalities identified.      1. No Limited bolus of contrast and motion artifact limits this examination. There is no central pulmonary embolism. There are questionable emboli in the descending right pulmonary artery. There is no right heart strain. 2. No aneurysm or dissection. 3. Coronary artery calcification. 4. Bibasilar atelectasis there are no effusions. There is no edema.  CTDI: 6.47 mGy DLP:191.23 mGy.cm  This report was signed and finalized on 10/19/2024 8:45 PM by Ashish Lanza MD.      XR Chest 1 View    Result Date: 10/19/2024  PORTABLE CHEST  HISTORY: Shortness of breath and cough for 2 to 3 days  COMPARISON: 3/18/2024  FINDINGS: A portable radiograph the chest demonstrates a 2-lead left pacemaker. The heart is normal in size. There is decreased lung volume. There is evidence of prior granulomatous disease. There is left base discoid atelectasis and/or fibrosis. There are increased interstitial markings      Underinflation with no edema or infiltrate.  There are coarse interstitial changes.  This report was signed and finalized on 10/19/2024 6:19 PM by Ashish Lanza MD.       Assessment/Plan:    Observation general for admission 10/21/2024 with melena after recently starting Eliquis for pulmonary embolism.    At time of encounter resting in bed, appears comfortably ill.  Pleasantly conversational.    Melena  Hold Eliquis.  Gastroenterology consultation, recommendations appreciated.  Protonix.  NPO.    Chronic: chronic back pain on Subutex, GERD, hypertension, pacemaker placed February 2024, COPD, constipation, degenerative arthritis spine, anxiety depression, restless leg syndrome, obesity BMI 32, fatty liver, insomnia, type 2 diabetes, chronic pancreatitis, hypertension, polyarthropathy, vitamin D deficiency, fibromyalgia, Humphreys's esophagus, hyperlipidemia.  Recent hospitalization for pulmonary embolism with rhinovirus, discharged 10/21/2024 with Eliquis.    Subcutaneous insulin protocol.  Continue other home medications.    Risk Assessment: High  DVT Prophylaxis: SCDs  Code Status: Full code  Diet: N.p.o.    Advance Care Planning   ACP discussion was held with the patient during this visit. Patient does not have an advance directive, information provided.           Brian Joseph Kerley, DO  10/22/24  01:12 EDT    Dictated utilizing Dragon dictation.

## 2024-10-22 NOTE — CONSULTS
In Patient Consult      Date of Consultation: 2024  Patient Name: Katelynn Sung  MRN: [id number]  : 1960     Referring provider: Melissa Torrez MD    Primary care provider:  Lesvia Servin MD    Reason for consultation: GI bleeding, suspected melena    History of Present Illness: This is a 64-year-old female patient with a prior history of hypertension, hyperlipidemia, cardiac arrhythmia status post PPM, COPD, anxiety depression, diabetes mellitus, stroke.  His constipation and gastroparesis, recently diagnosed PE presents to emergency room yesterday on 10/21/2024 with complaints of passing 3 dark bowel movements.    Patient was hospitalized recently with a rhinovirus infection and PE and was discharged yesterday on 10/21/2024 with Eliquis.  After she went home in the evening she had a 3 bowel movement which was darkish and she thought she has a blood in the stool and came to emergency room.  She also had a left lower abdominal cramps.  She denies any seeing red blood or clots.  No prior history of any GI bleeding.  She had an EGD done recently in 2024 which revealed healed ulcer scar in the stomach otherwise unremarkable.  She was supposed to be getting her colonoscopy soon as OP.  She has a significant issue with constipation while on a Subutex.      In the emergency room she had a lab work done which revealed normal CMP except borderline glucose of 175 and sodium of 135.  Her CBC revealed a normal hemoglobin of 13.6.  Stool C. difficile was negative.  There was a suspicion of GI bleed and stool occult blood test was positive for which GI was consulted    Subjective     Past Medical History:   Diagnosis Date    Adnexal mass     pelvic US 10/26/11 showed 2 cm left abdnexal cystic lesion, resolved on f/u 5/14/15    Anxiety     Arm fracture, left     Humphreys's esophagus     Body piercing     Both ears    Class 1 obesity due to excess calories with serious comorbidity and  body mass index (BMI) of 30.0 to 30.9 in adult 2018    Colon polyps     COPD (chronic obstructive pulmonary disease)     Cor pulmonale     Degenerative arthritis of lumbar spine     Depression     Fibromyalgia     Finger fracture     GERD (gastroesophageal reflux disease)     Herpes simplex     Hypertension     Insomnia     Lumbar degenerative disc disease     Mild sleep apnea 2013    Sleep study 13 showing mild degree    Narcotic dependence     Osteoporosis     Pancreatitis     Pneumonia     Respiratory failure requiring intubation     double pneumonia    Restless leg syndrome     Seasonal allergies     Sinus node dysfunction     Sleep apnea     does not wear cpap    Trouble swallowing     Wears dentures        Past Surgical History:   Procedure Laterality Date    COLONOSCOPY N/A     Complete    ENDOSCOPY N/A 2020    Procedure: ESOPHAGOGASTRODUODENOSCOPY;  Surgeon: Charbel Ching MD;  Location: [facility] ENDOSCOPY;  Service: Gastroenterology;  Laterality: N/A;    ENDOSCOPY N/A 2024    Procedure: Esophagogastroduodenoscopy with biopsy and polypectomy;  Surgeon: Jeronimo Lowe MD;  Location: [facility] ENDOSCOPY;  Service: Gastroenterology;  Laterality: N/A;    PACEMAKER IMPLANTATION      2024    TUBAL ABDOMINAL LIGATION      UPPER GASTROINTESTINAL ENDOSCOPY N/A        Family History   Problem Relation Age of Onset    Stroke Mother     Liver disease Mother         Chronic    Cirrhosis Father     Heart attack Father     Breast cancer Sister     Stroke Brother     Cancer Sister     Colon cancer Neg Hx        Social History     Socioeconomic History    Marital status:    Tobacco Use    Smoking status: Former     Current packs/day: 0.00     Average packs/day: 0.8 packs/day for 3.0 years (2.3 ttl pk-yrs)     Types: Cigarettes, Electronic Cigarette     Start date:      Quit date:      Years since quittin.8     Passive exposure: Never    Smokeless  tobacco: Never    Tobacco comments:     2013 started using nicotine containing substance in combustion-free vaporization device. PT not longer uses vapor stick.   Vaping Use    Vaping status: Former   Substance and Sexual Activity    Alcohol use: No    Drug use: Not Currently     Types: Oxycodone     Comment: Patient is currently on subutex    Sexual activity: Not Currently         Current Facility-Administered Medications:     methylPREDNISolone acetate (DEPO-medrol) injection 40 mg, 40 mg, Intramuscular, Once, Lesvia Servin MD    sodium chloride 0.9 % flush 10 mL, 10 mL, Intravenous, PRN, Melissa Torrez MD    Current Outpatient Medications:     albuterol sulfate HFA (ProAir HFA) 108 (90 Base) MCG/ACT inhaler, Inhale 2 puffs Every 4 (Four) Hours As Needed for Wheezing., Disp: 18 g, Rfl: 5    Apixaban Starter Pack tablet therapy pack, Take 2 tablets by mouth Every 12 (Twelve) Hours for 6 days, THEN 1 tablet Every 12 (Twelve) Hours. Indications: DVT/PE (active thrombosis), Disp: 74 tablet, Rfl: 0    Azelastine HCl 137 MCG/SPRAY solution, INSTILL 2 SPRAYS IN EACH NOSTRIL TWICE DAILY AS DIRECTED, Disp: 30 mL, Rfl: 9    buprenorphine (SUBUTEX) 8 MG sublingual tablet SL tablet, Place 1 tablet under the tongue Daily., Disp: , Rfl:     clonazePAM (KlonoPIN) 0.5 MG tablet, Take 1 tablet by mouth 2 (Two) Times a Day As Needed for Anxiety., Disp: 45 tablet, Rfl: 0    cyclobenzaprine (FLEXERIL) 5 MG tablet, Take 1 tablet by mouth 3 (Three) Times a Day As Needed for Muscle Spasms., Disp: 90 tablet, Rfl: 2    dexlansoprazole (DEXILANT) 60 MG capsule, Take 1 capsule by mouth Daily., Disp: 30 capsule, Rfl: 5    fluticasone (FLONASE) 50 MCG/ACT nasal spray, INSTILL 2 SPRAYS IN EACH NOSTRIL ONCE DAILY AS DIRECTED, Disp: 16 g, Rfl: 1    Fluticasone Furoate-Vilanterol (Breo Ellipta) 200-25 MCG/ACT inhaler, Inhale 1 puff Daily., Disp: 28 each, Rfl: 11    ipratropium-albuterol (Combivent Respimat)  MCG/ACT inhaler,  "Inhale 1 puff 4 (Four) Times a Day As Needed for Wheezing., Disp: 4 g, Rfl: 11    lisinopril-hydrochlorothiazide (PRINZIDE,ZESTORETIC) 10-12.5 MG per tablet, Take 1 tablet by mouth Daily., Disp: 30 tablet, Rfl: 11    nystatin (MYCOSTATIN) 100,000 unit/mL suspension, SWISH AND SWALLOW 5 MLS BY MOUTH FOUR TIMES DAILY (Patient taking differently: Take  by mouth As Needed. SWISH AND SWALLOW 5 MLS BY MOUTH FOUR TIMES DAILY), Disp: 180 mL, Rfl: 0    pregabalin (LYRICA) 200 MG capsule, Take 1 capsule by mouth 2 (Two) Times a Day., Disp: 60 capsule, Rfl: 1    promethazine (PHENERGAN) 25 MG tablet, TAKE 1 TABLET BY MOUTH EVERY 8 (EIGHT) HOURS AS NEEDED FOR NAUSEA OR VOMITING., Disp: 90 tablet, Rfl: 0    promethazine-dextromethorphan (PROMETHAZINE-DM) 6.25-15 MG/5ML syrup, Take 5 mL by mouth 4 (Four) Times a Day As Needed for Cough., Disp: 240 mL, Rfl: 0    Allergies   Allergen Reactions    Nsaids GI Intolerance       Review of Systems   Constitutional:  Negative for appetite change, fatigue, fever and unexpected weight loss.   HENT:  Negative for trouble swallowing.    Gastrointestinal:  Positive for abdominal pain and blood in stool. Negative for abdominal distention, anal bleeding, constipation, diarrhea, nausea, rectal pain, vomiting, GERD and indigestion.        Dark stool         The following portions of the patient's history were reviewed and updated as appropriate: allergies, current medications, past family history, past medical history, past social history, past surgical history and problem list.    Objective     Vitals:    10/21/24 1636 10/21/24 2013   BP: 146/78 144/80   BP Location: Left arm    Patient Position: Sitting    Pulse: 92    Resp: 14    Temp: 98.6 °F (37 °C)    TempSrc: Oral    SpO2: 93% 96%   Weight: 85.3 kg (188 lb)    Height: 162.6 cm (64\")        Physical Exam  Constitutional:       Appearance: She is obese.   HENT:      Head: Normocephalic and atraumatic.   Eyes:      Conjunctiva/sclera: " Conjunctivae normal.   Abdominal:      General: Abdomen is flat. There is no distension.      Palpations: There is no mass.      Tenderness: There is no abdominal tenderness. There is no guarding or rebound.      Hernia: No hernia is present.      Comments: Rectal examination reveals a liquid yellowish colored stool in the rectum   Musculoskeletal:      Cervical back: Normal range of motion and neck supple.   Neurological:      Mental Status: She is alert.         Results from last 7 days   Lab Units 10/21/24  1717 10/21/24  0542 10/20/24  0831 10/19/24  1716   SODIUM mmol/L 135* 139 138 139   POTASSIUM mmol/L 4.4 4.1 4.1 4.2   CHLORIDE mmol/L 98 105 103 106   CO2 mmol/L 23.8 24.6 22.2 21.9*   BUN mg/dL 12 15 8 7*   CREATININE mg/dL 0.91 0.90 0.82 0.87   CALCIUM mg/dL 9.3 8.7 9.5 8.9   ALBUMIN g/dL 4.4  --   --  4.2   BILIRUBIN mg/dL 0.2  --   --  0.3   ALK PHOS U/L 90  --   --  113   ALT (SGPT) U/L 24  --   --  31   AST (SGOT) U/L 25  --   --  64*   GLUCOSE mg/dL 175* 100* 159* 182*   WBC 10*3/mm3 9.70 9.93 11.02* 7.48   HEMOGLOBIN g/dL 13.6 13.1 14.3 13.8   PLATELETS 10*3/mm3 282 171 277 243       Imaging Results (Last 24 Hours)       Procedure Component Value Units Date/Time    CT Angiogram Chest Pulmonary Embolism [[id number]] Collected: 10/21/24 2112     Updated: 10/21/24 2116    Narrative:      PE PROTOCOL CHEST CT:     HISTORY:  Shortness of breath .     COMPARISON: Chest CT from 10-.     The patient was injected with  IV contrast . Axial images were obtained  through the chest in a PE protocol. 3 D reconstruction images were also  performed. Individualized dose reduction techniques using automated  exposure control or adjustment of the mA and/or kV according to patient  size were employed.     FINDINGS: Left upper anterior chest wall pacemaker is present.  Mediastinal vasculature is well opacified. Small filling defects are  identified within right lower lobe pulmonary artery branch vessels.  These  findings are well seen on images 41 through 44 of series 5. The  appearance is similar to that seen previously.     On the lung window images, there is some minimal bilateral groundglass  opacity present, similar to previous.     Limited images through the upper abdomen demonstrate mild fatty  infiltration of the liver.          Impression:            1. Redemonstration of right lower lobe pulmonary artery filling defects,  consistent with pulmonary emboli. Findings are similar to that seen  previously.     2. Mild bilateral ground glass opacities, probably related to mild  pneumonitis. Findings similar to previous.                 This report was signed and finalized on 10/21/2024 9:14 PM by Dami Zarate MD.       CT Abdomen Pelvis With & Without Contrast [[id number]] Collected: 10/21/24 2109     Updated: 10/21/24 2113    Narrative:      CT SCAN OF THE ABDOMEN AND PELVIS WITH AND WITHOUT CONTRAST     COMPARISON: Abdomen and pelvic CT scan from 8-6-2024     HISTORY: Abdominal pain. Black stools. Evaluate for GI bleed.     PROCEDURE: The patient was injected with 100 mL of Omnipaque-300. Axial  images were obtained from the lung bases to the pubic symphysis by  computed tomography. Individualized dose reduction techniques using  automated exposure control or adjustment of the mA and/or kV according  to patient size were employed.     FINDINGS:     ABDOMEN: On the preinfusion images, there is no evidence of kidney  stone.     There is mild diffuse fatty infiltration of the liver. Gallbladder is  present. Spleen, pancreas, adrenals, and kidneys appear unremarkable.  Note is made of a periampullary duodenal diverticulum.     PELVIS: The appendix appears unremarkable. Uterus is present and lies  eccentric to the right. Urinary bladder is incompletely distended. There  is no evidence of pelvic mass or inflammation.       Impression:         1. Diffuse fatty infiltration of the liver.     2. Periampullary duodenal  diverticulum.     3. No definite evidence of acute abnormality.           CTDI: 9.76 mGy  DLP:1073.55 mGy.cm        This report was signed and finalized on 10/21/2024 9:11 PM by Dami Zarate MD.               EGD 8/9/2024  - Normal oropharynx. - Z-line regular, 35 cm from the incisors. - No gross lesions in the entire esophagus. - No endoscopic esophageal abnormality to explain patient's dysphagia. Biopsied. - Bilious gastric fluid with food debris. - Healed ulcer Scar in the posterior wall of the stomach. - Erythematous mucosa in the posterior wall of the stomach, antrum and prepyloric region of the stomach. Biopsied. - Normal duodenal bulb, first portion of the duodenum, second portion of the duodenum and third portion of the duodenum. Biopsied. - A few gastric polyps. One resected and retrieved. - Likely has underlying IBS complicated by opioid use with opioid induced gastroparesis.   Pathology DIAGNOSIS:  A.     ANTRUM AND BODY, BIOPSY:  Reactive gastropathy  No Helicobacter pylori like organisms identified on H&E stained slide  No intestinal metaplasia or dysplasia identified  B.     DUODENUM, BIOPSY:  Small bowel mucosa with no significant pathologic abnormality  C.     STOMACH, FUNDUS, POLYP:  Body type gastric mucosa with no significant pathologic abnormality  D.     ESOPHAGUS, BIOPSY, RANDOM:  Squamous mucosa with mild reactive/reflux related changes  No increased intraepithelial eosinophils, intestinal metaplasia     Assessment / Plan      Assessment/Recommendations:   Suspected melena  Anticoagulation use  Stool occult blood test positive    Patient was recently diagnosed with a PE and started on Eliquis was discharged yesterday on a 10/21/2024.  She had 3 bowel movement which looked for her more darkish and she thought it was blood mixed and hence she came to emergency room for evaluation.  Findings not convincing of any overt GI bleeding.  As her stool occult blood test was positive GI was  consulted    Her hemoglobin is stable without signs of overt bleeding.  As she was started on Eliquis and also has a prior history of healed ulcer scars in the stomach, upper GI bleeding need to be ruled out.  She is also due for her colonoscopy and if EGD negative we will schedule her for colonoscopy.  Rectal examination done today reveals only yellow-colored liquid stool in the rectum without any signs of melena.    I have discussed the risk and benefits involved and patient is agreeable to proceed with the EGD.    Keep n.p.o.  IV fluid hydration  Protonix IV twice daily  Hold Eliquis for now  Scheduled for an urgent EGD this morning  If EGD negative we will schedule for colonoscopy on 10/23/2024    History of opioid-induced gastroparesis  History of opioid-induced constipation  History of COPD/      Thank you very much for letting me participate in the care of this patient.  Please do not hesitate to call me if you have any questions.    Jeronimo Lowe MD  [facility]  10/21/2024  21:38 EDT    Please note that portions of this note may have been completed with a voice recognition program.

## 2024-10-22 NOTE — OUTREACH NOTE
Call Center TCM Note      Flowsheet Row Responses   [facility] patient discharged from? [facility]   Does the patient have one of the following disease processes/diagnoses(primary or secondary)? Other   TCM attempt successful? No   Unsuccessful attempts Attempt 1   Change in Health Status Readmitted            Bibiana Lassiter RN    10/22/2024, 06:51 EDT

## 2024-10-22 NOTE — ANESTHESIA POSTPROCEDURE EVALUATION
Patient: Katelynn Sung    Procedure Summary       Date: 10/22/24 Room / Location: [facility] ENDOSCOPY 1 / [facility] ENDOSCOPY    Anesthesia Start: 1426 Anesthesia Stop: 1441    Procedure: Esophagogastroduodenscopy (Esophagus) Diagnosis:       Gastrointestinal hemorrhage with melena      (Gastrointestinal hemorrhage with melena [K92.1])    Surgeons: Jeronimo Lowe MD Provider: Wm Martinez CRNA    Anesthesia Type: MAC ASA Status: 3 - Emergent            Anesthesia Type: MAC    Vitals  HR 64  Resp 17  Sat 95  /64  Temp 97.6        Post Anesthesia Care and Evaluation    Patient location during evaluation: bedside  Patient participation: complete - patient participated  Level of consciousness: awake and alert  Pain score: 0  Pain management: adequate    Airway patency: patent  Anesthetic complications: No anesthetic complications  PONV Status: none  Cardiovascular status: acceptable  Respiratory status: acceptable  Hydration status: acceptable

## 2024-10-23 ENCOUNTER — ANESTHESIA (OUTPATIENT)
Dept: GASTROENTEROLOGY | Facility: HOSPITAL | Age: 64
End: 2024-10-23
Payer: MEDICAID

## 2024-10-23 ENCOUNTER — ANESTHESIA EVENT (OUTPATIENT)
Dept: GASTROENTEROLOGY | Facility: HOSPITAL | Age: 64
End: 2024-10-23
Payer: MEDICAID

## 2024-10-23 LAB
DEPRECATED RDW RBC AUTO: 40.8 FL (ref 37–54)
ERYTHROCYTE [DISTWIDTH] IN BLOOD BY AUTOMATED COUNT: 13.2 % (ref 12.3–15.4)
GLUCOSE BLDC GLUCOMTR-MCNC: 112 MG/DL (ref 70–130)
GLUCOSE BLDC GLUCOMTR-MCNC: 140 MG/DL (ref 70–130)
GLUCOSE BLDC GLUCOMTR-MCNC: 92 MG/DL (ref 70–130)
HCT VFR BLD AUTO: 40.3 % (ref 34–46.6)
HGB BLD-MCNC: 13.6 G/DL (ref 12–15.9)
MCH RBC QN AUTO: 28.7 PG (ref 26.6–33)
MCHC RBC AUTO-ENTMCNC: 33.7 G/DL (ref 31.5–35.7)
MCV RBC AUTO: 85 FL (ref 79–97)
PLATELET # BLD AUTO: 231 10*3/MM3 (ref 140–450)
PMV BLD AUTO: 10.9 FL (ref 6–12)
RBC # BLD AUTO: 4.74 10*6/MM3 (ref 3.77–5.28)
WBC NRBC COR # BLD AUTO: 8.18 10*3/MM3 (ref 3.4–10.8)

## 2024-10-23 PROCEDURE — G0378 HOSPITAL OBSERVATION PER HR: HCPCS

## 2024-10-23 PROCEDURE — 25010000002 ONDANSETRON PER 1 MG: Performed by: INTERNAL MEDICINE

## 2024-10-23 PROCEDURE — 25010000002 PROPOFOL 10 MG/ML EMULSION: Performed by: NURSE ANESTHETIST, CERTIFIED REGISTERED

## 2024-10-23 PROCEDURE — 45385 COLONOSCOPY W/LESION REMOVAL: CPT | Performed by: INTERNAL MEDICINE

## 2024-10-23 PROCEDURE — 82948 REAGENT STRIP/BLOOD GLUCOSE: CPT

## 2024-10-23 PROCEDURE — 25010000002 LIDOCAINE (CARDIAC): Performed by: NURSE ANESTHETIST, CERTIFIED REGISTERED

## 2024-10-23 PROCEDURE — 99232 SBSQ HOSP IP/OBS MODERATE 35: CPT | Performed by: INTERNAL MEDICINE

## 2024-10-23 PROCEDURE — 82948 REAGENT STRIP/BLOOD GLUCOSE: CPT | Performed by: INTERNAL MEDICINE

## 2024-10-23 PROCEDURE — 85027 COMPLETE CBC AUTOMATED: CPT | Performed by: INTERNAL MEDICINE

## 2024-10-23 RX ORDER — PROPOFOL 10 MG/ML
VIAL (ML) INTRAVENOUS AS NEEDED
Status: DISCONTINUED | OUTPATIENT
Start: 2024-10-23 | End: 2024-10-23 | Stop reason: SURG

## 2024-10-23 RX ORDER — LISINOPRIL 10 MG/1
10 TABLET ORAL
Status: DISCONTINUED | OUTPATIENT
Start: 2024-10-24 | End: 2024-10-24 | Stop reason: HOSPADM

## 2024-10-23 RX ORDER — BUPRENORPHINE 2 MG/1
8 TABLET SUBLINGUAL DAILY
Status: DISCONTINUED | OUTPATIENT
Start: 2024-10-24 | End: 2024-10-24 | Stop reason: HOSPADM

## 2024-10-23 RX ORDER — SIMETHICONE 40MG/0.6ML
SUSPENSION, DROPS(FINAL DOSAGE FORM)(ML) ORAL AS NEEDED
Status: DISCONTINUED | OUTPATIENT
Start: 2024-10-23 | End: 2024-10-23 | Stop reason: HOSPADM

## 2024-10-23 RX ORDER — CLONAZEPAM 0.5 MG/1
0.5 TABLET ORAL 2 TIMES DAILY PRN
Status: DISCONTINUED | OUTPATIENT
Start: 2024-10-23 | End: 2024-10-24 | Stop reason: HOSPADM

## 2024-10-23 RX ORDER — ONDANSETRON 2 MG/ML
4 INJECTION INTRAMUSCULAR; INTRAVENOUS ONCE AS NEEDED
Status: DISCONTINUED | OUTPATIENT
Start: 2024-10-23 | End: 2024-10-23 | Stop reason: HOSPADM

## 2024-10-23 RX ORDER — PANTOPRAZOLE SODIUM 40 MG/1
40 TABLET, DELAYED RELEASE ORAL
Status: DISCONTINUED | OUTPATIENT
Start: 2024-10-24 | End: 2024-10-24 | Stop reason: HOSPADM

## 2024-10-23 RX ORDER — HYDROCHLOROTHIAZIDE 12.5 MG/1
12.5 TABLET ORAL
Status: DISCONTINUED | OUTPATIENT
Start: 2024-10-24 | End: 2024-10-24 | Stop reason: HOSPADM

## 2024-10-23 RX ORDER — CYCLOBENZAPRINE HCL 10 MG
5 TABLET ORAL 3 TIMES DAILY PRN
Status: DISCONTINUED | OUTPATIENT
Start: 2024-10-23 | End: 2024-10-24 | Stop reason: HOSPADM

## 2024-10-23 RX ORDER — FLUTICASONE PROPIONATE 50 UG/1
2 SPRAY, METERED NASAL DAILY
Status: DISCONTINUED | OUTPATIENT
Start: 2024-10-24 | End: 2024-10-24 | Stop reason: HOSPADM

## 2024-10-23 RX ADMIN — ONDANSETRON 4 MG: 2 INJECTION INTRAMUSCULAR; INTRAVENOUS at 07:55

## 2024-10-23 RX ADMIN — PREGABALIN 200 MG: 75 CAPSULE ORAL at 22:02

## 2024-10-23 RX ADMIN — Medication 10 ML: at 09:53

## 2024-10-23 RX ADMIN — Medication 10 ML: at 22:03

## 2024-10-23 RX ADMIN — LIDOCAINE HYDROCHLORIDE 100 MG: 20 INJECTION, SOLUTION INTRAVENOUS at 12:10

## 2024-10-23 RX ADMIN — PROPOFOL 400 MG: 10 INJECTION, EMULSION INTRAVENOUS at 12:10

## 2024-10-23 RX ADMIN — Medication 10 ML: at 07:56

## 2024-10-23 NOTE — PLAN OF CARE
Problem: Pain Acute  Goal: Optimal Pain Control and Function  Outcome: Progressing  Intervention: Optimize Psychosocial Wellbeing  Recent Flowsheet Documentation  Taken 10/23/2024 0800 by Beti Santiago RN  Diversional Activities:   television   smartphone  Intervention: Prevent or Manage Pain  Recent Flowsheet Documentation  Taken 10/23/2024 0800 by Beti Santiago RN  Medication Review/Management: medications reviewed     Problem: Adult Inpatient Plan of Care  Goal: Plan of Care Review  Outcome: Progressing  Goal: Patient-Specific Goal (Individualized)  Outcome: Progressing  Goal: Absence of Hospital-Acquired Illness or Injury  Outcome: Progressing  Intervention: Identify and Manage Fall Risk  Recent Flowsheet Documentation  Taken 10/23/2024 1600 by Beti Santiago RN  Safety Promotion/Fall Prevention:   safety round/check completed   nonskid shoes/slippers when out of bed   clutter free environment maintained   assistive device/personal items within reach  Taken 10/23/2024 1400 by Beti Santiago RN  Safety Promotion/Fall Prevention:   safety round/check completed   clutter free environment maintained   assistive device/personal items within reach   nonskid shoes/slippers when out of bed  Taken 10/23/2024 1200 by Beti Santiago RN  Safety Promotion/Fall Prevention: patient off unit  Taken 10/23/2024 0800 by Beti Santiago RN  Safety Promotion/Fall Prevention:   safety round/check completed   clutter free environment maintained   assistive device/personal items within reach   nonskid shoes/slippers when out of bed  Intervention: Prevent Skin Injury  Recent Flowsheet Documentation  Taken 10/23/2024 1600 by Beti Santiago RN  Body Position:   supine   legs elevated   tilted   left  Taken 10/23/2024 1400 by Beti Santiago RN  Body Position:   sitting up in bed   legs elevated  Taken 10/23/2024 0800 by Beti Santiago RN  Body Position:   supine   legs elevated  Skin  Protection: incontinence pads utilized  Intervention: Prevent and Manage VTE (Venous Thromboembolism) Risk  Recent Flowsheet Documentation  Taken 10/23/2024 0800 by Beti Santiago RN  VTE Prevention/Management: SCDs (sequential compression devices) off  Intervention: Prevent Infection  Recent Flowsheet Documentation  Taken 10/23/2024 1600 by Beti Santiago RN  Infection Prevention: environmental surveillance performed  Taken 10/23/2024 1400 by Beti Santiago RN  Infection Prevention: environmental surveillance performed  Taken 10/23/2024 0800 by Beti Santiago RN  Infection Prevention: single patient room provided  Goal: Optimal Comfort and Wellbeing  Outcome: Progressing  Intervention: Provide Person-Centered Care  Recent Flowsheet Documentation  Taken 10/23/2024 0800 by Beti Santiago RN  Trust Relationship/Rapport:   thoughts/feelings acknowledged   reassurance provided   questions encouraged   questions answered   empathic listening provided   emotional support provided   choices provided   care explained  Goal: Readiness for Transition of Care  Outcome: Progressing     Problem: Comorbidity Management  Goal: Maintenance of Heart Failure Symptom Control  Outcome: Progressing  Intervention: Maintain Heart Failure Management  Recent Flowsheet Documentation  Taken 10/23/2024 0800 by Beti Santiago RN  Medication Review/Management: medications reviewed  Goal: Blood Pressure in Desired Range  Outcome: Progressing  Intervention: Maintain Blood Pressure Management  Recent Flowsheet Documentation  Taken 10/23/2024 0800 by Beti Santiago RN  Medication Review/Management: medications reviewed   Goal Outcome Evaluation:         Pt had colonoscopy procedure today and had 3 pylops removed.  No GI bleeding in upper and lower tract.  VSS on RA.  Pt had no acute events during shift.  No further complaints from pt at this time.

## 2024-10-23 NOTE — PLAN OF CARE
Goal Outcome Evaluation:               VSS att. PRN pain medication effective.  NPO since midnight. Appx 3/4 bowel prep consumed with pt stating she cannot drink anymore it is making her sick. Pt states BM is clear.

## 2024-10-23 NOTE — CASE MANAGEMENT/SOCIAL WORK
DCP; Home with family. Met with pt and her daughter at bedside. She stated she just returned from her procedure and was waiting for someone to come talk to her daughter. Pt and daughter denied any needs. CM continues to follow.

## 2024-10-23 NOTE — ANESTHESIA PREPROCEDURE EVALUATION
Anesthesia Evaluation     Patient summary reviewed and Nursing notes reviewed   NPO Solid Status: > 8 hours  NPO Liquid Status: > 8 hours           Airway   Mallampati: II  TM distance: >3 FB  Neck ROM: full  no difficulty expected and Possible difficult intubation  Dental    (+) upper dentures and lower dentures    Pulmonary - normal exam   (+) pneumonia , pulmonary embolism, a smoker Former, COPD,sleep apnea (no cpap)    ROS comment:  Cor pulmonale  Cardiovascular - normal exam  Exercise tolerance: good (4-7 METS)    ECG reviewed    (+) hypertension, dysrhythmias, hyperlipidemia    ROS comment: 10/21/24 EKG- sinus rhythm      Neuro/Psych  (+) psychiatric history Anxiety  GI/Hepatic/Renal/Endo    (+) obesity, GERD, GI bleeding , liver disease fatty liver disease  (-) morbid obesity    Musculoskeletal     (+) neck pain  Abdominal    Substance History   (+) drug use (narcotic dependence)     OB/GYN negative ob/gyn ROS         Other   arthritis,     ROS/Med Hx Other: ER visit and admission 10/19 for suspected PE   CT IMPRESSION:  1. No Limited bolus of contrast and motion artifact limits this  examination. There is no central pulmonary embolism. There are  questionable emboli in the descending right pulmonary artery. There is  no right heart strain.  2. No aneurysm or dissection.  3. Coronary artery calcification.  4. Bibasilar atelectasis there are no effusions. There is no edema.     Discharged 10/21 and returned same day to ER with complaints of abdominal pain and black stool              Anesthesia Plan    ASA 3 - emergent     MAC     (Risks and benefits discussed including risk of aspiration, recall and dental damage. All patient questions answered.    Will continue with plan of care.)  intravenous induction     Anesthetic plan, risks, benefits, and alternatives have been provided, discussed and informed consent has been obtained with: patient.  Pre-procedure education provided    CODE STATUS:    Code Status  (Patient has no pulse and is not breathing): CPR (Attempt to Resuscitate)  Medical Interventions (Patient has pulse or is breathing): Full Support

## 2024-10-23 NOTE — ANESTHESIA POSTPROCEDURE EVALUATION
Patient: Katelynn Sung    Procedure Summary       Date: 10/23/24 Room / Location: [facility] 1 / [facility]    Anesthesia Start: 1204 Anesthesia Stop: 1241    Procedure: Colonoscopy with polypectomy (Anus) Diagnosis:       Gastrointestinal hemorrhage, unspecified gastrointestinal hemorrhage type      Gastrointestinal hemorrhage with melena      (Gastrointestinal hemorrhage, unspecified gastrointestinal hemorrhage type [K92.2])      (Gastrointestinal hemorrhage with melena [K92.1])    Surgeons: Jeronimo Lowe MD Provider: Dallas Arroyo CRNA    Anesthesia Type: MAC ASA Status: 3 - Emergent            Anesthesia Type: MAC    Vitals  Vitals Value Taken Time   /79 10/23/24 1328   Temp 98.3 °F (36.8 °C) 10/23/24 1328   Pulse 65 10/23/24 1343   Resp 14 10/23/24 1328   SpO2 90 % 10/23/24 1342   Vitals shown include unfiled device data.          Post Anesthesia Care and Evaluation    Patient location during evaluation: PACU  Patient participation: complete - patient participated  Level of consciousness: awake  Pain score: 1  Pain management: adequate    Airway patency: patent  Anesthetic complications: No anesthetic complications  PONV Status: controlled  Cardiovascular status: acceptable and stable  Respiratory status: acceptable  Hydration status: acceptable    Comments: See Nursing record for post procedural Vital Signs as per protocol      
could not breathe

## 2024-10-23 NOTE — PROGRESS NOTES
[facility]    Name:  Katelynn Sung   Age:  64 y.o.  Sex:  female  :  1960  MRN:  [id number]   Visit Number:  [id number]  Admission Date:  10/21/2024  Date Of Service:  10/23/24  Primary Care Physician:  Lesvia Servin MD     LOS: 0 days :    Chief Complaint:      Follow-up of melena.    Subjective:    Katelynn Sung patient was seen in the endoscopy suite.  No further episodes of melena.  No significant overnight events.  Saturating at 92% on room air.    Hospital Course:    Katelynn Sung is a 64-year-old woman with past medical history of chronic back pain on Subutex, GERD, hypertension, pacemaker placed 2024, COPD, constipation, degenerative arthritis spine, anxiety depression, restless leg syndrome, obesity BMI 32, fatty liver, insomnia, type 2 diabetes, chronic pancreatitis, hypertension, polyarthropathy, vitamin D deficiency, fibromyalgia, Humphreys's esophagus, hyperlipidemia.  Recent hospitalization for pulmonary embolism with rhinovirus, discharged 10/21/2024 with Eliquis.  Presented to [facility] ED later same day with concerns for multiple episodes of black stool.       ED summary: Afebrile, vital signs stable room air.  EKG sinus rhythm, nonspecific ST-T wave changes.  Troponin not elevated.  Blood glucose 175.  Lipase 92.  Hemoglobin 13.6.  Trace leukocytes on UA.  C. difficile negative.  GI panel negative.  Fecal occult blood positive.  CT abdomen/pelvis diffuse fatty infiltration of the liver, periampullary duodenal diverticulum, no evidence of acute abnormality.  CT angio chest redemonstration of right lower lobe pulmonary artery filling defects consistent with pulmonary emboli.  She was provided morphine, Zofran, Protonix, 500 mL NS bolus.    Patient was seen by Dr. Lowe from gastroenterology and she underwent upper GI endoscopy on 10/22/2024 and was noted to have LA grade a reflux esophagitis with no bleeding.  Erythematous  mucosa noted in the prepyloric area with no evidence of bleeding.  Duodenum was within normal range.  Patient subsequently had a colonoscopy on 10/23/2024 and was noted to have multiple polyps in the colon with no evidence of lower GI bleeding.  Multiple polyps were resected.  He recommended to hold Eliquis for 1 to 2 days.    Review of Systems:     All systems were reviewed and negative except as mentioned in subjective, assessment and plan.    Vital Signs:    Temp:  [97.5 °F (36.4 °C)-98.3 °F (36.8 °C)] 98.3 °F (36.8 °C)  Heart Rate:  [60-73] 66  Resp:  [12-18] 14  BP: (116-148)/(59-79) 148/79    Intake and output:    No intake/output data recorded.  No intake/output data recorded.    Physical Examination:    General Appearance:  Alert and cooperative.   Head:  Atraumatic and normocephalic.   Eyes: Conjunctivae and sclerae normal, no icterus. No pallor.   Throat: No oral lesions, no thrush, oral mucosa moist.   Neck: Supple, trachea midline, no thyromegaly.   Lungs:   Breath sounds heard bilaterally equally.  No wheezing or crackles. No Pleural rub or bronchial breathing.   Heart:  Normal S1 and S2, no murmur, no gallop, no rub. No JVD.   Abdomen:   Normal bowel sounds, no masses, no organomegaly. Soft, nontender, nondistended, no rebound tenderness.   Extremities: Supple, no edema, no cyanosis, no clubbing.   Skin: No bleeding or rash.   Neurologic: Alert and oriented x 3. No facial asymmetry. Moves all four limbs. No tremors.    Laboratory results:    Results from last 7 days   Lab Units 10/22/24  0701 10/21/24  1717 10/21/24  0542 10/20/24  0831 10/19/24  1716   SODIUM mmol/L 140 135* 139   < > 139   POTASSIUM mmol/L 4.1 4.4 4.1   < > 4.2   CHLORIDE mmol/L 104 98 105   < > 106   CO2 mmol/L 25.1 23.8 24.6   < > 21.9*   BUN mg/dL 12 12 15   < > 7*   CREATININE mg/dL 0.81 0.91 0.90   < > 0.87   CALCIUM mg/dL 9.0 9.3 8.7   < > 8.9   BILIRUBIN mg/dL  --  0.2  --   --  0.3   ALK PHOS U/L  --  90  --   --  113   ALT  (SGPT) U/L  --  24  --   --  31   AST (SGOT) U/L  --  25  --   --  64*   GLUCOSE mg/dL 92 175* 100*   < > 182*    < > = values in this interval not displayed.     Results from last 7 days   Lab Units 10/23/24  0712 10/22/24  0701 10/21/24  1717   WBC 10*3/mm3 8.18 8.28 9.70   HEMOGLOBIN g/dL 13.6 12.3 13.6   HEMATOCRIT % 40.3 37.0 39.9   PLATELETS 10*3/mm3 231 256 282       Results from last 7 days   Lab Units 10/21/24  1717 10/19/24  1716   HSTROP T ng/L <6 <6     I have reviewed the patient's laboratory results.    Radiology results:    CT Angiogram Chest Pulmonary Embolism    Result Date: 10/21/2024  PE PROTOCOL CHEST CT:  HISTORY:  Shortness of breath .  COMPARISON: Chest CT from 10-.  The patient was injected with  IV contrast . Axial images were obtained through the chest in a PE protocol. 3 D reconstruction images were also performed. Individualized dose reduction techniques using automated exposure control or adjustment of the mA and/or kV according to patient size were employed.  FINDINGS: Left upper anterior chest wall pacemaker is present. Mediastinal vasculature is well opacified. Small filling defects are identified within right lower lobe pulmonary artery branch vessels. These findings are well seen on images 41 through 44 of series 5. The appearance is similar to that seen previously.  On the lung window images, there is some minimal bilateral groundglass opacity present, similar to previous.  Limited images through the upper abdomen demonstrate mild fatty infiltration of the liver.       Impression:   1. Redemonstration of right lower lobe pulmonary artery filling defects, consistent with pulmonary emboli. Findings are similar to that seen previously.  2. Mild bilateral ground glass opacities, probably related to mild pneumonitis. Findings similar to previous.      This report was signed and finalized on 10/21/2024 9:14 PM by Dami Zarate MD.      CT Abdomen Pelvis With & Without  Contrast    Result Date: 10/21/2024  CT SCAN OF THE ABDOMEN AND PELVIS WITH AND WITHOUT CONTRAST  COMPARISON: Abdomen and pelvic CT scan from 8-6-2024  HISTORY: Abdominal pain. Black stools. Evaluate for GI bleed.  PROCEDURE: The patient was injected with 100 mL of Omnipaque-300. Axial images were obtained from the lung bases to the pubic symphysis by computed tomography. Individualized dose reduction techniques using automated exposure control or adjustment of the mA and/or kV according to patient size were employed.  FINDINGS:  ABDOMEN: On the preinfusion images, there is no evidence of kidney stone.  There is mild diffuse fatty infiltration of the liver. Gallbladder is present. Spleen, pancreas, adrenals, and kidneys appear unremarkable. Note is made of a periampullary duodenal diverticulum.  PELVIS: The appendix appears unremarkable. Uterus is present and lies eccentric to the right. Urinary bladder is incompletely distended. There is no evidence of pelvic mass or inflammation.      Impression:  1. Diffuse fatty infiltration of the liver.  2. Periampullary duodenal diverticulum.  3. No definite evidence of acute abnormality.    CTDI: 9.76 mGy DLP:1073.55 mGy.cm   This report was signed and finalized on 10/21/2024 9:11 PM by Dami Zarate MD.     I have reviewed the patient's radiology reports.    Medication Review:     I have reviewed the patient's active and prn medications.     Problem List:      GI bleed    Assessment:    Suspected rectal bleeding from hemorrhoids, POA.  Recent history of pulmonary embolism on Eliquis.  Chronic back pain with chronic opioid dependence.  Essential hypertension.  Gastroesophageal reflux disease.  COPD, no exacerbation.  Fatty liver disease.    Plan:    Suspected rectal bleeding.  - Patient's status post upper and lower GI endoscopies without any evidence of bleeding source.  - Dr. Lowe feels that the patient may have bleeding from the internal hemorrhoids.  - Status post  polypectomies today during colonoscopy.  - Hold Eliquis for another day.  - Fortunately patient's hemoglobin has been stable and she did not require any blood transfusion.  - Repeat CBC in AM.    Recent history of pulmonary embolism.  - Patient has been on Eliquis at home and we will hold it tonight and restart it after discharge tomorrow.    Chronic back pain.  - Continue Subutex, Lyrica and Flexeril.    Essential hypertension.  - Continue home dose of lisinopril/hydrochlorothiazide.    Fatty liver.  - Transaminase levels are fairly within normal range except for mild elevation of AST.  - He may benefit from continued outpatient follow-up with gastroenterology.    I have discussed the patient's treatment plan with Dr. Lowe.  Discussed with the patient's daughter in the room.    Discussed with nursing staff and multidisciplinary team.    I have reviewed the copied text and it is accurate as of 10/23/24    DVT Prophylaxis: SCDs  Code Status: Full  Diet: Regular  Discharge Plan: Home tomorrow.    Sreedhar Lyn MD  10/23/24  19:55 EDT    Dictated utilizing Dragon dictation.

## 2024-10-24 ENCOUNTER — READMISSION MANAGEMENT (OUTPATIENT)
Dept: CALL CENTER | Facility: HOSPITAL | Age: 64
End: 2024-10-24
Payer: MEDICAID

## 2024-10-24 VITALS
TEMPERATURE: 98.3 F | SYSTOLIC BLOOD PRESSURE: 128 MMHG | HEART RATE: 72 BPM | WEIGHT: 188 LBS | RESPIRATION RATE: 16 BRPM | DIASTOLIC BLOOD PRESSURE: 72 MMHG | HEIGHT: 64 IN | OXYGEN SATURATION: 90 % | BODY MASS INDEX: 32.1 KG/M2

## 2024-10-24 LAB
ANION GAP SERPL CALCULATED.3IONS-SCNC: 7.6 MMOL/L (ref 5–15)
BUN SERPL-MCNC: 8 MG/DL (ref 8–23)
BUN/CREAT SERPL: 8.5 (ref 7–25)
CALCIUM SPEC-SCNC: 8.8 MG/DL (ref 8.6–10.5)
CHLORIDE SERPL-SCNC: 103 MMOL/L (ref 98–107)
CO2 SERPL-SCNC: 28.4 MMOL/L (ref 22–29)
CREAT SERPL-MCNC: 0.94 MG/DL (ref 0.57–1)
DEPRECATED RDW RBC AUTO: 41.1 FL (ref 37–54)
EGFRCR SERPLBLD CKD-EPI 2021: 68.3 ML/MIN/1.73
ERYTHROCYTE [DISTWIDTH] IN BLOOD BY AUTOMATED COUNT: 13.2 % (ref 12.3–15.4)
GLUCOSE BLDC GLUCOMTR-MCNC: 84 MG/DL (ref 70–130)
GLUCOSE SERPL-MCNC: 126 MG/DL (ref 65–99)
HCT VFR BLD AUTO: 40.4 % (ref 34–46.6)
HGB BLD-MCNC: 13.4 G/DL (ref 12–15.9)
INR PPP: 0.94 (ref 0.9–1.1)
MCH RBC QN AUTO: 28.4 PG (ref 26.6–33)
MCHC RBC AUTO-ENTMCNC: 33.2 G/DL (ref 31.5–35.7)
MCV RBC AUTO: 85.6 FL (ref 79–97)
PLATELET # BLD AUTO: 265 10*3/MM3 (ref 140–450)
PMV BLD AUTO: 10.4 FL (ref 6–12)
POTASSIUM SERPL-SCNC: 4 MMOL/L (ref 3.5–5.2)
PROTHROMBIN TIME: 13.1 SECONDS (ref 12.3–15.1)
RBC # BLD AUTO: 4.72 10*6/MM3 (ref 3.77–5.28)
SODIUM SERPL-SCNC: 139 MMOL/L (ref 136–145)
WBC NRBC COR # BLD AUTO: 7.8 10*3/MM3 (ref 3.4–10.8)

## 2024-10-24 PROCEDURE — 80048 BASIC METABOLIC PNL TOTAL CA: CPT | Performed by: INTERNAL MEDICINE

## 2024-10-24 PROCEDURE — 85610 PROTHROMBIN TIME: CPT | Performed by: INTERNAL MEDICINE

## 2024-10-24 PROCEDURE — G0378 HOSPITAL OBSERVATION PER HR: HCPCS

## 2024-10-24 PROCEDURE — 82948 REAGENT STRIP/BLOOD GLUCOSE: CPT | Performed by: INTERNAL MEDICINE

## 2024-10-24 PROCEDURE — 85027 COMPLETE CBC AUTOMATED: CPT | Performed by: INTERNAL MEDICINE

## 2024-10-24 PROCEDURE — 99239 HOSP IP/OBS DSCHRG MGMT >30: CPT | Performed by: INTERNAL MEDICINE

## 2024-10-24 RX ORDER — NYSTATIN 100000 [USP'U]/ML
200000 SUSPENSION ORAL AS NEEDED
Start: 2024-10-24

## 2024-10-24 RX ADMIN — PANTOPRAZOLE SODIUM 40 MG: 40 TABLET, DELAYED RELEASE ORAL at 05:39

## 2024-10-24 RX ADMIN — HYDROCHLOROTHIAZIDE 12.5 MG: 12.5 CAPSULE ORAL at 08:24

## 2024-10-24 RX ADMIN — FLUTICASONE PROPIONATE 2 SPRAY: 50 SPRAY, METERED NASAL at 08:24

## 2024-10-24 RX ADMIN — BUPRENORPHINE HCL 8 MG: 2 TABLET SUBLINGUAL at 09:24

## 2024-10-24 RX ADMIN — LISINOPRIL 10 MG: 10 TABLET ORAL at 08:24

## 2024-10-24 RX ADMIN — PREGABALIN 200 MG: 75 CAPSULE ORAL at 08:24

## 2024-10-24 RX ADMIN — Medication 10 ML: at 08:24

## 2024-10-24 NOTE — CASE MANAGEMENT/SOCIAL WORK
Case Management Discharge Note      Final Note: Plans to DC home with daughter    Provided Post Acute Provider List?: N/A  N/A Provider List Comment: Patient plans to return home; no new needs at this time  Provided Post Acute Provider Quality & Resource List?: N/A  N/A Quality & Resource List Comment: Patient plans to return home; no new needs at this time    Selected Continued Care - Admitted Since 10/21/2024       Destination    No services have been selected for the patient.                Durable Medical Equipment    No services have been selected for the patient.                Dialysis/Infusion    No services have been selected for the patient.                Home Medical Care    No services have been selected for the patient.                Therapy    No services have been selected for the patient.                Community Resources    No services have been selected for the patient.                Community & DME    No services have been selected for the patient.                         Final Discharge Disposition Code: 01 - home or self-care

## 2024-10-24 NOTE — OUTREACH NOTE
Prep Survey      Flowsheet Row Responses   [facility] facility patient discharged from? [facility]   Is LACE score < 7 ? No   Eligibility [facility]   Date of Admission 10/21/24   Date of Discharge 10/24/24   Discharge Disposition Home or Self Care   Discharge diagnosis GI bleed   Does the patient have one of the following disease processes/diagnoses(primary or secondary)? Other   Does the patient have Home health ordered? No   Is there a DME ordered? No   Prep survey completed? Yes            GARRY HASSAN - Registered Nurse

## 2024-10-24 NOTE — DISCHARGE SUMMARY
[facility]   DISCHARGE SUMMARY      Name:  Katelynn Sung   Age:  64 y.o.  Sex:  female  :  1960  MRN:  [id number]   Visit Number:  [id number]    Admission Date:  10/21/2024  Date of Discharge:  10/24/2024  Primary Care Physician:  Lesvia Servin MD    Important issues to note:    1.  Patient with recent pulmonary embolism on Eliquis was readmitted secondary to dark stools.  Her hemoglobin remained stable and she did not require any blood transfusions.  2.  She was seen by Dr. Lowe who did not think the patient had significant GI bleeding.  Upper and lower GI endoscopies were negative for any source of bleeding.  Patient had some colon polyps that were removed.  3.  Restart Eliquis on 10/25/2024.  4.  Follow-up with Dr. Lowe in 8 weeks.  5.  Follow-up with primary care physician in 1 week with CBC and biopsy results (polypectomy).    Discharge Diagnoses:     Suspected rectal bleeding from hemorrhoids, POA.  Recent history of pulmonary embolism on Eliquis.  Chronic back pain with chronic opioid dependence.  Essential hypertension.  Gastroesophageal reflux disease.  COPD, no exacerbation.  Fatty liver disease.    Problem List:     Active Hospital Problems    Diagnosis  POA    **GI bleed [K92.2]  Yes      Resolved Hospital Problems   No resolved problems to display.     Presenting Problem:    Chief Complaint   Patient presents with    Abdominal Pain    Black or Bloody Stool      Consults:     Consulting Physician(s)         Provider   Role Specialty     Jeronimo Lowe MD      Consulting Physician Gastroenterology          Procedures Performed:    Upper GI endoscopy done on 10/22/2024.  Impression:  - Normal oropharynx. - Z-line irregular, 35 cm from the incisors. - LA Grade A reflux esophagitis with no bleeding. - Bilious fluid in stomach - Erythematous mucosa in the prepyloric mucosa and posterior wall of the stomach. - Normal duodenal bulb, first portion of  the duodenum, second portion of the duodenum and third portion of the duodenum. - No specimens collected. - No upper GI bleeding.    Colonoscopy done on 10/23/2024.  Impression:  - One 5 mm polyp in the proximal descending colon, removed with a hot snare. Resected and retrieved. - One 6 mm polyp in the proximal sigmoid colon, removed with a hot snare. Resected and retrieved. - One 8 mm polyp in the distal sigmoid colon, removed with a hot snare. Resected and retrieved. - Non-bleeding external and internal hemorrhoids. - The examined portion of the ileum was normal. - No endoscopic evidence of upper or lower GI bleeding - Likely had positive stool occult from rectal outlet source.    History of presenting illness/Hospital Course:    Katelynn Sung is a 64-year-old woman with past medical history of chronic back pain on Subutex, GERD, hypertension, pacemaker placed February 2024, COPD, constipation, degenerative arthritis spine, anxiety depression, restless leg syndrome, obesity BMI 32, fatty liver, insomnia, type 2 diabetes, chronic pancreatitis, hypertension, polyarthropathy, vitamin D deficiency, fibromyalgia, Humphreys's esophagus, hyperlipidemia.  Recent hospitalization for pulmonary embolism with rhinovirus, discharged 10/21/2024 with Eliquis.  Presented to [facility] ED later same day with concerns for multiple episodes of black stool.       ED summary: Afebrile, vital signs stable room air.  EKG sinus rhythm, nonspecific ST-T wave changes.  Troponin not elevated.  Blood glucose 175.  Lipase 92.  Hemoglobin 13.6.  Trace leukocytes on UA.  C. difficile negative.  GI panel negative.  Fecal occult blood positive.  CT abdomen/pelvis diffuse fatty infiltration of the liver, periampullary duodenal diverticulum, no evidence of acute abnormality.  CT angio chest redemonstration of right lower lobe pulmonary artery filling defects consistent with pulmonary emboli.  She was provided morphine, Zofran, Protonix, 500 mL NS  bolus.     Patient was seen by Dr. Lowe from gastroenterology and she underwent upper GI endoscopy on 10/22/2024 and was noted to have LA grade a reflux esophagitis with no bleeding.  Erythematous mucosa noted in the prepyloric area with no evidence of bleeding.  Duodenum was within normal range.  Patient subsequently had a colonoscopy on 10/23/2024 and was noted to have multiple polyps in the colon with no evidence of lower GI bleeding.  Multiple polyps were resected.  He recommended to hold Eliquis for 1 to 2 days.  Patient is currently doing better and has not had any further episodes of lower GI bleeding.  She is walking around in the room without any difficulty.  She will be discharged home today and is advised to restart her Eliquis tomorrow.  I have discussed the patient's condition and treatment plan with the patient's daughter Sangeeta who is at the bedside.  Follow-up with primary care physician in 1 week with CBC.  Follow-up with Dr. Lowe as scheduled.    Suspected rectal bleeding.  - Patient's status post upper and lower GI endoscopies without any evidence of bleeding source.  - Dr. Lowe feels that the patient may have bleeding from the internal hemorrhoids.  - Status post polypectomies during colonoscopy on 10/23/2024.  - Restart Eliquis from tomorrow.  - Fortunately patient's hemoglobin has been stable and she did not require any blood transfusion.     Recent history of pulmonary embolism.  - Patient has been on Eliquis at home and will resume from tomorrow.  - Patient may benefit from outpatient hematology follow-up for advise regarding duration of anticoagulation therapy.     Chronic back pain.  - Continue Subutex, Lyrica and Flexeril.     Essential hypertension.  - Continue home dose of lisinopril/hydrochlorothiazide.     Fatty liver.  - Transaminase levels are fairly within normal range except for mild elevation of AST.  - Patient may benefit from continued outpatient follow-up with  gastroenterology.    Vital Signs:    Temp:  [98 °F (36.7 °C)-98.3 °F (36.8 °C)] 98.3 °F (36.8 °C)  Heart Rate:  [60-74] 72  Resp:  [12-16] 16  BP: (110-148)/(59-79) 128/72    Physical Exam:    General Appearance:  Alert and cooperative.    Head:  Atraumatic and normocephalic.   Eyes: Conjunctivae and sclerae normal, no icterus. No pallor.   Ears:  Ears with no abnormalities noted.   Throat: No oral lesions, no thrush, oral mucosa moist.   Neck: Supple, trachea midline, no thyromegaly.   Back:   No kyphoscoliosis present. No tenderness to palpation.   Lungs:   Breath sounds heard bilaterally equally.  No crackles or wheezing. No Pleural rub or bronchial breathing.   Heart:  Normal S1 and S2, no murmur, no gallop, no rub. No JVD.   Abdomen:   Normal bowel sounds, no masses, no organomegaly. Soft, nontender, obese, no rebound tenderness.   Extremities: Supple, no edema, no cyanosis, no clubbing.   Pulses: Pulses palpable bilaterally.   Skin: No bleeding or rash.   Neurologic: Alert and oriented x 3. No facial asymmetry. Moves all four limbs. No tremors.     Pertinent Lab Results:     Results from last 7 days   Lab Units 10/24/24  0627 10/22/24  0701 10/21/24  1717 10/20/24  0831 10/19/24  1716   SODIUM mmol/L 139 140 135*   < > 139   POTASSIUM mmol/L 4.0 4.1 4.4   < > 4.2   CHLORIDE mmol/L 103 104 98   < > 106   CO2 mmol/L 28.4 25.1 23.8   < > 21.9*   BUN mg/dL 8 12 12   < > 7*   CREATININE mg/dL 0.94 0.81 0.91   < > 0.87   CALCIUM mg/dL 8.8 9.0 9.3   < > 8.9   BILIRUBIN mg/dL  --   --  0.2  --  0.3   ALK PHOS U/L  --   --  90  --  113   ALT (SGPT) U/L  --   --  24  --  31   AST (SGOT) U/L  --   --  25  --  64*   GLUCOSE mg/dL 126* 92 175*   < > 182*    < > = values in this interval not displayed.     Results from last 7 days   Lab Units 10/24/24  0627 10/23/24  0712 10/22/24  0701   WBC 10*3/mm3 7.80 8.18 8.28   HEMOGLOBIN g/dL 13.4 13.6 12.3   HEMATOCRIT % 40.4 40.3 37.0   PLATELETS 10*3/mm3 265 231 256     Results  from last 7 days   Lab Units 10/24/24  0627   INR  0.94     Results from last 7 days   Lab Units 10/21/24  1717 10/19/24  1716   HSTROP T ng/L <6 <6     Results from last 7 days   Lab Units 10/19/24  1716   PROBNP pg/mL <36.0         Results from last 7 days   Lab Units 10/21/24  1717   LIPASE U/L 92*     Pertinent Radiology Results:    Imaging Results (All)       Procedure Component Value Units Date/Time    CT Angiogram Chest Pulmonary Embolism [[id number]] Collected: 10/21/24 2112     Updated: 10/21/24 2116    Narrative:      PE PROTOCOL CHEST CT:     HISTORY:  Shortness of breath .     COMPARISON: Chest CT from 10-.     The patient was injected with  IV contrast . Axial images were obtained  through the chest in a PE protocol. 3 D reconstruction images were also  performed. Individualized dose reduction techniques using automated  exposure control or adjustment of the mA and/or kV according to patient  size were employed.     FINDINGS: Left upper anterior chest wall pacemaker is present.  Mediastinal vasculature is well opacified. Small filling defects are  identified within right lower lobe pulmonary artery branch vessels.  These findings are well seen on images 41 through 44 of series 5. The  appearance is similar to that seen previously.     On the lung window images, there is some minimal bilateral groundglass  opacity present, similar to previous.     Limited images through the upper abdomen demonstrate mild fatty  infiltration of the liver.          Impression:          1. Redemonstration of right lower lobe pulmonary artery filling defects,  consistent with pulmonary emboli. Findings are similar to that seen  previously.     2. Mild bilateral ground glass opacities, probably related to mild  pneumonitis. Findings similar to previous.     This report was signed and finalized on 10/21/2024 9:14 PM by Dami Zarate MD.       CT Abdomen Pelvis With & Without Contrast [[id number]] Collected: 10/21/24 2109      Updated: 10/21/24 2113    Narrative:      CT SCAN OF THE ABDOMEN AND PELVIS WITH AND WITHOUT CONTRAST     COMPARISON: Abdomen and pelvic CT scan from 8-6-2024     HISTORY: Abdominal pain. Black stools. Evaluate for GI bleed.     PROCEDURE: The patient was injected with 100 mL of Omnipaque-300. Axial  images were obtained from the lung bases to the pubic symphysis by  computed tomography. Individualized dose reduction techniques using  automated exposure control or adjustment of the mA and/or kV according  to patient size were employed.     FINDINGS:     ABDOMEN: On the preinfusion images, there is no evidence of kidney  stone.     There is mild diffuse fatty infiltration of the liver. Gallbladder is  present. Spleen, pancreas, adrenals, and kidneys appear unremarkable.  Note is made of a periampullary duodenal diverticulum.     PELVIS: The appendix appears unremarkable. Uterus is present and lies  eccentric to the right. Urinary bladder is incompletely distended. There  is no evidence of pelvic mass or inflammation.       Impression:         1. Diffuse fatty infiltration of the liver.     2. Periampullary duodenal diverticulum.     3. No definite evidence of acute abnormality.      This report was signed and finalized on 10/21/2024 9:11 PM by Dami Zarate MD.        Condition on Discharge:      Stable.    Code status during the hospital stay:    Code Status and Medical Interventions: CPR (Attempt to Resuscitate); Full Support   Ordered at: 10/21/24 2203     Code Status (Patient has no pulse and is not breathing):    CPR (Attempt to Resuscitate)     Medical Interventions (Patient has pulse or is breathing):    Full Support     Discharge Disposition:    Home or Self Care    Discharge Medications:       Discharge Medications        Changes to Medications        Instructions Start Date   nystatin 100,000 unit/mL suspension  Commonly known as: MYCOSTATIN  What changed: See the new instructions.   200,000 Units,  Oral, As Needed, SWISH AND SWALLOW 5 MLS BY MOUTH FOUR TIMES DAILY             Continue These Medications        Instructions Start Date   albuterol sulfate  (90 Base) MCG/ACT inhaler  Commonly known as: ProAir HFA   2 puffs, Inhalation, Every 4 Hours PRN      Azelastine HCl 137 MCG/SPRAY solution   INSTILL 2 SPRAYS IN EACH NOSTRIL TWICE DAILY AS DIRECTED      buprenorphine 8 MG sublingual tablet SL tablet  Commonly known as: SUBUTEX   Place 1 tablet under the tongue Daily.      clonazePAM 0.5 MG tablet  Commonly known as: KlonoPIN   0.5 mg, Oral, 2 Times Daily PRN      Combivent Respimat  MCG/ACT inhaler  Generic drug: ipratropium-albuterol   1 puff, Inhalation, 4 Times Daily PRN      cyclobenzaprine 5 MG tablet  Commonly known as: FLEXERIL   5 mg, Oral, 3 Times Daily PRN      dexlansoprazole 60 MG capsule  Commonly known as: DEXILANT   60 mg, Oral, Daily      Eliquis DVT/PE Starter Pack tablet therapy pack  Generic drug: Apixaban Starter Pack   Take 2 tablets by mouth Every 12 (Twelve) Hours for 6 days, THEN 1 tablet Every 12 (Twelve) Hours. Indications: DVT/PE (active thrombosis)   Start Date: October 21, 2024     fluticasone 50 MCG/ACT nasal spray  Commonly known as: FLONASE   INSTILL 2 SPRAYS IN EACH NOSTRIL ONCE DAILY AS DIRECTED      Fluticasone Furoate-Vilanterol 200-25 MCG/ACT inhaler  Commonly known as: Breo Ellipta   1 puff, Inhalation, Daily - RT      lisinopril-hydrochlorothiazide 10-12.5 MG per tablet  Commonly known as: PRINZIDE,ZESTORETIC   1 tablet, Oral, Daily      pregabalin 200 MG capsule  Commonly known as: LYRICA   200 mg, Oral, 2 Times Daily      promethazine 25 MG tablet  Commonly known as: PHENERGAN   25 mg, Oral, Every 8 Hours PRN      promethazine-dextromethorphan 6.25-15 MG/5ML syrup  Commonly known as: PROMETHAZINE-DM   5 mL, Oral, 4 Times Daily PRN             Discharge Diet:     Diet Instructions       Diet: Regular/House Diet; Regular (IDDSI 7); Thin (IDDSI 0)       Discharge Diet: Regular/House Diet    Texture: Regular (IDDSI 7)    Fluid Consistency: Thin (IDDSI 0)          Activity at Discharge:     Activity Instructions       Activity as Tolerated            Follow-up Appointments:     Follow-up Information       Lesvia Servin MD Follow up in 1 week(s).    Specialties: Family Medicine, Emergency Medicine  Why: with CBC  Contact information:  [address] [place] [address]  [phone]                           Future Appointments   Date Time Provider Department Center   11/5/2024  2:10 PM Kelly Palomino MD [facility]   11/20/2024  1:45 PM Lesvia Servin MD [facility] [place] [facility]     Test Results Pending at Discharge:    Pending Labs       Order Current Status    TISSUE EXAM, [facility] ([facility]) In process             Sreedhar Lyn MD  10/24/24  10:07 EDT    Time: I spent 35 minutes on this discharge activity which included: face-to-face encounter with the patient, reviewing the data in the system, coordination of the care with the nursing staff as well as consultants, documentation, and entering orders.     Dictated utilizing Dragon dictation.

## 2024-10-24 NOTE — CASE MANAGEMENT/SOCIAL WORK
Case Management/Social Work    Patient Name:  Katelynn Sung  YOB: 1960  MRN: [id number]  Admit Date:  10/21/2024        09:41 EDT  Met with patient at bedside. Patient plans to return home once medically ready. CM will continue to follow.      Electronically signed by:  Andrea Barth RN  10/24/24 09:41 EDT

## 2024-10-24 NOTE — PLAN OF CARE
Goal Outcome Evaluation:        Problem: Pain Acute  Goal: Optimal Pain Control and Function  Intervention: Optimize Psychosocial Wellbeing  Description: Facilitate patient's self-control over pain by providing pain information and allowing choices in treatment.  Consider and address emotional response to pain; express compassion and reassurance.  Explore and promote use of coping strategies; address barriers to successful coping.  Evaluate and assist with psychosocial, cultural and spiritual factors impacting pain.  Assess for risk factors for developing chronic pain, such as depression, fear, pain avoidance and pain catastrophizing.  Modify pain perception using techniques, such as distraction, mindfulness, guided imagery, meditation or music.  Consider referral for ongoing coping support, such as education, relaxation training and role of thoughts.  Recent Flowsheet Documentation  Taken 10/24/2024 0800 by Rowena Cline RN  Diversional Activities:   television   smartphone  Intervention: Prevent or Manage Pain  Description: Evaluate pain level, effect of treatment and patient response at regular intervals.  Minimize painful stimuli; coordinate care and adjust environment (e.g., light, noise, unnecessary movement); promote sleep/rest.  Match pharmacologic analgesia to severity and type of pain mechanism (e.g., neuropathic, muscle, inflammatory); consider multimodal approach.  Provide medication at regular intervals; titrate to patient response; premedicate for painful procedures.  Manage breakthrough pain with additional doses; consider rotation or switching medication.  Monitor for signs of substance tolerance (increased dose to reach desired effect, decreased effect with same dose).  Manage medication-induced adverse events and side effects.  Provide multimodal interventions, such as physical activity, therapeutic exercise, yoga, TENS (transcutaneous electrical nerve stimulation) and manual therapy.  Train  in functional activity modifications, such as body mechanics, posture, ergonomics, energy conservation and activity pacing.  Consider addition of complementary or alternative therapy, such as acupuncture, hypnosis or therapeutic touch.  Recent Flowsheet Documentation  Taken 10/24/2024 0800 by Rowena Cline RN  Medication Review/Management: medications reviewed     Problem: Adult Inpatient Plan of Care  Goal: Absence of Hospital-Acquired Illness or Injury  Intervention: Identify and Manage Fall Risk  Description: Perform standard risk assessment on admission using a validated tool or comprehensive approach appropriate to the patient; reassess fall risk frequently, with change in status or transfer to another level of care.  Communicate risk to interprofessional healthcare team; ensure fall risk visible cue.  Determine need for increased observation, equipment and environmental modification, as well as use of supportive, nonskid footwear.  Adjust safety measures to individual needs and identified risk factors.  Reinforce the importance of active participation with fall risk prevention, safety, and physical activity with the patient and family.  Perform regular intentional rounding to assess need for position change, pain assessment and personal needs, including assistance with toileting.  Recent Flowsheet Documentation  Taken 10/24/2024 0800 by Rowena Cline RN  Safety Promotion/Fall Prevention:   safety round/check completed   room organization consistent   nonskid shoes/slippers when out of bed   fall prevention program maintained   clutter free environment maintained   assistive device/personal items within reach   activity supervised  Intervention: Prevent Skin Injury  Description: Perform a screening for skin injury risk, such as pressure or moisture-associated skin damage on admission and at regular intervals throughout hospital stay.  Keep all areas of skin (especially folds) clean and  dry.  Maintain adequate skin hydration.  Relieve and redistribute pressure and protect bony prominences and skin at risk for injury; implement measures based on patient-specific risk factors.  Match turning and repositioning schedule to clinical condition.  Encourage weight shift frequently; assist with reposition if unable to complete independently.  Float heels off bed; avoid pressure on the Achilles tendon.  Keep skin free from extended contact with medical devices.  Optimize nutrition and hydration.  Encourage functional activity and mobility, as early as tolerated.  Use aids (e.g., slide boards, mechanical lift) during transfer.  Recent Flowsheet Documentation  Taken 10/24/2024 0800 by Rowena Cline RN  Body Position: sitting up in bed  Intervention: Prevent Infection  Description: Maintain skin and mucous membrane integrity; promote hand, oral and pulmonary hygiene.  Optimize fluid balance, nutrition, sleep and glycemic control to maximize infection resistance.  Identify potential sources of infection early to prevent or mitigate progression of infection (e.g., wound, lines, devices).  Evaluate ongoing need for invasive devices; remove promptly when no longer indicated.  Review vaccination status.  Recent Flowsheet Documentation  Taken 10/24/2024 0800 by Rowena Cline RN  Infection Prevention:   environmental surveillance performed   hand hygiene promoted   equipment surfaces disinfected   personal protective equipment utilized   single patient room provided   visitors restricted/screened   rest/sleep promoted  Goal: Optimal Comfort and Wellbeing  Intervention: Provide Person-Centered Care  Description: Use a family-focused approach to care; encourage support system presence and participation.  Develop trust and rapport by proactively providing information, encouraging questions, addressing concerns and offering reassurance.  Acknowledge emotional response to hospitalization.  Recognize and  utilize personal coping strategies and strengths; develop goals via shared decision-making.  Honor spiritual and cultural preferences.  Recent Flowsheet Documentation  Taken 10/24/2024 0800 by Rowena Cline RN  Trust Relationship/Rapport:   care explained   choices provided   questions answered   questions encouraged   thoughts/feelings acknowledged     Problem: Comorbidity Management  Goal: Maintenance of Heart Failure Symptom Control  Intervention: Maintain Heart Failure Management  Description: Evaluate adherence to home heart failure self-care regimen (e.g., medication, fluid balance, sodium intake, daily weight, physical activity, telemonitoring, support).  Advocate continuation of home medication and schedule.  Consider pharmacologic therapy administration time and effects (e.g., avoid giving diuretic prior to bedtime or nitrates on empty stomach).  Monitor response to pharmacologic therapy, including weight fluctuations, blood pressure and electrolyte levels.  Monitor for signs and symptoms of anxiety and depression, including severity and duration; if present, provide psychosocial support.  Consider need for heart failure clinic or palliative care consult.  Recent Flowsheet Documentation  Taken 10/24/2024 0800 by Rowena Cline RN  Medication Review/Management: medications reviewed  Goal: Blood Pressure in Desired Range  Intervention: Maintain Blood Pressure Management  Description: Evaluate adherence to home antihypertensive regimen (e.g., exercise and activity, diet modification, medication).  Provide scheduled antihypertensive medication; consider administration time and effects (e.g., avoid giving diuretic prior to bedtime).  Monitor response to antihypertensive medication therapy (e.g., blood pressure, electrolyte levels, medication effects).  Minimize risk of orthostatic hypotension; encourage caution with position changes, particularly if elderly.  Recent Flowsheet Documentation  Taken  10/24/2024 0800 by Rowena Cline, RN  Medication Review/Management: medications reviewed

## 2024-10-24 NOTE — PLAN OF CARE
Goal Outcome Evaluation:  Plan of Care Reviewed With: patient        Progress: improving  Outcome Evaluation: VS stable throughout shift. Daughter present at bedside. No reports of blood in stool this shift. No significant events, continuing to monitor.

## 2024-10-25 ENCOUNTER — TRANSITIONAL CARE MANAGEMENT TELEPHONE ENCOUNTER (OUTPATIENT)
Dept: CALL CENTER | Facility: HOSPITAL | Age: 64
End: 2024-10-25
Payer: MEDICAID

## 2024-10-25 ENCOUNTER — TELEPHONE (OUTPATIENT)
Dept: GASTROENTEROLOGY | Facility: CLINIC | Age: 64
End: 2024-10-25
Payer: MEDICAID

## 2024-10-25 LAB — REF LAB TEST METHOD: NORMAL

## 2024-10-25 NOTE — OUTREACH NOTE
Call Center TCM Note      Flowsheet Row Responses   [facility] patient discharged from? [facility]   Does the patient have one of the following disease processes/diagnoses(primary or secondary)? Other   TCM attempt successful? No   Unsuccessful attempts Attempt 1  [Attempted to reach patient and sister Elinor, listed on PCP verbal release. No answer.]            Pennie Pardo RN    10/25/2024, 12:49 EDT

## 2024-10-25 NOTE — TELEPHONE ENCOUNTER
Called patient, discussed with her we would cancel the procedures coming up, because she has already had it.  She is fine with this.

## 2024-10-25 NOTE — TELEPHONE ENCOUNTER
----- Message from Jeronimo Lowe sent at 10/25/2024  3:46 PM EDT -----  No   We can cancel that procedure appointment   we did both EGD colon for her  ----- Message -----  From: Pennie Mejia MA  Sent: 10/25/2024   8:54 AM EDT  To: Jeronimo Lowe MD    She recently had procedures done while in the hospital.  She will not need the outpatient appointment  for the scopes, will she?

## 2024-10-25 NOTE — OUTREACH NOTE
Call Center TCM Note      Flowsheet Row Responses   [facility] patient discharged from? [facility]   Does the patient have one of the following disease processes/diagnoses(primary or secondary)? Other   TCM attempt successful? Yes   Call start time 1433   Call end time 1436   Discharge diagnosis Suspected rectal bleeding from hemorrhoids, POA.  Recent history of pulmonary embolism on Eliquis.  Chronic back pain with chronic opioid dependence.  Essential hypertension.  Gastroesophageal reflux disease.  COPD, no exacerbation.  Fatty liver disease.   Person spoke with today (if not patient) and relationship Patient   Medication alerts for this patient resume eliquis today 10/25   Meds reviewed with patient/caregiver? Yes   Does the patient have all medications ordered at discharge? Yes   Is the patient taking all medications as directed (includes completed medication regime)? Yes   Comments PCP Dr Servin. Hospital follow up appt in place for 10/29  11am with Dr Leavitt.   Does the patient have an appointment with their PCP within 7-14 days of discharge? Yes   Has home health visited the patient within 72 hours of discharge? N/A   Psychosocial issues? No   Did the patient receive a copy of their discharge instructions? Yes   Nursing interventions Reviewed instructions with patient   What is the patient's perception of their health status since discharge? Improving  [Patient denies any rectal bleeding today]   Is the patient/caregiver able to teach back signs and symptoms related to disease process for when to call PCP? Yes   If the patient is a current smoker, are they able to teach back resources for cessation? Not a smoker   TCM call completed? Yes   Call end time 1436   Would this patient benefit from a Referral to Amb Social Work? No   Is the patient interested in additional calls from an ambulatory ? No            Pennie Pardo RN    10/25/2024, 14:38 EDT

## 2024-10-29 ENCOUNTER — OFFICE VISIT (OUTPATIENT)
Dept: FAMILY MEDICINE CLINIC | Facility: CLINIC | Age: 64
End: 2024-10-29
Payer: MEDICAID

## 2024-10-29 VITALS
SYSTOLIC BLOOD PRESSURE: 132 MMHG | OXYGEN SATURATION: 97 % | WEIGHT: 187.2 LBS | HEIGHT: 64 IN | HEART RATE: 78 BPM | RESPIRATION RATE: 18 BRPM | BODY MASS INDEX: 31.96 KG/M2 | DIASTOLIC BLOOD PRESSURE: 70 MMHG

## 2024-10-29 DIAGNOSIS — R05.9 COUGH IN ADULT: ICD-10-CM

## 2024-10-29 DIAGNOSIS — K59.00 CONSTIPATION, UNSPECIFIED CONSTIPATION TYPE: ICD-10-CM

## 2024-10-29 DIAGNOSIS — B34.8 RHINOVIRUS INFECTION: ICD-10-CM

## 2024-10-29 DIAGNOSIS — G89.29 CHRONIC BACK PAIN, UNSPECIFIED BACK LOCATION, UNSPECIFIED BACK PAIN LATERALITY: ICD-10-CM

## 2024-10-29 DIAGNOSIS — M54.9 CHRONIC BACK PAIN, UNSPECIFIED BACK LOCATION, UNSPECIFIED BACK PAIN LATERALITY: ICD-10-CM

## 2024-10-29 DIAGNOSIS — R19.5 DARK STOOLS: Primary | ICD-10-CM

## 2024-10-29 DIAGNOSIS — I26.99 PULMONARY EMBOLISM WITHOUT ACUTE COR PULMONALE, UNSPECIFIED CHRONICITY, UNSPECIFIED PULMONARY EMBOLISM TYPE: ICD-10-CM

## 2024-10-29 RX ORDER — DOCUSATE SODIUM 100 MG/1
100 CAPSULE, LIQUID FILLED ORAL 2 TIMES DAILY PRN
Qty: 60 CAPSULE | Refills: 1 | Status: SHIPPED | OUTPATIENT
Start: 2024-10-29

## 2024-10-29 NOTE — PROGRESS NOTES
Follow Up Office Visit      Date: 10/29/2024   Patient Name: Katelynn Sung  : 1960   MRN: [id number]     Chief Complaint:    Chief Complaint   Patient presents with    Hospital Follow Up Visit     [facility] GI Hemorrhage- D/C 10/25/2024    Alex     Would like to have a prescription for thrush, previously used the oral suspension, requests a pill form       History of Present Illness: Katelynn Sung is a 64 y.o. female who is here today for hospital discharge follow-up.    Patient denies shortness of breath at this time.  Patient reports that she did have cough near the diagnosis of her blood clot, however, this has improved.  Patient reports cough is still somewhat present at night.  Patient would like prescription refilled for Phenergan/dextromethorphan.  Patient reports she is taking her Eliquis without difficulty.    As mentioned above patient reports restarting Eliquis.  Patient reports she has had no dark/bloody stools since re-hospitalization/discharge.  Patient would like to discuss colonoscopy results today.    Patient reports chronic constipation.  Patient would like docusate sodium prescription sent to pharmacy as her insurance will cover this.    Patient reports that she does see outside provider for chronic Subutex prescription.  Patient reports that the clinic she does receive this prescription from is switching care models to require patient's to see a provider at their clinic for their primary care if they continue to receive Subutex prescription from them.  Patient reports that she has enough of this prescription at present.  Patient reports she does have a follow-up with her regular primary care provider in the next couple of weeks and will ask them if they can take over this prescription.  Patient reports this prescription is for chronic pain.  Upon request for clarification this appears to be related to her back.    Have reviewed complaints regarding PE, dark stools, constipation,  and chronic pain/Subutex communicated by patient to myself and inquire whether there is anything else patient would like to discuss today.  Patient communicates no other concerns at this time.    Subjective     I have reviewed the patients family history, social history, past medical history, past surgical history and have updated it as appropriate.     Medications:     Current Outpatient Medications:     albuterol sulfate HFA (ProAir HFA) 108 (90 Base) MCG/ACT inhaler, Inhale 2 puffs Every 4 (Four) Hours As Needed for Wheezing., Disp: 18 g, Rfl: 5    Apixaban Starter Pack tablet therapy pack, Take 2 tablets by mouth Every 12 (Twelve) Hours for 6 days, THEN 1 tablet Every 12 (Twelve) Hours. Indications: DVT/PE (active thrombosis), Disp: 74 tablet, Rfl: 0    Azelastine HCl 137 MCG/SPRAY solution, INSTILL 2 SPRAYS IN EACH NOSTRIL TWICE DAILY AS DIRECTED, Disp: 30 mL, Rfl: 9    buprenorphine (SUBUTEX) 8 MG sublingual tablet SL tablet, Place 1 tablet under the tongue Daily., Disp: , Rfl:     clonazePAM (KlonoPIN) 0.5 MG tablet, Take 1 tablet by mouth 2 (Two) Times a Day As Needed for Anxiety., Disp: 45 tablet, Rfl: 0    cyclobenzaprine (FLEXERIL) 5 MG tablet, Take 1 tablet by mouth 3 (Three) Times a Day As Needed for Muscle Spasms., Disp: 90 tablet, Rfl: 2    dexlansoprazole (DEXILANT) 60 MG capsule, Take 1 capsule by mouth Daily., Disp: 30 capsule, Rfl: 5    fluticasone (FLONASE) 50 MCG/ACT nasal spray, INSTILL 2 SPRAYS IN EACH NOSTRIL ONCE DAILY AS DIRECTED, Disp: 16 g, Rfl: 1    Fluticasone Furoate-Vilanterol (Breo Ellipta) 200-25 MCG/ACT inhaler, Inhale 1 puff Daily., Disp: 28 each, Rfl: 11    ipratropium-albuterol (Combivent Respimat)  MCG/ACT inhaler, Inhale 1 puff 4 (Four) Times a Day As Needed for Wheezing., Disp: 4 g, Rfl: 11    lisinopril-hydrochlorothiazide (PRINZIDE,ZESTORETIC) 10-12.5 MG per tablet, Take 1 tablet by mouth Daily., Disp: 30 tablet, Rfl: 11    nystatin (MYCOSTATIN) 100,000 unit/mL  "suspension, Take 2 mL by mouth As Needed (Sore throat). SWISH AND SWALLOW 5 MLS BY MOUTH FOUR TIMES DAILY, Disp: , Rfl:     pregabalin (LYRICA) 200 MG capsule, Take 1 capsule by mouth 2 (Two) Times a Day., Disp: 60 capsule, Rfl: 1    promethazine (PHENERGAN) 25 MG tablet, TAKE 1 TABLET BY MOUTH EVERY 8 (EIGHT) HOURS AS NEEDED FOR NAUSEA OR VOMITING., Disp: 90 tablet, Rfl: 0    docusate sodium (Colace) 100 MG capsule, Take 1 capsule by mouth 2 (Two) Times a Day As Needed for Constipation., Disp: 60 capsule, Rfl: 1    Allergies:   Allergies   Allergen Reactions    Nsaids GI Intolerance       Objective     Physical Exam:     Vital Signs:   Vitals:    10/29/24 1120   BP: 132/70   Pulse: 78   Resp: 18   SpO2: 97%   Weight: 84.9 kg (187 lb 3.2 oz)   Height: 162.6 cm (64\")     Body mass index is 32.13 kg/m².          Physical Exam     General:  Non-toxic appearing female in no acute distress. Alert and oriented. Vitals reviewed and are within normal limits.  Head/ENT: Atraumatic.   Neck: Anatomy appears symmetrical.  Cardiac: Regular rate and rhythm to auscultation.   Pulmonary: Lungs are clear to auscultation bilaterally.  Abdomen: Normal appearing abdomen. Non-tender to palpation of upper and lower abdominal quadrants bilaterally.  Extremities: There is no appreciated discoloration or lower extremity edema bilaterally to palpation.  Integumentary/Skin: Skin appears unremarkable from observable skin surfaces.   Neurological: Normal gait and normal speech.  Behavioral/Psych: Patient behavior/demeanor appears consistent with reported age. Patient is pleasant with normal affect today.      Procedures      Assessment / Plan      1. Pulmonary embolism without acute cor pulmonale, unspecified chronicity, unspecified pulmonary embolism type  Patient does have recent hospitalization/diagnosis of pulmonary embolism diagnosed on 10/19/2024 via CT angiogram of chest. Repeat CT angiogram on 10/21/2024 showed pulmonary artery filling " defect in the right lower lobe consistent with previously seen emboli.  These findings were similar to CT angiogram performed previously.  Venous duplex of the lower extremity showed no evidence of right or left DVT.  Patient is currently on Eliquis.  Unsure etiology of pulmonary embolism though the patient does have risk factors such as obesity.  I have discussed potential workup and/or possible hematological referral for workup of possible clotting abnormalities/disorders.  Patient does wish to defer this until she sees her regular primary care provider.  If PE is unprovoked she will need at least 3 months of treatment or longer.    2. Rhinovirus infection  Patient does report improving though continuing presence of cough since time of pulmonary embolism diagnosis.  Patient was diagnosed with rhinovirus at the same time of pulmonary embolism.  I suspect chronic cough is likely related to rhinovirus rather than pulmonary embolism.  Patient has been taking Phenergan/dextromethorphan combination.  Patient would like refill for this.  I did discuss with patient that likely would be more beneficial to take dextromethorphan alone given she is having no issues with nausea.  Have recommended over-the-counter Delsym as needed.    3. Cough in adult  See rhinovirus.    4. Dark stools  Patient does have a recent rehospitalization for suspected GI bleed.  Patient was admitted and observed following development of dark stools with positive occult stool.  Patient's hemoglobin remained stable without need of transfusions during hospitalization.  Patient did have colonoscopy/EGD that showed no obvious findings that would explain bleeding.  Colon polyps were removed during colonoscopy that showed hyperplastic polyp and tubular adenoma.  Patient discloses continuing resolution of dark stools since hospitalization. Patient patient has been taking Eliquis without difficulty. I did discuss pathology results with patient in office  today.  There was some consideration that the bleeding could be related to hemorrhoids.  Patient does have a history of chronic constipation.  Have provided docusate sodium for patient to take. Will obtain new CBC today to ensure stable H&H.  - CBC No Differential    5. Constipation, unspecified constipation type  Patient does have chronic constipation.  Patient specifically requested docusate in office today.  Chronic constipation could be contributing to hemorrhoids which could have contributed to patient's dark stools.  Have prescribed docusate sodium for patient today.  - docusate sodium (Colace) 100 MG capsule; Take 1 capsule by mouth 2 (Two) Times a Day As Needed for Constipation.  Dispense: 60 capsule; Refill: 1    6. Chronic back pain, unspecified back location, unspecified back pain laterality  Patient does take Subutex.  Patient appears to take this for chronic back pain.  Patient has been getting this prescription at outside provider, however, outside provider is now requiring patient transfer primary care to them in order to continue receiving prescription.  Patient does have appointment with her regular primary care provider upcoming and will be discussing possible take over a prescription with them.       Follow Up:   No follow-ups on file.      MD [facility]  [address] [place] [address] [address] [place] [address]  Fax [phone]  Phone [phone]

## 2024-10-30 LAB
ERYTHROCYTE [DISTWIDTH] IN BLOOD BY AUTOMATED COUNT: 13.5 % (ref 12.3–15.4)
HCT VFR BLD AUTO: 43.4 % (ref 34–46.6)
HGB BLD-MCNC: 14.1 G/DL (ref 12–15.9)
MCH RBC QN AUTO: 28.3 PG (ref 26.6–33)
MCHC RBC AUTO-ENTMCNC: 32.5 G/DL (ref 31.5–35.7)
MCV RBC AUTO: 87 FL (ref 79–97)
PLATELET # BLD AUTO: 257 10*3/MM3 (ref 140–450)
RBC # BLD AUTO: 4.99 10*6/MM3 (ref 3.77–5.28)
WBC # BLD AUTO: 7.46 10*3/MM3 (ref 3.4–10.8)

## 2024-11-07 DIAGNOSIS — F41.8 SITUATIONAL ANXIETY: ICD-10-CM

## 2024-11-07 RX ORDER — CLONAZEPAM 0.5 MG/1
TABLET ORAL
Qty: 45 TABLET | Refills: 0 | Status: SHIPPED | OUTPATIENT
Start: 2024-11-07

## 2024-11-07 NOTE — TELEPHONE ENCOUNTER
Rx Refill Note  Requested Prescriptions     Pending Prescriptions Disp Refills    clonazePAM (KlonoPIN) 0.5 MG tablet [Pharmacy Med Name: CLONAZEPAM 0.5MG] 45 tablet 0     Sig: TAKE 1 TABLET BY MOUTH TWO TIMES DAILY AS NEEDED FOR ANXIETY      Last office visit with prescribing clinician: 10/17/2024   Last telemedicine visit with prescribing clinician: Visit date not found   Next office visit with prescribing clinician: Visit date not found                         Would you like a call back once the refill request has been completed: [] Yes [] No    If the office needs to give you a call back, can they leave a voicemail: [] Yes [] No    Sherley Swan MA  11/07/24, 09:45 EST

## 2024-11-11 RX ORDER — FUROSEMIDE 20 MG/1
20 TABLET ORAL DAILY PRN
Qty: 30 TABLET | Refills: 0 | OUTPATIENT
Start: 2024-11-11

## 2024-11-12 DIAGNOSIS — M79.7 FIBROMYALGIA: ICD-10-CM

## 2024-11-12 NOTE — TELEPHONE ENCOUNTER
Rx Refill Note  Requested Prescriptions     Pending Prescriptions Disp Refills    pregabalin (LYRICA) 200 MG capsule [Pharmacy Med Name: PREGABALIN 200MG] 60 capsule 0     Sig: TAKE 1 CAPSULE BY MOUTH 2 (TWO) TIMES A DAY.      Last office visit with prescribing clinician: 8/19/2024   Last telemedicine visit with prescribing clinician: Visit date not found   Next office visit with prescribing clinician: 11/20/2024                         Would you like a call back once the refill request has been completed: [] Yes [] No    If the office needs to give you a call back, can they leave a voicemail: [] Yes [] No    Selina Robertson MA  11/12/24, 17:38 EST

## 2024-11-13 RX ORDER — PREGABALIN 200 MG/1
200 CAPSULE ORAL 2 TIMES DAILY
Qty: 60 CAPSULE | Refills: 0 | Status: SHIPPED | OUTPATIENT
Start: 2024-11-13

## 2024-11-13 NOTE — TELEPHONE ENCOUNTER
DANTE reviewed. Refill approved. Medication E rx'd to pharmacy as requested. Pt to keep f/u apt as scheduled.    CSA and UDS UTD

## 2024-11-20 ENCOUNTER — TELEPHONE (OUTPATIENT)
Dept: FAMILY MEDICINE CLINIC | Facility: CLINIC | Age: 64
End: 2024-11-20

## 2024-11-20 ENCOUNTER — OFFICE VISIT (OUTPATIENT)
Dept: FAMILY MEDICINE CLINIC | Facility: CLINIC | Age: 64
End: 2024-11-20
Payer: MEDICAID

## 2024-11-20 VITALS
OXYGEN SATURATION: 97 % | SYSTOLIC BLOOD PRESSURE: 142 MMHG | BODY MASS INDEX: 32.58 KG/M2 | HEART RATE: 78 BPM | HEIGHT: 64 IN | WEIGHT: 190.8 LBS | DIASTOLIC BLOOD PRESSURE: 86 MMHG

## 2024-11-20 DIAGNOSIS — I26.99 OTHER ACUTE PULMONARY EMBOLISM WITHOUT ACUTE COR PULMONALE: ICD-10-CM

## 2024-11-20 DIAGNOSIS — M79.7 FIBROMYALGIA: ICD-10-CM

## 2024-11-20 DIAGNOSIS — I10 PRIMARY HYPERTENSION: Primary | ICD-10-CM

## 2024-11-20 DIAGNOSIS — E78.2 MIXED HYPERLIPIDEMIA: ICD-10-CM

## 2024-11-20 DIAGNOSIS — Z28.21 INFLUENZA VACCINATION DECLINED: ICD-10-CM

## 2024-11-20 DIAGNOSIS — I26.99 OTHER ACUTE PULMONARY EMBOLISM WITHOUT ACUTE COR PULMONALE: Primary | ICD-10-CM

## 2024-11-20 DIAGNOSIS — R73.03 PREDIABETES: ICD-10-CM

## 2024-11-20 DIAGNOSIS — J42 CHRONIC BRONCHITIS, UNSPECIFIED CHRONIC BRONCHITIS TYPE: ICD-10-CM

## 2024-11-20 NOTE — PROGRESS NOTES
Subjective   Katelynn Sung is a 64 y.o. female.     History of Present Illness  Here for f/u on chronic med problems which include:  HTN- on zestoretic  FMSx- on lyrica  preDM- does not follow diabetic appropriate diet  SMITH- recently stable liver enzymes  A fib with recent dx of PE, some GI bleeding from anticoagulant, now resolved.- on apixaban  Opioid Use DO- was [facility]. Was told she can't continue care there unless she becomes their primary care pt as well which she was not wanting to do. Looking into alternative treatment location     The following portions of the patient's history were reviewed and updated as appropriate: allergies, current medications, past family history, past medical history, past social history, past surgical history, and problem list.    Review of Systems   Constitutional:  Positive for fatigue. Negative for diaphoresis and fever.   HENT:  Positive for congestion and postnasal drip. Negative for ear pain, mouth sores, nosebleeds, rhinorrhea and sore throat.    Eyes:  Negative for visual disturbance.   Respiratory:  Positive for shortness of breath (mild with exertion). Negative for cough and wheezing.    Cardiovascular:  Negative for chest pain, palpitations and leg swelling.   Gastrointestinal:  Positive for abdominal pain (chronic, stable), constipation and nausea. Negative for blood in stool, rectal pain and vomiting.   Endocrine: Positive for heat intolerance. Negative for polydipsia and polyuria.   Genitourinary:  Negative for difficulty urinating, dysuria, genital sores, hematuria, pelvic pain, vaginal bleeding and vaginal discharge.   Musculoskeletal:  Positive for arthralgias, back pain, myalgias, neck pain and neck stiffness.   Skin:  Negative for rash and wound.   Neurological:  Positive for dizziness, weakness (generalized) and headaches. Negative for tremors and syncope.   Hematological:  Negative for adenopathy. Does not bruise/bleed easily.    Psychiatric/Behavioral:  Positive for dysphoric mood. Negative for confusion and suicidal ideas. The patient is nervous/anxious.    Pt's previous ROS reviewed and updated as indicated.       Objective   Vitals:    11/20/24 1351   BP: 142/86   Pulse: 78   SpO2: 97%     Body mass index is 32.75 kg/m².      11/20/24  1351   Weight: 86.5 kg (190 lb 12.8 oz)       Physical Exam  Vitals and nursing note reviewed.   Constitutional:       General: She is not in acute distress.     Appearance: She is well-groomed. She is obese. She is not ill-appearing.   HENT:      Head: Atraumatic.      Mouth/Throat:      Mouth: Mucous membranes are moist.   Eyes:      General: No scleral icterus.     Conjunctiva/sclera: Conjunctivae normal.   Neck:      Thyroid: No thyroid mass.   Cardiovascular:      Rate and Rhythm: Normal rate and regular rhythm.      Pulses: Normal pulses.      Heart sounds: Normal heart sounds.   Pulmonary:      Effort: Pulmonary effort is normal.      Breath sounds: Decreased breath sounds present. No wheezing, rhonchi or rales.   Musculoskeletal:      Right lower leg: No edema.      Left lower leg: No edema.   Lymphadenopathy:      Cervical: No cervical adenopathy.   Skin:     General: Skin is warm and dry.      Coloration: Skin is not jaundiced or pale.      Findings: No bruising or rash.   Neurological:      Mental Status: She is alert and oriented to person, place, and time.      Gait: Gait is intact.   Psychiatric:         Mood and Affect: Mood and affect normal.         Behavior: Behavior normal. Behavior is cooperative.         Cognition and Memory: Cognition normal.     Pt's previous physical exam reviewed and updated as indicated.    Lab Results   Component Value Date    WBC 7.46 10/29/2024    HGB 14.1 10/29/2024    HCT 43.4 10/29/2024    MCV 87.0 10/29/2024     10/29/2024       Lab Results   Component Value Date    GLUCOSE 126 (H) 10/24/2024    BUN 8 10/24/2024    CREATININE 0.94 10/24/2024     EGFRIFNONA 80 12/09/2021    EGFRIFAFRI 97 12/09/2021    BCR 8.5 10/24/2024    K 4.0 10/24/2024    CO2 28.4 10/24/2024    CALCIUM 8.8 10/24/2024    PROTENTOTREF 7.3 08/19/2024    ALBUMIN 4.4 10/21/2024    LABIL2 1.6 08/19/2024    AST 25 10/21/2024    ALT 24 10/21/2024       Lab Results   Component Value Date    CHLPL 200 01/29/2024    TRIG 192 (H) 01/29/2024    HDL 43 01/29/2024     (H) 01/29/2024       Lab Results   Component Value Date    HGBA1C 5.70 (H) 10/19/2024       Lab Results   Component Value Date    TSH 2.270 08/06/2024     CT Angiogram Chest Pulmonary Embolism  Result Date: 10/21/2024    1. Redemonstration of right lower lobe pulmonary artery filling defects, consistent with pulmonary emboli. Findings are similar to that seen previously.  2. Mild bilateral ground glass opacities, probably related to mild pneumonitis. Findings similar to previous.      This report was signed and finalized on 10/21/2024 9:14 PM by Dami Zarate MD.      CT Abdomen Pelvis With & Without Contrast  Result Date: 10/21/2024   1. Diffuse fatty infiltration of the liver.  2. Periampullary duodenal diverticulum.  3. No definite evidence of acute abnormality.    CTDI: 9.76 mGy DLP:1073.55 mGy.cm   This report was signed and finalized on 10/21/2024 9:11 PM by Dami Zarate MD.      US Venous Doppler Lower Extremity Bilateral (duplex)  Result Date: 10/20/2024  No evidence of left or right lower extremity deep venous thrombosis.      This report was signed and finalized on 10/20/2024 7:01 PM by Ashish Lanza MD.      CT Angiogram Chest Pulmonary Embolism  Result Date: 10/19/2024  1. No Limited bolus of contrast and motion artifact limits this examination. There is no central pulmonary embolism. There are questionable emboli in the descending right pulmonary artery. There is no right heart strain. 2. No aneurysm or dissection. 3. Coronary artery calcification. 4. Bibasilar atelectasis there are no effusions. There is no edema.   CTDI: 6.47 mGy DLP:191.23 mGy.cm  This report was signed and finalized on 10/19/2024 8:45 PM by Ashish Lanza MD.      XR Chest 1 View  Result Date: 10/19/2024  Underinflation with no edema or infiltrate. There are coarse interstitial changes.  This report was signed and finalized on 10/19/2024 6:19 PM by Ashish Lanza MD.        Assessment & Plan   Diagnoses and all orders for this visit:    1. Primary hypertension (Primary)    2. Chronic bronchitis, unspecified chronic bronchitis type    3. Fibromyalgia    4. Prediabetes    5. Mixed hyperlipidemia    6. Other acute pulmonary embolism without acute cor pulmonale    7. Influenza vaccination declined       HTN- controlled. Continue zestoretic.  COPD -stable. Continue albuterol prn, Breo daily  FMSx - stable. Continue lyrica.  preDM - stable, prevention reviewed.   HLP - she has declined statin  PE- on appropriate anticoagulation. No bleeding issues.     Routine f/u in 3 months, sooner as needed.  Patient was encouraged to keep me informed of any acute changes, lack of improvement, or any new concerning symptoms.  Pt is aware of reasons to seek emergent care.  Patient voiced understanding of all instructions and denied further questions.    Please note that portions of this note may have been completed with a voice recognition program.

## 2024-11-21 ENCOUNTER — TELEPHONE (OUTPATIENT)
Dept: FAMILY MEDICINE CLINIC | Facility: CLINIC | Age: 64
End: 2024-11-21
Payer: MEDICAID

## 2024-11-21 DIAGNOSIS — K86.1 CHRONIC PANCREATITIS, UNSPECIFIED PANCREATITIS TYPE: ICD-10-CM

## 2024-11-21 RX ORDER — PROMETHAZINE HYDROCHLORIDE 25 MG/1
25 TABLET ORAL EVERY 8 HOURS PRN
Qty: 90 TABLET | Refills: 0 | Status: SHIPPED | OUTPATIENT
Start: 2024-11-21

## 2024-11-21 NOTE — TELEPHONE ENCOUNTER
Patient called and left a message on my voicemail that she had discussed completing an imaging test that would evaluate a blood clot in her lungs during her visit yesterday. She said that she could not remember what testing was discussed, but has decided she would like to have an order to do the testing.

## 2024-11-25 NOTE — TELEPHONE ENCOUNTER
The follow up imaging will not be done until she has had at least 3 months of treatment with blood thinner

## 2024-11-25 NOTE — TELEPHONE ENCOUNTER
THIS WAS PRESCRIBED WHEN THEY FOUND THE BLOOD CLOT. SHE SAID SHE WAS SUPPOSE TO TAKE THIS MEDICATION FOR 3 MONTHS.

## 2024-11-26 DIAGNOSIS — M79.7 FIBROMYALGIA: ICD-10-CM

## 2024-11-26 DIAGNOSIS — F41.8 SITUATIONAL ANXIETY: ICD-10-CM

## 2024-11-26 DIAGNOSIS — M54.2 NECK PAIN: ICD-10-CM

## 2024-11-26 DIAGNOSIS — M62.838 CERVICAL PARASPINAL MUSCLE SPASM: ICD-10-CM

## 2024-11-26 RX ORDER — CYCLOBENZAPRINE HCL 5 MG
5 TABLET ORAL 3 TIMES DAILY PRN
Qty: 90 TABLET | Refills: 1 | Status: SHIPPED | OUTPATIENT
Start: 2024-11-26

## 2024-11-26 NOTE — TELEPHONE ENCOUNTER
Rx Refill Note  Requested Prescriptions     Pending Prescriptions Disp Refills    clonazePAM (KlonoPIN) 0.5 MG tablet 45 tablet 0     Signed Prescriptions Disp Refills    cyclobenzaprine (FLEXERIL) 5 MG tablet 90 tablet 1     Sig: TAKE 1 TABLET BY MOUTH 3 (THREE) TIMES A DAY AS NEEDED FOR MUSCLE SPASMS.     Authorizing Provider: ERROL GUERRIER     Ordering User: ALEXIA ROJO      Last office visit with prescribing clinician: 11/20/2024   Last telemedicine visit with prescribing clinician: Visit date not found   Next office visit with prescribing clinician: 2/24/2025                         Would you like a call back once the refill request has been completed: [] Yes [] No    If the office needs to give you a call back, can they leave a voicemail: [] Yes [] No    Alexia Kidd CMA  11/26/24, 14:01 EST

## 2024-11-26 NOTE — TELEPHONE ENCOUNTER
Rx Refill Note  Requested Prescriptions     Pending Prescriptions Disp Refills    clonazePAM (KlonoPIN) 0.5 MG tablet [Pharmacy Med Name: CLONAZEPAM 0.5MG] 45 tablet 0     Sig: TAKE 1 TABLET BY MOUTH TWO TIMES DAILY AS NEEDED FOR ANXIETY      Last office visit with prescribing clinician: 11/20/2024   Last telemedicine visit with prescribing clinician: Visit date not found   Next office visit with prescribing clinician: 2/24/2025                         Would you like a call back once the refill request has been completed: [] Yes [] No    If the office needs to give you a call back, can they leave a voicemail: [] Yes [] No    Alexia Kidd, JOLIE  11/26/24, 15:31 EST

## 2024-11-27 RX ORDER — CLONAZEPAM 0.5 MG/1
TABLET ORAL
Qty: 45 TABLET | Refills: 0 | Status: SHIPPED | OUTPATIENT
Start: 2024-11-27

## 2024-11-27 NOTE — TELEPHONE ENCOUNTER
PATIENT CALLED BACK AND STATED SHE NEEDS THIS MEDICATION ASAP. IT IS TWO DAYS EARLY, DUE ON 11/29/24 BUT WE WILL BE CLOSED. PHARMACY ADVISED HER TO ASK PCP TO FILL TWO DAYS EARLY.  PATIENT STATED HER SISTER RECENTLY PASSED AWAY AND IS STRUGGLING WITH [facility].

## 2024-11-27 NOTE — TELEPHONE ENCOUNTER
Rx Refill Note  Requested Prescriptions     Pending Prescriptions Disp Refills    clonazePAM (KlonoPIN) 0.5 MG tablet [Pharmacy Med Name: CLONAZEPAM 0.5MG] 45 tablet 0     Sig: TAKE 1 TABLET BY MOUTH TWO TIMES DAILY AS NEEDED FOR ANXIETY      Last office visit with prescribing clinician: 11/20/2024   Last telemedicine visit with prescribing clinician: Visit date not found   Next office visit with prescribing clinician: 2/24/2025                         Would you like a call back once the refill request has been completed: [] Yes [] No    If the office needs to give you a call back, can they leave a voicemail: [] Yes [] No    Selina Robertson MA  11/27/24, 10:46 EST

## 2024-12-01 RX ORDER — CLONAZEPAM 0.5 MG/1
0.5 TABLET ORAL 2 TIMES DAILY PRN
Qty: 45 TABLET | Refills: 0 | Status: SHIPPED | OUTPATIENT
Start: 2024-12-01

## 2024-12-09 DIAGNOSIS — M79.7 FIBROMYALGIA: ICD-10-CM

## 2024-12-09 NOTE — TELEPHONE ENCOUNTER
Rx Refill Note  Requested Prescriptions     Pending Prescriptions Disp Refills    pregabalin (LYRICA) 200 MG capsule [Pharmacy Med Name: PREGABALIN 200MG] 60 capsule 0     Sig: TAKE 1 CAPSULE BY MOUTH 2 (TWO) TIMES A DAY.      Last office visit with prescribing clinician: 11/20/2024   Last telemedicine visit with prescribing clinician: Visit date not found   Next office visit with prescribing clinician: 2/24/2025     Adrienne Medrano MA  12/09/24, 16:42 EST

## 2024-12-10 RX ORDER — PREGABALIN 200 MG/1
200 CAPSULE ORAL 2 TIMES DAILY
Qty: 60 CAPSULE | Refills: 1 | Status: SHIPPED | OUTPATIENT
Start: 2024-12-10

## 2024-12-23 DIAGNOSIS — K86.1 CHRONIC PANCREATITIS, UNSPECIFIED PANCREATITIS TYPE: ICD-10-CM

## 2024-12-23 DIAGNOSIS — M54.2 NECK PAIN: ICD-10-CM

## 2024-12-23 DIAGNOSIS — M79.7 FIBROMYALGIA: ICD-10-CM

## 2024-12-23 DIAGNOSIS — M62.838 CERVICAL PARASPINAL MUSCLE SPASM: ICD-10-CM

## 2024-12-23 RX ORDER — CYCLOBENZAPRINE HCL 5 MG
5 TABLET ORAL 3 TIMES DAILY PRN
Qty: 90 TABLET | Refills: 1 | Status: SHIPPED | OUTPATIENT
Start: 2024-12-23

## 2024-12-23 RX ORDER — PROMETHAZINE HYDROCHLORIDE 25 MG/1
25 TABLET ORAL EVERY 8 HOURS PRN
Qty: 90 TABLET | Refills: 0 | Status: SHIPPED | OUTPATIENT
Start: 2024-12-23

## 2024-12-23 RX ORDER — PROMETHAZINE HYDROCHLORIDE 25 MG/1
25 TABLET ORAL EVERY 8 HOURS PRN
Qty: 90 TABLET | Refills: 0 | OUTPATIENT
Start: 2024-12-23

## 2024-12-23 NOTE — TELEPHONE ENCOUNTER
Caller: Katelynn Sung Richy    Relationship: Self    Best call back number:     Requested Prescriptions:   Requested Prescriptions     Pending Prescriptions Disp Refills    promethazine (PHENERGAN) 25 MG tablet 90 tablet 0     Sig: Take 1 tablet by mouth Every 8 (Eight) Hours As Needed for Nausea or Vomiting.    cyclobenzaprine (FLEXERIL) 5 MG tablet 90 tablet 1     Sig: Take 1 tablet by mouth 3 (Three) Times a Day As Needed for Muscle Spasms.        Pharmacy where request should be sent: [facility] ([place] - [address], [address] - [phone]  - [phone]     Last office visit with prescribing clinician: 11/20/2024   Last telemedicine visit with prescribing clinician: Visit date not found   Next office visit with prescribing clinician: 2/24/2025     Additional details provided by patient: IS OUT OF THE PROMETHAZINE. PLEASE FILL BEFORE THE HOLIDAY.    Does the patient have less than a 3 day supply:  [x] Yes  [] No    Would you like a call back once the refill request has been completed: [] Yes [x] No    If the office needs to give you a call back, can they leave a voicemail: [] Yes [x] No    Shira Doss Rep   12/23/24 10:24 EST

## 2025-01-03 RX ORDER — CETIRIZINE HYDROCHLORIDE 10 MG/1
10 TABLET ORAL DAILY
Qty: 30 TABLET | Refills: 0 | Status: SHIPPED | OUTPATIENT
Start: 2025-01-03

## 2025-01-09 DIAGNOSIS — F41.8 SITUATIONAL ANXIETY: Primary | ICD-10-CM

## 2025-01-09 RX ORDER — CLONAZEPAM 0.5 MG/1
0.5 TABLET ORAL 2 TIMES DAILY PRN
Qty: 45 TABLET | Refills: 0 | Status: SHIPPED | OUTPATIENT
Start: 2025-01-09

## 2025-01-09 NOTE — TELEPHONE ENCOUNTER
Rx Refill Note  Requested Prescriptions     Pending Prescriptions Disp Refills    clonazePAM (KlonoPIN) 0.5 MG tablet [Pharmacy Med Name: CLONAZEPAM 0.5MG] 45 tablet 0     Sig: TAKE 1 TABLET BY MOUTH 2 (TWO) TIMES A DAY AS NEEDED FOR ANXIETY.      Last office visit with prescribing clinician: 11/20/2024   Last telemedicine visit with prescribing clinician: Visit date not found   Next office visit with prescribing clinician: 2/24/2025                         Would you like a call back once the refill request has been completed: [] Yes [] No    If the office needs to give you a call back, can they leave a voicemail: [] Yes [] No    Selina Robertson MA  01/09/25, 09:41 EST

## 2025-01-18 DIAGNOSIS — K86.1 CHRONIC PANCREATITIS, UNSPECIFIED PANCREATITIS TYPE: ICD-10-CM

## 2025-01-20 RX ORDER — PROMETHAZINE HYDROCHLORIDE 25 MG/1
25 TABLET ORAL EVERY 8 HOURS PRN
Qty: 90 TABLET | Refills: 0 | Status: SHIPPED | OUTPATIENT
Start: 2025-01-20

## 2025-01-23 DIAGNOSIS — K59.00 CONSTIPATION, UNSPECIFIED CONSTIPATION TYPE: ICD-10-CM

## 2025-01-23 RX ORDER — DOCUSATE SODIUM 100 MG/1
100 CAPSULE, LIQUID FILLED ORAL 2 TIMES DAILY PRN
Qty: 60 CAPSULE | Refills: 0 | Status: SHIPPED | OUTPATIENT
Start: 2025-01-23

## 2025-01-28 DIAGNOSIS — I26.99 OTHER ACUTE PULMONARY EMBOLISM WITHOUT ACUTE COR PULMONALE: ICD-10-CM

## 2025-01-28 RX ORDER — APIXABAN 5 MG/1
5 TABLET, FILM COATED ORAL 2 TIMES DAILY
Qty: 60 TABLET | Refills: 0 | Status: SHIPPED | OUTPATIENT
Start: 2025-01-28

## 2025-01-30 DIAGNOSIS — F41.8 SITUATIONAL ANXIETY: ICD-10-CM

## 2025-01-30 RX ORDER — CLONAZEPAM 0.5 MG/1
0.5 TABLET ORAL 2 TIMES DAILY PRN
Qty: 45 TABLET | Refills: 0 | Status: SHIPPED | OUTPATIENT
Start: 2025-01-30

## 2025-01-30 NOTE — TELEPHONE ENCOUNTER
Rx Refill Note  Requested Prescriptions     Pending Prescriptions Disp Refills    clonazePAM (KlonoPIN) 0.5 MG tablet [Pharmacy Med Name: CLONAZEPAM 0.500 MG TABLET] 45 tablet 0     Sig: TAKE 1 TABLET BY MOUTH 2 (TWO) TIMES A DAY AS NEEDED FOR ANXIETY.      Last office visit with prescribing clinician: Visit date not found   Last telemedicine visit with prescribing clinician: Visit date not found   Next office visit with prescribing clinician: Visit date not found                         Would you like a call back once the refill request has been completed: [] Yes [] No    If the office needs to give you a call back, can they leave a voicemail: [] Yes [] No    Selina Robertson MA  01/30/25, 11:02 EST

## 2025-01-31 RX ORDER — CETIRIZINE HYDROCHLORIDE 10 MG/1
10 TABLET ORAL DAILY
Qty: 30 TABLET | Refills: 0 | Status: SHIPPED | OUTPATIENT
Start: 2025-01-31

## 2025-02-10 DIAGNOSIS — M79.7 FIBROMYALGIA: ICD-10-CM

## 2025-02-10 NOTE — TELEPHONE ENCOUNTER
Rx Refill Note  Requested Prescriptions     Pending Prescriptions Disp Refills    pregabalin (LYRICA) 200 MG capsule 60 capsule 1     Sig: Take 1 capsule by mouth 2 (Two) Times a Day.      Last office visit with prescribing clinician: 11/20/2024   Last telemedicine visit with prescribing clinician: Visit date not found   Next office visit with prescribing clinician: 2/24/2025                         Would you like a call back once the refill request has been completed: [] Yes [] No    If the office needs to give you a call back, can they leave a voicemail: [] Yes [] No    Selina Robertson MA  02/10/25, 17:50 EST

## 2025-02-10 NOTE — TELEPHONE ENCOUNTER
Caller: Katelynn Sung Richy    Relationship: Self    Best call back number: [phone]     Requested Prescriptions:   Requested Prescriptions     Pending Prescriptions Disp Refills    pregabalin (LYRICA) 200 MG capsule 60 capsule 1     Sig: Take 1 capsule by mouth 2 (Two) Times a Day.        Pharmacy where request should be sent: [facility] ([place]) - [address], [address] - [phone] [facility] [phone]      Last office visit with prescribing clinician: 11/20/2024   Last telemedicine visit with prescribing clinician: Visit date not found   Next office visit with prescribing clinician: 2/24/2025     Additional details provided by patient:     Does the patient have less than a 3 day supply:  [] Yes  [x] No    Would you like a call back once the refill request has been completed: [] Yes [x] No    If the office needs to give you a call back, can they leave a voicemail: [] Yes [x] No    Cadance Dunaway, RegSched Rep   02/10/25 10:52 EST

## 2025-02-11 RX ORDER — PREGABALIN 200 MG/1
200 CAPSULE ORAL 2 TIMES DAILY
Qty: 60 CAPSULE | Refills: 0 | Status: SHIPPED | OUTPATIENT
Start: 2025-02-11

## 2025-02-11 NOTE — TELEPHONE ENCOUNTER
Rx Refill Note  Requested Prescriptions     Pending Prescriptions Disp Refills    pregabalin (LYRICA) 200 MG capsule 60 capsule 1     Sig: Take 1 capsule by mouth 2 (Two) Times a Day.      Last office visit with prescribing clinician: 11/20/2024   Last telemedicine visit with prescribing clinician: Visit date not found   Next office visit with prescribing clinician: 2/24/2025                         Would you like a call back once the refill request has been completed: [] Yes [] No    If the office needs to give you a call back, can they leave a voicemail: [] Yes [] No    Selina Robertson MA  02/11/25, 09:45 EST

## 2025-02-17 DIAGNOSIS — K86.1 CHRONIC PANCREATITIS, UNSPECIFIED PANCREATITIS TYPE: ICD-10-CM

## 2025-02-17 RX ORDER — PROMETHAZINE HYDROCHLORIDE 25 MG/1
25 TABLET ORAL EVERY 8 HOURS PRN
Qty: 90 TABLET | Refills: 0 | Status: SHIPPED | OUTPATIENT
Start: 2025-02-17

## 2025-02-18 DIAGNOSIS — K86.1 CHRONIC PANCREATITIS, UNSPECIFIED PANCREATITIS TYPE: ICD-10-CM

## 2025-02-18 RX ORDER — NALOXEGOL OXALATE 25 MG/1
TABLET, FILM COATED ORAL
Qty: 30 TABLET | Refills: 3 | Status: SHIPPED | OUTPATIENT
Start: 2025-02-18

## 2025-02-18 RX ORDER — PROMETHAZINE HYDROCHLORIDE 25 MG/1
25 TABLET ORAL EVERY 8 HOURS PRN
Qty: 90 TABLET | Refills: 0 | OUTPATIENT
Start: 2025-02-18

## 2025-02-19 DIAGNOSIS — M79.7 FIBROMYALGIA: ICD-10-CM

## 2025-02-19 DIAGNOSIS — M54.2 NECK PAIN: ICD-10-CM

## 2025-02-19 DIAGNOSIS — M62.838 CERVICAL PARASPINAL MUSCLE SPASM: ICD-10-CM

## 2025-02-19 RX ORDER — CYCLOBENZAPRINE HCL 5 MG
5 TABLET ORAL 3 TIMES DAILY PRN
Qty: 90 TABLET | Refills: 0 | Status: SHIPPED | OUTPATIENT
Start: 2025-02-19

## 2025-02-24 ENCOUNTER — OFFICE VISIT (OUTPATIENT)
Dept: FAMILY MEDICINE CLINIC | Facility: CLINIC | Age: 65
End: 2025-02-24
Payer: MEDICAID

## 2025-02-24 VITALS
HEIGHT: 64 IN | BODY MASS INDEX: 31.58 KG/M2 | DIASTOLIC BLOOD PRESSURE: 78 MMHG | OXYGEN SATURATION: 98 % | WEIGHT: 185 LBS | HEART RATE: 77 BPM | SYSTOLIC BLOOD PRESSURE: 130 MMHG

## 2025-02-24 DIAGNOSIS — I10 PRIMARY HYPERTENSION: ICD-10-CM

## 2025-02-24 DIAGNOSIS — I26.99 OTHER ACUTE PULMONARY EMBOLISM WITHOUT ACUTE COR PULMONALE: ICD-10-CM

## 2025-02-24 DIAGNOSIS — Z23 NEED FOR PNEUMOCOCCAL 20-VALENT CONJUGATE VACCINATION: ICD-10-CM

## 2025-02-24 DIAGNOSIS — Z12.31 ENCOUNTER FOR SCREENING MAMMOGRAM FOR MALIGNANT NEOPLASM OF BREAST: ICD-10-CM

## 2025-02-24 DIAGNOSIS — F41.8 SITUATIONAL ANXIETY: ICD-10-CM

## 2025-02-24 DIAGNOSIS — E78.2 MIXED HYPERLIPIDEMIA: ICD-10-CM

## 2025-02-24 DIAGNOSIS — R73.03 PREDIABETES: ICD-10-CM

## 2025-02-24 DIAGNOSIS — M79.7 FIBROMYALGIA: Primary | ICD-10-CM

## 2025-02-24 DIAGNOSIS — R79.9 ABNORMAL BLOOD CHEMISTRY: ICD-10-CM

## 2025-02-24 DIAGNOSIS — I48.91 ATRIAL FIBRILLATION, UNSPECIFIED TYPE: ICD-10-CM

## 2025-02-24 RX ORDER — CLONAZEPAM 0.5 MG/1
0.5 TABLET ORAL 2 TIMES DAILY PRN
Qty: 60 TABLET | Refills: 2 | Status: SHIPPED | OUTPATIENT
Start: 2025-02-24

## 2025-02-24 RX ORDER — PREGABALIN 200 MG/1
200 CAPSULE ORAL 2 TIMES DAILY
Qty: 60 CAPSULE | Refills: 2 | Status: SHIPPED | OUTPATIENT
Start: 2025-02-24

## 2025-02-24 NOTE — PROGRESS NOTES
Subjective   Katelynn Sung is a 64 y.o. female.     History of Present Illness  Here for routine f/u on several chronic med problems:  FMSx- recently stable symptoms on lyrica. Denies side effects from med other than some difficulty losing weight  PE- coming up on 6 months of eliquis  Anxiety DO- has failed SSRI, SNRI, wellbutrin. Symptoms currently stable on klonopin up to bid. No aberrant behavior. Denies worsening depression.   HTN- on zestoretic  HLP- not currently on statin as she has declined tx  A fib- on eliquis, no rate conrol needed at this time. Managed by cardiology at [facility]  Obesity with fatty liver- weight recently stable. Struggles with limiting caloric intake  Substance use DO - followed closely at her MAT clinic, seeing therapist as well. Not having to take ambien any longer.    The following portions of the patient's history were reviewed and updated as appropriate: allergies, current medications, past family history, past medical history, past social history, past surgical history, and problem list.    Review of Systems   Constitutional:  Positive for fatigue. Negative for diaphoresis and fever.   HENT:  Positive for congestion and postnasal drip. Negative for ear pain, mouth sores, nosebleeds, rhinorrhea and sore throat.    Eyes:  Negative for visual disturbance.   Respiratory:  Positive for shortness of breath (mild with exertion). Negative for cough and wheezing.    Cardiovascular:  Negative for chest pain, palpitations and leg swelling.   Gastrointestinal:  Positive for abdominal pain (chronic, stable), constipation and nausea. Negative for blood in stool, rectal pain and vomiting.   Endocrine: Positive for heat intolerance. Negative for polydipsia and polyuria.   Genitourinary:  Negative for difficulty urinating, dysuria, genital sores, hematuria, pelvic pain, vaginal bleeding and vaginal discharge.   Musculoskeletal:  Positive for arthralgias, back pain, myalgias, neck pain and  neck stiffness.   Skin:  Negative for rash and wound.   Neurological:  Positive for dizziness, weakness (generalized) and headaches. Negative for tremors and syncope.   Hematological:  Negative for adenopathy. Does not bruise/bleed easily.   Psychiatric/Behavioral:  Positive for dysphoric mood. Negative for confusion and suicidal ideas. The patient is nervous/anxious.    Pt's previous ROS reviewed and updated as indicated.       Objective   Vitals:    02/24/25 1312   BP: 130/78   Pulse: 77   SpO2: 98%     Body mass index is 31.76 kg/m².      02/24/25  1312   Weight: 83.9 kg (185 lb)       Physical Exam  Vitals and nursing note reviewed.   Constitutional:       General: She is not in acute distress.     Appearance: She is well-groomed. She is obese. She is not ill-appearing.   HENT:      Head: Atraumatic.      Mouth/Throat:      Mouth: Mucous membranes are moist.   Eyes:      General: No scleral icterus.     Conjunctiva/sclera: Conjunctivae normal.   Neck:      Thyroid: No thyroid mass.   Cardiovascular:      Rate and Rhythm: Normal rate and regular rhythm.      Pulses: Normal pulses.      Heart sounds: Normal heart sounds.   Pulmonary:      Effort: Pulmonary effort is normal.      Breath sounds: Decreased breath sounds present. No wheezing, rhonchi or rales.   Musculoskeletal:      Right lower leg: No edema.      Left lower leg: No edema.   Lymphadenopathy:      Cervical: No cervical adenopathy.   Skin:     General: Skin is warm and dry.      Coloration: Skin is not jaundiced or pale.      Findings: No bruising or rash.   Neurological:      Mental Status: She is alert and oriented to person, place, and time.      Gait: Gait is intact.   Psychiatric:         Mood and Affect: Mood and affect normal.         Behavior: Behavior normal. Behavior is cooperative.         Cognition and Memory: Cognition normal.     Pt's previous physical exam reviewed and updated as indicated.    Lab Results   Component Value Date    HGBA1C  5.70 (H) 10/19/2024    HGBA1C 5.90 (H) 01/29/2024    HGBA1C 5.7 (H) 07/24/2023     Lab Results   Component Value Date    CREATININE 0.94 10/24/2024     Lab Results   Component Value Date    WBC 7.46 10/29/2024    HGB 14.1 10/29/2024    HCT 43.4 10/29/2024    MCV 87.0 10/29/2024     10/29/2024       Lab Results   Component Value Date    GLUCOSE 126 (H) 10/24/2024    BUN 8 10/24/2024    CREATININE 0.94 10/24/2024    EGFRIFNONA 80 12/09/2021    EGFRIFAFRI 97 12/09/2021    BCR 8.5 10/24/2024    K 4.0 10/24/2024    CO2 28.4 10/24/2024    CALCIUM 8.8 10/24/2024    ALBUMIN 4.4 10/21/2024    AST 25 10/21/2024    ALT 24 10/21/2024       Lab Results   Component Value Date    CHLPL 200 01/29/2024    TRIG 192 (H) 01/29/2024    HDL 43 01/29/2024     (H) 01/29/2024     Lab Results   Component Value Date    TSH 2.270 08/06/2024           Assessment & Plan   Diagnoses and all orders for this visit:    1. Fibromyalgia (Primary)  -     pregabalin (LYRICA) 200 MG capsule; Take 1 capsule by mouth 2 (Two) Times a Day.  Dispense: 60 capsule; Refill: 2    2. Need for pneumococcal 20-valent conjugate vaccination  -     Pneumococcal Conjugate Vaccine 20-Valent (PCV20)    3. Situational anxiety  -     clonazePAM (KlonoPIN) 0.5 MG tablet; Take 1 tablet by mouth 2 (Two) Times a Day As Needed for Anxiety. for anxiety  Dispense: 60 tablet; Refill: 2    4. Other acute pulmonary embolism without acute cor pulmonale  -     apixaban (Eliquis) 5 MG tablet tablet; Take 1 tablet by mouth 2 (Two) Times a Day.  Dispense: 60 tablet; Refill: 1    5. Primary hypertension    6. Mixed hyperlipidemia  -     Lipid Panel    7. Atrial fibrillation, unspecified type    8. Prediabetes  -     Hemoglobin A1c    9. Abnormal blood chemistry  -     Hemoglobin A1c       FMSx - stable. Continue lyrica.  PE with reported dx of a fib- continue eliquis, f/u with cardiology as scheduled.  FLAVIO- stable. Continue klonopin up to bid. F/u with therapist as  scheduled.  HTN- controlled, continue zestoretic  HLP - declines statin. Pt advised to eat a heart healthy diet and get regular aerobic exercise.  Assess status of preDM, tx as indicated.  Routine f/u in 3 months, sooner as needed/instructed.  I will contact patient regarding test results and provide instructions regarding any necessary changes in plan of care.  Patient was encouraged to keep me informed of any acute changes, lack of improvement, or any new concerning symptoms.  Pt is aware of reasons to seek emergent care.  Patient voiced understanding of all instructions and denied further questions.  As part of patient's treatment plan I am prescribing a controlled substance.  The patient has been made aware of the appropriate use of such medications, including potential risk of somnolence, limited ability to drive and/or work safely, and potential for dependence and/or overdose.  It has also been made clear that these medications are for use by this patient only, without concomitant use of alcohol or other substances, unless prescribed.  History and physical exam exhibit continued safe and appropriate use of controlled substance.  DANTE reviewed.  Patient has completed a prescribing agreement detailing terms of continued prescribing of controlled substances, including monitoring DANTE reports, urine drug screening, and pill counts if necessary.  Patient is aware that inappropriate use will result in cessation of prescribing such medications.    Please note that portions of this note may have been completed with a voice recognition program.

## 2025-02-28 PROBLEM — R10.10 UPPER ABDOMINAL PAIN: Status: RESOLVED | Noted: 2024-08-06 | Resolved: 2025-02-28

## 2025-03-03 ENCOUNTER — OFFICE VISIT (OUTPATIENT)
Dept: FAMILY MEDICINE CLINIC | Facility: CLINIC | Age: 65
End: 2025-03-03
Payer: MEDICAID

## 2025-03-03 ENCOUNTER — HOSPITAL ENCOUNTER (OUTPATIENT)
Dept: CT IMAGING | Facility: HOSPITAL | Age: 65
Discharge: HOME | End: 2025-03-03
Payer: MEDICAID

## 2025-03-03 ENCOUNTER — HOSPITAL ENCOUNTER (EMERGENCY)
Facility: HOSPITAL | Age: 65
Discharge: HOME | End: 2025-03-03
Attending: STUDENT IN AN ORGANIZED HEALTH CARE EDUCATION/TRAINING PROGRAM | Admitting: STUDENT IN AN ORGANIZED HEALTH CARE EDUCATION/TRAINING PROGRAM
Payer: MEDICAID

## 2025-03-03 ENCOUNTER — LAB (OUTPATIENT)
Dept: LAB | Facility: HOSPITAL | Age: 65
End: 2025-03-03
Payer: MEDICAID

## 2025-03-03 VITALS
DIASTOLIC BLOOD PRESSURE: 94 MMHG | BODY MASS INDEX: 31.76 KG/M2 | HEART RATE: 83 BPM | TEMPERATURE: 98.3 F | SYSTOLIC BLOOD PRESSURE: 142 MMHG | OXYGEN SATURATION: 96 % | WEIGHT: 186 LBS | HEIGHT: 64 IN

## 2025-03-03 VITALS
DIASTOLIC BLOOD PRESSURE: 72 MMHG | BODY MASS INDEX: 31.76 KG/M2 | OXYGEN SATURATION: 98 % | RESPIRATION RATE: 17 BRPM | WEIGHT: 186 LBS | TEMPERATURE: 97.8 F | HEART RATE: 78 BPM | HEIGHT: 64 IN | SYSTOLIC BLOOD PRESSURE: 114 MMHG

## 2025-03-03 DIAGNOSIS — K52.9 COLITIS: Primary | ICD-10-CM

## 2025-03-03 DIAGNOSIS — R10.9 ACUTE ABDOMINAL PAIN: ICD-10-CM

## 2025-03-03 DIAGNOSIS — R10.2 ACUTE SUPRAPUBIC PAIN: ICD-10-CM

## 2025-03-03 DIAGNOSIS — K21.9 GASTROESOPHAGEAL REFLUX DISEASE WITHOUT ESOPHAGITIS: ICD-10-CM

## 2025-03-03 DIAGNOSIS — R10.84 GENERALIZED ABDOMINAL PAIN: Primary | ICD-10-CM

## 2025-03-03 LAB
ALBUMIN SERPL-MCNC: 4.3 G/DL (ref 3.5–5.2)
ALBUMIN SERPL-MCNC: 4.5 G/DL (ref 3.5–5.2)
ALBUMIN/GLOB SERPL: 1.1 G/DL
ALBUMIN/GLOB SERPL: 1.3 G/DL
ALP SERPL-CCNC: 75 U/L (ref 39–117)
ALP SERPL-CCNC: 83 U/L (ref 39–117)
ALT SERPL W P-5'-P-CCNC: 19 U/L (ref 1–33)
ALT SERPL W P-5'-P-CCNC: 23 U/L (ref 1–33)
ANION GAP SERPL CALCULATED.3IONS-SCNC: 12.7 MMOL/L (ref 5–15)
ANION GAP SERPL CALCULATED.3IONS-SCNC: 13.1 MMOL/L (ref 5–15)
AST SERPL-CCNC: 37 U/L (ref 1–32)
AST SERPL-CCNC: 40 U/L (ref 1–32)
BACTERIA UR QL AUTO: ABNORMAL /HPF
BASOPHILS # BLD AUTO: 0.05 10*3/MM3 (ref 0–0.2)
BASOPHILS # BLD AUTO: 0.06 10*3/MM3 (ref 0–0.2)
BASOPHILS NFR BLD AUTO: 0.8 % (ref 0–1.5)
BASOPHILS NFR BLD AUTO: 0.8 % (ref 0–1.5)
BILIRUB BLD-MCNC: NEGATIVE MG/DL
BILIRUB SERPL-MCNC: 0.4 MG/DL (ref 0–1.2)
BILIRUB SERPL-MCNC: 0.4 MG/DL (ref 0–1.2)
BILIRUB UR QL STRIP: NEGATIVE
BUN SERPL-MCNC: 6 MG/DL (ref 8–23)
BUN SERPL-MCNC: 7 MG/DL (ref 8–23)
BUN/CREAT SERPL: 7.5 (ref 7–25)
BUN/CREAT SERPL: 8.8 (ref 7–25)
CALCIUM SPEC-SCNC: 9.3 MG/DL (ref 8.6–10.5)
CALCIUM SPEC-SCNC: 9.9 MG/DL (ref 8.6–10.5)
CHLORIDE SERPL-SCNC: 101 MMOL/L (ref 98–107)
CHLORIDE SERPL-SCNC: 96 MMOL/L (ref 98–107)
CHOLEST SERPL-MCNC: 248 MG/DL (ref 0–200)
CLARITY UR: CLEAR
CLARITY, POC: CLEAR
CO2 SERPL-SCNC: 24.3 MMOL/L (ref 22–29)
CO2 SERPL-SCNC: 25.9 MMOL/L (ref 22–29)
COLOR UR: YELLOW
COLOR UR: YELLOW
CREAT SERPL-MCNC: 0.8 MG/DL (ref 0.57–1)
CREAT SERPL-MCNC: 0.8 MG/DL (ref 0.57–1)
CRP SERPL-MCNC: 0.89 MG/DL (ref 0–0.5)
D-LACTATE SERPL-SCNC: 1.4 MMOL/L (ref 0.5–2)
DEPRECATED RDW RBC AUTO: 40.4 FL (ref 37–54)
DEPRECATED RDW RBC AUTO: 41.7 FL (ref 37–54)
EGFRCR SERPLBLD CKD-EPI 2021: 82.9 ML/MIN/1.73
EGFRCR SERPLBLD CKD-EPI 2021: 82.9 ML/MIN/1.73
EOSINOPHIL # BLD AUTO: 0.07 10*3/MM3 (ref 0–0.4)
EOSINOPHIL # BLD AUTO: 0.09 10*3/MM3 (ref 0–0.4)
EOSINOPHIL NFR BLD AUTO: 1.1 % (ref 0.3–6.2)
EOSINOPHIL NFR BLD AUTO: 1.2 % (ref 0.3–6.2)
ERYTHROCYTE [DISTWIDTH] IN BLOOD BY AUTOMATED COUNT: 13.4 % (ref 12.3–15.4)
ERYTHROCYTE [DISTWIDTH] IN BLOOD BY AUTOMATED COUNT: 13.5 % (ref 12.3–15.4)
ERYTHROCYTE [SEDIMENTATION RATE] IN BLOOD: 17 MM/HR (ref 0–30)
EXPIRATION DATE: 0
GLOBULIN UR ELPH-MCNC: 3.2 GM/DL
GLOBULIN UR ELPH-MCNC: 4 GM/DL
GLUCOSE SERPL-MCNC: 101 MG/DL (ref 65–99)
GLUCOSE SERPL-MCNC: 108 MG/DL (ref 65–99)
GLUCOSE UR STRIP-MCNC: NEGATIVE MG/DL
GLUCOSE UR STRIP-MCNC: NEGATIVE MG/DL
HBA1C MFR BLD: 5.3 % (ref 4.8–5.6)
HCT VFR BLD AUTO: 41.5 % (ref 34–46.6)
HCT VFR BLD AUTO: 44.3 % (ref 34–46.6)
HDLC SERPL-MCNC: 49 MG/DL (ref 40–60)
HGB BLD-MCNC: 14.3 G/DL (ref 12–15.9)
HGB BLD-MCNC: 15.1 G/DL (ref 12–15.9)
HGB UR QL STRIP.AUTO: NEGATIVE
HOLD SPECIMEN: NORMAL
HYALINE CASTS UR QL AUTO: ABNORMAL /LPF
IMM GRANULOCYTES # BLD AUTO: 0.02 10*3/MM3 (ref 0–0.05)
IMM GRANULOCYTES # BLD AUTO: 0.03 10*3/MM3 (ref 0–0.05)
IMM GRANULOCYTES NFR BLD AUTO: 0.3 % (ref 0–0.5)
IMM GRANULOCYTES NFR BLD AUTO: 0.4 % (ref 0–0.5)
KETONES UR QL STRIP: NEGATIVE
KETONES UR QL: NEGATIVE
LDLC SERPL CALC-MCNC: 174 MG/DL (ref 0–100)
LDLC/HDLC SERPL: 3.51 {RATIO}
LEUKOCYTE EST, POC: NEGATIVE
LEUKOCYTE ESTERASE UR QL STRIP.AUTO: ABNORMAL
LIPASE SERPL-CCNC: 55 U/L (ref 13–60)
LYMPHOCYTES # BLD AUTO: 3.15 10*3/MM3 (ref 0.7–3.1)
LYMPHOCYTES # BLD AUTO: 3.46 10*3/MM3 (ref 0.7–3.1)
LYMPHOCYTES NFR BLD AUTO: 47.7 % (ref 19.6–45.3)
LYMPHOCYTES NFR BLD AUTO: 51.6 % (ref 19.6–45.3)
Lab: 0
MCH RBC QN AUTO: 28.6 PG (ref 26.6–33)
MCH RBC QN AUTO: 28.6 PG (ref 26.6–33)
MCHC RBC AUTO-ENTMCNC: 34.1 G/DL (ref 31.5–35.7)
MCHC RBC AUTO-ENTMCNC: 34.5 G/DL (ref 31.5–35.7)
MCV RBC AUTO: 83 FL (ref 79–97)
MCV RBC AUTO: 83.9 FL (ref 79–97)
MONOCYTES # BLD AUTO: 0.39 10*3/MM3 (ref 0.1–0.9)
MONOCYTES # BLD AUTO: 0.53 10*3/MM3 (ref 0.1–0.9)
MONOCYTES NFR BLD AUTO: 6.4 % (ref 5–12)
MONOCYTES NFR BLD AUTO: 7.3 % (ref 5–12)
NEUTROPHILS NFR BLD AUTO: 2.42 10*3/MM3 (ref 1.7–7)
NEUTROPHILS NFR BLD AUTO: 3.08 10*3/MM3 (ref 1.7–7)
NEUTROPHILS NFR BLD AUTO: 39.8 % (ref 42.7–76)
NEUTROPHILS NFR BLD AUTO: 42.6 % (ref 42.7–76)
NITRITE UR QL STRIP: NEGATIVE
NITRITE UR-MCNC: NEGATIVE MG/ML
NRBC BLD AUTO-RTO: 0 /100 WBC (ref 0–0.2)
NRBC BLD AUTO-RTO: 0 /100 WBC (ref 0–0.2)
PH UR STRIP.AUTO: 6 [PH] (ref 5–8)
PH UR: 6 [PH] (ref 5–8)
PLATELET # BLD AUTO: 278 10*3/MM3 (ref 140–450)
PLATELET # BLD AUTO: 306 10*3/MM3 (ref 140–450)
PMV BLD AUTO: 10.5 FL (ref 6–12)
PMV BLD AUTO: 10.8 FL (ref 6–12)
POTASSIUM SERPL-SCNC: 3.8 MMOL/L (ref 3.5–5.2)
POTASSIUM SERPL-SCNC: 4.3 MMOL/L (ref 3.5–5.2)
PROT SERPL-MCNC: 7.5 G/DL (ref 6–8.5)
PROT SERPL-MCNC: 8.5 G/DL (ref 6–8.5)
PROT UR QL STRIP: NEGATIVE
PROT UR STRIP-MCNC: NEGATIVE MG/DL
RBC # BLD AUTO: 5 10*6/MM3 (ref 3.77–5.28)
RBC # BLD AUTO: 5.28 10*6/MM3 (ref 3.77–5.28)
RBC # UR STRIP: ABNORMAL /HPF
RBC # UR STRIP: NEGATIVE /UL
REF LAB TEST METHOD: ABNORMAL
SODIUM SERPL-SCNC: 133 MMOL/L (ref 136–145)
SODIUM SERPL-SCNC: 140 MMOL/L (ref 136–145)
SP GR UR STRIP: 1.03 (ref 1–1.03)
SP GR UR: 1 (ref 1–1.03)
SQUAMOUS #/AREA URNS HPF: ABNORMAL /HPF
TRIGL SERPL-MCNC: 136 MG/DL (ref 0–150)
UROBILINOGEN UR QL STRIP: ABNORMAL
UROBILINOGEN UR QL: NORMAL
VLDLC SERPL-MCNC: 25 MG/DL (ref 5–40)
WBC # UR STRIP: ABNORMAL /HPF
WBC CLUMPS # UR AUTO: ABNORMAL /HPF
WBC NRBC COR # BLD AUTO: 6.1 10*3/MM3 (ref 3.4–10.8)
WBC NRBC COR # BLD AUTO: 7.25 10*3/MM3 (ref 3.4–10.8)
WHOLE BLOOD HOLD COAG: NORMAL
WHOLE BLOOD HOLD SPECIMEN: NORMAL

## 2025-03-03 PROCEDURE — 80053 COMPREHEN METABOLIC PANEL: CPT | Performed by: STUDENT IN AN ORGANIZED HEALTH CARE EDUCATION/TRAINING PROGRAM

## 2025-03-03 PROCEDURE — 85025 COMPLETE CBC W/AUTO DIFF WBC: CPT | Performed by: FAMILY MEDICINE

## 2025-03-03 PROCEDURE — 99283 EMERGENCY DEPT VISIT LOW MDM: CPT | Performed by: STUDENT IN AN ORGANIZED HEALTH CARE EDUCATION/TRAINING PROGRAM

## 2025-03-03 PROCEDURE — 80053 COMPREHEN METABOLIC PANEL: CPT | Performed by: FAMILY MEDICINE

## 2025-03-03 PROCEDURE — 83036 HEMOGLOBIN GLYCOSYLATED A1C: CPT | Performed by: FAMILY MEDICINE

## 2025-03-03 PROCEDURE — 25010000002 KETOROLAC TROMETHAMINE PER 15 MG: Performed by: PHYSICIAN ASSISTANT

## 2025-03-03 PROCEDURE — 96375 TX/PRO/DX INJ NEW DRUG ADDON: CPT

## 2025-03-03 PROCEDURE — 3077F SYST BP >= 140 MM HG: CPT | Performed by: FAMILY MEDICINE

## 2025-03-03 PROCEDURE — 25510000001 IOPAMIDOL 61 % SOLUTION: Performed by: FAMILY MEDICINE

## 2025-03-03 PROCEDURE — 81001 URINALYSIS AUTO W/SCOPE: CPT | Performed by: STUDENT IN AN ORGANIZED HEALTH CARE EDUCATION/TRAINING PROGRAM

## 2025-03-03 PROCEDURE — 25510000002 DIATRIZOATE MEGLUMINE & SODIUM PER 1 ML: Performed by: FAMILY MEDICINE

## 2025-03-03 PROCEDURE — 25010000002 ONDANSETRON PER 1 MG: Performed by: STUDENT IN AN ORGANIZED HEALTH CARE EDUCATION/TRAINING PROGRAM

## 2025-03-03 PROCEDURE — 25810000003 SODIUM CHLORIDE 0.9 % SOLUTION: Performed by: STUDENT IN AN ORGANIZED HEALTH CARE EDUCATION/TRAINING PROGRAM

## 2025-03-03 PROCEDURE — 74177 CT ABD & PELVIS W/CONTRAST: CPT

## 2025-03-03 PROCEDURE — 86140 C-REACTIVE PROTEIN: CPT | Performed by: FAMILY MEDICINE

## 2025-03-03 PROCEDURE — 85652 RBC SED RATE AUTOMATED: CPT | Performed by: FAMILY MEDICINE

## 2025-03-03 PROCEDURE — 96374 THER/PROPH/DIAG INJ IV PUSH: CPT

## 2025-03-03 PROCEDURE — 1126F AMNT PAIN NOTED NONE PRSNT: CPT | Performed by: FAMILY MEDICINE

## 2025-03-03 PROCEDURE — 83605 ASSAY OF LACTIC ACID: CPT | Performed by: STUDENT IN AN ORGANIZED HEALTH CARE EDUCATION/TRAINING PROGRAM

## 2025-03-03 PROCEDURE — 85025 COMPLETE CBC W/AUTO DIFF WBC: CPT | Performed by: STUDENT IN AN ORGANIZED HEALTH CARE EDUCATION/TRAINING PROGRAM

## 2025-03-03 PROCEDURE — 3080F DIAST BP >= 90 MM HG: CPT | Performed by: FAMILY MEDICINE

## 2025-03-03 PROCEDURE — 25010000002 MORPHINE PER 10 MG: Performed by: STUDENT IN AN ORGANIZED HEALTH CARE EDUCATION/TRAINING PROGRAM

## 2025-03-03 PROCEDURE — 36415 COLL VENOUS BLD VENIPUNCTURE: CPT | Performed by: FAMILY MEDICINE

## 2025-03-03 PROCEDURE — 80061 LIPID PANEL: CPT | Performed by: FAMILY MEDICINE

## 2025-03-03 PROCEDURE — 99214 OFFICE O/P EST MOD 30 MIN: CPT | Performed by: FAMILY MEDICINE

## 2025-03-03 PROCEDURE — 83690 ASSAY OF LIPASE: CPT | Performed by: STUDENT IN AN ORGANIZED HEALTH CARE EDUCATION/TRAINING PROGRAM

## 2025-03-03 PROCEDURE — 87086 URINE CULTURE/COLONY COUNT: CPT | Performed by: PHYSICIAN ASSISTANT

## 2025-03-03 RX ORDER — DIATRIZOATE MEGLUMINE AND DIATRIZOATE SODIUM 660; 100 MG/ML; MG/ML
30 SOLUTION ORAL; RECTAL
Status: COMPLETED | OUTPATIENT
Start: 2025-03-03 | End: 2025-03-03

## 2025-03-03 RX ORDER — SODIUM CHLORIDE 0.9 % (FLUSH) 0.9 %
10 SYRINGE (ML) INJECTION AS NEEDED
Status: DISCONTINUED | OUTPATIENT
Start: 2025-03-03 | End: 2025-03-03 | Stop reason: HOSPADM

## 2025-03-03 RX ORDER — ONDANSETRON 2 MG/ML
4 INJECTION INTRAMUSCULAR; INTRAVENOUS ONCE
Status: COMPLETED | OUTPATIENT
Start: 2025-03-03 | End: 2025-03-03

## 2025-03-03 RX ORDER — DICYCLOMINE HCL 20 MG
20 TABLET ORAL EVERY 6 HOURS PRN
Qty: 20 TABLET | Refills: 0 | Status: SHIPPED | OUTPATIENT
Start: 2025-03-03

## 2025-03-03 RX ORDER — DICYCLOMINE HYDROCHLORIDE 10 MG/1
20 CAPSULE ORAL ONCE
Status: COMPLETED | OUTPATIENT
Start: 2025-03-03 | End: 2025-03-03

## 2025-03-03 RX ORDER — IOPAMIDOL 612 MG/ML
100 INJECTION, SOLUTION INTRAVASCULAR
Status: COMPLETED | OUTPATIENT
Start: 2025-03-03 | End: 2025-03-03

## 2025-03-03 RX ORDER — KETOROLAC TROMETHAMINE 30 MG/ML
15 INJECTION, SOLUTION INTRAMUSCULAR; INTRAVENOUS ONCE
Status: COMPLETED | OUTPATIENT
Start: 2025-03-03 | End: 2025-03-03

## 2025-03-03 RX ADMIN — MORPHINE SULFATE 4 MG: 4 INJECTION, SOLUTION INTRAMUSCULAR; INTRAVENOUS at 20:36

## 2025-03-03 RX ADMIN — SODIUM CHLORIDE 1000 ML: 9 INJECTION, SOLUTION INTRAVENOUS at 20:34

## 2025-03-03 RX ADMIN — DICYCLOMINE HYDROCHLORIDE 20 MG: 10 CAPSULE ORAL at 18:59

## 2025-03-03 RX ADMIN — IOPAMIDOL 100 ML: 612 INJECTION, SOLUTION INTRAVENOUS at 16:48

## 2025-03-03 RX ADMIN — ONDANSETRON 4 MG: 2 INJECTION INTRAMUSCULAR; INTRAVENOUS at 20:35

## 2025-03-03 RX ADMIN — DIATRIZOATE MEGLUMINE AND DIATRIZOATE SODIUM 30 ML: 660; 100 LIQUID ORAL; RECTAL at 16:48

## 2025-03-03 RX ADMIN — KETOROLAC TROMETHAMINE 15 MG: 30 INJECTION, SOLUTION INTRAMUSCULAR; INTRAVENOUS at 18:59

## 2025-03-03 NOTE — ED PROVIDER NOTES
EMERGENCY DEPARTMENT ENCOUNTER    Pt Name: Katelynn Sung  MRN: [id number]  Pt :   1960  Room Number:    Date of encounter:  3/3/2025  PCP: Lesvia Servin MD  ED Provider: TOYIN TruongC    Historian: Patient      HPI:  Chief Complaint   Patient presents with    Abdominal Pain          Context: Katelynn Sung is a 64 y.o. female who presents to the ED c/o lower central and right lower abdominal pain with watery diarrhea.  Symptoms started yesterday with abdominal pain but normal bowel movements, watery bowel movement started today without blood.  Seen by PCP and had labs drawn including CBC, CMP, sed rate, CRP, urinalysis and just had a p.o. and IV contrasted CT scan of the abdomen pelvis showing mild colitis.  No recent antibiotics.  No other sick family members at home.  States eating makes the pain worse.      PAST MEDICAL HISTORY  Past Medical History:   Diagnosis Date    Adnexal mass     pelvic US 10/26/11 showed 2 cm left abdnexal cystic lesion, resolved on f/u 5/14/15    Anxiety     Arm fracture, left     Humphreys's esophagus     Body piercing     Both ears    Class 1 obesity due to excess calories with serious comorbidity and body mass index (BMI) of 30.0 to 30.9 in adult 2018    Colon polyps     COPD (chronic obstructive pulmonary disease)     Cor pulmonale     Degenerative arthritis of lumbar spine     Depression     Fibromyalgia     Finger fracture     GERD (gastroesophageal reflux disease)     Herpes simplex     Hypertension     Insomnia     Lumbar degenerative disc disease     Mild sleep apnea 2013    Sleep study 13 showing mild degree    Narcotic dependence     Osteoporosis     Pancreatitis     PE (pulmonary thromboembolism)     Pneumonia     Respiratory failure requiring intubation     double pneumonia    Restless leg syndrome     Seasonal allergies     Sinus node dysfunction     Sleep apnea     does not wear cpap    Trouble swallowing     Wears  dentures          PAST SURGICAL HISTORY  Past Surgical History:   Procedure Laterality Date    COLONOSCOPY N/A     Complete    COLONOSCOPY N/A 10/23/2024    Procedure: Colonoscopy with polypectomy;  Surgeon: Jeronimo Lowe MD;  Location: [facility] ENDOSCOPY;  Service: Gastroenterology;  Laterality: N/A;    ENDOSCOPY N/A 2020    Procedure: ESOPHAGOGASTRODUODENOSCOPY;  Surgeon: Charbel Ching MD;  Location: [facility] ENDOSCOPY;  Service: Gastroenterology;  Laterality: N/A;    ENDOSCOPY N/A 2024    Procedure: Esophagogastroduodenoscopy with biopsy and polypectomy;  Surgeon: Jeronimo Lowe MD;  Location: [facility] ENDOSCOPY;  Service: Gastroenterology;  Laterality: N/A;    ENDOSCOPY N/A 10/22/2024    Procedure: Esophagogastroduodenscopy;  Surgeon: Jeronimo Lowe MD;  Location: [facility] ENDOSCOPY;  Service: Gastroenterology;  Laterality: N/A;    PACEMAKER IMPLANTATION      2024    TUBAL ABDOMINAL LIGATION      UPPER GASTROINTESTINAL ENDOSCOPY N/A          FAMILY HISTORY  Family History   Problem Relation Age of Onset    Stroke Mother     Liver disease Mother         Chronic    Cirrhosis Father     Heart attack Father     Breast cancer Sister     Stroke Brother     Cancer Sister     Colon cancer Neg Hx          SOCIAL HISTORY  Social History     Socioeconomic History    Marital status:    Tobacco Use    Smoking status: Former     Current packs/day: 0.00     Average packs/day: 0.8 packs/day for 3.0 years (2.3 ttl pk-yrs)     Types: Cigarettes, Electronic Cigarette     Start date:      Quit date:      Years since quittin.1     Passive exposure: Never    Smokeless tobacco: Never    Tobacco comments:     2013 started using nicotine containing substance in combustion-free vaporization device. PT not longer uses vapor stick.   Vaping Use    Vaping status: Former   Substance and Sexual Activity    Alcohol use: No    Drug use: Not Currently     Types: Oxycodone      Comment: Patient is currently on subutex    Sexual activity: Not Currently         ALLERGIES  Nsaids        REVIEW OF SYSTEMS  Review of Systems   Constitutional: Negative.    HENT: Negative.     Eyes: Negative.    Respiratory: Negative.     Cardiovascular: Negative.    Gastrointestinal:  Positive for abdominal pain and diarrhea. Negative for nausea and vomiting.   Genitourinary: Negative.  Negative for dysuria.   Musculoskeletal: Negative.    Skin: Negative.    Neurological: Negative.    Psychiatric/Behavioral: Negative.          All systems reviewed and negative except for those discussed in HPI.       PHYSICAL EXAM    I have reviewed the triage vital signs and nursing notes.    ED Triage Vitals [03/03/25 1756]   Temp Heart Rate Resp BP SpO2   97.7 °F (36.5 °C) 85 18 138/90 94 %      Temp src Heart Rate Source Patient Position BP Location FiO2 (%)   Oral Monitor Sitting Left arm --       Physical Exam  Vitals and nursing note reviewed.   Constitutional:       General: She is not in acute distress.     Appearance: She is well-developed. She is not ill-appearing, toxic-appearing or diaphoretic.   HENT:      Head: Normocephalic and atraumatic.   Eyes:      Extraocular Movements: Extraocular movements intact.   Cardiovascular:      Rate and Rhythm: Normal rate.   Pulmonary:      Effort: Pulmonary effort is normal.   Abdominal:      General: Abdomen is flat. Bowel sounds are normal.      Palpations: Abdomen is soft.      Tenderness:  in the right lower quadrant and suprapubic area There is no guarding or rebound.      Comments: No rebound, no guarding, no peritoneal signs   Skin:     General: Skin is warm and dry.   Neurological:      General: No focal deficit present.      Mental Status: She is alert.   Psychiatric:         Mood and Affect: Mood normal.         Behavior: Behavior normal.            LAB RESULTS  No results found for this or any previous visit (from the past 24 hours).      If labs were ordered, I  independently reviewed the results and considered them in treating the patient.        RADIOLOGY  No Radiology Exams Resulted Within Past 24 Hours        PROCEDURES    Procedures    Interpretations    O2 Sat: The patient's oxygen saturation was 94% on Room Air.  This was independently interpreted by me as Normal      MEDICATIONS GIVEN IN ER    Medications   ketorolac (TORADOL) injection 15 mg (15 mg Intravenous Given 3/3/25 1859)   dicyclomine (BENTYL) capsule 20 mg (20 mg Oral Given 3/3/25 1859)   morphine injection 4 mg (4 mg Intravenous Given 3/3/25 2036)   ondansetron (ZOFRAN) injection 4 mg (4 mg Intravenous Given 3/3/25 2035)   sodium chloride 0.9 % bolus 1,000 mL (0 mL Intravenous Stopped 3/3/25 2145)         MEDICAL DECISION MAKING, PROGRESS, and CONSULTS    All labs, if obtained, have been independently reviewed by me.  All radiology studies, if obtained, have been reviewed by me and the radiologist dictating the report.  All EKG's, if obtained, have been independently viewed and interpreted by me      Discussion below represents my analysis of pertinent findings related to patient's condition, differential diagnosis, treatment plan and final disposition.      Differential diagnosis:    Colitis    Additional Sources:  None      Orders placed during this visit:  Orders Placed This Encounter   Procedures    Urine Culture - Urine, Urine, Clean Catch    Gastrointestinal Panel, PCR - Stool, Per Rectum    Clostridioides difficile Toxin - Stool, Per Rectum    [place] Draw    Comprehensive Metabolic Panel    Lipase    Urinalysis With Microscopic If Indicated (No Culture) - Urine, Clean Catch    Lactic Acid, Plasma    CBC Auto Differential    Urinalysis, Microscopic Only - Urine, Clean Catch    Undress & Gown    CBC & Differential    Green Top (Gel)    Lavender Top    Light Blue Top         Additional orders considered but not ordered:  None    ED Course:    Consultants:  None    ED Course as of 03/05/25 0024   Mon  Mar 03, 2025   1854 I have reviewed the mid-level provider(s) note and verbally discussed the case/plan of care.  I was available for consultation as needed at all times during the patient's visit in the Emergency Department.  I agree with the clinical impression, plan, and disposition unless otherwise stated in the MDM below.    ATTENDING ATTESTATION  HPI: 64-year-old female presents with abdominal pain that started yesterday.  Associated diarrhea.    MDM: External notes reviewed.  CT abdomen pelvis with contrast from today at 5:22 PM reviewed.  Shows some mild colitis.      No clinical indication for repeat imaging.    ED workup reviewed.    I have reviewed the labs results. Clinically unremarkable.    Notable findings include: UA shows moderate leukocyte esterase, elevated WBCs.    Patient has no urinary symptoms, therefore likely asymptomatic bacteruria.    Radiology reports reviewed.     CT Abdomen Pelvis With Contrast    Result Date: 3/3/2025  Impression: Mild colitis, infectious/inflammatory. Authenticated and Electronically Signed by Licha Fernandez MD on 03/03/2025 05:22:19 PM     [JS]   2012 States she has only had 2 episodes of diarrhea today.  Unable to produce sample here.  No improvement after Toradol and Bentyl, will escalate to morphine see if she can produce a stool sample and reassess after repeat pain medication [TM]   2102 Patient feeling better at this time after analgesia, currently rates her pain a 3 out of 10 per nursing staff.  Currently eating.  Unable to produce stool sample here. [TM]   2108 Patient feeling better, up to the bathroom now to try to give a stool sample [TM]   2125 Patient unable to produce stool sample here.  Hemodynamically stable, labs nonactionable, just had CT scan no indication for repeat.  Will send home with stool collection cup and have return for worsening symptoms.  Discussed case Dr. Harp, no indication for antibiotics at this time. [TM]   2135 Patient  denies any urinary symptoms.  Urine sent for culture. [TM]      ED Course User Index  [JS] Sergo Harp DO  [TM] Kenji Malone PA-C           After my consideration of clinical presentation and any laboratory/radiology studies obtained, I discussed the findings with the patient/patient representative who is in agreement with the treatment plan and the final disposition. Risks and benefits of discharge were discussed.     AS OF 00:24 EST VITALS:    BP - 114/72  HR - 78  TEMP - 97.8 °F (36.6 °C) (Axillary)  O2 SATS - 98%    I reviewed the patient's prescription monitoring report if available prior to discharge    DIAGNOSIS  Final diagnoses:   Colitis         DISPOSITION  ED Disposition       ED Disposition   Discharge    Condition   Stable    Comment   --                   Please note that portions of this document were completed with voice recognition software.        Kenji Malone PA-C  03/03/25 2132       Kenji Malone PA-C  03/03/25 2132       Kenji Malone PA-C  03/05/25 0024

## 2025-03-03 NOTE — PROGRESS NOTES
Subjective   Katelynn Sung is a 64 y.o. female.     History of Present Illness  She c/o abd pain  Abdominal Pain  This is a new problem. The current episode started yesterday. The onset quality is sudden. The problem occurs constantly. The problem has been worse. The pain is located in the suprapubic region, RLQ and LLQ. The pain is severe. The quality of the pain is cramping, sharp and aching. The abdominal pain radiates to the RLQ. Associated symptoms include anorexia, arthralgias, constipation, a fever, headaches, myalgias and nausea. Pertinent negatives include no diarrhea, dysuria, frequency, hematochezia, hematuria, melena or vomiting. The pain is aggravated by bowel movement (drinking/eating). The pain is relieved by Nothing. She has tried nothing for the symptoms.   Fever   This is a new problem. The current episode started today. The problem occurs constantly. The problem has been gradually improving. Her temperature was unmeasured prior to arrival. Associated symptoms include abdominal pain, congestion, headaches and nausea. Pertinent negatives include no chest pain, coughing, diarrhea, ear pain, rash, sore throat, urinary pain, vomiting or wheezing. She has tried nothing for the symptoms.       The following portions of the patient's history were reviewed and updated as appropriate: allergies, current medications, past family history, past medical history, past social history, past surgical history, and problem list.    Review of Systems   Constitutional:  Positive for fatigue and fever. Negative for diaphoresis.   HENT:  Positive for congestion and postnasal drip. Negative for ear pain, mouth sores, nosebleeds, rhinorrhea and sore throat.    Eyes:  Negative for visual disturbance.   Respiratory:  Positive for shortness of breath (mild with exertion). Negative for cough and wheezing.    Cardiovascular:  Negative for chest pain, palpitations and leg swelling.   Gastrointestinal:  Positive for abdominal  pain, anorexia, constipation and nausea. Negative for blood in stool, diarrhea, hematochezia, melena, rectal pain and vomiting.   Endocrine: Positive for heat intolerance. Negative for polydipsia and polyuria.   Genitourinary:  Negative for difficulty urinating, dysuria, frequency, genital sores, hematuria, pelvic pain, vaginal bleeding and vaginal discharge.   Musculoskeletal:  Positive for arthralgias, back pain, myalgias, neck pain and neck stiffness.   Skin:  Negative for rash and wound.   Neurological:  Positive for dizziness, weakness (generalized) and headaches. Negative for tremors and syncope.   Hematological:  Negative for adenopathy. Does not bruise/bleed easily.   Psychiatric/Behavioral:  Positive for dysphoric mood. Negative for confusion and suicidal ideas. The patient is nervous/anxious.    Pt's previous ROS reviewed and updated as indicated.       Objective   Vitals:    03/03/25 1103   BP: 142/94   Pulse: 83   Temp: 98.3 °F (36.8 °C)   SpO2: 96%     Body mass index is 31.93 kg/m².      03/03/25  1103   Weight: 84.4 kg (186 lb)       Physical Exam  Vitals and nursing note reviewed.   Constitutional:       General: She is in acute distress (mild due to pain).      Appearance: She is well-groomed. She is obese. She is ill-appearing (mildly).   HENT:      Head: Atraumatic.      Mouth/Throat:      Mouth: Mucous membranes are moist.   Eyes:      General: No scleral icterus.     Conjunctiva/sclera: Conjunctivae normal.   Cardiovascular:      Rate and Rhythm: Normal rate and regular rhythm.      Pulses: Normal pulses.      Heart sounds: Normal heart sounds.   Pulmonary:      Effort: Pulmonary effort is normal.      Breath sounds: Decreased breath sounds present. No wheezing, rhonchi or rales.   Abdominal:      General: Bowel sounds are absent. There is distension.      Palpations: There is no mass.      Tenderness: There is abdominal tenderness (moderate) in the periumbilical area and suprapubic area. There  is no right CVA tenderness, left CVA tenderness, guarding or rebound.   Musculoskeletal:      Right lower leg: No edema.      Left lower leg: No edema.   Skin:     General: Skin is warm and dry.      Coloration: Skin is not jaundiced or pale.      Findings: No bruising or rash.   Neurological:      Mental Status: She is alert and oriented to person, place, and time.      Gait: Gait is intact.   Psychiatric:         Mood and Affect: Affect normal. Mood is anxious.         Behavior: Behavior normal. Behavior is cooperative.         Cognition and Memory: Cognition normal.     Pt's previous physical exam reviewed and updated as indicated.    Brief Urine Lab Results  (Last result in the past 365 days)        Color   Clarity   Blood   Leuk Est   Nitrite   Protein   CREAT   Urine HCG        03/03/25 1128 Yellow   Clear   Negative   Negative   Negative   Negative                     Assessment & Plan   Diagnoses and all orders for this visit:    1. Generalized abdominal pain (Primary)  -     POC Urinalysis Dipstick, Automated    2. Acute suprapubic pain  -     CT Abdomen Pelvis With Contrast; Future  -     Cancel: CBC & Differential  -     Cancel: Comprehensive Metabolic Panel  -     Cancel: Sedimentation Rate  -     Cancel: C-reactive protein  -     CBC & Differential  -     Comprehensive Metabolic Panel  -     Sedimentation Rate  -     C-reactive protein    3. Acute abdominal pain  -     CT Abdomen Pelvis With Contrast; Future  -     Cancel: CBC & Differential  -     Cancel: Comprehensive Metabolic Panel  -     Cancel: Sedimentation Rate  -     Cancel: C-reactive protein  -     CBC & Differential  -     Comprehensive Metabolic Panel  -     Sedimentation Rate  -     C-reactive protein       Recommend stat imaging and lab eval due to concern for obstruction, volvulus, mass, etc.    I will contact patient regarding test results and provide instructions regarding any necessary changes in plan of care.  Patient was encouraged  to keep me informed of any acute changes, lack of improvement, or any new concerning symptoms.  Pt is aware of reasons to seek emergent care.  Patient voiced understanding of all instructions and denied further questions.    Please note that portions of this note may have been completed with a voice recognition program.

## 2025-03-04 PROCEDURE — 87449 NOS EACH ORGANISM AG IA: CPT

## 2025-03-04 PROCEDURE — 87324 CLOSTRIDIUM AG IA: CPT

## 2025-03-04 PROCEDURE — 87507 IADNA-DNA/RNA PROBE TQ 12-25: CPT

## 2025-03-04 RX ORDER — CETIRIZINE HYDROCHLORIDE 10 MG/1
10 TABLET ORAL DAILY
Qty: 30 TABLET | Refills: 0 | Status: SHIPPED | OUTPATIENT
Start: 2025-03-04

## 2025-03-04 RX ORDER — DEXLANSOPRAZOLE 60 MG/1
1 CAPSULE, DELAYED RELEASE ORAL DAILY
Qty: 30 CAPSULE | Refills: 4 | Status: SHIPPED | OUTPATIENT
Start: 2025-03-04

## 2025-03-04 NOTE — DISCHARGE INSTRUCTIONS
You have been sent home with a stool collection kit.  Please produce this and bring it back to the hospital either to the outpatient lab for testing during business hours or to the front ED check-in desk if after hours.  We will call you with the results and any treatment that is necessary.  Please follow-up with your family doctor at the next available appointment and return back to the ER for any new or worsening symptoms

## 2025-03-04 NOTE — PROGRESS NOTES
Pt was seen in ER the night of her CT scan. How is she doing? What was diagnosis/treatment? Does she have follow up gastroenterology?

## 2025-03-05 ENCOUNTER — HOSPITAL ENCOUNTER (EMERGENCY)
Facility: HOSPITAL | Age: 65
Discharge: HOME | End: 2025-03-05
Attending: STUDENT IN AN ORGANIZED HEALTH CARE EDUCATION/TRAINING PROGRAM | Admitting: STUDENT IN AN ORGANIZED HEALTH CARE EDUCATION/TRAINING PROGRAM
Payer: MEDICAID

## 2025-03-05 ENCOUNTER — APPOINTMENT (OUTPATIENT)
Dept: GENERAL RADIOLOGY | Facility: HOSPITAL | Age: 65
End: 2025-03-05
Payer: MEDICAID

## 2025-03-05 ENCOUNTER — APPOINTMENT (OUTPATIENT)
Dept: CT IMAGING | Facility: HOSPITAL | Age: 65
End: 2025-03-05
Payer: MEDICAID

## 2025-03-05 ENCOUNTER — LAB (OUTPATIENT)
Dept: LAB | Facility: HOSPITAL | Age: 65
End: 2025-03-05
Payer: MEDICAID

## 2025-03-05 VITALS
WEIGHT: 185 LBS | HEIGHT: 64 IN | TEMPERATURE: 99.1 F | HEART RATE: 60 BPM | RESPIRATION RATE: 18 BRPM | BODY MASS INDEX: 31.58 KG/M2 | SYSTOLIC BLOOD PRESSURE: 109 MMHG | DIASTOLIC BLOOD PRESSURE: 47 MMHG | OXYGEN SATURATION: 93 %

## 2025-03-05 DIAGNOSIS — K52.9 COLITIS: ICD-10-CM

## 2025-03-05 DIAGNOSIS — A08.11 NOROVIRUS: Primary | ICD-10-CM

## 2025-03-05 LAB
ADV 40+41 DNA STL QL NAA+NON-PROBE: NOT DETECTED
ALBUMIN SERPL-MCNC: 4.3 G/DL (ref 3.5–5.2)
ALBUMIN/GLOB SERPL: 1.3 G/DL
ALP SERPL-CCNC: 79 U/L (ref 39–117)
ALT SERPL W P-5'-P-CCNC: 15 U/L (ref 1–33)
ANION GAP SERPL CALCULATED.3IONS-SCNC: 11.6 MMOL/L (ref 5–15)
AST SERPL-CCNC: 32 U/L (ref 1–32)
ASTRO TYP 1-8 RNA STL QL NAA+NON-PROBE: NOT DETECTED
BACTERIA SPEC AEROBE CULT: NO GROWTH
BASOPHILS # BLD AUTO: 0.05 10*3/MM3 (ref 0–0.2)
BASOPHILS NFR BLD AUTO: 1.1 % (ref 0–1.5)
BILIRUB SERPL-MCNC: 0.3 MG/DL (ref 0–1.2)
BUN SERPL-MCNC: 8 MG/DL (ref 8–23)
BUN/CREAT SERPL: 9.3 (ref 7–25)
C CAYETANENSIS DNA STL QL NAA+NON-PROBE: NOT DETECTED
C COLI+JEJ+UPSA DNA STL QL NAA+NON-PROBE: NOT DETECTED
C DIFF GDH + TOXINS A+B STL QL IA.RAPID: NEGATIVE
C DIFF GDH + TOXINS A+B STL QL IA.RAPID: NEGATIVE
CALCIUM SPEC-SCNC: 9 MG/DL (ref 8.6–10.5)
CHLORIDE SERPL-SCNC: 101 MMOL/L (ref 98–107)
CO2 SERPL-SCNC: 25.4 MMOL/L (ref 22–29)
CREAT SERPL-MCNC: 0.86 MG/DL (ref 0.57–1)
CRP SERPL-MCNC: 1.45 MG/DL (ref 0–0.5)
CRYPTOSP DNA STL QL NAA+NON-PROBE: NOT DETECTED
DEPRECATED RDW RBC AUTO: 42.5 FL (ref 37–54)
E HISTOLYT DNA STL QL NAA+NON-PROBE: NOT DETECTED
EAEC PAA PLAS AGGR+AATA ST NAA+NON-PRB: NOT DETECTED
EC STX1+STX2 GENES STL QL NAA+NON-PROBE: NOT DETECTED
EGFRCR SERPLBLD CKD-EPI 2021: 76 ML/MIN/1.73
EOSINOPHIL # BLD AUTO: 0.07 10*3/MM3 (ref 0–0.4)
EOSINOPHIL NFR BLD AUTO: 1.5 % (ref 0.3–6.2)
EPEC EAE GENE STL QL NAA+NON-PROBE: NOT DETECTED
ERYTHROCYTE [DISTWIDTH] IN BLOOD BY AUTOMATED COUNT: 13.5 % (ref 12.3–15.4)
ETEC LTA+ST1A+ST1B TOX ST NAA+NON-PROBE: NOT DETECTED
FLUAV RNA RESP QL NAA+PROBE: NOT DETECTED
FLUBV RNA RESP QL NAA+PROBE: NOT DETECTED
G LAMBLIA DNA STL QL NAA+NON-PROBE: NOT DETECTED
GEN 5 1HR TROPONIN T REFLEX: 11 NG/L
GLOBULIN UR ELPH-MCNC: 3.3 GM/DL
GLUCOSE SERPL-MCNC: 105 MG/DL (ref 65–99)
HCT VFR BLD AUTO: 37.9 % (ref 34–46.6)
HGB BLD-MCNC: 13.1 G/DL (ref 12–15.9)
IMM GRANULOCYTES # BLD AUTO: 0.02 10*3/MM3 (ref 0–0.05)
IMM GRANULOCYTES NFR BLD AUTO: 0.4 % (ref 0–0.5)
LYMPHOCYTES # BLD AUTO: 1.78 10*3/MM3 (ref 0.7–3.1)
LYMPHOCYTES NFR BLD AUTO: 38.6 % (ref 19.6–45.3)
MAGNESIUM SERPL-MCNC: 1.9 MG/DL (ref 1.6–2.4)
MCH RBC QN AUTO: 29.6 PG (ref 26.6–33)
MCHC RBC AUTO-ENTMCNC: 34.6 G/DL (ref 31.5–35.7)
MCV RBC AUTO: 85.7 FL (ref 79–97)
MONOCYTES # BLD AUTO: 0.61 10*3/MM3 (ref 0.1–0.9)
MONOCYTES NFR BLD AUTO: 13.2 % (ref 5–12)
NEUTROPHILS NFR BLD AUTO: 2.08 10*3/MM3 (ref 1.7–7)
NEUTROPHILS NFR BLD AUTO: 45.2 % (ref 42.7–76)
NOROVIRUS GI+II RNA STL QL NAA+NON-PROBE: DETECTED
NRBC BLD AUTO-RTO: 0 /100 WBC (ref 0–0.2)
NT-PROBNP SERPL-MCNC: 484.4 PG/ML (ref 0–900)
P SHIGELLOIDES DNA STL QL NAA+NON-PROBE: NOT DETECTED
PLATELET # BLD AUTO: 240 10*3/MM3 (ref 140–450)
PMV BLD AUTO: 10.7 FL (ref 6–12)
POTASSIUM SERPL-SCNC: 4.4 MMOL/L (ref 3.5–5.2)
PROCALCITONIN SERPL-MCNC: 0.08 NG/ML (ref 0–0.25)
PROT SERPL-MCNC: 7.6 G/DL (ref 6–8.5)
RBC # BLD AUTO: 4.42 10*6/MM3 (ref 3.77–5.28)
RVA RNA STL QL NAA+NON-PROBE: NOT DETECTED
S ENT+BONG DNA STL QL NAA+NON-PROBE: NOT DETECTED
SAPO I+II+IV+V RNA STL QL NAA+NON-PROBE: NOT DETECTED
SARS-COV-2 RNA RESP QL NAA+PROBE: NOT DETECTED
SHIGELLA SP+EIEC IPAH ST NAA+NON-PROBE: NOT DETECTED
SODIUM SERPL-SCNC: 138 MMOL/L (ref 136–145)
TROPONIN T NUMERIC DELTA: 3 NG/L
TROPONIN T SERPL HS-MCNC: 8 NG/L
V CHOL+PARA+VUL DNA STL QL NAA+NON-PROBE: NOT DETECTED
V CHOLERAE DNA STL QL NAA+NON-PROBE: NOT DETECTED
WBC NRBC COR # BLD AUTO: 4.61 10*3/MM3 (ref 3.4–10.8)
Y ENTEROCOL DNA STL QL NAA+NON-PROBE: NOT DETECTED

## 2025-03-05 PROCEDURE — 25510000001 IOPAMIDOL 61 % SOLUTION: Performed by: STUDENT IN AN ORGANIZED HEALTH CARE EDUCATION/TRAINING PROGRAM

## 2025-03-05 PROCEDURE — 85025 COMPLETE CBC W/AUTO DIFF WBC: CPT | Performed by: STUDENT IN AN ORGANIZED HEALTH CARE EDUCATION/TRAINING PROGRAM

## 2025-03-05 PROCEDURE — 93005 ELECTROCARDIOGRAM TRACING: CPT | Performed by: STUDENT IN AN ORGANIZED HEALTH CARE EDUCATION/TRAINING PROGRAM

## 2025-03-05 PROCEDURE — 36415 COLL VENOUS BLD VENIPUNCTURE: CPT

## 2025-03-05 PROCEDURE — 84484 ASSAY OF TROPONIN QUANT: CPT | Performed by: STUDENT IN AN ORGANIZED HEALTH CARE EDUCATION/TRAINING PROGRAM

## 2025-03-05 PROCEDURE — 86140 C-REACTIVE PROTEIN: CPT | Performed by: STUDENT IN AN ORGANIZED HEALTH CARE EDUCATION/TRAINING PROGRAM

## 2025-03-05 PROCEDURE — 71045 X-RAY EXAM CHEST 1 VIEW: CPT

## 2025-03-05 PROCEDURE — 83880 ASSAY OF NATRIURETIC PEPTIDE: CPT | Performed by: STUDENT IN AN ORGANIZED HEALTH CARE EDUCATION/TRAINING PROGRAM

## 2025-03-05 PROCEDURE — 87636 SARSCOV2 & INF A&B AMP PRB: CPT | Performed by: STUDENT IN AN ORGANIZED HEALTH CARE EDUCATION/TRAINING PROGRAM

## 2025-03-05 PROCEDURE — 80053 COMPREHEN METABOLIC PANEL: CPT | Performed by: STUDENT IN AN ORGANIZED HEALTH CARE EDUCATION/TRAINING PROGRAM

## 2025-03-05 PROCEDURE — 71275 CT ANGIOGRAPHY CHEST: CPT

## 2025-03-05 PROCEDURE — 84145 PROCALCITONIN (PCT): CPT | Performed by: STUDENT IN AN ORGANIZED HEALTH CARE EDUCATION/TRAINING PROGRAM

## 2025-03-05 PROCEDURE — 74177 CT ABD & PELVIS W/CONTRAST: CPT

## 2025-03-05 PROCEDURE — 83735 ASSAY OF MAGNESIUM: CPT | Performed by: STUDENT IN AN ORGANIZED HEALTH CARE EDUCATION/TRAINING PROGRAM

## 2025-03-05 PROCEDURE — 99285 EMERGENCY DEPT VISIT HI MDM: CPT | Performed by: STUDENT IN AN ORGANIZED HEALTH CARE EDUCATION/TRAINING PROGRAM

## 2025-03-05 RX ORDER — ALBUTEROL SULFATE 90 UG/1
2 INHALANT RESPIRATORY (INHALATION) ONCE
Status: COMPLETED | OUTPATIENT
Start: 2025-03-05 | End: 2025-03-05

## 2025-03-05 RX ORDER — ACETAMINOPHEN 325 MG/1
650 TABLET ORAL ONCE
Status: COMPLETED | OUTPATIENT
Start: 2025-03-05 | End: 2025-03-05

## 2025-03-05 RX ORDER — IOPAMIDOL 612 MG/ML
100 INJECTION, SOLUTION INTRAVASCULAR
Status: COMPLETED | OUTPATIENT
Start: 2025-03-05 | End: 2025-03-05

## 2025-03-05 RX ORDER — ONDANSETRON 4 MG/1
4 TABLET, ORALLY DISINTEGRATING ORAL EVERY 8 HOURS PRN
Qty: 20 TABLET | Refills: 0 | Status: SHIPPED | OUTPATIENT
Start: 2025-03-05

## 2025-03-05 RX ADMIN — IOPAMIDOL 100 ML: 612 INJECTION, SOLUTION INTRAVENOUS at 19:00

## 2025-03-05 RX ADMIN — ACETAMINOPHEN 650 MG: 325 TABLET, FILM COATED ORAL at 18:16

## 2025-03-05 RX ADMIN — ALBUTEROL SULFATE 2 PUFF: 90 AEROSOL, METERED RESPIRATORY (INHALATION) at 19:51

## 2025-03-05 NOTE — Clinical Note
[facility] EMERGENCY DEPARTMENT  [address] [address]  Phone: [phone]    Katelynn Sung was seen and treated in our emergency department on 3/5/2025.  She may return to work on 03/08/2025.         Thank you for choosing [facility].    Wayne Mccarthy MD

## 2025-03-05 NOTE — ED PROVIDER NOTES
Subjective:  History of Present Illness:    Patient is a 64-year-old female with history of COPD, obesity, fibromyalgia, hypertension, restless leg syndrome, sleep apnea who presents today with shortness of breath chest pain and abdominal pain.  Reports she was recently diagnosed with colitis, states she has had continued abdominal pain diarrhea and now has had chest pain and shortness of breath.  Concern for the occurrence of these issues and now presents to emergency department for evaluation.  She denies any fevers.  No new leg swelling or leg pain.  Denies any personal history of PE/DVT.  No dysuria.      Nurses Notes reviewed and agree, including vitals, allergies, social history and prior medical history.     REVIEW OF SYSTEMS: All systems reviewed and not pertinent unless noted.  Review of Systems   Constitutional:  Positive for activity change, appetite change, chills and fatigue. Negative for fever.   HENT:  Positive for congestion. Negative for rhinorrhea, sinus pressure and sinus pain.    Eyes:  Negative for discharge and itching.   Respiratory:  Positive for cough and shortness of breath.    Cardiovascular:  Positive for chest pain. Negative for leg swelling.   Gastrointestinal:  Positive for abdominal pain, diarrhea, nausea and vomiting. Negative for abdominal distention.   Endocrine: Negative for cold intolerance and heat intolerance.   Genitourinary:  Negative for decreased urine volume, difficulty urinating, flank pain, frequency, urgency, vaginal bleeding, vaginal discharge and vaginal pain.   Musculoskeletal:  Negative for gait problem, neck pain and neck stiffness.   Skin:  Negative for color change.   Allergic/Immunologic: Negative for environmental allergies.   Neurological:  Negative for seizures, syncope, facial asymmetry and speech difficulty.   Psychiatric/Behavioral:  Negative for self-injury and suicidal ideas.        Past Medical History:   Diagnosis Date    Adnexal mass     pelvic US  10/26/11 showed 2 cm left abdnexal cystic lesion, resolved on f/u 5/14/15    Anxiety     Arm fracture, left     Humphreys's esophagus     Body piercing     Both ears    Class 1 obesity due to excess calories with serious comorbidity and body mass index (BMI) of 30.0 to 30.9 in adult 02/16/2018    Colon polyps     COPD (chronic obstructive pulmonary disease) 2008    Cor pulmonale     Degenerative arthritis of lumbar spine     Depression     Fibromyalgia     Finger fracture     GERD (gastroesophageal reflux disease)     Herpes simplex     Hypertension     Insomnia     Lumbar degenerative disc disease     Mild sleep apnea 01/16/2013    Sleep study 1/16/13 showing mild degree    Narcotic dependence     Osteoporosis     Pancreatitis     PE (pulmonary thromboembolism)     Pneumonia     Respiratory failure requiring intubation     double pneumonia    Restless leg syndrome     Seasonal allergies     Sinus node dysfunction     Sleep apnea     does not wear cpap    Trouble swallowing     Wears dentures        Allergies:    Nsaids      Past Surgical History:   Procedure Laterality Date    COLONOSCOPY N/A 2008    Complete    COLONOSCOPY N/A 10/23/2024    Procedure: Colonoscopy with polypectomy;  Surgeon: Jeronimo Lowe MD;  Location: [facility] ENDOSCOPY;  Service: Gastroenterology;  Laterality: N/A;    ENDOSCOPY N/A 03/11/2020    Procedure: ESOPHAGOGASTRODUODENOSCOPY;  Surgeon: Charbel Ching MD;  Location: [facility] ENDOSCOPY;  Service: Gastroenterology;  Laterality: N/A;    ENDOSCOPY N/A 8/9/2024    Procedure: Esophagogastroduodenoscopy with biopsy and polypectomy;  Surgeon: Jeronimo Lowe MD;  Location: [facility] ENDOSCOPY;  Service: Gastroenterology;  Laterality: N/A;    ENDOSCOPY N/A 10/22/2024    Procedure: Esophagogastroduodenscopy;  Surgeon: Jeronimo Lowe MD;  Location: [facility] ENDOSCOPY;  Service: Gastroenterology;  Laterality: N/A;    PACEMAKER IMPLANTATION      March of 2024    TUBAL ABDOMINAL LIGATION   "    UPPER GASTROINTESTINAL ENDOSCOPY N/A          Social History     Socioeconomic History    Marital status:    Tobacco Use    Smoking status: Former     Current packs/day: 0.00     Average packs/day: 0.8 packs/day for 3.0 years (2.3 ttl pk-yrs)     Types: Cigarettes, Electronic Cigarette     Start date:      Quit date:      Years since quittin.1     Passive exposure: Never    Smokeless tobacco: Never    Tobacco comments:     2013 started using nicotine containing substance in combustion-free vaporization device. PT not longer uses vapor stick.   Vaping Use    Vaping status: Former   Substance and Sexual Activity    Alcohol use: No    Drug use: Not Currently     Types: Oxycodone     Comment: Patient is currently on subutex    Sexual activity: Not Currently         Family History   Problem Relation Age of Onset    Stroke Mother     Liver disease Mother         Chronic    Cirrhosis Father     Heart attack Father     Breast cancer Sister     Stroke Brother     Cancer Sister     Colon cancer Neg Hx        Objective  Physical Exam:  /47   Pulse 60   Temp 99.1 °F (37.3 °C) (Oral)   Resp 18   Ht 162.6 cm (64\")   Wt 83.9 kg (185 lb)   LMP  (LMP Unknown)   SpO2 93%   BMI 31.76 kg/m²      Physical Exam  Constitutional:       General: She is not in acute distress.     Appearance: Normal appearance. She is obese. She is not ill-appearing.   HENT:      Head: Normocephalic and atraumatic.      Nose: Nose normal. No congestion or rhinorrhea.      Mouth/Throat:      Mouth: Mucous membranes are dry.      Pharynx: Oropharynx is clear. No oropharyngeal exudate or posterior oropharyngeal erythema.   Eyes:      Extraocular Movements: Extraocular movements intact.      Conjunctiva/sclera: Conjunctivae normal.      Pupils: Pupils are equal, round, and reactive to light.   Cardiovascular:      Rate and Rhythm: Normal rate and regular rhythm.      Pulses: Normal pulses.      Heart sounds: Normal " heart sounds.   Pulmonary:      Effort: Pulmonary effort is normal. No tachypnea, accessory muscle usage or respiratory distress.      Breath sounds: Normal breath sounds.   Abdominal:      General: Abdomen is flat. Bowel sounds are normal. There is no distension.      Palpations: Abdomen is soft.      Tenderness: There is no abdominal tenderness.   Musculoskeletal:         General: No swelling or tenderness. Normal range of motion.      Cervical back: Normal range of motion and neck supple.   Skin:     General: Skin is warm and dry.      Capillary Refill: Capillary refill takes less than 2 seconds.   Neurological:      General: No focal deficit present.      Mental Status: She is alert and oriented to person, place, and time. Mental status is at baseline.      Cranial Nerves: No cranial nerve deficit.      Sensory: No sensory deficit.      Motor: No weakness.      Coordination: Coordination normal.   Psychiatric:         Mood and Affect: Mood normal.         Behavior: Behavior normal.         Thought Content: Thought content normal.         Judgment: Judgment normal.         Procedures    ED Course:    ED Course as of 03/05/25 2255   Wed Mar 05, 2025   1716 EKG interpreted by me, normal sinus rhythm no concerning ST changes noted, rate 63 [JE]      ED Course User Index  [JE] Wayne Mccarthy MD       Lab Results (last 24 hours)       Procedure Component Value Units Date/Time    CBC & Differential [[id number]]  (Abnormal) Collected: 03/05/25 1656    Specimen: Blood Updated: 03/05/25 1707    Narrative:      The following orders were created for panel order CBC & Differential.  Procedure                               Abnormality         Status                     ---------                               -----------         ------                     CBC Auto Differential[[id number]]        Abnormal            Final result                 Please view results for these tests on the individual orders.    Comprehensive  Metabolic Panel [[id number]]  (Abnormal) Collected: 03/05/25 1656    Specimen: Blood Updated: 03/05/25 1722     Glucose 105 mg/dL      BUN 8 mg/dL      Creatinine 0.86 mg/dL      Sodium 138 mmol/L      Potassium 4.4 mmol/L      Chloride 101 mmol/L      CO2 25.4 mmol/L      Calcium 9.0 mg/dL      Total Protein 7.6 g/dL      Albumin 4.3 g/dL      ALT (SGPT) 15 U/L      AST (SGOT) 32 U/L      Alkaline Phosphatase 79 U/L      Total Bilirubin 0.3 mg/dL      Globulin 3.3 gm/dL      A/G Ratio 1.3 g/dL      BUN/Creatinine Ratio 9.3     Anion Gap 11.6 mmol/L      eGFR 76.0 mL/min/1.73     Narrative:      GFR Categories in Chronic Kidney Disease (CKD)      GFR Category          GFR (mL/min/1.73)    Interpretation  G1                     90 or greater         Normal or high (1)  G2                      60-89                Mild decrease (1)  G3a                   45-59                Mild to moderate decrease  G3b                   30-44                Moderate to severe decrease  G4                    15-29                Severe decrease  G5                    14 or less           Kidney failure          (1)In the absence of evidence of kidney disease, neither GFR category G1 or G2 fulfill the criteria for CKD.    eGFR calculation 2021 CKD-EPI creatinine equation, which does not include race as a factor    High Sensitivity Troponin T [[id number]]  (Normal) Collected: 03/05/25 1656    Specimen: Blood Updated: 03/05/25 1726     HS Troponin T 8 ng/L     Narrative:      High Sensitive Troponin T Reference Range:  <14.0 ng/L- Negative Female for AMI  <22.0 ng/L- Negative Male for AMI  >=14 - Abnormal Female indicating possible myocardial injury.  >=22 - Abnormal Male indicating possible myocardial injury.   Clinicians would have to utilize clinical acumen, EKG, Troponin, and serial changes to determine if it is an Acute Myocardial Infarction or myocardial injury due to an underlying chronic condition.         BNP [[id number]]   "(Normal) Collected: 03/05/25 1656    Specimen: Blood Updated: 03/05/25 1726     proBNP 484.4 pg/mL     Narrative:      This assay is used as an aid in the diagnosis of individuals suspected of having heart failure. It can be used as an aid in the diagnosis of acute decompensated heart failure (ADHF) in patients presenting with signs and symptoms of ADHF to the emergency department (ED). In addition, NT-proBNP of <300 pg/mL indicates ADHF is not likely.    Age Range Result Interpretation  NT-proBNP Concentration (pg/mL:      <50             Positive            >450                   Gray                 300-450                    Negative             <300    50-75           Positive            >900                  Gray                300-900                  Negative            <300      >75             Positive            >1800                  Gray                300-1800                  Negative            <300    C-reactive Protein [[id number]]  (Abnormal) Collected: 03/05/25 1656    Specimen: Blood Updated: 03/05/25 1722     C-Reactive Protein 1.45 mg/dL     Procalcitonin [[id number]]  (Normal) Collected: 03/05/25 1656    Specimen: Blood Updated: 03/05/25 1832     Procalcitonin 0.08 ng/mL     Narrative:      As a Marker for Sepsis (Non-Neonates):    1. <0.5 ng/mL represents a low risk of severe sepsis and/or septic shock.  2. >2 ng/mL represents a high risk of severe sepsis and/or septic shock.    As a Marker for Lower Respiratory Tract Infections that require antibiotic therapy:    PCT on Admission    Antibiotic Therapy       6-12 Hrs later    >0.5                Strongly Recommended  >0.25 - <0.5        Recommended   0.1 - 0.25          Discouraged              Remeasure/reassess PCT  <0.1                Strongly Discouraged     Remeasure/reassess PCT    As 28 day mortality risk marker: \"Change in Procalcitonin Result\" (>80% or <=80%) if Day 0 (or Day 1) and Day 4 values are available. Refer to " http://www.[facility]-pct-calculator.com    Change in PCT <=80%  A decrease of PCT levels below or equal to 80% defines a positive change in PCT test result representing a higher risk for 28-day all-cause mortality of patients diagnosed with severe sepsis for septic shock.    Change in PCT >80%  A decrease of PCT levels of more than 80% defines a negative change in PCT result representing a lower risk for 28-day all-cause mortality of patients diagnosed with severe sepsis or septic shock.       Magnesium [[id number]]  (Normal) Collected: 03/05/25 1656    Specimen: Blood Updated: 03/05/25 1722     Magnesium 1.9 mg/dL     CBC Auto Differential [[id number]]  (Abnormal) Collected: 03/05/25 1656    Specimen: Blood Updated: 03/05/25 1707     WBC 4.61 10*3/mm3      RBC 4.42 10*6/mm3      Hemoglobin 13.1 g/dL      Hematocrit 37.9 %      MCV 85.7 fL      MCH 29.6 pg      MCHC 34.6 g/dL      RDW 13.5 %      RDW-SD 42.5 fl      MPV 10.7 fL      Platelets 240 10*3/mm3      Neutrophil % 45.2 %      Lymphocyte % 38.6 %      Monocyte % 13.2 %      Eosinophil % 1.5 %      Basophil % 1.1 %      Immature Grans % 0.4 %      Neutrophils, Absolute 2.08 10*3/mm3      Lymphocytes, Absolute 1.78 10*3/mm3      Monocytes, Absolute 0.61 10*3/mm3      Eosinophils, Absolute 0.07 10*3/mm3      Basophils, Absolute 0.05 10*3/mm3      Immature Grans, Absolute 0.02 10*3/mm3      nRBC 0.0 /100 WBC     COVID-19 and FLU A/B PCR, 1 HR TAT - Swab, Nasopharynx [[id number]]  (Normal) Collected: 03/05/25 1659    Specimen: Swab from Nasopharynx Updated: 03/05/25 1727     COVID19 Not Detected     Influenza A PCR Not Detected     Influenza B PCR Not Detected    Narrative:      Fact sheet for providers: https://www.fda.gov/media/117255/download    Fact sheet for patients: https://www.fda.gov/media/579171/download    Test performed by PCR.    High Sensitivity Troponin T 1Hr [[id number]]  (Abnormal) Collected: 03/05/25 1808    Specimen: Blood Updated: 03/05/25 5373      HS Troponin T 11 ng/L      Troponin T Numeric Delta 3 ng/L     Narrative:      High Sensitive Troponin T Reference Range:  <14.0 ng/L- Negative Female for AMI  <22.0 ng/L- Negative Male for AMI  >=14 - Abnormal Female indicating possible myocardial injury.  >=22 - Abnormal Male indicating possible myocardial injury.   Clinicians would have to utilize clinical acumen, EKG, Troponin, and serial changes to determine if it is an Acute Myocardial Infarction or myocardial injury due to an underlying chronic condition.                  CT Angiogram Chest Pulmonary Embolism    Result Date: 3/5/2025  FINAL REPORT TECHNIQUE: null CLINICAL HISTORY: Chest pain, shortness of breath COMPARISON: null FINDINGS: CT angiography chest with contrast. MIP and MPR reformations. Comparison: None Findings: The heart is normal size. RV/LV ratio is normal. Unremarkable thoracic aorta and great vessels. No aneurysm. No pulmonary artery filling defects. The visualized thyroid and mediastinum are unremarkable. No consolidation or effusion. The upper abdomen demonstrates hepatic steatosis. No acute fractures.     Impression: IMPRESSION: No pulmonary embolus or acute airspace disease. Authenticated and Electronically Signed by RADHA Garcia MD on 03/05/2025 07:42:54 PM    CT Abdomen Pelvis With Contrast    Result Date: 3/5/2025  FINAL REPORT TECHNIQUE: null CLINICAL HISTORY: Diffuse abdominal pain, recent diagnosis of colitis COMPARISON: null FINDINGS: CT abdomen and pelvis with contrast Comparison: CT/SR - CT ANGIOGRAM CHEST PULMONARY EMBOLISM - 3/5/25 18:40 EST CT - CT ABDOMEN PELVIS W CONTRAST - 3/3/25 16:54 EST CT - CT ABDOMEN PELVIS W CONTRAST - 8/6/24 17:43 EDT Findings: No consolidation or effusion. Stable hepatic steatosis. Unremarkable gallbladder. No renal stones or hydronephrosis. No bowel obstruction, pneumoperitoneum, or pneumatosis. There is improved descending colon and sigmoid mucosal thickening. Stable duodenal diverticulum.  There is sigmoid diverticulosis. Pelvic contents unremarkable. Normal appendix. The bones are intact.     Impression: IMPRESSION: Improved colitis. No other interval changes. Authenticated and Electronically Signed by RADHA Garcia MD on 03/05/2025 07:37:00 PM        [facility]      Initial impression of presenting illness: Shortness of breath, chest pain, abdominal pain    DDX: includes but is not limited to: Viral URI, gastroenteritis, perforated viscus, PE, bacterial pneumonia    Patient arrives stable with vitals interpreted by myself.     Pertinent features from physical exam: Clear to auscultation, regular rate and rhythm, no murmur, endorsing diffuse abdominal pain on exam with no significant reaction to abdominal palpation.    Initial diagnostic plan: CBC, CMP, lipase, UA, CRP, lactic acid, CT abdomen pelvis, CTA chest, EKG, troponin    Results from initial plan were reviewed and interpreted by me revealing CT imaging negative for any acute process, patient CT of the abdomen with improved colitis    Diagnostic information from other sources: Reviewed past medical records    Interventions / Re-evaluation: Observed in emergency department for over 4 hours with no change in vital signs, given Tylenol for pain control, given albuterol per patient's request as previously prescribed at home    Results/clinical rationale were discussed with patient at bedside    Consultations/Discussion of results with other physicians: Discussed negative workup in emergency department, no concern for PE, bacterial pneumonia, or significant intra-abdominal process.  Suspect patient's symptoms are related to recently diagnosed norovirus.  I have prescribed patient Zofran and recommended oral hydration and close outpatient follow-up with primary care doctor.  Strict turn precaution for severe increase in chest pain or abdominal pain    Disposition plan: Discharge  -----        Final diagnoses:   Norovirus          Wayne Mccarthy,  MD  03/05/25 2271

## 2025-03-06 NOTE — DISCHARGE INSTRUCTIONS
You were evaluated due to shortness of breath weakness and abdominal pain.  We got lab work a CT scan of your chest and your abdomen.  This showed that you had an improvement of your CT findings in your abdomen and no concern for any blood clots or problems with your aorta or heart.  You are now stable for discharge.  We would recommend continuing to drink plenty of fluids to prevent dehydration.  We did see in your chart that she recently tested positive for norovirus which we believe is etiology of your symptoms.  We have sent a course of Zofran to help with nausea at home.  If you have severe increase in chest pain or abdominal pain please come back to the to emergency department for further evaluation.  You are now stable for discharge.

## 2025-03-07 ENCOUNTER — OFFICE VISIT (OUTPATIENT)
Dept: FAMILY MEDICINE CLINIC | Facility: CLINIC | Age: 65
End: 2025-03-07
Payer: MEDICAID

## 2025-03-07 VITALS
BODY MASS INDEX: 31.92 KG/M2 | SYSTOLIC BLOOD PRESSURE: 140 MMHG | RESPIRATION RATE: 16 BRPM | HEIGHT: 64 IN | DIASTOLIC BLOOD PRESSURE: 82 MMHG | WEIGHT: 187 LBS | HEART RATE: 77 BPM | OXYGEN SATURATION: 89 %

## 2025-03-07 DIAGNOSIS — R06.2 WHEEZING: ICD-10-CM

## 2025-03-07 DIAGNOSIS — R06.02 SHORTNESS OF BREATH: ICD-10-CM

## 2025-03-07 DIAGNOSIS — J22 ACUTE LOWER RESPIRATORY TRACT INFECTION: Primary | ICD-10-CM

## 2025-03-07 DIAGNOSIS — R05.1 ACUTE COUGH: ICD-10-CM

## 2025-03-07 DIAGNOSIS — R53.1 GENERALIZED WEAKNESS: ICD-10-CM

## 2025-03-07 RX ORDER — AZELASTINE HYDROCHLORIDE, FLUTICASONE PROPIONATE 137; 50 UG/1; UG/1
SPRAY, METERED NASAL
COMMUNITY
End: 2025-03-07

## 2025-03-07 RX ORDER — DEXTROMETHORPHAN HYDROBROMIDE AND PROMETHAZINE HYDROCHLORIDE 15; 6.25 MG/5ML; MG/5ML
2.5 SYRUP ORAL 4 TIMES DAILY PRN
Qty: 75 ML | Refills: 0 | Status: SHIPPED | OUTPATIENT
Start: 2025-03-07 | End: 2025-03-13 | Stop reason: SDUPTHER

## 2025-03-07 RX ORDER — PREDNISONE 20 MG/1
20 TABLET ORAL DAILY
Qty: 5 TABLET | Refills: 0 | Status: SHIPPED | OUTPATIENT
Start: 2025-03-07 | End: 2025-03-12

## 2025-03-07 RX ORDER — AMOXICILLIN AND CLAVULANATE POTASSIUM 250; 125 MG/1; MG/1
1 TABLET, FILM COATED ORAL 3 TIMES DAILY
Status: CANCELLED | OUTPATIENT
Start: 2025-03-07

## 2025-03-07 RX ORDER — BUPROPION HYDROCHLORIDE 100 MG/1
TABLET ORAL
COMMUNITY

## 2025-03-07 RX ORDER — DOXYCYCLINE 100 MG/1
100 CAPSULE ORAL 2 TIMES DAILY
Qty: 14 CAPSULE | Refills: 0 | Status: SHIPPED | OUTPATIENT
Start: 2025-03-07 | End: 2025-03-13

## 2025-03-07 NOTE — PROGRESS NOTES
"                      Established Patient        Chief Complaint:   Chief Complaint   Patient presents with    Wheezing     Pt is here for wheezing and not getting better after having norovirus.            History of Present Illness:    Katelynn Sung is a 65 y.o. female who presents today for complaints of worsening cough.    Norovirus earlier this week, presented to ER twice, Tuesday and wedesday.   experiencing diarrhea x2 days, abdominal pain, sob, and wheezing. Was tested for covid, negative. They also told her that her blood clot had resolved from 3 months prior.     The last couple days she has had cough, sob, weakness, and subjective fever yesterday.     Subjective     The following portions of the patient's history were reviewed and updated as appropriate: allergies, current medications, past family history, past medical history, past social history, past surgical history and problem list.    ALLERGIES  Allergies   Allergen Reactions    Nsaids GI Intolerance       Review of Systems  As above    Objective     Vital Signs:   /82   Pulse 77   Resp 16   Ht 162.6 cm (64\")   Wt 84.8 kg (187 lb)   LMP  (LMP Unknown)   SpO2 (!) 89%   BMI 32.10 kg/m²                Physical Exam   Physical Exam  Vitals and nursing note reviewed.   HENT:      Right Ear: Tympanic membrane normal. No middle ear effusion.      Left Ear: Tympanic membrane normal.  No middle ear effusion.      Nose: Nose normal. Mucosal edema and rhinorrhea present. No congestion.      Right Turbinates: Swollen.      Left Turbinates: Swollen.      Right Sinus: No maxillary sinus tenderness or frontal sinus tenderness.      Left Sinus: No maxillary sinus tenderness or frontal sinus tenderness.      Mouth/Throat:      Lips: Pink.      Pharynx: Posterior oropharyngeal erythema and postnasal drip present. No pharyngeal swelling or oropharyngeal exudate.   Eyes:      General: Lids are normal.      Conjunctiva/sclera: Conjunctivae normal.      " Pupils: Pupils are equal, round, and reactive to light.   Cardiovascular:      Rate and Rhythm: Normal rate and regular rhythm.   Pulmonary:      Effort: Pulmonary effort is normal.      Breath sounds: Decreased air movement present. Decreased breath sounds, wheezing and rhonchi present. No rales.   Lymphadenopathy:      Head:      Right side of head: No tonsillar adenopathy.      Left side of head: No tonsillar adenopathy.   Neurological:      Mental Status: She is alert and oriented to person, place, and time.      Gait: Gait is intact.   Psychiatric:         Attention and Perception: Attention normal.         Mood and Affect: Mood and affect normal.         Speech: Speech normal.         Behavior: Behavior normal. Behavior is cooperative.         Assessment and Plan      Assessment/Plan:   Diagnoses and all orders for this visit:    1. Acute lower respiratory tract infection (Primary)  -     predniSONE (DELTASONE) 20 MG tablet; Take 1 tablet by mouth Daily for 5 days.  Dispense: 5 tablet; Refill: 0  -     Discontinue: promethazine-dextromethorphan (PROMETHAZINE-DM) 6.25-15 MG/5ML syrup; Take 2.5 mL by mouth 4 (Four) Times a Day As Needed for Cough.  Dispense: 75 mL; Refill: 0    2. Acute cough  -     Discontinue: promethazine-dextromethorphan (PROMETHAZINE-DM) 6.25-15 MG/5ML syrup; Take 2.5 mL by mouth 4 (Four) Times a Day As Needed for Cough.  Dispense: 75 mL; Refill: 0    3. Generalized weakness    4. Shortness of breath    5. Wheezing  -     predniSONE (DELTASONE) 20 MG tablet; Take 1 tablet by mouth Daily for 5 days.  Dispense: 5 tablet; Refill: 0    Other orders  -     Discontinue: doxycycline (VIBRAMYCIN) 100 MG capsule; Take 1 capsule by mouth 2 (Two) Times a Day for 7 days.  Dispense: 14 capsule; Refill: 0  -     Discontinue: amoxicillin-clavulanate (AUGMENTIN) 875-125 MG per tablet; Take 1 tablet by mouth Every 12 (Twelve) Hours for 7 days.  Dispense: 14 tablet; Refill: 0    Risks, benefits, and  potential side effects of current/new medications reviewed with patient.  Patient voiced understanding and wished to proceed with treatment.    Patient was encouraged to keep me informed of any acute changes, lack of improvement, or any new concerning symptoms.      Discussion Summary:  Discussed plan of care in detail with pt today; pt verb understanding and agrees.        I have reviewed and updated all copied forward information, as appropriate.  I attest to the accuracy and relevance of any unchanged information.      Follow up:  No follow-ups on file.     Patient Education:  There are no Patient Instructions on file for this visit.    JESSICA Yung  03/24/25  19:19 EDT          Please note that portions of this note may have been completed with a voice recognition program.

## 2025-03-12 ENCOUNTER — TELEPHONE (OUTPATIENT)
Dept: FAMILY MEDICINE CLINIC | Facility: CLINIC | Age: 65
End: 2025-03-12
Payer: MEDICAID

## 2025-03-13 ENCOUNTER — OFFICE VISIT (OUTPATIENT)
Dept: FAMILY MEDICINE CLINIC | Facility: CLINIC | Age: 65
End: 2025-03-13
Payer: MEDICAID

## 2025-03-13 VITALS
BODY MASS INDEX: 31.76 KG/M2 | WEIGHT: 186 LBS | HEIGHT: 64 IN | RESPIRATION RATE: 18 BRPM | TEMPERATURE: 98.1 F | OXYGEN SATURATION: 96 % | DIASTOLIC BLOOD PRESSURE: 84 MMHG | HEART RATE: 80 BPM | SYSTOLIC BLOOD PRESSURE: 130 MMHG

## 2025-03-13 DIAGNOSIS — R05.1 ACUTE COUGH: ICD-10-CM

## 2025-03-13 DIAGNOSIS — B37.0 THRUSH: ICD-10-CM

## 2025-03-13 DIAGNOSIS — J22 ACUTE LOWER RESPIRATORY TRACT INFECTION: Primary | ICD-10-CM

## 2025-03-13 DIAGNOSIS — J44.1 COPD WITH EXACERBATION: ICD-10-CM

## 2025-03-13 RX ORDER — ALBUTEROL SULFATE 90 UG/1
2 INHALANT RESPIRATORY (INHALATION) EVERY 4 HOURS PRN
Qty: 18 G | Refills: 5 | Status: SHIPPED | OUTPATIENT
Start: 2025-03-13

## 2025-03-13 RX ORDER — PREDNISONE 20 MG/1
20 TABLET ORAL DAILY
Qty: 7 TABLET | Refills: 0 | Status: SHIPPED | OUTPATIENT
Start: 2025-03-13 | End: 2025-03-20

## 2025-03-13 RX ORDER — IPRATROPIUM BROMIDE AND ALBUTEROL SULFATE 2.5; .5 MG/3ML; MG/3ML
3 SOLUTION RESPIRATORY (INHALATION) EVERY 4 HOURS PRN
Qty: 360 ML | Refills: 0 | Status: SHIPPED | OUTPATIENT
Start: 2025-03-13

## 2025-03-13 RX ORDER — AZITHROMYCIN 250 MG/1
TABLET, FILM COATED ORAL
Qty: 6 TABLET | Refills: 0 | Status: SHIPPED | OUTPATIENT
Start: 2025-03-13

## 2025-03-13 RX ORDER — GUAIFENESIN 600 MG/1
1200 TABLET, EXTENDED RELEASE ORAL 2 TIMES DAILY
Qty: 28 TABLET | Refills: 0 | Status: SHIPPED | OUTPATIENT
Start: 2025-03-13 | End: 2025-03-20

## 2025-03-13 RX ORDER — CEFTRIAXONE 1 G/1
1 INJECTION, POWDER, FOR SOLUTION INTRAMUSCULAR; INTRAVENOUS EVERY 24 HOURS
Status: COMPLETED | OUTPATIENT
Start: 2025-03-13 | End: 2025-03-13

## 2025-03-13 RX ORDER — FLUCONAZOLE 150 MG/1
150 TABLET ORAL EVERY OTHER DAY
Qty: 3 TABLET | Refills: 0 | Status: SHIPPED | OUTPATIENT
Start: 2025-03-13

## 2025-03-13 RX ORDER — IPRATROPIUM BROMIDE AND ALBUTEROL 20; 100 UG/1; UG/1
1 SPRAY, METERED RESPIRATORY (INHALATION) 4 TIMES DAILY PRN
Qty: 4 G | Refills: 11 | Status: SHIPPED | OUTPATIENT
Start: 2025-03-13

## 2025-03-13 RX ORDER — ALBUTEROL SULFATE 0.83 MG/ML
2.5 SOLUTION RESPIRATORY (INHALATION) ONCE
Status: COMPLETED | OUTPATIENT
Start: 2025-03-13 | End: 2025-03-13

## 2025-03-13 RX ORDER — DEXTROMETHORPHAN HYDROBROMIDE AND PROMETHAZINE HYDROCHLORIDE 15; 6.25 MG/5ML; MG/5ML
2.5 SYRUP ORAL 4 TIMES DAILY PRN
Qty: 150 ML | Refills: 0 | Status: SHIPPED | OUTPATIENT
Start: 2025-03-13

## 2025-03-13 RX ORDER — NYSTATIN 100000 [USP'U]/ML
200000 SUSPENSION ORAL AS NEEDED
Start: 2025-03-13

## 2025-03-13 RX ADMIN — CEFTRIAXONE 1 G: 1 INJECTION, POWDER, FOR SOLUTION INTRAMUSCULAR; INTRAVENOUS at 17:52

## 2025-03-13 RX ADMIN — Medication 0.5 MG: at 18:09

## 2025-03-13 RX ADMIN — ALBUTEROL SULFATE 2.5 MG: 0.83 SOLUTION RESPIRATORY (INHALATION) at 18:08

## 2025-03-13 NOTE — PROGRESS NOTES
"                      Established Patient        Chief Complaint:   Chief Complaint   Patient presents with    Cough     Symptoms have continued to worsened since visit on 3/7,  shortness of breath with exertion, mucus feels like \"glue\" in chest, completed meds given at last visit, wheezing    Thrush     Noticed symptoms about 3 days ago          History of Present Illness:    Katelynn Sung is a 65 y.o. female who presents today for complaints of persistent cough, congestion in her chest.     SOA, fatigue. Feels like she has \"glue\" in her lungs.   Has completed abx therapy previously prescribed--doxycycline, augmentin  Denies fever. Daughter has also been sick, similar symptoms but is running a fever.     Has been using breo ellipta multiple times per day.   Subjective     The following portions of the patient's history were reviewed and updated as appropriate: allergies, current medications, past family history, past medical history, past social history, past surgical history and problem list.    ALLERGIES  Allergies   Allergen Reactions    Nsaids GI Intolerance       Review of Systems  As above      Objective     Vital Signs:   /84   Pulse 80   Temp 98.1 °F (36.7 °C)   Resp 18   Ht 162.6 cm (64\")   Wt 84.4 kg (186 lb)   LMP  (LMP Unknown)   SpO2 96%   BMI 31.93 kg/m²                Physical Exam   Physical Exam  Vitals and nursing note reviewed.   HENT:      Right Ear: No middle ear effusion.      Left Ear:  No middle ear effusion.      Nose: No mucosal edema, congestion or rhinorrhea.      Right Turbinates: Swollen.      Left Turbinates: Swollen.      Right Sinus: No maxillary sinus tenderness or frontal sinus tenderness.      Left Sinus: No maxillary sinus tenderness or frontal sinus tenderness.      Mouth/Throat:      Lips: Pink.      Pharynx: Posterior oropharyngeal erythema present. No pharyngeal swelling, oropharyngeal exudate or postnasal drip.   Eyes:      General: Lids are normal.      " Conjunctiva/sclera: Conjunctivae normal.      Pupils: Pupils are equal, round, and reactive to light.   Cardiovascular:      Rate and Rhythm: Normal rate and regular rhythm.   Pulmonary:      Effort: Pulmonary effort is normal.      Breath sounds: Examination of the right-lower field reveals rhonchi. Examination of the left-lower field reveals rhonchi. Decreased breath sounds, wheezing and rhonchi present.   Lymphadenopathy:      Head:      Right side of head: No tonsillar adenopathy.      Left side of head: No tonsillar adenopathy.   Neurological:      Mental Status: She is alert and oriented to person, place, and time.      Gait: Gait is intact.   Psychiatric:         Attention and Perception: Attention normal.         Mood and Affect: Mood and affect normal.         Speech: Speech normal.         Behavior: Behavior normal. Behavior is cooperative.         Assessment and Plan      Assessment/Plan:   Diagnoses and all orders for this visit:    1. Acute lower respiratory tract infection (Primary)  -     azithromycin (Zithromax Z-Bolivar) 250 MG tablet; Take 2 tablets by mouth on day 1, then 1 tablet daily on days 2-5  Dispense: 6 tablet; Refill: 0  -     promethazine-dextromethorphan (PROMETHAZINE-DM) 6.25-15 MG/5ML syrup; Take 2.5 mL by mouth 4 (Four) Times a Day As Needed for Cough.  Dispense: 150 mL; Refill: 0  -     predniSONE (DELTASONE) 20 MG tablet; Take 1 tablet by mouth Daily for 7 days.  Dispense: 7 tablet; Refill: 0  -     guaiFENesin (Mucinex) 600 MG 12 hr tablet; Take 2 tablets by mouth 2 (Two) Times a Day for 7 days.  Dispense: 28 tablet; Refill: 0  -     albuterol sulfate HFA (ProAir HFA) 108 (90 Base) MCG/ACT inhaler; Inhale 2 puffs Every 4 (Four) Hours As Needed for Wheezing.  Dispense: 18 g; Refill: 5  -     cefTRIAXone (ROCEPHIN) injection 1 g  -     ipratropium (ATROVENT) nebulizer solution 0.5 mg  -     albuterol (PROVENTIL) nebulizer solution 0.083% 2.5 mg/3mL  -     ipratropium-albuterol (DUO-NEB)  0.5-2.5 mg/3 ml nebulizer; Take 3 mL by nebulization Every 4 (Four) Hours As Needed for Wheezing or Shortness of Air.  Dispense: 360 mL; Refill: 0    2. Acute cough  -     promethazine-dextromethorphan (PROMETHAZINE-DM) 6.25-15 MG/5ML syrup; Take 2.5 mL by mouth 4 (Four) Times a Day As Needed for Cough.  Dispense: 150 mL; Refill: 0  -     cefTRIAXone (ROCEPHIN) injection 1 g  -     ipratropium (ATROVENT) nebulizer solution 0.5 mg  -     albuterol (PROVENTIL) nebulizer solution 0.083% 2.5 mg/3mL    3. COPD with exacerbation  -     predniSONE (DELTASONE) 20 MG tablet; Take 1 tablet by mouth Daily for 7 days.  Dispense: 7 tablet; Refill: 0  -     albuterol sulfate HFA (ProAir HFA) 108 (90 Base) MCG/ACT inhaler; Inhale 2 puffs Every 4 (Four) Hours As Needed for Wheezing.  Dispense: 18 g; Refill: 5  -     ipratropium-albuterol (DUO-NEB) 0.5-2.5 mg/3 ml nebulizer; Take 3 mL by nebulization Every 4 (Four) Hours As Needed for Wheezing or Shortness of Air.  Dispense: 360 mL; Refill: 0  -     ipratropium-albuterol (Combivent Respimat)  MCG/ACT inhaler; Inhale 1 puff 4 (Four) Times a Day As Needed for Wheezing.  Dispense: 4 g; Refill: 11    4. Thrush  -     nystatin (MYCOSTATIN) 100,000 unit/mL suspension; Take 2 mL by mouth As Needed (Sore throat). SWISH AND SWALLOW 5 MLS BY MOUTH FOUR TIMES DAILY  -     fluconazole (Diflucan) 150 MG tablet; Take 1 tablet by mouth Every Other Day.  Dispense: 3 tablet; Refill: 0    Risks, benefits, and potential side effects of current/new medications reviewed with patient.  Patient voiced understanding and wished to proceed with treatment.    Discussed with patient s/s requiring presentation to ED.    Patient was encouraged to keep me informed of any acute changes, lack of improvement, or any new concerning symptoms.    Discussion Summary:  Discussed plan of care in detail with pt today; pt verb understanding and agrees.      I spent 30 minutes caring for Katelynn on this date of service.  This time includes time spent by me in the following activities:preparing for the visit, reviewing tests, obtaining and/or reviewing a separately obtained history, performing a medically appropriate examination and/or evaluation , counseling and educating the patient/family/caregiver, ordering medications, tests, or procedures, documenting information in the medical record, and care coordination    I confirm accuracy of unchanged data/findings which have been carried forward from previous visit, as well as I have updated appropriately those that have changed.        I have reviewed and updated all copied forward information, as appropriate.  I attest to the accuracy and relevance of any unchanged information.      Follow up:  No follow-ups on file.     Patient Education:  There are no Patient Instructions on file for this visit.    JESSICA Yung  03/31/25  09:30 EDT          Please note that portions of this note may have been completed with a voice recognition program.

## 2025-03-19 DIAGNOSIS — K86.1 CHRONIC PANCREATITIS, UNSPECIFIED PANCREATITIS TYPE: ICD-10-CM

## 2025-03-19 RX ORDER — PROMETHAZINE HYDROCHLORIDE 25 MG/1
25 TABLET ORAL EVERY 8 HOURS PRN
Qty: 90 TABLET | Refills: 0 | Status: SHIPPED | OUTPATIENT
Start: 2025-03-19

## 2025-04-04 ENCOUNTER — PRIOR AUTHORIZATION (OUTPATIENT)
Dept: FAMILY MEDICINE CLINIC | Facility: CLINIC | Age: 65
End: 2025-04-04
Payer: MEDICAID

## 2025-04-04 RX ORDER — CETIRIZINE HYDROCHLORIDE 10 MG/1
10 TABLET ORAL DAILY
Qty: 30 TABLET | Refills: 0 | Status: SHIPPED | OUTPATIENT
Start: 2025-04-04

## 2025-04-04 NOTE — TELEPHONE ENCOUNTER
PA approved   Approval dates:    04/04/2025 to 04/04/2026    Patient notified via voicemail.  Pharmacy notified

## 2025-04-04 NOTE — TELEPHONE ENCOUNTER
Prior Authorization has been started on [employer] for Dexlansoprazole    Waiting on response from insurance       Key:[id number]

## 2025-04-09 DIAGNOSIS — K86.1 CHRONIC PANCREATITIS, UNSPECIFIED PANCREATITIS TYPE: ICD-10-CM

## 2025-04-09 DIAGNOSIS — M62.838 CERVICAL PARASPINAL MUSCLE SPASM: ICD-10-CM

## 2025-04-09 DIAGNOSIS — M54.2 NECK PAIN: ICD-10-CM

## 2025-04-09 DIAGNOSIS — Z86.0100 HISTORY OF COLON POLYPS: ICD-10-CM

## 2025-04-09 DIAGNOSIS — M79.7 FIBROMYALGIA: ICD-10-CM

## 2025-04-09 RX ORDER — BISACODYL 5 MG
TABLET, DELAYED RELEASE (ENTERIC COATED) ORAL
Qty: 4 TABLET | Refills: 0 | OUTPATIENT
Start: 2025-04-09

## 2025-04-09 RX ORDER — PROMETHAZINE HYDROCHLORIDE 25 MG/1
25 TABLET ORAL EVERY 8 HOURS PRN
Qty: 90 TABLET | Refills: 0 | Status: SHIPPED | OUTPATIENT
Start: 2025-04-09

## 2025-04-09 RX ORDER — CYCLOBENZAPRINE HCL 5 MG
5 TABLET ORAL 3 TIMES DAILY PRN
Qty: 90 TABLET | Refills: 0 | Status: SHIPPED | OUTPATIENT
Start: 2025-04-09

## 2025-04-10 DIAGNOSIS — J44.1 COPD WITH EXACERBATION: ICD-10-CM

## 2025-04-10 DIAGNOSIS — K21.9 GASTROESOPHAGEAL REFLUX DISEASE WITHOUT ESOPHAGITIS: ICD-10-CM

## 2025-04-10 DIAGNOSIS — J22 ACUTE LOWER RESPIRATORY TRACT INFECTION: ICD-10-CM

## 2025-04-10 DIAGNOSIS — K59.00 CONSTIPATION, UNSPECIFIED CONSTIPATION TYPE: ICD-10-CM

## 2025-04-10 RX ORDER — DOCUSATE SODIUM 100 MG/1
100 CAPSULE, LIQUID FILLED ORAL 2 TIMES DAILY PRN
Qty: 60 CAPSULE | Refills: 0 | Status: SHIPPED | OUTPATIENT
Start: 2025-04-10

## 2025-04-10 RX ORDER — ALBUTEROL SULFATE 90 UG/1
2 INHALANT RESPIRATORY (INHALATION) EVERY 4 HOURS PRN
Qty: 18 G | Refills: 5 | Status: SHIPPED | OUTPATIENT
Start: 2025-04-10

## 2025-04-10 RX ORDER — DEXLANSOPRAZOLE 60 MG/1
1 CAPSULE, DELAYED RELEASE ORAL DAILY
Qty: 30 CAPSULE | Refills: 4 | Status: SHIPPED | OUTPATIENT
Start: 2025-04-10

## 2025-04-20 DIAGNOSIS — J44.1 COPD WITH EXACERBATION: ICD-10-CM

## 2025-04-20 DIAGNOSIS — J22 ACUTE LOWER RESPIRATORY TRACT INFECTION: ICD-10-CM

## 2025-04-22 RX ORDER — IPRATROPIUM BROMIDE AND ALBUTEROL SULFATE 2.5; .5 MG/3ML; MG/3ML
SOLUTION RESPIRATORY (INHALATION)
Qty: 360 ML | Refills: 0 | Status: SHIPPED | OUTPATIENT
Start: 2025-04-22

## 2025-05-01 ENCOUNTER — OFFICE VISIT (OUTPATIENT)
Dept: FAMILY MEDICINE CLINIC | Facility: CLINIC | Age: 65
End: 2025-05-01
Payer: MEDICAID

## 2025-05-01 VITALS
BODY MASS INDEX: 32.44 KG/M2 | DIASTOLIC BLOOD PRESSURE: 72 MMHG | OXYGEN SATURATION: 96 % | HEIGHT: 64 IN | WEIGHT: 190 LBS | HEART RATE: 80 BPM | SYSTOLIC BLOOD PRESSURE: 116 MMHG | RESPIRATION RATE: 16 BRPM

## 2025-05-01 DIAGNOSIS — R05.1 ACUTE COUGH: ICD-10-CM

## 2025-05-01 DIAGNOSIS — R06.02 SHORTNESS OF BREATH: ICD-10-CM

## 2025-05-01 DIAGNOSIS — Z86.711 HISTORY OF PULMONARY EMBOLISM: ICD-10-CM

## 2025-05-01 DIAGNOSIS — J22 ACUTE LOWER RESPIRATORY TRACT INFECTION: Primary | ICD-10-CM

## 2025-05-01 RX ORDER — METHYLPREDNISOLONE SODIUM SUCCINATE 40 MG/ML
40 INJECTION INTRAMUSCULAR; INTRAVENOUS ONCE
Status: COMPLETED | OUTPATIENT
Start: 2025-05-01 | End: 2025-05-01

## 2025-05-01 RX ORDER — DEXTROMETHORPHAN HYDROBROMIDE AND PROMETHAZINE HYDROCHLORIDE 15; 6.25 MG/5ML; MG/5ML
2.5 SYRUP ORAL 4 TIMES DAILY PRN
Qty: 150 ML | Refills: 0 | Status: SHIPPED | OUTPATIENT
Start: 2025-05-01

## 2025-05-01 RX ORDER — BUDESONIDE, GLYCOPYRROLATE, AND FORMOTEROL FUMARATE 160; 9; 4.8 UG/1; UG/1; UG/1
AEROSOL, METERED RESPIRATORY (INHALATION)
COMMUNITY
Start: 2025-04-29

## 2025-05-01 RX ORDER — AZITHROMYCIN 250 MG/1
TABLET, FILM COATED ORAL
Qty: 6 TABLET | Refills: 0 | Status: SHIPPED | OUTPATIENT
Start: 2025-05-01

## 2025-05-01 RX ADMIN — METHYLPREDNISOLONE SODIUM SUCCINATE 40 MG: 40 INJECTION INTRAMUSCULAR; INTRAVENOUS at 16:19

## 2025-05-01 NOTE — PROGRESS NOTES
"                      Established Patient        Chief Complaint:   Chief Complaint   Patient presents with    Pneumonia     Pt thinks she may have pneumonia.        History of Present Illness  The patient presents for evaluation of wheezing and cough.    Experiencing wheezing, chest congestion, and persistent cough for 3 days, significantly impacting well-being. Sudden onset. Difficulty expectorating thick, dark sputum; allergy medication used. Reports body aches and back pain due to excessive coughing. No sore throat or runny nose. Breathlessness when ascending/descending stairs. Symptoms worse at night and early morning. No improvement with Levaquin prescribed by Dr. Judy Leigh on Tuesday; taken at 4:00 AM today.    History of pulmonary embolism 5 months ago (11/2024).    SOCIAL HISTORY  No smoking or vaping.      Subjective     The following portions of the patient's history were reviewed and updated as appropriate: allergies, current medications, past family history, past medical history, past social history, past surgical history and problem list.    ALLERGIES  Allergies   Allergen Reactions    Nsaids GI Intolerance       Review of Systems  Gen- No fevers, chills, +fatigue  HEENT--No runny nose, congestion, sore throat, ear pain  CV- No chest pain, palpitations  Resp- + cough, + dyspnea  GI- No N/V/D, abd pain  -No dysuria, flank pain, difficulty urinating  Musc-No tenderness, swelling, decreased ROM, +body aches  Neuro-No dizziness, headaches  Psych-No SI/HI, sleep disturbance    Objective     Vital Signs:   /72   Pulse 80   Resp 16   Ht 162.6 cm (64\")   Wt 86.2 kg (190 lb)   LMP  (LMP Unknown)   SpO2 96%   BMI 32.61 kg/m²                Physical Exam   Physical Exam  Vitals and nursing note reviewed.   HENT:      Right Ear: No middle ear effusion.      Left Ear:  No middle ear effusion.      Nose: Mucosal edema and rhinorrhea present. No congestion.      Right Turbinates: Not swollen.     "  Left Turbinates: Not swollen.      Right Sinus: No maxillary sinus tenderness or frontal sinus tenderness.      Left Sinus: No maxillary sinus tenderness or frontal sinus tenderness.      Mouth/Throat:      Lips: Pink.      Pharynx: Posterior oropharyngeal erythema present. No pharyngeal swelling, oropharyngeal exudate or postnasal drip.   Eyes:      General: Lids are normal.      Conjunctiva/sclera: Conjunctivae normal.      Pupils: Pupils are equal, round, and reactive to light.   Cardiovascular:      Rate and Rhythm: Normal rate and regular rhythm.   Pulmonary:      Effort: Pulmonary effort is normal.      Breath sounds: Decreased breath sounds, wheezing and rhonchi present.   Lymphadenopathy:      Head:      Right side of head: No tonsillar adenopathy.      Left side of head: No tonsillar adenopathy.   Neurological:      Mental Status: She is alert and oriented to person, place, and time.      Gait: Gait is intact.   Psychiatric:         Attention and Perception: Attention normal.         Mood and Affect: Mood and affect normal.         Speech: Speech normal.         Behavior: Behavior normal. Behavior is cooperative.         Assessment and Plan      Assessment/Plan:   Diagnoses and all orders for this visit:    1. Acute lower respiratory tract infection (Primary)  -     promethazine-dextromethorphan (PROMETHAZINE-DM) 6.25-15 MG/5ML syrup; Take 2.5 mL by mouth 4 (Four) Times a Day As Needed for Cough.  Dispense: 150 mL; Refill: 0  -     azithromycin (Zithromax Z-Bolivar) 250 MG tablet; Take 2 tablets by mouth on day 1, then 1 tablet daily on days 2-5  Dispense: 6 tablet; Refill: 0  -     methylPREDNISolone sodium succinate (SOLU-Medrol) injection 40 mg    2. Shortness of breath  -     POCT SARS-CoV-2 Antigen MICHELLE + Flu  -     XR Chest PA & Lateral (In Office)  -     D-dimer, Quantitative    3. History of pulmonary embolism  -     D-dimer, Quantitative    4. Acute cough  -     promethazine-dextromethorphan  (PROMETHAZINE-DM) 6.25-15 MG/5ML syrup; Take 2.5 mL by mouth 4 (Four) Times a Day As Needed for Cough.  Dispense: 150 mL; Refill: 0      Risks, benefits, and potential side effects of current/new medications reviewed with patient.  Patient voiced understanding and wished to proceed with treatment.    I will contact patient regarding test results and provide instructions regarding any necessary changes in plan of care.    Discussed with patient that symptoms appear viral in presentation. Antibiotics are not necessary or appropriate at this time.    Increase clear fluid intake to thin secretions. Avoid dairy as it may thicken sputum/mucous.    May use nasal saline OTC for management of nasal congestion by irrigation/thinning of mucous.    May utilize cool mist humidifier in bedroom at night to decrease dryness and help to thin secretions.     Cough drops OTC PRN.    May alternate Acetaminophen and Ibuprofen every 4-6 hours as needed for pain, fever > 101.    Encourage hand hygiene to prevent spread of infection.    Patient to go to ED for worsening symptoms.       Assessment & Plan  Wheezing and cough  - Reports wheezing, chest congestion, and coughing for 3 days  - Physical exam reveals wheezing without significant congestion; COVID-19 and influenza tests negative  - Chest x-ray to rule out pneumonia or other respiratory issues  - Prescribed promethazine DM, antibiotics, and steroids  - Advised ER visit if symptoms worsen despite normal chest x-ray    Pulmonary embolism  - History of pulmonary embolism 5 months ago  - If chest x-ray is normal and symptoms worsen, advised ER visit to rule out recurrent pulmonary embolism    Discussion Summary:  Discussed plan of care in detail with pt today; pt verb understanding and agrees.        I have reviewed and updated all copied forward information, as appropriate.  I attest to the accuracy and relevance of any unchanged information.      Follow up:  No follow-ups on file.      Patient Education:  There are no Patient Instructions on file for this visit.    Patient or patient representative verbalized consent for the use of Ambient Listening during the visit with  JESSICA Yung for chart documentation. 5/18/2025  21:35 EDT    JESSICA Yung  05/18/25  21:35 EDT          Please note that portions of this note may have been completed with a voice recognition program.

## 2025-05-02 LAB — D DIMER PPP FEU-MCNC: 0.74 MG/L FEU (ref 0–0.49)

## 2025-05-07 DIAGNOSIS — I26.99 OTHER ACUTE PULMONARY EMBOLISM WITHOUT ACUTE COR PULMONALE: ICD-10-CM

## 2025-05-07 RX ORDER — CETIRIZINE HYDROCHLORIDE 10 MG/1
10 TABLET ORAL DAILY
Qty: 30 TABLET | Refills: 0 | Status: SHIPPED | OUTPATIENT
Start: 2025-05-07

## 2025-05-07 RX ORDER — APIXABAN 5 MG/1
5 TABLET, FILM COATED ORAL 2 TIMES DAILY
Qty: 60 TABLET | Refills: 0 | Status: SHIPPED | OUTPATIENT
Start: 2025-05-07

## 2025-05-09 DIAGNOSIS — M79.7 FIBROMYALGIA: ICD-10-CM

## 2025-05-09 DIAGNOSIS — M54.2 NECK PAIN: ICD-10-CM

## 2025-05-09 DIAGNOSIS — M62.838 CERVICAL PARASPINAL MUSCLE SPASM: ICD-10-CM

## 2025-05-09 RX ORDER — CYCLOBENZAPRINE HCL 5 MG
5 TABLET ORAL 3 TIMES DAILY PRN
Qty: 90 TABLET | Refills: 0 | Status: SHIPPED | OUTPATIENT
Start: 2025-05-09

## 2025-05-13 DIAGNOSIS — K86.1 CHRONIC PANCREATITIS, UNSPECIFIED PANCREATITIS TYPE: ICD-10-CM

## 2025-05-13 RX ORDER — PROMETHAZINE HYDROCHLORIDE 25 MG/1
25 TABLET ORAL EVERY 8 HOURS PRN
Qty: 90 TABLET | Refills: 0 | Status: SHIPPED | OUTPATIENT
Start: 2025-05-13

## 2025-05-19 DIAGNOSIS — F41.8 SITUATIONAL ANXIETY: ICD-10-CM

## 2025-05-19 DIAGNOSIS — K59.00 CONSTIPATION, UNSPECIFIED CONSTIPATION TYPE: ICD-10-CM

## 2025-05-19 RX ORDER — DOCUSATE SODIUM 100 MG/1
100 CAPSULE, LIQUID FILLED ORAL 2 TIMES DAILY PRN
Qty: 60 CAPSULE | Refills: 0 | OUTPATIENT
Start: 2025-05-19

## 2025-05-19 RX ORDER — CLONAZEPAM 0.5 MG/1
0.5 TABLET ORAL 2 TIMES DAILY PRN
Qty: 60 TABLET | Refills: 2 | OUTPATIENT
Start: 2025-05-19

## 2025-05-19 NOTE — TELEPHONE ENCOUNTER
Rx Refill Note  Requested Prescriptions     Pending Prescriptions Disp Refills    clonazePAM (KlonoPIN) 0.5 MG tablet [Pharmacy Med Name: CLONAZEPAM 0.500 MG TABLET] 60 tablet 1     Sig: TAKE 1 TABLET BY MOUTH 2 (TWO) TIMES A DAY AS NEEDED FOR ANXIETY.    docusate sodium (COLACE) 100 MG capsule [Pharmacy Med Name: DOCUSATE SODIUM 100 MG CAPSULE] 60 capsule 0     Sig: TAKE 1 CAPSULE BY MOUTH 2 (TWO) TIMES A DAY AS NEEDED FOR CONSTIPATION.      Last office visit with prescribing clinician: 3/3/2025   Last telemedicine visit with prescribing clinician: Visit date not found   Next office visit with prescribing clinician: 5/28/2025                         Would you like a call back once the refill request has been completed: [] Yes [] No    If the office needs to give you a call back, can they leave a voicemail: [] Yes [] No    Desiree Madison MA  05/19/25, 17:50 EDT

## 2025-05-20 ENCOUNTER — CLINICAL SUPPORT (OUTPATIENT)
Dept: FAMILY MEDICINE CLINIC | Facility: CLINIC | Age: 65
End: 2025-05-20
Payer: MEDICAID

## 2025-05-20 DIAGNOSIS — Z51.81 ENCOUNTER FOR THERAPEUTIC DRUG MONITORING: Primary | ICD-10-CM

## 2025-05-20 RX ORDER — CLONAZEPAM 0.5 MG/1
0.5 TABLET ORAL 2 TIMES DAILY PRN
Qty: 60 TABLET | Refills: 0 | Status: SHIPPED | OUTPATIENT
Start: 2025-05-20

## 2025-05-20 RX ORDER — DOCUSATE SODIUM 100 MG/1
100 CAPSULE, LIQUID FILLED ORAL 2 TIMES DAILY PRN
Qty: 60 CAPSULE | Refills: 5 | Status: SHIPPED | OUTPATIENT
Start: 2025-05-20

## 2025-05-20 NOTE — TELEPHONE ENCOUNTER
PATIENT CALLED AND I LET HER KNOW TO COME IN AND UPDATE UDS AND CSA. PATIENT UNDERSTOOD AND WILL COME IN TODAY

## 2025-05-28 ENCOUNTER — OFFICE VISIT (OUTPATIENT)
Dept: FAMILY MEDICINE CLINIC | Facility: CLINIC | Age: 65
End: 2025-05-28
Payer: MEDICAID

## 2025-05-28 VITALS
OXYGEN SATURATION: 95 % | DIASTOLIC BLOOD PRESSURE: 80 MMHG | SYSTOLIC BLOOD PRESSURE: 136 MMHG | HEART RATE: 80 BPM | HEIGHT: 64 IN | WEIGHT: 189 LBS | BODY MASS INDEX: 32.27 KG/M2

## 2025-05-28 DIAGNOSIS — M79.7 FIBROMYALGIA: ICD-10-CM

## 2025-05-28 DIAGNOSIS — Z78.0 POSTMENOPAUSAL: ICD-10-CM

## 2025-05-28 DIAGNOSIS — G47.33 OBSTRUCTIVE SLEEP APNEA SYNDROME: ICD-10-CM

## 2025-05-28 DIAGNOSIS — K21.9 GASTROESOPHAGEAL REFLUX DISEASE, UNSPECIFIED WHETHER ESOPHAGITIS PRESENT: ICD-10-CM

## 2025-05-28 DIAGNOSIS — E78.2 MIXED HYPERLIPIDEMIA: ICD-10-CM

## 2025-05-28 DIAGNOSIS — K59.09 CHRONIC CONSTIPATION: ICD-10-CM

## 2025-05-28 DIAGNOSIS — K22.719 BARRETT'S ESOPHAGUS WITH DYSPLASIA: ICD-10-CM

## 2025-05-28 DIAGNOSIS — M79.673 CHRONIC FOOT PAIN, UNSPECIFIED LATERALITY: ICD-10-CM

## 2025-05-28 DIAGNOSIS — G47.19 EXCESSIVE DAYTIME SLEEPINESS: ICD-10-CM

## 2025-05-28 DIAGNOSIS — J42 CHRONIC BRONCHITIS, UNSPECIFIED CHRONIC BRONCHITIS TYPE: Primary | ICD-10-CM

## 2025-05-28 DIAGNOSIS — K76.0 FATTY LIVER: ICD-10-CM

## 2025-05-28 DIAGNOSIS — G89.29 CHRONIC FOOT PAIN, UNSPECIFIED LATERALITY: ICD-10-CM

## 2025-05-28 DIAGNOSIS — M21.619 HALLUX VALGUS WITH BUNIONS, UNSPECIFIED LATERALITY: ICD-10-CM

## 2025-05-28 DIAGNOSIS — M20.12 HALLUX VALGUS (ACQUIRED), LEFT FOOT: ICD-10-CM

## 2025-05-28 DIAGNOSIS — E66.09 CLASS 1 OBESITY DUE TO EXCESS CALORIES WITH SERIOUS COMORBIDITY AND BODY MASS INDEX (BMI) OF 32.0 TO 32.9 IN ADULT: ICD-10-CM

## 2025-05-28 DIAGNOSIS — R06.83 SNORING: ICD-10-CM

## 2025-05-28 DIAGNOSIS — M20.10 HALLUX VALGUS WITH BUNIONS, UNSPECIFIED LATERALITY: ICD-10-CM

## 2025-05-28 DIAGNOSIS — Z13.820 SCREENING FOR OSTEOPOROSIS: ICD-10-CM

## 2025-05-28 DIAGNOSIS — K86.1 CHRONIC PANCREATITIS, UNSPECIFIED PANCREATITIS TYPE: ICD-10-CM

## 2025-05-28 DIAGNOSIS — Z12.31 ENCOUNTER FOR SCREENING MAMMOGRAM FOR MALIGNANT NEOPLASM OF BREAST: ICD-10-CM

## 2025-05-28 DIAGNOSIS — E66.811 CLASS 1 OBESITY DUE TO EXCESS CALORIES WITH SERIOUS COMORBIDITY AND BODY MASS INDEX (BMI) OF 32.0 TO 32.9 IN ADULT: ICD-10-CM

## 2025-05-28 DIAGNOSIS — I10 PRIMARY HYPERTENSION: ICD-10-CM

## 2025-05-28 PROBLEM — F41.1 GENERALIZED ANXIETY DISORDER: Status: ACTIVE | Noted: 2025-01-21

## 2025-05-28 PROBLEM — Z87.19 HISTORY OF GI BLEED: Status: ACTIVE | Noted: 2024-10-21

## 2025-05-28 PROBLEM — F11.20 OPIOID DEPENDENCE: Status: ACTIVE | Noted: 2025-01-21

## 2025-05-28 PROBLEM — Z86.711 HISTORY OF PULMONARY EMBOLISM: Status: ACTIVE | Noted: 2024-10-19

## 2025-05-28 PROBLEM — B34.8 RHINOVIRUS: Status: RESOLVED | Noted: 2024-10-20 | Resolved: 2025-05-28

## 2025-05-28 PROBLEM — R14.0 BLOATING: Status: RESOLVED | Noted: 2022-01-13 | Resolved: 2025-05-28

## 2025-05-28 RX ORDER — PREGABALIN 200 MG/1
200 CAPSULE ORAL 2 TIMES DAILY
Qty: 60 CAPSULE | Refills: 2 | Status: SHIPPED | OUTPATIENT
Start: 2025-05-28

## 2025-05-28 NOTE — PROGRESS NOTES
Subjective   Katelynn Sung is a 65 y.o. female.     History of Present Illness  Here for routine f/u on several chronic med problems:  FMSx- recently stable symptoms on lyrica. Denies side effects from med other than some difficulty losing weight  H/o PE- completed 6 months of eliquis  Anxiety DO- has failed SSRI, SNRI, wellbutrin. Symptoms currently stable on klonopin up to bid. No aberrant behavior. Denies worsening depression.   HTN- on zestoretic  HLP- not currently on statin as she has declined tx. Last tried atorvastatin but stopped due to increase myalgias, fatigue.  A fib- no longer on eliquis, no rate control needed at this time s/p pacemaker placement. Managed by cardiology at [facility]  Obesity with fatty liver- weight recently stable. Struggles with limiting caloric intake  Substance use DO - followed closely at her [facility] clinic, seeing therapist as well.   She complains of worsening bilateral foot pain, worse on the left.  Has had podiatry consultation.  No improvement with recommendations.  Requesting orthopedics.    Seen by Svetlana here in clinic approximately 1 month ago for possible pneumonia in setting of COPd exacerbation.  Chest x-ray negative.  Treated with antitussive, oral CS, Z-Bolivar.  D-dimer mildly elevated.  She reports significant improvement in symptoms.  Denies chest pain, increased dyspnea on exertion, cough. States she is walking about a mild a day.    Due for pap, declines today.    Episode of colitis 3/2025.  Followed by gastroenterology but no recent visit.  Diagnoses include opioid-induced constipation, chronic pancreatitis, GERD, Humphreys's, SMITH, history of GI bleed.    Suspected MARY during last hospitalization. Has had no previous sleep study.    Pt's previous HPI reviewed and updated as indicated.     The following portions of the patient's history were reviewed and updated as appropriate: allergies, current medications, past family history, past medical history, past social  history, past surgical history, and problem list.    Review of Systems   Constitutional:  Positive for fatigue. Negative for diaphoresis, fever and unexpected weight change.   HENT:  Positive for congestion and postnasal drip. Negative for ear pain, mouth sores, nosebleeds, rhinorrhea and sore throat.    Eyes:  Negative for visual disturbance.   Respiratory:  Positive for shortness of breath (mild with exertion, back to baseline). Negative for cough and wheezing.    Cardiovascular:  Negative for chest pain, palpitations and leg swelling.   Gastrointestinal:  Positive for abdominal pain (chronic, stable), constipation and nausea. Negative for blood in stool, rectal pain and vomiting.   Endocrine: Positive for heat intolerance. Negative for polydipsia and polyuria.   Genitourinary:  Negative for difficulty urinating, dysuria, genital sores, hematuria, pelvic pain, vaginal bleeding and vaginal discharge.   Musculoskeletal:  Positive for arthralgias, back pain, myalgias, neck pain and neck stiffness.   Skin:  Negative for rash and wound.   Neurological:  Positive for dizziness, weakness (generalized) and headaches. Negative for tremors and syncope.   Hematological:  Negative for adenopathy. Does not bruise/bleed easily.   Psychiatric/Behavioral:  Positive for dysphoric mood. Negative for confusion and suicidal ideas. The patient is nervous/anxious.    Pt's previous ROS reviewed and updated as indicated.       Objective   Vitals:    05/28/25 1355   BP: 136/80   Pulse: 80   SpO2: 95%     Body mass index is 32.44 kg/m².      05/28/25  1355   Weight: 85.7 kg (189 lb)         Physical Exam  Vitals and nursing note reviewed.   Constitutional:       General: She is not in acute distress.     Appearance: She is well-groomed. She is obese. She is not ill-appearing.   HENT:      Head: Atraumatic.      Mouth/Throat:      Mouth: Mucous membranes are moist.   Eyes:      General: No scleral icterus.     Conjunctiva/sclera:  Conjunctivae normal.   Neck:      Thyroid: No thyroid mass.   Cardiovascular:      Rate and Rhythm: Normal rate and regular rhythm.      Pulses: Normal pulses.      Heart sounds: Normal heart sounds.   Pulmonary:      Effort: Pulmonary effort is normal.      Breath sounds: Decreased breath sounds present. No wheezing, rhonchi or rales.   Musculoskeletal:      Thoracic back: Deformity (increased kyphosis) present. No bony tenderness.      Right lower leg: No edema.      Left lower leg: No edema.   Lymphadenopathy:      Cervical: No cervical adenopathy.   Skin:     General: Skin is warm and dry.      Coloration: Skin is not jaundiced or pale.      Findings: No bruising or rash.   Neurological:      Mental Status: She is alert and oriented to person, place, and time. Mental status is at baseline.      Gait: Gait is intact.   Psychiatric:         Mood and Affect: Mood and affect normal.         Behavior: Behavior normal. Behavior is cooperative.         Cognition and Memory: Cognition normal.     Pt's previous physical exam reviewed and updated as indicated.    XR Chest PA & Lateral (In Office)  Result Date: 5/1/2025  No acute cardiopulmonary process.      Images were reviewed, interpreted, and dictated by Dr. Epifanio Amado MD Transcribed by Arthur Eaton PA-C.  This report was signed and finalized on 5/1/2025 4:38 PM by Epifanio Amado MD.      XR Chest 1 View  Result Date: 3/6/2025  No acute cardiopulmonary process.        Images were reviewed, interpreted, and dictated by Dr. Marianna Rodriguez MD Transcribed by Arthur Eaton PA-C.  This report was signed and finalized on 3/6/2025 8:36 AM by Marianna Rodriguez MD.      CT Angiogram Chest Pulmonary Embolism  Result Date: 3/5/2025  IMPRESSION: No pulmonary embolus or acute airspace disease. Authenticated and Electronically Signed by RADHA Garcia MD on 03/05/2025 07:42:54 PM    CT Abdomen Pelvis With Contrast  Result Date: 3/5/2025  IMPRESSION: Improved colitis. No other interval  changes. Authenticated and Electronically Signed by RADHA Garcia MD on 03/05/2025 07:37:00 PM    CT Abdomen Pelvis With Contrast  Result Date: 3/3/2025  Impression: Mild colitis, infectious/inflammatory. Authenticated and Electronically Signed by Licha Fernandez MD on 03/03/2025 05:22:19 PM    Lab Results   Component Value Date    WBC 4.61 03/05/2025    HGB 13.1 03/05/2025    HCT 37.9 03/05/2025    MCV 85.7 03/05/2025     03/05/2025       Lab Results   Component Value Date    GLUCOSE 105 (H) 03/05/2025    BUN 8 03/05/2025    CREATININE 0.86 03/05/2025    EGFRIFNONA 80 12/09/2021    EGFRIFAFRI 97 12/09/2021    BCR 9.3 03/05/2025    K 4.4 03/05/2025    CO2 25.4 03/05/2025    CALCIUM 9.0 03/05/2025    ALBUMIN 4.3 03/05/2025    AST 32 03/05/2025    ALT 15 03/05/2025       Lab Results   Component Value Date    CHOL 248 (H) 03/03/2025    CHLPL 200 01/29/2024    TRIG 136 03/03/2025    HDL 49 03/03/2025     (H) 03/03/2025       Lab Results   Component Value Date    HGBA1C 5.30 03/03/2025       Lab Results   Component Value Date    TSH 2.270 08/06/2024           Assessment & Plan   Diagnoses and all orders for this visit:    1. Chronic bronchitis, unspecified chronic bronchitis type (Primary)    2. Obstructive sleep apnea syndrome    3. Fatty liver  -     Ambulatory Referral to Gastroenterology    4. Humphreys's esophagus with dysplasia  -     Ambulatory Referral to Gastroenterology    5. Class 1 obesity due to excess calories with serious comorbidity and body mass index (BMI) of 32.0 to 32.9 in adult  -     Ambulatory Referral to Sleep Medicine    6. Mixed hyperlipidemia    7. Primary hypertension    8. Fibromyalgia  -     pregabalin (LYRICA) 200 MG capsule; Take 1 capsule by mouth 2 (Two) Times a Day.  Dispense: 60 capsule; Refill: 2    9. Chronic foot pain, unspecified laterality  -     Ambulatory Referral to Orthopedic Surgery    10. Hallux valgus with bunions, unspecified laterality  -     Ambulatory  Referral to Orthopedic Surgery    11. Hallux valgus (acquired), left foot  -     Ambulatory Referral to Orthopedic Surgery    12. Gastroesophageal reflux disease, unspecified whether esophagitis present  -     Ambulatory Referral to Gastroenterology    13. Chronic pancreatitis, unspecified pancreatitis type  -     Ambulatory Referral to Gastroenterology    14. Chronic constipation  -     Ambulatory Referral to Gastroenterology    15. Excessive daytime sleepiness  -     Ambulatory Referral to Sleep Medicine    16. Snoring  -     Ambulatory Referral to Sleep Medicine    17. Screening for osteoporosis  -     DEXA Bone Density Axial; Future    18. Postmenopausal  -     DEXA Bone Density Axial; Future    19. Encounter for screening mammogram for malignant neoplasm of breast  -     Mammo Screening Digital Tomosynthesis Bilateral With CAD; Future       COPD appears stable. Clinically improved. Do not feel f/u imaging for mildly elevated d dimer 1 month ago indicated at this time.  HTN controlled - continue zestoretic  HLP - declines statin. Pt advised to eat a heart healthy diet and get regular aerobic exercise.  FMSx stable - good functional benefit. No aberrant behavior. Continue Lyrica.  Routine f/u in 3 months, sooner as needed/instructed.  I will contact patient regarding test results and provide instructions regarding any necessary changes in plan of care.  Patient was encouraged to keep me informed of any acute changes, lack of improvement, or any new concerning symptoms.  Pt is aware of reasons to seek emergent care.  Patient voiced understanding of all instructions and denied further questions.  As part of patient's treatment plan I am prescribing a controlled substance.  The patient has been made aware of the appropriate use of such medications, including potential risk of somnolence, limited ability to drive and/or work safely, and potential for dependence and/or overdose.  It has also been made clear that these  medications are for use by this patient only, without concomitant use of alcohol or other substances, unless prescribed.  History and physical exam exhibit continued safe and appropriate use of controlled substance.  DANTE reviewed.  Patient has completed a prescribing agreement detailing terms of continued prescribing of controlled substances, including monitoring DANTE reports, urine drug screening, and pill counts if necessary.  Patient is aware that inappropriate use will result in cessation of prescribing such medications.      Please note that portions of this note may have been completed with a voice recognition program.

## 2025-05-29 LAB — DRUGS UR: NORMAL

## 2025-06-06 DIAGNOSIS — I26.99 OTHER ACUTE PULMONARY EMBOLISM WITHOUT ACUTE COR PULMONALE: ICD-10-CM

## 2025-06-09 RX ORDER — APIXABAN 5 MG/1
5 TABLET, FILM COATED ORAL 2 TIMES DAILY
Qty: 60 TABLET | Refills: 0 | OUTPATIENT
Start: 2025-06-09

## 2025-06-09 RX ORDER — CETIRIZINE HYDROCHLORIDE 10 MG/1
10 TABLET ORAL DAILY
Qty: 30 TABLET | Refills: 0 | Status: SHIPPED | OUTPATIENT
Start: 2025-06-09

## 2025-06-10 DIAGNOSIS — K86.1 CHRONIC PANCREATITIS, UNSPECIFIED PANCREATITIS TYPE: ICD-10-CM

## 2025-06-10 RX ORDER — PROMETHAZINE HYDROCHLORIDE 25 MG/1
25 TABLET ORAL EVERY 8 HOURS PRN
Qty: 90 TABLET | Refills: 0 | Status: SHIPPED | OUTPATIENT
Start: 2025-06-10

## 2025-06-16 DIAGNOSIS — M54.2 NECK PAIN: ICD-10-CM

## 2025-06-16 DIAGNOSIS — M62.838 CERVICAL PARASPINAL MUSCLE SPASM: ICD-10-CM

## 2025-06-16 DIAGNOSIS — M79.7 FIBROMYALGIA: ICD-10-CM

## 2025-06-16 DIAGNOSIS — F41.8 SITUATIONAL ANXIETY: ICD-10-CM

## 2025-06-17 RX ORDER — CLONAZEPAM 0.5 MG/1
0.5 TABLET ORAL 2 TIMES DAILY PRN
Qty: 60 TABLET | Refills: 2 | Status: SHIPPED | OUTPATIENT
Start: 2025-06-17

## 2025-06-17 RX ORDER — CYCLOBENZAPRINE HCL 5 MG
5 TABLET ORAL 3 TIMES DAILY PRN
Qty: 90 TABLET | Refills: 0 | Status: SHIPPED | OUTPATIENT
Start: 2025-06-17

## 2025-06-17 NOTE — TELEPHONE ENCOUNTER
Rx Refill Note  Requested Prescriptions     Pending Prescriptions Disp Refills    clonazePAM (KlonoPIN) 0.5 MG tablet 60 tablet 0     Sig: Take 1 tablet by mouth 2 (Two) Times a Day As Needed. for anxiety     Signed Prescriptions Disp Refills    cyclobenzaprine (FLEXERIL) 5 MG tablet 90 tablet 0     Sig: Take 1 tablet by mouth 3 (Three) Times a Day As Needed for Muscle Spasms.     Authorizing Provider: ERROL GUERRIER     Ordering User: SELINA ROBERTSON      Last office visit with prescribing clinician: 5/28/2025   Last telemedicine visit with prescribing clinician: Visit date not found   Next office visit with prescribing clinician: 6/16/2025                         Would you like a call back once the refill request has been completed: [] Yes [] No    If the office needs to give you a call back, can they leave a voicemail: [] Yes [] No    Selina Robertson MA  06/17/25, 11:40 EDT

## 2025-06-25 RX ORDER — AZELASTINE HYDROCHLORIDE 137 UG/1
SPRAY, METERED NASAL
Qty: 30 ML | Refills: 8 | Status: SHIPPED | OUTPATIENT
Start: 2025-06-25

## 2025-07-09 DIAGNOSIS — K86.1 CHRONIC PANCREATITIS, UNSPECIFIED PANCREATITIS TYPE: ICD-10-CM

## 2025-07-09 RX ORDER — PROMETHAZINE HYDROCHLORIDE 25 MG/1
25 TABLET ORAL EVERY 8 HOURS PRN
Qty: 90 TABLET | Refills: 0 | Status: SHIPPED | OUTPATIENT
Start: 2025-07-09

## 2025-07-09 RX ORDER — CETIRIZINE HYDROCHLORIDE 10 MG/1
10 TABLET ORAL DAILY
Qty: 30 TABLET | Refills: 0 | Status: SHIPPED | OUTPATIENT
Start: 2025-07-09

## 2025-07-16 DIAGNOSIS — M54.2 NECK PAIN: ICD-10-CM

## 2025-07-16 DIAGNOSIS — M62.838 CERVICAL PARASPINAL MUSCLE SPASM: ICD-10-CM

## 2025-07-16 DIAGNOSIS — M79.7 FIBROMYALGIA: ICD-10-CM

## 2025-07-16 RX ORDER — CYCLOBENZAPRINE HCL 5 MG
5 TABLET ORAL 3 TIMES DAILY PRN
Qty: 90 TABLET | Refills: 0 | Status: SHIPPED | OUTPATIENT
Start: 2025-07-16

## 2025-08-11 DIAGNOSIS — M54.2 NECK PAIN: ICD-10-CM

## 2025-08-11 DIAGNOSIS — M62.838 CERVICAL PARASPINAL MUSCLE SPASM: ICD-10-CM

## 2025-08-11 DIAGNOSIS — M79.7 FIBROMYALGIA: ICD-10-CM

## 2025-08-11 RX ORDER — CYCLOBENZAPRINE HCL 5 MG
5 TABLET ORAL 3 TIMES DAILY PRN
Qty: 90 TABLET | Refills: 0 | Status: SHIPPED | OUTPATIENT
Start: 2025-08-11

## 2025-08-16 DIAGNOSIS — K86.1 CHRONIC PANCREATITIS, UNSPECIFIED PANCREATITIS TYPE: ICD-10-CM

## 2025-08-18 RX ORDER — CETIRIZINE HYDROCHLORIDE 10 MG/1
10 TABLET ORAL DAILY
Qty: 30 TABLET | Refills: 0 | Status: SHIPPED | OUTPATIENT
Start: 2025-08-18

## 2025-08-18 RX ORDER — PROMETHAZINE HYDROCHLORIDE 25 MG/1
25 TABLET ORAL EVERY 8 HOURS PRN
Qty: 90 TABLET | Refills: 0 | Status: SHIPPED | OUTPATIENT
Start: 2025-08-18

## (undated) DEVICE — ENDOSCOPY PORT CONNECTOR FOR OLYMPUS® SCOPES: Brand: ERBE

## (undated) DEVICE — QUICK CATCH IN-LINE SUCTION POLYP TRAP IS USED FOR SUCTION RETRIEVAL OF ENDOSCOPICALLY REMOVED POLYPS.

## (undated) DEVICE — VLV SXN AIR/H2O ORCAPOD3 1P/U STRL

## (undated) DEVICE — Device: Brand: DEFENDO AIR/WATER/SUCTION AND BIOPSY VALVE

## (undated) DEVICE — HYBRID CO2 TUBING/CAP SET FOR OLYMPUS® SCOPES & CO2 SOURCE: Brand: ERBE

## (undated) DEVICE — SUCTION CANISTER, 1500CC, RIGID: Brand: DEROYAL

## (undated) DEVICE — DEV INFL ALLIANCE2 SYS

## (undated) DEVICE — CONMED SCOPE SAVER BITE BLOCK, 20X27 MM: Brand: SCOPE SAVER

## (undated) DEVICE — FRCP BX RADJAW4 NDL 2.8 240 STD OG

## (undated) DEVICE — SNAR POLYP CAPTIVATOR2 RND STFF 2.4 10MM 240CM

## (undated) DEVICE — Device

## (undated) DEVICE — ESOPHAGEAL BALLOON DILATATION CATHETER: Brand: CRE FIXED WIRE

## (undated) DEVICE — LUBE JELLY PK/2.75GM STRL BX/144

## (undated) DEVICE — FRCP BIOP COLD ENDOJAW ALLGTR W/NDL 2.8X2300MM BLU

## (undated) DEVICE — HYBRID TUBING/CAP SET FOR OLYMPUS® SCOPES: Brand: ERBE